# Patient Record
Sex: MALE | Race: OTHER | HISPANIC OR LATINO | Employment: OTHER | URBAN - METROPOLITAN AREA
[De-identification: names, ages, dates, MRNs, and addresses within clinical notes are randomized per-mention and may not be internally consistent; named-entity substitution may affect disease eponyms.]

---

## 2017-01-16 ENCOUNTER — TRANSCRIBE ORDERS (OUTPATIENT)
Dept: ADMINISTRATIVE | Facility: HOSPITAL | Age: 32
End: 2017-01-16

## 2017-01-16 ENCOUNTER — LAB (OUTPATIENT)
Dept: LAB | Facility: HOSPITAL | Age: 32
End: 2017-01-16
Payer: MEDICARE

## 2017-01-16 DIAGNOSIS — F25.1 SCHIZOAFFECTIVE DISORDER, DEPRESSIVE TYPE (HCC): Primary | ICD-10-CM

## 2017-01-16 DIAGNOSIS — Z79.899 ENCOUNTER FOR LONG-TERM (CURRENT) USE OF OTHER MEDICATIONS: ICD-10-CM

## 2017-01-16 DIAGNOSIS — F25.1 SCHIZOAFFECTIVE DISORDER, DEPRESSIVE TYPE (HCC): ICD-10-CM

## 2017-01-16 LAB
ALBUMIN SERPL BCP-MCNC: 3.8 G/DL (ref 3.5–5)
ALP SERPL-CCNC: 97 U/L (ref 46–116)
ALT SERPL W P-5'-P-CCNC: 26 U/L (ref 12–78)
ANION GAP SERPL CALCULATED.3IONS-SCNC: 5 MMOL/L (ref 4–13)
AST SERPL W P-5'-P-CCNC: 20 U/L (ref 5–45)
BASOPHILS # BLD AUTO: 0.02 THOUSANDS/ΜL (ref 0–0.1)
BASOPHILS NFR BLD AUTO: 0 % (ref 0–1)
BILIRUB SERPL-MCNC: 0.43 MG/DL (ref 0.2–1)
BUN SERPL-MCNC: 9 MG/DL (ref 5–25)
CALCIUM SERPL-MCNC: 9.2 MG/DL (ref 8.3–10.1)
CHLORIDE SERPL-SCNC: 104 MMOL/L (ref 100–108)
CO2 SERPL-SCNC: 34 MMOL/L (ref 21–32)
CREAT SERPL-MCNC: 0.82 MG/DL (ref 0.6–1.3)
EOSINOPHIL # BLD AUTO: 0.31 THOUSAND/ΜL (ref 0–0.61)
EOSINOPHIL NFR BLD AUTO: 7 % (ref 0–6)
ERYTHROCYTE [DISTWIDTH] IN BLOOD BY AUTOMATED COUNT: 14.3 % (ref 11.6–15.1)
GFR SERPL CREATININE-BSD FRML MDRD: >60 ML/MIN/1.73SQ M
GLUCOSE SERPL-MCNC: 68 MG/DL (ref 65–140)
HCT VFR BLD AUTO: 42.6 % (ref 36.5–49.3)
HGB BLD-MCNC: 14 G/DL (ref 12–17)
LYMPHOCYTES # BLD AUTO: 2.09 THOUSANDS/ΜL (ref 0.6–4.47)
LYMPHOCYTES NFR BLD AUTO: 43 % (ref 14–44)
MCH RBC QN AUTO: 31.2 PG (ref 26.8–34.3)
MCHC RBC AUTO-ENTMCNC: 32.9 G/DL (ref 31.4–37.4)
MCV RBC AUTO: 95 FL (ref 82–98)
MONOCYTES # BLD AUTO: 0.5 THOUSAND/ΜL (ref 0.17–1.22)
MONOCYTES NFR BLD AUTO: 11 % (ref 4–12)
NEUTROPHILS # BLD AUTO: 1.83 THOUSANDS/ΜL (ref 1.85–7.62)
NEUTS SEG NFR BLD AUTO: 39 % (ref 43–75)
NRBC BLD AUTO-RTO: 0 /100 WBCS
PLATELET # BLD AUTO: 235 THOUSANDS/UL (ref 149–390)
PMV BLD AUTO: 10.5 FL (ref 8.9–12.7)
POTASSIUM SERPL-SCNC: 4.4 MMOL/L (ref 3.5–5.3)
PROT SERPL-MCNC: 6.9 G/DL (ref 6.4–8.2)
RBC # BLD AUTO: 4.49 MILLION/UL (ref 3.88–5.62)
SODIUM SERPL-SCNC: 143 MMOL/L (ref 136–145)
VALPROATE SERPL-MCNC: 70 UG/ML (ref 50–100)
WBC # BLD AUTO: 4.75 THOUSAND/UL (ref 4.31–10.16)

## 2017-01-16 PROCEDURE — 80164 ASSAY DIPROPYLACETIC ACD TOT: CPT

## 2017-01-16 PROCEDURE — 36415 COLL VENOUS BLD VENIPUNCTURE: CPT

## 2017-01-16 PROCEDURE — 80053 COMPREHEN METABOLIC PANEL: CPT

## 2017-01-16 PROCEDURE — 85025 COMPLETE CBC W/AUTO DIFF WBC: CPT

## 2017-02-16 ENCOUNTER — TRANSCRIBE ORDERS (OUTPATIENT)
Dept: ADMINISTRATIVE | Facility: HOSPITAL | Age: 32
End: 2017-02-16

## 2017-02-16 ENCOUNTER — APPOINTMENT (OUTPATIENT)
Dept: LAB | Facility: HOSPITAL | Age: 32
End: 2017-02-16
Payer: COMMERCIAL

## 2017-02-16 DIAGNOSIS — F25.1 SCHIZOAFFECTIVE DISORDER, DEPRESSIVE TYPE (HCC): ICD-10-CM

## 2017-02-16 DIAGNOSIS — Z79.899 ENCOUNTER FOR LONG-TERM (CURRENT) USE OF OTHER MEDICATIONS: ICD-10-CM

## 2017-02-16 DIAGNOSIS — F25.1 SCHIZOAFFECTIVE DISORDER, DEPRESSIVE TYPE (HCC): Primary | ICD-10-CM

## 2017-02-16 LAB
ALBUMIN SERPL BCP-MCNC: 3.8 G/DL (ref 3.5–5)
ALP SERPL-CCNC: 105 U/L (ref 46–116)
ALT SERPL W P-5'-P-CCNC: 19 U/L (ref 12–78)
ANION GAP SERPL CALCULATED.3IONS-SCNC: 6 MMOL/L (ref 4–13)
AST SERPL W P-5'-P-CCNC: 12 U/L (ref 5–45)
BASOPHILS # BLD AUTO: 0.01 THOUSANDS/ΜL (ref 0–0.1)
BASOPHILS NFR BLD AUTO: 0 % (ref 0–1)
BILIRUB SERPL-MCNC: 0.24 MG/DL (ref 0.2–1)
BUN SERPL-MCNC: 7 MG/DL (ref 5–25)
CALCIUM SERPL-MCNC: 8.9 MG/DL (ref 8.3–10.1)
CHLORIDE SERPL-SCNC: 102 MMOL/L (ref 100–108)
CO2 SERPL-SCNC: 32 MMOL/L (ref 21–32)
CREAT SERPL-MCNC: 0.78 MG/DL (ref 0.6–1.3)
EOSINOPHIL # BLD AUTO: 0.17 THOUSAND/ΜL (ref 0–0.61)
EOSINOPHIL NFR BLD AUTO: 5 % (ref 0–6)
ERYTHROCYTE [DISTWIDTH] IN BLOOD BY AUTOMATED COUNT: 13.6 % (ref 11.6–15.1)
GFR SERPL CREATININE-BSD FRML MDRD: >60 ML/MIN/1.73SQ M
GLUCOSE SERPL-MCNC: 87 MG/DL (ref 65–140)
HCT VFR BLD AUTO: 39.4 % (ref 36.5–49.3)
HGB BLD-MCNC: 13.8 G/DL (ref 12–17)
LYMPHOCYTES # BLD AUTO: 1.76 THOUSANDS/ΜL (ref 0.6–4.47)
LYMPHOCYTES NFR BLD AUTO: 54 % (ref 14–44)
MCH RBC QN AUTO: 32.2 PG (ref 26.8–34.3)
MCHC RBC AUTO-ENTMCNC: 35 G/DL (ref 31.4–37.4)
MCV RBC AUTO: 92 FL (ref 82–98)
MONOCYTES # BLD AUTO: 0.34 THOUSAND/ΜL (ref 0.17–1.22)
MONOCYTES NFR BLD AUTO: 10 % (ref 4–12)
NEUTROPHILS # BLD AUTO: 1.04 THOUSANDS/ΜL (ref 1.85–7.62)
NEUTS SEG NFR BLD AUTO: 31 % (ref 43–75)
NRBC BLD AUTO-RTO: 0 /100 WBCS
PLATELET # BLD AUTO: 203 THOUSANDS/UL (ref 149–390)
PMV BLD AUTO: 9.9 FL (ref 8.9–12.7)
POTASSIUM SERPL-SCNC: 4.5 MMOL/L (ref 3.5–5.3)
PROT SERPL-MCNC: 7 G/DL (ref 6.4–8.2)
RBC # BLD AUTO: 4.28 MILLION/UL (ref 3.88–5.62)
SODIUM SERPL-SCNC: 140 MMOL/L (ref 136–145)
WBC # BLD AUTO: 3.32 THOUSAND/UL (ref 4.31–10.16)

## 2017-02-16 PROCEDURE — 80053 COMPREHEN METABOLIC PANEL: CPT

## 2017-02-16 PROCEDURE — 80165 DIPROPYLACETIC ACID FREE: CPT

## 2017-02-16 PROCEDURE — 85025 COMPLETE CBC W/AUTO DIFF WBC: CPT

## 2017-02-16 PROCEDURE — 36415 COLL VENOUS BLD VENIPUNCTURE: CPT

## 2017-02-23 LAB — VALPROATE FREE SERPL-MCNC: 14.1 UG/ML (ref 6–22)

## 2017-03-09 ENCOUNTER — APPOINTMENT (OUTPATIENT)
Dept: LAB | Facility: HOSPITAL | Age: 32
End: 2017-03-09
Payer: COMMERCIAL

## 2017-03-09 ENCOUNTER — TRANSCRIBE ORDERS (OUTPATIENT)
Dept: ADMINISTRATIVE | Facility: HOSPITAL | Age: 32
End: 2017-03-09

## 2017-03-09 DIAGNOSIS — Z79.899 ENCOUNTER FOR LONG-TERM (CURRENT) USE OF OTHER MEDICATIONS: ICD-10-CM

## 2017-03-09 DIAGNOSIS — F25.1 SCHIZOAFFECTIVE DISORDER, DEPRESSIVE TYPE (HCC): ICD-10-CM

## 2017-03-09 DIAGNOSIS — F25.1 SCHIZOAFFECTIVE DISORDER, DEPRESSIVE TYPE (HCC): Primary | ICD-10-CM

## 2017-03-09 LAB
BASOPHILS # BLD AUTO: 0.01 THOUSANDS/ΜL (ref 0–0.1)
BASOPHILS NFR BLD AUTO: 0 % (ref 0–1)
EOSINOPHIL # BLD AUTO: 0.12 THOUSAND/ΜL (ref 0–0.61)
EOSINOPHIL NFR BLD AUTO: 3 % (ref 0–6)
ERYTHROCYTE [DISTWIDTH] IN BLOOD BY AUTOMATED COUNT: 13.8 % (ref 11.6–15.1)
HCT VFR BLD AUTO: 41.3 % (ref 36.5–49.3)
HGB BLD-MCNC: 13.8 G/DL (ref 12–17)
LYMPHOCYTES # BLD AUTO: 1.86 THOUSANDS/ΜL (ref 0.6–4.47)
LYMPHOCYTES NFR BLD AUTO: 45 % (ref 14–44)
MCH RBC QN AUTO: 31.2 PG (ref 26.8–34.3)
MCHC RBC AUTO-ENTMCNC: 33.4 G/DL (ref 31.4–37.4)
MCV RBC AUTO: 93 FL (ref 82–98)
MONOCYTES # BLD AUTO: 0.58 THOUSAND/ΜL (ref 0.17–1.22)
MONOCYTES NFR BLD AUTO: 14 % (ref 4–12)
NEUTROPHILS # BLD AUTO: 1.6 THOUSANDS/ΜL (ref 1.85–7.62)
NEUTS SEG NFR BLD AUTO: 38 % (ref 43–75)
NRBC BLD AUTO-RTO: 0 /100 WBCS
PLATELET # BLD AUTO: 220 THOUSANDS/UL (ref 149–390)
PMV BLD AUTO: 10.3 FL (ref 8.9–12.7)
RBC # BLD AUTO: 4.43 MILLION/UL (ref 3.88–5.62)
WBC # BLD AUTO: 4.17 THOUSAND/UL (ref 4.31–10.16)

## 2017-03-09 PROCEDURE — 80159 DRUG ASSAY CLOZAPINE: CPT

## 2017-03-09 PROCEDURE — 85025 COMPLETE CBC W/AUTO DIFF WBC: CPT

## 2017-03-09 PROCEDURE — 36415 COLL VENOUS BLD VENIPUNCTURE: CPT

## 2017-03-10 LAB
CLOZAPINE SERPL-MCNC: NORMAL NG/ML (ref 350–650)
CLOZAPINE+NOR SERPL-MCNC: NORMAL NG/ML
NORCLOZAPINE SERPL-MCNC: NORMAL NG/ML

## 2017-03-17 ENCOUNTER — LAB (OUTPATIENT)
Dept: LAB | Facility: HOSPITAL | Age: 32
End: 2017-03-17
Payer: COMMERCIAL

## 2017-03-17 DIAGNOSIS — F25.1 SCHIZOAFFECTIVE DISORDER, DEPRESSIVE TYPE (HCC): ICD-10-CM

## 2017-03-17 DIAGNOSIS — Z79.899 ENCOUNTER FOR LONG-TERM (CURRENT) USE OF OTHER MEDICATIONS: ICD-10-CM

## 2017-03-17 LAB
BASOPHILS # BLD AUTO: 0.02 THOUSANDS/ΜL (ref 0–0.1)
BASOPHILS NFR BLD AUTO: 0 % (ref 0–1)
EOSINOPHIL # BLD AUTO: 0.24 THOUSAND/ΜL (ref 0–0.61)
EOSINOPHIL NFR BLD AUTO: 5 % (ref 0–6)
ERYTHROCYTE [DISTWIDTH] IN BLOOD BY AUTOMATED COUNT: 13.7 % (ref 11.6–15.1)
HCT VFR BLD AUTO: 39.2 % (ref 36.5–49.3)
HGB BLD-MCNC: 13.3 G/DL (ref 12–17)
LYMPHOCYTES # BLD AUTO: 1.88 THOUSANDS/ΜL (ref 0.6–4.47)
LYMPHOCYTES NFR BLD AUTO: 40 % (ref 14–44)
MCH RBC QN AUTO: 31.7 PG (ref 26.8–34.3)
MCHC RBC AUTO-ENTMCNC: 33.9 G/DL (ref 31.4–37.4)
MCV RBC AUTO: 94 FL (ref 82–98)
MONOCYTES # BLD AUTO: 0.59 THOUSAND/ΜL (ref 0.17–1.22)
MONOCYTES NFR BLD AUTO: 12 % (ref 4–12)
NEUTROPHILS # BLD AUTO: 2.03 THOUSANDS/ΜL (ref 1.85–7.62)
NEUTS SEG NFR BLD AUTO: 43 % (ref 43–75)
NRBC BLD AUTO-RTO: 0 /100 WBCS
PLATELET # BLD AUTO: 216 THOUSANDS/UL (ref 149–390)
PMV BLD AUTO: 10.1 FL (ref 8.9–12.7)
RBC # BLD AUTO: 4.19 MILLION/UL (ref 3.88–5.62)
WBC # BLD AUTO: 4.76 THOUSAND/UL (ref 4.31–10.16)

## 2017-03-17 PROCEDURE — 80159 DRUG ASSAY CLOZAPINE: CPT

## 2017-03-17 PROCEDURE — 36415 COLL VENOUS BLD VENIPUNCTURE: CPT

## 2017-03-17 PROCEDURE — 85025 COMPLETE CBC W/AUTO DIFF WBC: CPT

## 2017-03-24 ENCOUNTER — APPOINTMENT (OUTPATIENT)
Dept: LAB | Facility: HOSPITAL | Age: 32
End: 2017-03-24
Payer: COMMERCIAL

## 2017-03-24 DIAGNOSIS — F25.1 SCHIZOAFFECTIVE DISORDER, DEPRESSIVE TYPE (HCC): ICD-10-CM

## 2017-03-24 DIAGNOSIS — Z79.899 ENCOUNTER FOR LONG-TERM (CURRENT) USE OF OTHER MEDICATIONS: ICD-10-CM

## 2017-03-24 LAB
BASOPHILS # BLD AUTO: 0.02 THOUSANDS/ΜL (ref 0–0.1)
BASOPHILS NFR BLD AUTO: 0 % (ref 0–1)
EOSINOPHIL # BLD AUTO: 0.3 THOUSAND/ΜL (ref 0–0.61)
EOSINOPHIL NFR BLD AUTO: 6 % (ref 0–6)
ERYTHROCYTE [DISTWIDTH] IN BLOOD BY AUTOMATED COUNT: 13.5 % (ref 11.6–15.1)
HCT VFR BLD AUTO: 41 % (ref 36.5–49.3)
HGB BLD-MCNC: 13.9 G/DL (ref 12–17)
LYMPHOCYTES # BLD AUTO: 2 THOUSANDS/ΜL (ref 0.6–4.47)
LYMPHOCYTES NFR BLD AUTO: 41 % (ref 14–44)
MCH RBC QN AUTO: 31.6 PG (ref 26.8–34.3)
MCHC RBC AUTO-ENTMCNC: 33.9 G/DL (ref 31.4–37.4)
MCV RBC AUTO: 93 FL (ref 82–98)
MONOCYTES # BLD AUTO: 0.51 THOUSAND/ΜL (ref 0.17–1.22)
MONOCYTES NFR BLD AUTO: 11 % (ref 4–12)
NEUTROPHILS # BLD AUTO: 2.02 THOUSANDS/ΜL (ref 1.85–7.62)
NEUTS SEG NFR BLD AUTO: 42 % (ref 43–75)
NRBC BLD AUTO-RTO: 0 /100 WBCS
PLATELET # BLD AUTO: 222 THOUSANDS/UL (ref 149–390)
PMV BLD AUTO: 10.2 FL (ref 8.9–12.7)
RBC # BLD AUTO: 4.4 MILLION/UL (ref 3.88–5.62)
WBC # BLD AUTO: 4.85 THOUSAND/UL (ref 4.31–10.16)

## 2017-03-24 PROCEDURE — 36415 COLL VENOUS BLD VENIPUNCTURE: CPT

## 2017-03-24 PROCEDURE — 85025 COMPLETE CBC W/AUTO DIFF WBC: CPT

## 2017-04-04 ENCOUNTER — TRANSCRIBE ORDERS (OUTPATIENT)
Dept: ADMINISTRATIVE | Facility: HOSPITAL | Age: 32
End: 2017-04-04

## 2017-04-04 ENCOUNTER — APPOINTMENT (OUTPATIENT)
Dept: LAB | Facility: HOSPITAL | Age: 32
End: 2017-04-04
Payer: COMMERCIAL

## 2017-04-04 DIAGNOSIS — Z79.899 ENCOUNTER FOR LONG-TERM (CURRENT) USE OF OTHER MEDICATIONS: ICD-10-CM

## 2017-04-04 DIAGNOSIS — F25.1 SCHIZOAFFECTIVE DISORDER, DEPRESSIVE TYPE (HCC): Primary | ICD-10-CM

## 2017-04-04 DIAGNOSIS — F25.1 SCHIZOAFFECTIVE DISORDER, DEPRESSIVE TYPE (HCC): ICD-10-CM

## 2017-04-04 LAB
BASOPHILS # BLD AUTO: 0.01 THOUSANDS/ΜL (ref 0–0.1)
BASOPHILS NFR BLD AUTO: 0 % (ref 0–1)
EOSINOPHIL # BLD AUTO: 0.19 THOUSAND/ΜL (ref 0–0.61)
EOSINOPHIL NFR BLD AUTO: 5 % (ref 0–6)
ERYTHROCYTE [DISTWIDTH] IN BLOOD BY AUTOMATED COUNT: 13.4 % (ref 11.6–15.1)
HCT VFR BLD AUTO: 39.1 % (ref 36.5–49.3)
HGB BLD-MCNC: 13.2 G/DL (ref 12–17)
LYMPHOCYTES # BLD AUTO: 2.02 THOUSANDS/ΜL (ref 0.6–4.47)
LYMPHOCYTES NFR BLD AUTO: 49 % (ref 14–44)
MCH RBC QN AUTO: 31 PG (ref 26.8–34.3)
MCHC RBC AUTO-ENTMCNC: 33.8 G/DL (ref 31.4–37.4)
MCV RBC AUTO: 92 FL (ref 82–98)
MONOCYTES # BLD AUTO: 0.39 THOUSAND/ΜL (ref 0.17–1.22)
MONOCYTES NFR BLD AUTO: 10 % (ref 4–12)
NEUTROPHILS # BLD AUTO: 1.49 THOUSANDS/ΜL (ref 1.85–7.62)
NEUTS SEG NFR BLD AUTO: 36 % (ref 43–75)
PLATELET # BLD AUTO: 198 THOUSANDS/UL (ref 149–390)
PMV BLD AUTO: 10 FL (ref 8.9–12.7)
RBC # BLD AUTO: 4.26 MILLION/UL (ref 3.88–5.62)
WBC # BLD AUTO: 4.1 THOUSAND/UL (ref 4.31–10.16)

## 2017-04-04 PROCEDURE — 85025 COMPLETE CBC W/AUTO DIFF WBC: CPT

## 2017-04-04 PROCEDURE — 80159 DRUG ASSAY CLOZAPINE: CPT

## 2017-04-04 PROCEDURE — 36415 COLL VENOUS BLD VENIPUNCTURE: CPT

## 2017-04-13 ENCOUNTER — TRANSCRIBE ORDERS (OUTPATIENT)
Dept: ADMINISTRATIVE | Facility: HOSPITAL | Age: 32
End: 2017-04-13

## 2017-04-13 ENCOUNTER — APPOINTMENT (OUTPATIENT)
Dept: LAB | Facility: HOSPITAL | Age: 32
End: 2017-04-13
Payer: COMMERCIAL

## 2017-04-13 DIAGNOSIS — F25.9 SCHIZOAFFECTIVE DISORDER, UNSPECIFIED TYPE (HCC): ICD-10-CM

## 2017-04-13 DIAGNOSIS — F25.9 SCHIZOAFFECTIVE DISORDER, UNSPECIFIED TYPE (HCC): Primary | ICD-10-CM

## 2017-04-13 DIAGNOSIS — Z79.899 ENCOUNTER FOR LONG-TERM (CURRENT) USE OF OTHER MEDICATIONS: ICD-10-CM

## 2017-04-13 LAB
BASOPHILS # BLD AUTO: 0.02 THOUSANDS/ΜL (ref 0–0.1)
BASOPHILS NFR BLD AUTO: 0 % (ref 0–1)
EOSINOPHIL # BLD AUTO: 0.3 THOUSAND/ΜL (ref 0–0.61)
EOSINOPHIL NFR BLD AUTO: 6 % (ref 0–6)
ERYTHROCYTE [DISTWIDTH] IN BLOOD BY AUTOMATED COUNT: 13.7 % (ref 11.6–15.1)
HCT VFR BLD AUTO: 42.1 % (ref 36.5–49.3)
HGB BLD-MCNC: 14.6 G/DL (ref 12–17)
LYMPHOCYTES # BLD AUTO: 2.31 THOUSANDS/ΜL (ref 0.6–4.47)
LYMPHOCYTES NFR BLD AUTO: 43 % (ref 14–44)
MCH RBC QN AUTO: 32.5 PG (ref 26.8–34.3)
MCHC RBC AUTO-ENTMCNC: 34.7 G/DL (ref 31.4–37.4)
MCV RBC AUTO: 94 FL (ref 82–98)
MONOCYTES # BLD AUTO: 0.74 THOUSAND/ΜL (ref 0.17–1.22)
MONOCYTES NFR BLD AUTO: 14 % (ref 4–12)
NEUTROPHILS # BLD AUTO: 1.94 THOUSANDS/ΜL (ref 1.85–7.62)
NEUTS SEG NFR BLD AUTO: 37 % (ref 43–75)
NRBC BLD AUTO-RTO: 0 /100 WBCS
PLATELET # BLD AUTO: 210 THOUSANDS/UL (ref 149–390)
PMV BLD AUTO: 10 FL (ref 8.9–12.7)
RBC # BLD AUTO: 4.49 MILLION/UL (ref 3.88–5.62)
WBC # BLD AUTO: 5.31 THOUSAND/UL (ref 4.31–10.16)

## 2017-04-13 PROCEDURE — 85025 COMPLETE CBC W/AUTO DIFF WBC: CPT

## 2017-04-13 PROCEDURE — 36415 COLL VENOUS BLD VENIPUNCTURE: CPT

## 2017-04-20 ENCOUNTER — APPOINTMENT (OUTPATIENT)
Dept: LAB | Facility: HOSPITAL | Age: 32
End: 2017-04-20
Payer: COMMERCIAL

## 2017-04-20 ENCOUNTER — TRANSCRIBE ORDERS (OUTPATIENT)
Dept: ADMINISTRATIVE | Facility: HOSPITAL | Age: 32
End: 2017-04-20

## 2017-04-20 DIAGNOSIS — Z79.899 ENCOUNTER FOR LONG-TERM (CURRENT) USE OF OTHER MEDICATIONS: ICD-10-CM

## 2017-04-20 DIAGNOSIS — F25.1 SCHIZOAFFECTIVE DISORDER, DEPRESSIVE TYPE (HCC): ICD-10-CM

## 2017-04-20 DIAGNOSIS — F25.1 SCHIZOAFFECTIVE DISORDER, DEPRESSIVE TYPE (HCC): Primary | ICD-10-CM

## 2017-04-20 LAB
BASOPHILS # BLD AUTO: 0.03 THOUSANDS/ΜL (ref 0–0.1)
BASOPHILS NFR BLD AUTO: 1 % (ref 0–1)
EOSINOPHIL # BLD AUTO: 0.24 THOUSAND/ΜL (ref 0–0.61)
EOSINOPHIL NFR BLD AUTO: 5 % (ref 0–6)
ERYTHROCYTE [DISTWIDTH] IN BLOOD BY AUTOMATED COUNT: 13.9 % (ref 11.6–15.1)
HCT VFR BLD AUTO: 41.3 % (ref 36.5–49.3)
HGB BLD-MCNC: 13.8 G/DL (ref 12–17)
LYMPHOCYTES # BLD AUTO: 2.77 THOUSANDS/ΜL (ref 0.6–4.47)
LYMPHOCYTES NFR BLD AUTO: 58 % (ref 14–44)
MCH RBC QN AUTO: 31.1 PG (ref 26.8–34.3)
MCHC RBC AUTO-ENTMCNC: 33.4 G/DL (ref 31.4–37.4)
MCV RBC AUTO: 93 FL (ref 82–98)
MONOCYTES # BLD AUTO: 0.5 THOUSAND/ΜL (ref 0.17–1.22)
MONOCYTES NFR BLD AUTO: 11 % (ref 4–12)
NEUTROPHILS # BLD AUTO: 1.2 THOUSANDS/ΜL (ref 1.85–7.62)
NEUTS SEG NFR BLD AUTO: 25 % (ref 43–75)
NRBC BLD AUTO-RTO: 0 /100 WBCS
PLATELET # BLD AUTO: 222 THOUSANDS/UL (ref 149–390)
PMV BLD AUTO: 9.9 FL (ref 8.9–12.7)
RBC # BLD AUTO: 4.44 MILLION/UL (ref 3.88–5.62)
WBC # BLD AUTO: 4.74 THOUSAND/UL (ref 4.31–10.16)

## 2017-04-20 PROCEDURE — 36415 COLL VENOUS BLD VENIPUNCTURE: CPT

## 2017-04-20 PROCEDURE — 85025 COMPLETE CBC W/AUTO DIFF WBC: CPT

## 2017-04-25 ENCOUNTER — APPOINTMENT (OUTPATIENT)
Dept: LAB | Facility: HOSPITAL | Age: 32
End: 2017-04-25
Payer: COMMERCIAL

## 2017-04-25 ENCOUNTER — TRANSCRIBE ORDERS (OUTPATIENT)
Dept: ADMINISTRATIVE | Facility: HOSPITAL | Age: 32
End: 2017-04-25

## 2017-04-25 DIAGNOSIS — F25.1 SCHIZOAFFECTIVE DISORDER, DEPRESSIVE TYPE (HCC): Primary | ICD-10-CM

## 2017-04-25 DIAGNOSIS — F25.1 SCHIZOAFFECTIVE DISORDER, DEPRESSIVE TYPE (HCC): ICD-10-CM

## 2017-04-25 DIAGNOSIS — Z79.899 ENCOUNTER FOR LONG-TERM (CURRENT) USE OF OTHER MEDICATIONS: ICD-10-CM

## 2017-04-25 LAB
BASOPHILS # BLD AUTO: 0.01 THOUSANDS/ΜL (ref 0–0.1)
BASOPHILS NFR BLD AUTO: 0 % (ref 0–1)
EOSINOPHIL # BLD AUTO: 0.23 THOUSAND/ΜL (ref 0–0.61)
EOSINOPHIL NFR BLD AUTO: 6 % (ref 0–6)
ERYTHROCYTE [DISTWIDTH] IN BLOOD BY AUTOMATED COUNT: 13.8 % (ref 11.6–15.1)
HCT VFR BLD AUTO: 44.3 % (ref 36.5–49.3)
HGB BLD-MCNC: 14.6 G/DL (ref 12–17)
LYMPHOCYTES # BLD AUTO: 1.75 THOUSANDS/ΜL (ref 0.6–4.47)
LYMPHOCYTES NFR BLD AUTO: 45 % (ref 14–44)
MCH RBC QN AUTO: 30.3 PG (ref 26.8–34.3)
MCHC RBC AUTO-ENTMCNC: 33 G/DL (ref 31.4–37.4)
MCV RBC AUTO: 92 FL (ref 82–98)
MONOCYTES # BLD AUTO: 0.48 THOUSAND/ΜL (ref 0.17–1.22)
MONOCYTES NFR BLD AUTO: 12 % (ref 4–12)
NEUTROPHILS # BLD AUTO: 1.44 THOUSANDS/ΜL (ref 1.85–7.62)
NEUTS SEG NFR BLD AUTO: 37 % (ref 43–75)
PLATELET # BLD AUTO: 232 THOUSANDS/UL (ref 149–390)
PMV BLD AUTO: 9.9 FL (ref 8.9–12.7)
RBC # BLD AUTO: 4.82 MILLION/UL (ref 3.88–5.62)
WBC # BLD AUTO: 3.91 THOUSAND/UL (ref 4.31–10.16)

## 2017-04-25 PROCEDURE — 36415 COLL VENOUS BLD VENIPUNCTURE: CPT

## 2017-04-25 PROCEDURE — 85025 COMPLETE CBC W/AUTO DIFF WBC: CPT

## 2017-05-31 ENCOUNTER — APPOINTMENT (OUTPATIENT)
Dept: LAB | Facility: HOSPITAL | Age: 32
End: 2017-05-31
Payer: COMMERCIAL

## 2017-05-31 ENCOUNTER — TRANSCRIBE ORDERS (OUTPATIENT)
Dept: ADMINISTRATIVE | Facility: HOSPITAL | Age: 32
End: 2017-05-31

## 2017-05-31 DIAGNOSIS — F25.1 SCHIZOAFFECTIVE DISORDER, DEPRESSIVE TYPE (HCC): ICD-10-CM

## 2017-05-31 DIAGNOSIS — F25.1 SCHIZOAFFECTIVE DISORDER, DEPRESSIVE TYPE (HCC): Primary | ICD-10-CM

## 2017-05-31 DIAGNOSIS — Z79.899 ENCOUNTER FOR LONG-TERM (CURRENT) USE OF OTHER MEDICATIONS: ICD-10-CM

## 2017-05-31 LAB
BASOPHILS # BLD AUTO: 0.03 THOUSANDS/ΜL (ref 0–0.1)
BASOPHILS NFR BLD AUTO: 1 % (ref 0–1)
EOSINOPHIL # BLD AUTO: 0.37 THOUSAND/ΜL (ref 0–0.61)
EOSINOPHIL NFR BLD AUTO: 8 % (ref 0–6)
ERYTHROCYTE [DISTWIDTH] IN BLOOD BY AUTOMATED COUNT: 13.6 % (ref 11.6–15.1)
HCT VFR BLD AUTO: 39.7 % (ref 36.5–49.3)
HGB BLD-MCNC: 13.4 G/DL (ref 12–17)
LYMPHOCYTES # BLD AUTO: 2.46 THOUSANDS/ΜL (ref 0.6–4.47)
LYMPHOCYTES NFR BLD AUTO: 51 % (ref 14–44)
MCH RBC QN AUTO: 30.5 PG (ref 26.8–34.3)
MCHC RBC AUTO-ENTMCNC: 33.8 G/DL (ref 31.4–37.4)
MCV RBC AUTO: 90 FL (ref 82–98)
MONOCYTES # BLD AUTO: 0.48 THOUSAND/ΜL (ref 0.17–1.22)
MONOCYTES NFR BLD AUTO: 10 % (ref 4–12)
NEUTROPHILS # BLD AUTO: 1.39 THOUSANDS/ΜL (ref 1.85–7.62)
NEUTS SEG NFR BLD AUTO: 30 % (ref 43–75)
NRBC BLD AUTO-RTO: 0 /100 WBCS
PLATELET # BLD AUTO: 228 THOUSANDS/UL (ref 149–390)
PMV BLD AUTO: 9.7 FL (ref 8.9–12.7)
RBC # BLD AUTO: 4.4 MILLION/UL (ref 3.88–5.62)
WBC # BLD AUTO: 4.73 THOUSAND/UL (ref 4.31–10.16)

## 2017-05-31 PROCEDURE — 85025 COMPLETE CBC W/AUTO DIFF WBC: CPT

## 2017-05-31 PROCEDURE — 36415 COLL VENOUS BLD VENIPUNCTURE: CPT

## 2017-06-07 ENCOUNTER — APPOINTMENT (OUTPATIENT)
Dept: LAB | Facility: HOSPITAL | Age: 32
End: 2017-06-07
Payer: COMMERCIAL

## 2017-06-07 ENCOUNTER — TRANSCRIBE ORDERS (OUTPATIENT)
Dept: ADMINISTRATIVE | Facility: HOSPITAL | Age: 32
End: 2017-06-07

## 2017-06-07 DIAGNOSIS — Z79.899 ENCOUNTER FOR LONG-TERM (CURRENT) USE OF OTHER MEDICATIONS: ICD-10-CM

## 2017-06-07 DIAGNOSIS — Z79.899 ENCOUNTER FOR LONG-TERM (CURRENT) USE OF OTHER MEDICATIONS: Primary | ICD-10-CM

## 2017-06-07 DIAGNOSIS — F25.1 SCHIZOAFFECTIVE DISORDER, DEPRESSIVE TYPE (HCC): ICD-10-CM

## 2017-06-07 LAB
BASOPHILS # BLD AUTO: 0.02 THOUSANDS/ΜL (ref 0–0.1)
BASOPHILS NFR BLD AUTO: 1 % (ref 0–1)
EOSINOPHIL # BLD AUTO: 0.31 THOUSAND/ΜL (ref 0–0.61)
EOSINOPHIL NFR BLD AUTO: 7 % (ref 0–6)
ERYTHROCYTE [DISTWIDTH] IN BLOOD BY AUTOMATED COUNT: 13.8 % (ref 11.6–15.1)
HCT VFR BLD AUTO: 39.5 % (ref 36.5–49.3)
HGB BLD-MCNC: 13.6 G/DL (ref 12–17)
LYMPHOCYTES # BLD AUTO: 2.51 THOUSANDS/ΜL (ref 0.6–4.47)
LYMPHOCYTES NFR BLD AUTO: 57 % (ref 14–44)
MCH RBC QN AUTO: 30.6 PG (ref 26.8–34.3)
MCHC RBC AUTO-ENTMCNC: 34.4 G/DL (ref 31.4–37.4)
MCV RBC AUTO: 89 FL (ref 82–98)
MONOCYTES # BLD AUTO: 0.47 THOUSAND/ΜL (ref 0.17–1.22)
MONOCYTES NFR BLD AUTO: 11 % (ref 4–12)
NEUTROPHILS # BLD AUTO: 1.07 THOUSANDS/ΜL (ref 1.85–7.62)
NEUTS SEG NFR BLD AUTO: 24 % (ref 43–75)
NRBC BLD AUTO-RTO: 0 /100 WBCS
PLATELET # BLD AUTO: 192 THOUSANDS/UL (ref 149–390)
PMV BLD AUTO: 9.9 FL (ref 8.9–12.7)
RBC # BLD AUTO: 4.44 MILLION/UL (ref 3.88–5.62)
WBC # BLD AUTO: 4.38 THOUSAND/UL (ref 4.31–10.16)

## 2017-06-07 PROCEDURE — 85025 COMPLETE CBC W/AUTO DIFF WBC: CPT

## 2017-06-07 PROCEDURE — 36415 COLL VENOUS BLD VENIPUNCTURE: CPT

## 2017-06-19 ENCOUNTER — APPOINTMENT (OUTPATIENT)
Dept: LAB | Facility: HOSPITAL | Age: 32
End: 2017-06-19
Payer: COMMERCIAL

## 2017-06-19 DIAGNOSIS — Z79.899 ENCOUNTER FOR LONG-TERM (CURRENT) USE OF OTHER MEDICATIONS: ICD-10-CM

## 2017-06-19 DIAGNOSIS — F25.1 SCHIZOAFFECTIVE DISORDER, DEPRESSIVE TYPE (HCC): ICD-10-CM

## 2017-06-19 LAB
BASOPHILS # BLD AUTO: 0.03 THOUSANDS/ΜL (ref 0–0.1)
BASOPHILS NFR BLD AUTO: 0 % (ref 0–1)
EOSINOPHIL # BLD AUTO: 0.29 THOUSAND/ΜL (ref 0–0.61)
EOSINOPHIL NFR BLD AUTO: 4 % (ref 0–6)
ERYTHROCYTE [DISTWIDTH] IN BLOOD BY AUTOMATED COUNT: 14.4 % (ref 11.6–15.1)
HCT VFR BLD AUTO: 40.2 % (ref 36.5–49.3)
HGB BLD-MCNC: 13.7 G/DL (ref 12–17)
LYMPHOCYTES # BLD AUTO: 2.88 THOUSANDS/ΜL (ref 0.6–4.47)
LYMPHOCYTES NFR BLD AUTO: 40 % (ref 14–44)
MCH RBC QN AUTO: 30.6 PG (ref 26.8–34.3)
MCHC RBC AUTO-ENTMCNC: 34.1 G/DL (ref 31.4–37.4)
MCV RBC AUTO: 90 FL (ref 82–98)
MONOCYTES # BLD AUTO: 1.05 THOUSAND/ΜL (ref 0.17–1.22)
MONOCYTES NFR BLD AUTO: 15 % (ref 4–12)
NEUTROPHILS # BLD AUTO: 2.94 THOUSANDS/ΜL (ref 1.85–7.62)
NEUTS SEG NFR BLD AUTO: 41 % (ref 43–75)
NRBC BLD AUTO-RTO: 0 /100 WBCS
PLATELET # BLD AUTO: 232 THOUSANDS/UL (ref 149–390)
PMV BLD AUTO: 10.1 FL (ref 8.9–12.7)
RBC # BLD AUTO: 4.48 MILLION/UL (ref 3.88–5.62)
WBC # BLD AUTO: 7.19 THOUSAND/UL (ref 4.31–10.16)

## 2017-06-19 PROCEDURE — 36415 COLL VENOUS BLD VENIPUNCTURE: CPT

## 2017-06-19 PROCEDURE — 85025 COMPLETE CBC W/AUTO DIFF WBC: CPT

## 2017-07-07 ENCOUNTER — TRANSCRIBE ORDERS (OUTPATIENT)
Dept: ADMINISTRATIVE | Facility: HOSPITAL | Age: 32
End: 2017-07-07

## 2017-07-07 ENCOUNTER — APPOINTMENT (OUTPATIENT)
Dept: LAB | Facility: HOSPITAL | Age: 32
End: 2017-07-07
Payer: COMMERCIAL

## 2017-07-07 DIAGNOSIS — Z79.899 ENCOUNTER FOR LONG-TERM (CURRENT) USE OF OTHER MEDICATIONS: Primary | ICD-10-CM

## 2017-07-07 DIAGNOSIS — Z79.899 ENCOUNTER FOR LONG-TERM (CURRENT) USE OF OTHER MEDICATIONS: ICD-10-CM

## 2017-07-07 LAB
BASOPHILS # BLD AUTO: 0.03 THOUSANDS/ΜL (ref 0–0.1)
BASOPHILS NFR BLD AUTO: 1 % (ref 0–1)
EOSINOPHIL # BLD AUTO: 0.26 THOUSAND/ΜL (ref 0–0.61)
EOSINOPHIL NFR BLD AUTO: 6 % (ref 0–6)
ERYTHROCYTE [DISTWIDTH] IN BLOOD BY AUTOMATED COUNT: 14.2 % (ref 11.6–15.1)
HCT VFR BLD AUTO: 37.9 % (ref 36.5–49.3)
HGB BLD-MCNC: 13.1 G/DL (ref 12–17)
LYMPHOCYTES # BLD AUTO: 1.83 THOUSANDS/ΜL (ref 0.6–4.47)
LYMPHOCYTES NFR BLD AUTO: 40 % (ref 14–44)
MCH RBC QN AUTO: 30.8 PG (ref 26.8–34.3)
MCHC RBC AUTO-ENTMCNC: 34.6 G/DL (ref 31.4–37.4)
MCV RBC AUTO: 89 FL (ref 82–98)
MONOCYTES # BLD AUTO: 0.56 THOUSAND/ΜL (ref 0.17–1.22)
MONOCYTES NFR BLD AUTO: 13 % (ref 4–12)
NEUTROPHILS # BLD AUTO: 1.77 THOUSANDS/ΜL (ref 1.85–7.62)
NEUTS SEG NFR BLD AUTO: 40 % (ref 43–75)
NRBC BLD AUTO-RTO: 0 /100 WBCS
PLATELET # BLD AUTO: 223 THOUSANDS/UL (ref 149–390)
PMV BLD AUTO: 10.2 FL (ref 8.9–12.7)
RBC # BLD AUTO: 4.25 MILLION/UL (ref 3.88–5.62)
WBC # BLD AUTO: 4.45 THOUSAND/UL (ref 4.31–10.16)

## 2017-07-07 PROCEDURE — 36415 COLL VENOUS BLD VENIPUNCTURE: CPT

## 2017-07-07 PROCEDURE — 85025 COMPLETE CBC W/AUTO DIFF WBC: CPT

## 2017-07-18 ENCOUNTER — TRANSCRIBE ORDERS (OUTPATIENT)
Dept: ADMINISTRATIVE | Facility: HOSPITAL | Age: 32
End: 2017-07-18

## 2017-07-18 ENCOUNTER — APPOINTMENT (OUTPATIENT)
Dept: LAB | Facility: HOSPITAL | Age: 32
End: 2017-07-18
Payer: COMMERCIAL

## 2017-07-18 DIAGNOSIS — F25.1 SCHIZOAFFECTIVE DISORDER, DEPRESSIVE TYPE (HCC): Primary | ICD-10-CM

## 2017-07-18 DIAGNOSIS — F25.1 SCHIZOAFFECTIVE DISORDER, DEPRESSIVE TYPE (HCC): ICD-10-CM

## 2017-07-18 DIAGNOSIS — Z79.899 ENCOUNTER FOR LONG-TERM (CURRENT) USE OF OTHER MEDICATIONS: ICD-10-CM

## 2017-07-18 LAB
BASOPHILS # BLD AUTO: 0.03 THOUSANDS/ΜL (ref 0–0.1)
BASOPHILS NFR BLD AUTO: 0 % (ref 0–1)
EOSINOPHIL # BLD AUTO: 0.19 THOUSAND/ΜL (ref 0–0.61)
EOSINOPHIL NFR BLD AUTO: 3 % (ref 0–6)
ERYTHROCYTE [DISTWIDTH] IN BLOOD BY AUTOMATED COUNT: 14.7 % (ref 11.6–15.1)
HCT VFR BLD AUTO: 36.7 % (ref 36.5–49.3)
HGB BLD-MCNC: 12.8 G/DL (ref 12–17)
LYMPHOCYTES # BLD AUTO: 2.52 THOUSANDS/ΜL (ref 0.6–4.47)
LYMPHOCYTES NFR BLD AUTO: 36 % (ref 14–44)
MCH RBC QN AUTO: 30.9 PG (ref 26.8–34.3)
MCHC RBC AUTO-ENTMCNC: 34.9 G/DL (ref 31.4–37.4)
MCV RBC AUTO: 89 FL (ref 82–98)
MONOCYTES # BLD AUTO: 0.8 THOUSAND/ΜL (ref 0.17–1.22)
MONOCYTES NFR BLD AUTO: 11 % (ref 4–12)
NEUTROPHILS # BLD AUTO: 3.5 THOUSANDS/ΜL (ref 1.85–7.62)
NEUTS SEG NFR BLD AUTO: 50 % (ref 43–75)
NRBC BLD AUTO-RTO: 0 /100 WBCS
PLATELET # BLD AUTO: 207 THOUSANDS/UL (ref 149–390)
PMV BLD AUTO: 9.9 FL (ref 8.9–12.7)
RBC # BLD AUTO: 4.14 MILLION/UL (ref 3.88–5.62)
WBC # BLD AUTO: 7.04 THOUSAND/UL (ref 4.31–10.16)

## 2017-07-18 PROCEDURE — 36415 COLL VENOUS BLD VENIPUNCTURE: CPT

## 2017-07-18 PROCEDURE — 85025 COMPLETE CBC W/AUTO DIFF WBC: CPT

## 2017-07-31 ENCOUNTER — APPOINTMENT (OUTPATIENT)
Dept: LAB | Facility: HOSPITAL | Age: 32
End: 2017-07-31
Payer: COMMERCIAL

## 2017-07-31 ENCOUNTER — TRANSCRIBE ORDERS (OUTPATIENT)
Dept: ADMINISTRATIVE | Facility: HOSPITAL | Age: 32
End: 2017-07-31

## 2017-07-31 DIAGNOSIS — Z79.899 ENCOUNTER FOR LONG-TERM (CURRENT) USE OF OTHER MEDICATIONS: ICD-10-CM

## 2017-07-31 DIAGNOSIS — F25.1 SCHIZOAFFECTIVE DISORDER, DEPRESSIVE TYPE (HCC): Primary | ICD-10-CM

## 2017-07-31 DIAGNOSIS — F25.1 SCHIZOAFFECTIVE DISORDER, DEPRESSIVE TYPE (HCC): ICD-10-CM

## 2017-07-31 LAB
BASOPHILS # BLD AUTO: 0.01 THOUSANDS/ΜL (ref 0–0.1)
BASOPHILS NFR BLD AUTO: 0 % (ref 0–1)
EOSINOPHIL # BLD AUTO: 0.21 THOUSAND/ΜL (ref 0–0.61)
EOSINOPHIL NFR BLD AUTO: 6 % (ref 0–6)
ERYTHROCYTE [DISTWIDTH] IN BLOOD BY AUTOMATED COUNT: 14.8 % (ref 11.6–15.1)
HCT VFR BLD AUTO: 39.1 % (ref 36.5–49.3)
HGB BLD-MCNC: 13.2 G/DL (ref 12–17)
LYMPHOCYTES # BLD AUTO: 1.48 THOUSANDS/ΜL (ref 0.6–4.47)
LYMPHOCYTES NFR BLD AUTO: 41 % (ref 14–44)
MCH RBC QN AUTO: 30.4 PG (ref 26.8–34.3)
MCHC RBC AUTO-ENTMCNC: 33.8 G/DL (ref 31.4–37.4)
MCV RBC AUTO: 90 FL (ref 82–98)
MONOCYTES # BLD AUTO: 0.55 THOUSAND/ΜL (ref 0.17–1.22)
MONOCYTES NFR BLD AUTO: 15 % (ref 4–12)
NEUTROPHILS # BLD AUTO: 1.4 THOUSANDS/ΜL (ref 1.85–7.62)
NEUTS SEG NFR BLD AUTO: 38 % (ref 43–75)
PLATELET # BLD AUTO: 213 THOUSANDS/UL (ref 149–390)
PMV BLD AUTO: 10.1 FL (ref 8.9–12.7)
RBC # BLD AUTO: 4.34 MILLION/UL (ref 3.88–5.62)
WBC # BLD AUTO: 3.65 THOUSAND/UL (ref 4.31–10.16)

## 2017-07-31 PROCEDURE — 36415 COLL VENOUS BLD VENIPUNCTURE: CPT

## 2017-07-31 PROCEDURE — 85025 COMPLETE CBC W/AUTO DIFF WBC: CPT

## 2017-08-11 ENCOUNTER — APPOINTMENT (OUTPATIENT)
Dept: LAB | Facility: HOSPITAL | Age: 32
End: 2017-08-11
Payer: COMMERCIAL

## 2017-08-11 DIAGNOSIS — Z79.899 ENCOUNTER FOR LONG-TERM (CURRENT) USE OF OTHER MEDICATIONS: ICD-10-CM

## 2017-08-11 DIAGNOSIS — F25.1 SCHIZOAFFECTIVE DISORDER, DEPRESSIVE TYPE (HCC): ICD-10-CM

## 2017-08-11 LAB
BASOPHILS # BLD AUTO: 0.01 THOUSANDS/ΜL (ref 0–0.1)
BASOPHILS NFR BLD AUTO: 0 % (ref 0–1)
EOSINOPHIL # BLD AUTO: 0.18 THOUSAND/ΜL (ref 0–0.61)
EOSINOPHIL NFR BLD AUTO: 5 % (ref 0–6)
ERYTHROCYTE [DISTWIDTH] IN BLOOD BY AUTOMATED COUNT: 14.7 % (ref 11.6–15.1)
HCT VFR BLD AUTO: 37.6 % (ref 36.5–49.3)
HGB BLD-MCNC: 12.6 G/DL (ref 12–17)
LYMPHOCYTES # BLD AUTO: 1.44 THOUSANDS/ΜL (ref 0.6–4.47)
LYMPHOCYTES NFR BLD AUTO: 38 % (ref 14–44)
MCH RBC QN AUTO: 30.5 PG (ref 26.8–34.3)
MCHC RBC AUTO-ENTMCNC: 33.5 G/DL (ref 31.4–37.4)
MCV RBC AUTO: 91 FL (ref 82–98)
MONOCYTES # BLD AUTO: 0.48 THOUSAND/ΜL (ref 0.17–1.22)
MONOCYTES NFR BLD AUTO: 13 % (ref 4–12)
NEUTROPHILS # BLD AUTO: 1.64 THOUSANDS/ΜL (ref 1.85–7.62)
NEUTS SEG NFR BLD AUTO: 44 % (ref 43–75)
PLATELET # BLD AUTO: 212 THOUSANDS/UL (ref 149–390)
PMV BLD AUTO: 9.9 FL (ref 8.9–12.7)
RBC # BLD AUTO: 4.13 MILLION/UL (ref 3.88–5.62)
WBC # BLD AUTO: 3.75 THOUSAND/UL (ref 4.31–10.16)

## 2017-08-11 PROCEDURE — 36415 COLL VENOUS BLD VENIPUNCTURE: CPT

## 2017-08-11 PROCEDURE — 85025 COMPLETE CBC W/AUTO DIFF WBC: CPT

## 2017-08-18 ENCOUNTER — TRANSCRIBE ORDERS (OUTPATIENT)
Dept: ADMINISTRATIVE | Facility: HOSPITAL | Age: 32
End: 2017-08-18

## 2017-08-18 ENCOUNTER — APPOINTMENT (OUTPATIENT)
Dept: LAB | Facility: HOSPITAL | Age: 32
End: 2017-08-18
Payer: COMMERCIAL

## 2017-08-18 DIAGNOSIS — Z79.899 ENCOUNTER FOR LONG-TERM (CURRENT) USE OF OTHER MEDICATIONS: Primary | ICD-10-CM

## 2017-08-18 DIAGNOSIS — Z79.899 ENCOUNTER FOR LONG-TERM (CURRENT) USE OF OTHER MEDICATIONS: ICD-10-CM

## 2017-08-18 LAB
BASOPHILS # BLD AUTO: 0.03 THOUSANDS/ΜL (ref 0–0.1)
BASOPHILS NFR BLD AUTO: 1 % (ref 0–1)
EOSINOPHIL # BLD AUTO: 0.16 THOUSAND/ΜL (ref 0–0.61)
EOSINOPHIL NFR BLD AUTO: 4 % (ref 0–6)
ERYTHROCYTE [DISTWIDTH] IN BLOOD BY AUTOMATED COUNT: 14.5 % (ref 11.6–15.1)
HCT VFR BLD AUTO: 36.6 % (ref 36.5–49.3)
HGB BLD-MCNC: 13 G/DL (ref 12–17)
LYMPHOCYTES # BLD AUTO: 2.32 THOUSANDS/ΜL (ref 0.6–4.47)
LYMPHOCYTES NFR BLD AUTO: 51 % (ref 14–44)
MCH RBC QN AUTO: 31.7 PG (ref 26.8–34.3)
MCHC RBC AUTO-ENTMCNC: 35.5 G/DL (ref 31.4–37.4)
MCV RBC AUTO: 89 FL (ref 82–98)
MONOCYTES # BLD AUTO: 0.47 THOUSAND/ΜL (ref 0.17–1.22)
MONOCYTES NFR BLD AUTO: 11 % (ref 4–12)
NEUTROPHILS # BLD AUTO: 1.5 THOUSANDS/ΜL (ref 1.85–7.62)
NEUTS SEG NFR BLD AUTO: 33 % (ref 43–75)
NRBC BLD AUTO-RTO: 0 /100 WBCS
PLATELET # BLD AUTO: 219 THOUSANDS/UL (ref 149–390)
PMV BLD AUTO: 9.6 FL (ref 8.9–12.7)
RBC # BLD AUTO: 4.1 MILLION/UL (ref 3.88–5.62)
WBC # BLD AUTO: 4.48 THOUSAND/UL (ref 4.31–10.16)

## 2017-08-18 PROCEDURE — 36415 COLL VENOUS BLD VENIPUNCTURE: CPT

## 2017-08-18 PROCEDURE — 85025 COMPLETE CBC W/AUTO DIFF WBC: CPT

## 2017-10-03 ENCOUNTER — APPOINTMENT (OUTPATIENT)
Dept: LAB | Facility: HOSPITAL | Age: 32
End: 2017-10-03
Payer: COMMERCIAL

## 2017-10-03 ENCOUNTER — TRANSCRIBE ORDERS (OUTPATIENT)
Dept: ADMINISTRATIVE | Facility: HOSPITAL | Age: 32
End: 2017-10-03

## 2017-10-03 DIAGNOSIS — F25.1 SCHIZOAFFECTIVE DISORDER, DEPRESSIVE TYPE (HCC): ICD-10-CM

## 2017-10-03 DIAGNOSIS — Z79.899 ENCOUNTER FOR LONG-TERM (CURRENT) USE OF OTHER MEDICATIONS: Primary | ICD-10-CM

## 2017-10-03 DIAGNOSIS — Z79.899 ENCOUNTER FOR LONG-TERM (CURRENT) USE OF OTHER MEDICATIONS: ICD-10-CM

## 2017-10-03 LAB
BASOPHILS # BLD AUTO: 0.02 THOUSANDS/ΜL (ref 0–0.1)
BASOPHILS NFR BLD AUTO: 1 % (ref 0–1)
EOSINOPHIL # BLD AUTO: 0.32 THOUSAND/ΜL (ref 0–0.61)
EOSINOPHIL NFR BLD AUTO: 7 % (ref 0–6)
ERYTHROCYTE [DISTWIDTH] IN BLOOD BY AUTOMATED COUNT: 13.7 % (ref 11.6–15.1)
HCT VFR BLD AUTO: 42.5 % (ref 36.5–49.3)
HGB BLD-MCNC: 14.3 G/DL (ref 12–17)
LYMPHOCYTES # BLD AUTO: 2.26 THOUSANDS/ΜL (ref 0.6–4.47)
LYMPHOCYTES NFR BLD AUTO: 52 % (ref 14–44)
MCH RBC QN AUTO: 30.6 PG (ref 26.8–34.3)
MCHC RBC AUTO-ENTMCNC: 33.6 G/DL (ref 31.4–37.4)
MCV RBC AUTO: 91 FL (ref 82–98)
MONOCYTES # BLD AUTO: 0.45 THOUSAND/ΜL (ref 0.17–1.22)
MONOCYTES NFR BLD AUTO: 10 % (ref 4–12)
NEUTROPHILS # BLD AUTO: 1.33 THOUSANDS/ΜL (ref 1.85–7.62)
NEUTS SEG NFR BLD AUTO: 30 % (ref 43–75)
NRBC BLD AUTO-RTO: 0 /100 WBCS
PLATELET # BLD AUTO: 197 THOUSANDS/UL (ref 149–390)
PMV BLD AUTO: 10.6 FL (ref 8.9–12.7)
RBC # BLD AUTO: 4.68 MILLION/UL (ref 3.88–5.62)
WBC # BLD AUTO: 4.38 THOUSAND/UL (ref 4.31–10.16)

## 2017-10-03 PROCEDURE — 36415 COLL VENOUS BLD VENIPUNCTURE: CPT

## 2017-10-03 PROCEDURE — 85025 COMPLETE CBC W/AUTO DIFF WBC: CPT

## 2017-10-12 ENCOUNTER — ALLSCRIPTS OFFICE VISIT (OUTPATIENT)
Dept: OTHER | Facility: OTHER | Age: 32
End: 2017-10-12

## 2017-10-12 DIAGNOSIS — F25.9 SCHIZOAFFECTIVE DISORDER (HCC): ICD-10-CM

## 2017-10-12 DIAGNOSIS — Z00.00 ENCOUNTER FOR GENERAL ADULT MEDICAL EXAMINATION WITHOUT ABNORMAL FINDINGS: ICD-10-CM

## 2017-10-12 DIAGNOSIS — R25.9 ABNORMAL INVOLUNTARY MOVEMENT: ICD-10-CM

## 2017-10-18 ENCOUNTER — GENERIC CONVERSION - ENCOUNTER (OUTPATIENT)
Dept: OTHER | Facility: OTHER | Age: 32
End: 2017-10-18

## 2017-10-18 ENCOUNTER — APPOINTMENT (OUTPATIENT)
Dept: LAB | Facility: HOSPITAL | Age: 32
End: 2017-10-18
Payer: COMMERCIAL

## 2017-10-18 ENCOUNTER — TRANSCRIBE ORDERS (OUTPATIENT)
Dept: ADMINISTRATIVE | Facility: HOSPITAL | Age: 32
End: 2017-10-18

## 2017-10-18 DIAGNOSIS — F25.1 SCHIZOAFFECTIVE DISORDER, DEPRESSIVE TYPE (HCC): Primary | ICD-10-CM

## 2017-10-18 DIAGNOSIS — Z00.00 ENCOUNTER FOR GENERAL ADULT MEDICAL EXAMINATION WITHOUT ABNORMAL FINDINGS: ICD-10-CM

## 2017-10-18 DIAGNOSIS — F25.1 SCHIZOAFFECTIVE DISORDER, DEPRESSIVE TYPE (HCC): ICD-10-CM

## 2017-10-18 DIAGNOSIS — R25.9 ABNORMAL INVOLUNTARY MOVEMENT: ICD-10-CM

## 2017-10-18 DIAGNOSIS — Z79.899 NEED FOR PROPHYLACTIC CHEMOTHERAPY: ICD-10-CM

## 2017-10-18 DIAGNOSIS — F25.9 SCHIZOAFFECTIVE DISORDER (HCC): ICD-10-CM

## 2017-10-18 LAB
ALBUMIN SERPL BCP-MCNC: 3.8 G/DL (ref 3.5–5)
ALP SERPL-CCNC: 81 U/L (ref 46–116)
ALT SERPL W P-5'-P-CCNC: 22 U/L (ref 12–78)
ANION GAP SERPL CALCULATED.3IONS-SCNC: 7 MMOL/L (ref 4–13)
AST SERPL W P-5'-P-CCNC: 18 U/L (ref 5–45)
BASOPHILS # BLD AUTO: 0.01 THOUSANDS/ΜL (ref 0–0.1)
BASOPHILS NFR BLD AUTO: 0 % (ref 0–1)
BILIRUB SERPL-MCNC: 0.37 MG/DL (ref 0.2–1)
BILIRUB UR QL STRIP: NEGATIVE
BUN SERPL-MCNC: 11 MG/DL (ref 5–25)
CALCIUM SERPL-MCNC: 9.3 MG/DL (ref 8.3–10.1)
CHLORIDE SERPL-SCNC: 101 MMOL/L (ref 100–108)
CHOLEST SERPL-MCNC: 187 MG/DL (ref 50–200)
CLARITY UR: CLEAR
CO2 SERPL-SCNC: 32 MMOL/L (ref 21–32)
COLOR UR: YELLOW
CREAT SERPL-MCNC: 0.89 MG/DL (ref 0.6–1.3)
EOSINOPHIL # BLD AUTO: 0.33 THOUSAND/ΜL (ref 0–0.61)
EOSINOPHIL NFR BLD AUTO: 7 % (ref 0–6)
ERYTHROCYTE [DISTWIDTH] IN BLOOD BY AUTOMATED COUNT: 14.1 % (ref 11.6–15.1)
FOLATE SERPL-MCNC: 7.7 NG/ML (ref 3.1–17.5)
GFR SERPL CREATININE-BSD FRML MDRD: 115 ML/MIN/1.73SQ M
GLUCOSE P FAST SERPL-MCNC: 72 MG/DL (ref 65–99)
GLUCOSE UR STRIP-MCNC: NEGATIVE MG/DL
HCT VFR BLD AUTO: 42.5 % (ref 36.5–49.3)
HDLC SERPL-MCNC: 60 MG/DL (ref 40–60)
HGB BLD-MCNC: 14.6 G/DL (ref 12–17)
HGB UR QL STRIP.AUTO: NEGATIVE
KETONES UR STRIP-MCNC: NEGATIVE MG/DL
LDLC SERPL CALC-MCNC: 105 MG/DL (ref 0–100)
LEUKOCYTE ESTERASE UR QL STRIP: NEGATIVE
LYMPHOCYTES # BLD AUTO: 2.26 THOUSANDS/ΜL (ref 0.6–4.47)
LYMPHOCYTES NFR BLD AUTO: 50 % (ref 14–44)
MAGNESIUM SERPL-MCNC: 1.9 MG/DL (ref 1.6–2.6)
MCH RBC QN AUTO: 31.4 PG (ref 26.8–34.3)
MCHC RBC AUTO-ENTMCNC: 34.4 G/DL (ref 31.4–37.4)
MCV RBC AUTO: 91 FL (ref 82–98)
MONOCYTES # BLD AUTO: 0.68 THOUSAND/ΜL (ref 0.17–1.22)
MONOCYTES NFR BLD AUTO: 15 % (ref 4–12)
NEUTROPHILS # BLD AUTO: 1.27 THOUSANDS/ΜL (ref 1.85–7.62)
NEUTS SEG NFR BLD AUTO: 28 % (ref 43–75)
NITRITE UR QL STRIP: NEGATIVE
NRBC BLD AUTO-RTO: 0 /100 WBCS
PH UR STRIP.AUTO: 6 [PH] (ref 4.5–8)
PLATELET # BLD AUTO: 212 THOUSANDS/UL (ref 149–390)
PMV BLD AUTO: 10.2 FL (ref 8.9–12.7)
POTASSIUM SERPL-SCNC: 4.3 MMOL/L (ref 3.5–5.3)
PROT SERPL-MCNC: 7 G/DL (ref 6.4–8.2)
PROT UR STRIP-MCNC: NEGATIVE MG/DL
RBC # BLD AUTO: 4.65 MILLION/UL (ref 3.88–5.62)
SODIUM SERPL-SCNC: 140 MMOL/L (ref 136–145)
SP GR UR STRIP.AUTO: 1.01 (ref 1–1.03)
T4 FREE SERPL-MCNC: 1.08 NG/DL (ref 0.76–1.46)
TRIGL SERPL-MCNC: 112 MG/DL
TSH SERPL DL<=0.05 MIU/L-ACNC: 3.83 UIU/ML (ref 0.36–3.74)
UROBILINOGEN UR QL STRIP.AUTO: 0.2 E.U./DL
VIT B12 SERPL-MCNC: 451 PG/ML (ref 100–900)
WBC # BLD AUTO: 4.55 THOUSAND/UL (ref 4.31–10.16)

## 2017-10-18 PROCEDURE — 80061 LIPID PANEL: CPT

## 2017-10-18 PROCEDURE — 36415 COLL VENOUS BLD VENIPUNCTURE: CPT

## 2017-10-18 PROCEDURE — 81003 URINALYSIS AUTO W/O SCOPE: CPT

## 2017-10-18 PROCEDURE — 85025 COMPLETE CBC W/AUTO DIFF WBC: CPT

## 2017-10-18 PROCEDURE — 82746 ASSAY OF FOLIC ACID SERUM: CPT

## 2017-10-18 PROCEDURE — 84443 ASSAY THYROID STIM HORMONE: CPT

## 2017-10-18 PROCEDURE — 82607 VITAMIN B-12: CPT

## 2017-10-18 PROCEDURE — 80053 COMPREHEN METABOLIC PANEL: CPT

## 2017-10-18 PROCEDURE — 84439 ASSAY OF FREE THYROXINE: CPT

## 2017-10-18 PROCEDURE — 83735 ASSAY OF MAGNESIUM: CPT

## 2017-11-09 ENCOUNTER — APPOINTMENT (OUTPATIENT)
Dept: LAB | Facility: HOSPITAL | Age: 32
End: 2017-11-09
Payer: COMMERCIAL

## 2017-11-09 ENCOUNTER — TRANSCRIBE ORDERS (OUTPATIENT)
Dept: ADMINISTRATIVE | Facility: HOSPITAL | Age: 32
End: 2017-11-09

## 2017-11-09 DIAGNOSIS — Z79.899 DRUG THERAPY: ICD-10-CM

## 2017-11-09 DIAGNOSIS — F25.1 SCHIZOAFFECTIVE DISORDER, DEPRESSIVE TYPE (HCC): Primary | ICD-10-CM

## 2017-11-09 DIAGNOSIS — F25.1 SCHIZOAFFECTIVE DISORDER, DEPRESSIVE TYPE (HCC): ICD-10-CM

## 2017-11-09 LAB
BASOPHILS # BLD AUTO: 0.01 THOUSANDS/ΜL (ref 0–0.1)
BASOPHILS NFR BLD AUTO: 0 % (ref 0–1)
EOSINOPHIL # BLD AUTO: 0.22 THOUSAND/ΜL (ref 0–0.61)
EOSINOPHIL NFR BLD AUTO: 5 % (ref 0–6)
ERYTHROCYTE [DISTWIDTH] IN BLOOD BY AUTOMATED COUNT: 14.5 % (ref 11.6–15.1)
HCT VFR BLD AUTO: 41.7 % (ref 36.5–49.3)
HGB BLD-MCNC: 14 G/DL (ref 12–17)
LYMPHOCYTES # BLD AUTO: 1.8 THOUSANDS/ΜL (ref 0.6–4.47)
LYMPHOCYTES NFR BLD AUTO: 39 % (ref 14–44)
MCH RBC QN AUTO: 30.2 PG (ref 26.8–34.3)
MCHC RBC AUTO-ENTMCNC: 33.6 G/DL (ref 31.4–37.4)
MCV RBC AUTO: 90 FL (ref 82–98)
MONOCYTES # BLD AUTO: 0.83 THOUSAND/ΜL (ref 0.17–1.22)
MONOCYTES NFR BLD AUTO: 18 % (ref 4–12)
NEUTROPHILS # BLD AUTO: 1.73 THOUSANDS/ΜL (ref 1.85–7.62)
NEUTS SEG NFR BLD AUTO: 38 % (ref 43–75)
PLATELET # BLD AUTO: 221 THOUSANDS/UL (ref 149–390)
PMV BLD AUTO: 9.8 FL (ref 8.9–12.7)
RBC # BLD AUTO: 4.64 MILLION/UL (ref 3.88–5.62)
WBC # BLD AUTO: 4.59 THOUSAND/UL (ref 4.31–10.16)

## 2017-11-09 PROCEDURE — 36415 COLL VENOUS BLD VENIPUNCTURE: CPT

## 2017-11-09 PROCEDURE — 85025 COMPLETE CBC W/AUTO DIFF WBC: CPT

## 2017-12-01 ENCOUNTER — APPOINTMENT (OUTPATIENT)
Dept: LAB | Facility: HOSPITAL | Age: 32
End: 2017-12-01
Payer: COMMERCIAL

## 2017-12-01 DIAGNOSIS — Z79.899 DRUG THERAPY: ICD-10-CM

## 2017-12-01 DIAGNOSIS — F25.1 SCHIZOAFFECTIVE DISORDER, DEPRESSIVE TYPE (HCC): ICD-10-CM

## 2017-12-01 LAB
BASOPHILS # BLD AUTO: 0.01 THOUSANDS/ΜL (ref 0–0.1)
BASOPHILS NFR BLD AUTO: 0 % (ref 0–1)
EOSINOPHIL # BLD AUTO: 0.22 THOUSAND/ΜL (ref 0–0.61)
EOSINOPHIL NFR BLD AUTO: 5 % (ref 0–6)
ERYTHROCYTE [DISTWIDTH] IN BLOOD BY AUTOMATED COUNT: 14.6 % (ref 11.6–15.1)
HCT VFR BLD AUTO: 39.6 % (ref 36.5–49.3)
HGB BLD-MCNC: 13.6 G/DL (ref 12–17)
LYMPHOCYTES # BLD AUTO: 2.04 THOUSANDS/ΜL (ref 0.6–4.47)
LYMPHOCYTES NFR BLD AUTO: 44 % (ref 14–44)
MCH RBC QN AUTO: 31.3 PG (ref 26.8–34.3)
MCHC RBC AUTO-ENTMCNC: 34.3 G/DL (ref 31.4–37.4)
MCV RBC AUTO: 91 FL (ref 82–98)
MONOCYTES # BLD AUTO: 0.73 THOUSAND/ΜL (ref 0.17–1.22)
MONOCYTES NFR BLD AUTO: 16 % (ref 4–12)
NEUTROPHILS # BLD AUTO: 1.62 THOUSANDS/ΜL (ref 1.85–7.62)
NEUTS SEG NFR BLD AUTO: 35 % (ref 43–75)
NRBC BLD AUTO-RTO: 0 /100 WBCS
PLATELET # BLD AUTO: 195 THOUSANDS/UL (ref 149–390)
PMV BLD AUTO: 10.2 FL (ref 8.9–12.7)
RBC # BLD AUTO: 4.35 MILLION/UL (ref 3.88–5.62)
WBC # BLD AUTO: 4.62 THOUSAND/UL (ref 4.31–10.16)

## 2017-12-01 PROCEDURE — 36415 COLL VENOUS BLD VENIPUNCTURE: CPT

## 2017-12-01 PROCEDURE — 85025 COMPLETE CBC W/AUTO DIFF WBC: CPT

## 2017-12-27 ENCOUNTER — TRANSCRIBE ORDERS (OUTPATIENT)
Dept: ADMINISTRATIVE | Facility: HOSPITAL | Age: 32
End: 2017-12-27

## 2017-12-27 ENCOUNTER — APPOINTMENT (OUTPATIENT)
Dept: LAB | Facility: HOSPITAL | Age: 32
End: 2017-12-27
Payer: COMMERCIAL

## 2017-12-27 DIAGNOSIS — F25.1 SCHIZOAFFECTIVE DISORDER, DEPRESSIVE TYPE (HCC): ICD-10-CM

## 2017-12-27 DIAGNOSIS — F25.1 SCHIZOAFFECTIVE DISORDER, DEPRESSIVE TYPE (HCC): Primary | ICD-10-CM

## 2017-12-27 DIAGNOSIS — Z79.899 NEED FOR PROPHYLACTIC CHEMOTHERAPY: ICD-10-CM

## 2017-12-27 LAB
BASOPHILS # BLD AUTO: 0.04 THOUSANDS/ΜL (ref 0–0.1)
BASOPHILS NFR BLD AUTO: 1 % (ref 0–1)
EOSINOPHIL # BLD AUTO: 0.27 THOUSAND/ΜL (ref 0–0.61)
EOSINOPHIL NFR BLD AUTO: 6 % (ref 0–6)
ERYTHROCYTE [DISTWIDTH] IN BLOOD BY AUTOMATED COUNT: 14.7 % (ref 11.6–15.1)
HCT VFR BLD AUTO: 42.3 % (ref 36.5–49.3)
HGB BLD-MCNC: 14.3 G/DL (ref 12–17)
LYMPHOCYTES # BLD AUTO: 2.1 THOUSANDS/ΜL (ref 0.6–4.47)
LYMPHOCYTES NFR BLD AUTO: 44 % (ref 14–44)
MCH RBC QN AUTO: 31.3 PG (ref 26.8–34.3)
MCHC RBC AUTO-ENTMCNC: 33.8 G/DL (ref 31.4–37.4)
MCV RBC AUTO: 93 FL (ref 82–98)
MONOCYTES # BLD AUTO: 0.49 THOUSAND/ΜL (ref 0.17–1.22)
MONOCYTES NFR BLD AUTO: 10 % (ref 4–12)
NEUTROPHILS # BLD AUTO: 1.83 THOUSANDS/ΜL (ref 1.85–7.62)
NEUTS SEG NFR BLD AUTO: 39 % (ref 43–75)
NRBC BLD AUTO-RTO: 0 /100 WBCS
PLATELET # BLD AUTO: 213 THOUSANDS/UL (ref 149–390)
PMV BLD AUTO: 10 FL (ref 8.9–12.7)
RBC # BLD AUTO: 4.57 MILLION/UL (ref 3.88–5.62)
WBC # BLD AUTO: 4.73 THOUSAND/UL (ref 4.31–10.16)

## 2017-12-27 PROCEDURE — 85025 COMPLETE CBC W/AUTO DIFF WBC: CPT

## 2017-12-27 PROCEDURE — 36415 COLL VENOUS BLD VENIPUNCTURE: CPT

## 2018-01-12 NOTE — PROGRESS NOTES
Assessment    1  Encounter for preventive health examination (V70 0) (Z00 00)   2  Schizoaffective disorder (295 70) (F25 9)   3  Resting tremor (781 0) (R25 9)    Plan  Health Maintenance    · Always use a seat belt and shoulder strap when riding or driving a motor vehicle ;  Status:Complete;   Done: 59LLG7684   · Begin a limited exercise program ; Status:Complete;   Done: 93MXX0647   · Begin or continue regular aerobic exercise  Gradually work up to at least 3 sessions of 30  minutes of exercise a week ; Status:Complete;   Done: 68VRG3673   · Brush your teeth 3 times a day and floss at least once a day ; Status:Complete;   Done:  12Oct2017   · Decreasing the stress in your life may help your condition improve ; Status:Complete;    Done: 12Oct2017   · Drink plenty of fluids ; Status:Complete;   Done: 63BQI6491   · Stretch and warm up your muscles during the first 10 minutes , then cool down your  muscles for the last 10 minutes of exercise ; Status:Complete;   Done: 93WFS4968   · Use a sun block product with an SPF of 15 or more ; Status:Complete;   Done:  77DIE2764   · We encourage all of our patients to exercise regularly  30 minutes of exercise or physical  activity five or more days a week is recommended for children and adults ;  Status:Complete;   Done: 12Oct2017   · We recommend routine visits to a dentist ; Status:Complete;   Done: 47FRX5457   · You need to quit smoking ; Status:Complete;   Done: 34FQF9990  Health Maintenance, Schizoaffective disorder    · (1) CBC/PLT/DIFF; Status:Active; Requested for:12Oct2017;    · (1) COMPREHENSIVE METABOLIC PANEL; Status:Active; Requested for:12Oct2017;    · (1) LIPID PANEL, FASTING; Status:Active; Requested for:12Oct2017;   Resting tremor, Schizoaffective disorder    · (1) FOLATE; Status:Active; Requested for:12Oct2017;    · (1) MAGNESIUM; Status:Active; Requested for:12Oct2017;    · (1) TSH WITH FT4 REFLEX; Status:Active;  Requested for:12Oct2017;    · (1) URINALYSIS w URINE C/S REFLEX (will reflex a microscopy if leukocytes, occult  blood, or nitrites are not within normal limits); Status:Active; Requested for:12Oct2017;    · (1) VITAMIN B12; Status:Active; Requested for:12Oct2017;     Discussion/Summary    Discussed diet and exercise with patient  Recommend to decrease and completely stop soda drinking  Recommend to walk daily  Get lab work completed, will call with results  Discussed with patient the importance of smoking cessation  Recommend to try and smoke every 30-60 minutes, and do this for several day, then slowly increase  Can provide medication or patches to help, but patient declines at this time  Follow up in 1 year for physical    Impression: Welcome to Medicare Visit, with preventive exam as well as age and risk appropriate counseling completed  Cardiovascular screening and counseling: the risks and benefits of screening were discussed, screening is current, counseling was given on maintaining a healthy diet, counseling was given on maintaining a healthy weight, counseling was given on ways to improve exercise tolerance, counseling was given on tobacco cessation, due for a lipid panel and Dx - V81 2 Screen for CV Disorder  Diabetes screening and counseling: the risks and benefits of screening were discussed, screening is current, counseling was given on maintaining a healthy diet, counseling was given on maintaining a healthy weight, counseling was given on ways to improve physical activity, due for blood glucose and Dx - V77 1 Screen for DM  Colorectal cancer screening and counseling: screening not indicated  Prostate cancer screening and counseling: screening not indicated  Osteoporosis screening and counseling: screening not indicated  Abdominal aortic aneurysm screening and counseling: screening not indicated  Glaucoma screening and counseling: screening not indicated  Tobacco Cessation: an intermediate session is done today  Advance Directive Planning: not complete  Advice and education were given regarding increasing physical activity, nutrition (non-diabetic) and sunscreen use  Patient Discussion: plan discussed with the patient, follow-up visit needed in one year  Self Referrals: No   Possible side effects of new medications were reviewed with the patient/guardian today  The treatment plan was reviewed with the patient/guardian  The patient/guardian understands and agrees with the treatment plan      Chief Complaint  29 y/o male here for initial AWV      History of Present Illness  HPI: 29 y/o male presenting for Medicare wellness visit  Pt is doing well and is following with psychiatry for Bipolar d/o Continues to smoke a cigarette every 15 min  Not interested in quitting  Exercising and eating healthy diet  No complaints  Welcome to [facility] Visits: The patient is being seen for the welcome to medicare visit  Medicare Screening and Risk Factors   Hospitalizations: no previous hospitalizations  Once per lifetime medicare screening tests: ECG has not been done and AAA screening US has not yet been done  Medicare Screening Tests Risk Questions   Abdominal aortic aneurysm risk assessment: tobacco use and cigarrettes  Osteoporosis risk assessment: the patient's weight is too low (<127 lbs) and past medical history of fracture(s)  HIV risk assessment: none indicated  Drug and Alcohol Use: The patient is a current cigarette smoker and currently smokes 15-20 cigarettes per day  He has smoked for 13 year(s)  He is ready to quit using tobacco, is quitting tobacco use and is cutting back on tobacco use  The patient reports occasional alcohol use  Alcohol concern:   The patient has no concerns about alcohol abuse  He has previously used illicit drugs and pt states he used to smoke marijuana  He reports using marijuana     Diet and Physical Activity: Current diet includes frequent junk food, 1 servings of fruit per day, 1-2 servings of vegetables per day, 2 servings of meat per day, 0 servings of whole grains per day, 1 servings of simple carbohydrates per day, not everyday servings of dairy products per day, 2-4 cups of coffee per day, 0 cups of tea per day, 2 cans of regular soda per day and 0 cans of diet soda per day  He exercises infrequently  Exercise: walking 30 minutes per day  Mood Disorder and Cognitive Impairment Screening: PHQ-9 Depression Scale   Over the past 2 weeks, how often have you been bothered by the following problems? 1 ) Little interest or pleasure in doing things? Not at all    2 ) Feeling down, depressed or hopeless? Not at all    3 ) Trouble falling asleep or sleeping too much? Not at all    4 ) Feeling tired or having little energy? Not at all    5 ) Poor appetite or overeating? Not at all    6 ) Feeling bad about yourself, or that you are a failure, or have let yourself or your family down? Not at all    7 ) Trouble concentrating on things, such as reading a newspaper or watching television? Not at all    8 ) Moving or speaking so slowly that other people could have noticed, or the opposite, moving or speaking faster than usual? Not at all  TOTAL SCORE: 0    How difficult have these problems made it for you to do your work, take care of things at home, or get along with people? Not at all  He denies feeling down, depressed, or hopeless over the past two weeks  He denies feeling little interest or pleasure in doing things over the past two weeks  Cognitive impairment screening: denies difficulty learning/retaining new information, denies difficulty handling complex tasks, denies difficulty with reasoning, denies difficulty with spatial ability and orientation, denies difficulty with language and denies difficulty with behavior  Functional Ability/Level of Safety: Hearing is normal bilaterally  He denies hearing difficulties  He does not use a hearing aid   The patient is currently able to participate in social activities with limitations and unable to drive, but able to do activities of daily living without limitations and able to do instrumental activities of daily living without limitations  Activities of daily living details: does not need help using the phone, no transportation help needed, does not need help shopping, no meal preparation help needed, does not need help doing housework, does not need help doing laundry, does not need help managing medications and does not need help managing money  Fall risk factors: The patient fell 0 times in the past 12 months  Injury History: polypharmacy, alcohol use, no mobility impairment, no antidepressant use, no deconditioning, sedative use, no visual impairment, no urinary incontinence, no antihypertensive use, no cognitive impairment and no previous fall   pt states he drinks socially  Home safety risk factors:  no unfamiliar surroundings, no loose rugs, no poor household lighting, no uneven floors, no household clutter, grab bars in the bathroom and handrails on the stairs  Co-Managers and Medical Equipment/Suppliers: See Patient Care Team      Patient Care Team    Care Team Member Role Specialty Office Number   Ely MASON  Specialist Orthopedic Surgery [phone], Pau Schumacher [facility] [phone]     Review of Systems    Constitutional: negative  Head and Face: negative  Eyes: negative  ENT: negative  Cardiovascular: negative  Respiratory: negative  Gastrointestinal: negative  Genitourinary: negative  Musculoskeletal: negative  Integumentary and Breasts: negative  Neurological: negative  Psychiatric: negative  Endocrine: negative  Hematologic and Lymphatic: negative  Active Problems    1  Fracture, metacarpal (815 00) (P25 088A)   2   History of bipolar disorder (V11 1) (Z86 59)    Past Medical History    · History of bipolar disorder (V11 1) (Z86 59)   · History of Schizo affective schizophrenia (295 70) (F25 0)    The active problems and past medical history were reviewed and updated today  Surgical History    · Denied: History Of Prior Surgery    The surgical history was reviewed and updated today  Family History  Mother    · Family history of Anxiety and depression   · Denied: Family history of Drug abuse   · Family history of Dysphagia   · Denied: Family history of arthritis   · Family history of cerebrovascular accident (CVA) (V17 1) (Z82 3)   · Denied: Family history of malignant neoplasm   · Family history of type 2 diabetes mellitus (V18 0) (Z83 3)   · Family history of Hyponatremia   · Denied: Family history of Osteopetrosis   · Family history of Psychosis, affective  Father    · Denied: Family history of arthritis   · Denied: Family history of malignant neoplasm   · Denied: Family history of Osteopetrosis  Family History    · Denied: Family history of arthritis   · Denied: Family history of malignant neoplasm   · Denied: Family history of Osteopetrosis    The family history was reviewed and updated today  Social History    · Cigarette smoker (305 1) (F17 210)   · Current every day smoker (305 1) (F17 200)   · History of drug use (305 93) (Z87 898)   · No preference on Mu-ism beliefs   · Social alcohol use (Z78 9)  The social history was reviewed and updated today  The social history was reviewed and is unchanged  Current Meds   1  Benztropine Mesylate 0 5 MG Oral Tablet; Therapy: 18JIR4541 to Recorded   2  Benztropine Mesylate 1 MG Oral Tablet; Therapy: 46UOG7775 to Recorded   3  Divalproex Sodium  MG Oral Tablet Extended Release 24 Hour; Therapy: 00GLN0624 to Recorded   4  HydrOXYzine HCl - 25 MG Oral Tablet; Therapy: 26UEE7223 to Recorded   5  Invega Sustenna 156 MG/ML Intramuscular Suspension; Therapy: 91Hun1568 to Recorded   6  RisperiDONE 1 MG Oral Tablet; Therapy: 78BGL5449 to Recorded   7   RisperiDONE 2 MG Oral Tablet; Therapy: 62IDP8116 to Recorded   8  TraZODone HCl - 100 MG Oral Tablet; Therapy: 08GKD8811 to Recorded   9  Zolpidem Tartrate 10 MG Oral Tablet; Therapy: 80OME5160 to Recorded    The medication list was reviewed and updated today  Allergies    1  No Known Drug Allergies    Vitals  Signs    Temperature: 96 9 F  Heart Rate: 72  Respiration: 18  Systolic: 407  Diastolic: 62  Height: 5 ft 4 in  Weight: 122 lb 2 oz  BMI Calculated: 20 96  BSA Calculated: 1 59  O2 Saturation: 100    Physical Exam    Constitutional   General appearance: No acute distress, well appearing and well nourished  Head and Face   Head and face: Normal     Palpation of the face and sinuses: No sinus tenderness  Eyes   Conjunctiva and lids: No erythema, swelling or discharge  Pupils and irises: Equal, round, reactive to light  Ears, Nose, Mouth, and Throat   External inspection of ears and nose: Normal     Otoscopic examination: Tympanic membranes translucent with normal light reflex  Canals patent without erythema  Hearing: Normal     Nasal mucosa, septum, and turbinates: Normal without edema or erythema  Lips, teeth, and gums: Normal, good dentition  Oropharynx: Normal with no erythema, edema, exudate or lesions  Neck   Neck: Supple, symmetric, trachea midline, no masses  Pulmonary   Respiratory effort: No increased work of breathing or signs of respiratory distress  Auscultation of lungs: Clear to auscultation  Cardiovascular   Palpation of heart: Normal PMI, no thrills  Auscultation of heart: Normal rate and rhythm, normal S1 and S2, no murmurs  Peripheral vascular exam: Normal     Examination of extremities for edema and/or varicosities: Normal     Abdomen   Abdomen: Non-tender, no masses  Liver and spleen: No hepatomegaly or splenomegaly  Lymphatic   Palpation of lymph nodes in neck: No lymphadenopathy      Musculoskeletal   Gait and station: Normal     Inspection/palpation of digits and nails: Normal without clubbing or cyanosis  Inspection/palpation of joints, bones, and muscles: Normal     Range of motion: Normal     Stability: Normal     Muscle strength/tone: Abnormal   Tremor(s): right upper extremity resting tremor and left upper extremity resting tremor  Neurologic   Cranial nerves: Cranial nerves 2-12 intact  Reflexes: 2+ and symmetric  Coordination: Normal finger to nose and heel to shin  Psychiatric   Mood and affect: Abnormal   Mood and Affect: flat        Signatures   Electronically signed by : GUERDA Sanchez; Oct 12 2017  2:48PM EST                       (Author)    Electronically signed by : FELIBERTO Arais ; Oct 12 2017  3:40PM EST

## 2018-01-13 VITALS
TEMPERATURE: 96.9 F | SYSTOLIC BLOOD PRESSURE: 110 MMHG | DIASTOLIC BLOOD PRESSURE: 62 MMHG | WEIGHT: 122.13 LBS | RESPIRATION RATE: 18 BRPM | BODY MASS INDEX: 20.85 KG/M2 | OXYGEN SATURATION: 100 % | HEIGHT: 64 IN | HEART RATE: 72 BPM

## 2018-01-17 NOTE — RESULT NOTES
Verified Results  (1) CBC/PLT/DIFF 18Oct2017 08:02AM Grace Tomlin Order Number: [id number]     Test Name Result Flag Reference   WBC COUNT 4 55 Thousand/uL  4 31-10 16   RBC COUNT 4 65 Million/uL  3 88-5 62   HEMOGLOBIN 14 6 g/dL  12 0-17 0   HEMATOCRIT 42 5 %  36 5-49 3   MCV 91 fL  82-98   MCH 31 4 pg  26 8-34 3   MCHC 34 4 g/dL  31 4-37 4   RDW 14 1 %  11 6-15 1   MPV 10 2 fL  8 9-12 7   PLATELET COUNT 878 Thousands/uL  149-390   nRBC AUTOMATED 0 /100 WBCs     NEUTROPHILS RELATIVE PERCENT 28 % L 43-75   LYMPHOCYTES RELATIVE PERCENT 50 % H 14-44   MONOCYTES RELATIVE PERCENT 15 % H 4-12   EOSINOPHILS RELATIVE PERCENT 7 % H 0-6   BASOPHILS RELATIVE PERCENT 0 %  0-1   NEUTROPHILS ABSOLUTE COUNT 1 27 Thousands/? ??L L 1 85-7 62   LYMPHOCYTES ABSOLUTE COUNT 2 26 Thousands/? ??L  0 60-4 47   MONOCYTES ABSOLUTE COUNT 0 68 Thousand/? ??L  0 17-1 22   EOSINOPHILS ABSOLUTE COUNT 0 33 Thousand/? ??L  0 00-0 61   BASOPHILS ABSOLUTE COUNT 0 01 Thousands/? ??L  0 00-0 10     (1) COMPREHENSIVE METABOLIC PANEL [id number] 42:38UE Grace Tomlin Order Number: [id number]     Test Name Result Flag Reference   SODIUM 140 mmol/L  136-145   POTASSIUM 4 3 mmol/L  3 5-5 3   CHLORIDE 101 mmol/L  100-108   CARBON DIOXIDE 32 mmol/L  21-32   ANION GAP (CALC) 7 mmol/L  4-13   BLOOD UREA NITROGEN 11 mg/dL  5-25   CREATININE 0 89 mg/dL  0 60-1 30   Standardized to IDMS reference method   CALCIUM 9 3 mg/dL  8 3-10 1   BILI, TOTAL 0 37 mg/dL  0 20-1 00   ALK PHOSPHATAS 81 U/L     ALT (SGPT) 22 U/L  12-78   Specimen collection should occur prior to Sulfasalazine administration due to the potential for falsely depressed results  AST(SGOT) 18 U/L  5-45   Specimen collection should occur prior to Sulfasalazine administration due to the potential for falsely depressed results     ALBUMIN 3 8 g/dL  3 5-5 0   TOTAL PROTEIN 7 0 g/dL  6 4-8 2   eGFR 115 ml/min/1 73sq m     [employer] [facility] depressed TSH values  If the patient had this procedure,a specimen should be resubmitted post fluorescein clearance  T4,FREE 1 08 ng/dL  0 76-1 46   Specimen collection should occur prior to Sulfasalazine administration due to the potential for falsely elevated results       (1) URINALYSIS w URINE C/S REFLEX (will reflex a microscopy if leukocytes, occult blood, or nitrites are not within normal limits) 18Oct2017 08:02AM [facility] [place] Order Number: [id number]     Test Name Result Flag Reference   COLOR Yellow     CLARITY Clear     PH UA 6 0  4 5-8 0   LEUKOCYTE ESTERASE UA Negative  Negative   NITRITE UA Negative  Negative   PROTEIN UA Negative mg/dl  Negative   GLUCOSE UA Negative mg/dl  Negative   KETONES UA Negative mg/dl  Negative   UROBILINOGEN UA 0 2 E U /dl  0 2, 1 0 E U /dl   BILIRUBIN UA Negative  Negative   BLOOD UA Negative  Negative, Trace-Intact   SPECIFIC GRAVITY UA 1 010  1 003-1 030     (1) MAGNESIUM 18Oct2017 08:02AM [facility] [place] Order Number: [id number]     Test Name Result Flag Reference   MAGNESIUM 1 9 mg/dL  1 6-2 6     (1) VITAMIN B12 18Oct2017 08:02AM [facility]: [id number]     Test Name Result Flag Reference   VITAMIN B12 451 pg/mL  100-900     (1) FOLATE 44KOB4012 08:02AM [facility] Order Number: [id number]     Test Name Result Flag Reference   FOLATE 7 7 ng/mL  3 1-17 5

## 2018-01-19 ENCOUNTER — APPOINTMENT (OUTPATIENT)
Dept: LAB | Facility: HOSPITAL | Age: 33
End: 2018-01-19
Payer: COMMERCIAL

## 2018-01-19 ENCOUNTER — TRANSCRIBE ORDERS (OUTPATIENT)
Dept: ADMINISTRATIVE | Facility: HOSPITAL | Age: 33
End: 2018-01-19

## 2018-01-19 DIAGNOSIS — Z79.899 DRUG THERAPY: ICD-10-CM

## 2018-01-19 DIAGNOSIS — F25.0 SCHIZOAFFECTIVE DISORDER, BIPOLAR TYPE (HCC): Primary | ICD-10-CM

## 2018-01-19 DIAGNOSIS — F25.0 SCHIZOAFFECTIVE DISORDER, BIPOLAR TYPE (HCC): ICD-10-CM

## 2018-01-19 LAB
ALBUMIN SERPL BCP-MCNC: 3.9 G/DL (ref 3.5–5)
ALP SERPL-CCNC: 89 U/L (ref 46–116)
ALT SERPL W P-5'-P-CCNC: 29 U/L (ref 12–78)
AST SERPL W P-5'-P-CCNC: 25 U/L (ref 5–45)
BASOPHILS # BLD AUTO: 0.02 THOUSANDS/ΜL (ref 0–0.1)
BASOPHILS NFR BLD AUTO: 0 % (ref 0–1)
BILIRUB DIRECT SERPL-MCNC: 0.13 MG/DL (ref 0–0.2)
BILIRUB SERPL-MCNC: 0.44 MG/DL (ref 0.2–1)
BUN SERPL-MCNC: 6 MG/DL (ref 5–25)
CREAT SERPL-MCNC: 0.87 MG/DL (ref 0.6–1.3)
EOSINOPHIL # BLD AUTO: 0.19 THOUSAND/ΜL (ref 0–0.61)
EOSINOPHIL NFR BLD AUTO: 4 % (ref 0–6)
ERYTHROCYTE [DISTWIDTH] IN BLOOD BY AUTOMATED COUNT: 14 % (ref 11.6–15.1)
GFR SERPL CREATININE-BSD FRML MDRD: 115 ML/MIN/1.73SQ M
HCT VFR BLD AUTO: 41.3 % (ref 36.5–49.3)
HGB BLD-MCNC: 13.8 G/DL (ref 12–17)
LYMPHOCYTES # BLD AUTO: 1.69 THOUSANDS/ΜL (ref 0.6–4.47)
LYMPHOCYTES NFR BLD AUTO: 36 % (ref 14–44)
MCH RBC QN AUTO: 31.5 PG (ref 26.8–34.3)
MCHC RBC AUTO-ENTMCNC: 33.4 G/DL (ref 31.4–37.4)
MCV RBC AUTO: 94 FL (ref 82–98)
MONOCYTES # BLD AUTO: 0.71 THOUSAND/ΜL (ref 0.17–1.22)
MONOCYTES NFR BLD AUTO: 15 % (ref 4–12)
NEUTROPHILS # BLD AUTO: 2.1 THOUSANDS/ΜL (ref 1.85–7.62)
NEUTS SEG NFR BLD AUTO: 45 % (ref 43–75)
NRBC BLD AUTO-RTO: 0 /100 WBCS
PLATELET # BLD AUTO: 210 THOUSANDS/UL (ref 149–390)
PMV BLD AUTO: 11 FL (ref 8.9–12.7)
PROT SERPL-MCNC: 7 G/DL (ref 6.4–8.2)
RBC # BLD AUTO: 4.38 MILLION/UL (ref 3.88–5.62)
VALPROATE SERPL-MCNC: 64 UG/ML (ref 50–100)
WBC # BLD AUTO: 4.71 THOUSAND/UL (ref 4.31–10.16)

## 2018-01-19 PROCEDURE — 85025 COMPLETE CBC W/AUTO DIFF WBC: CPT

## 2018-01-19 PROCEDURE — 80164 ASSAY DIPROPYLACETIC ACD TOT: CPT

## 2018-01-19 PROCEDURE — 80076 HEPATIC FUNCTION PANEL: CPT

## 2018-01-19 PROCEDURE — 36415 COLL VENOUS BLD VENIPUNCTURE: CPT

## 2018-01-19 PROCEDURE — 84520 ASSAY OF UREA NITROGEN: CPT

## 2018-01-19 PROCEDURE — 82565 ASSAY OF CREATININE: CPT

## 2018-02-09 ENCOUNTER — TRANSCRIBE ORDERS (OUTPATIENT)
Dept: ADMINISTRATIVE | Facility: HOSPITAL | Age: 33
End: 2018-02-09

## 2018-02-09 ENCOUNTER — APPOINTMENT (OUTPATIENT)
Dept: LAB | Facility: HOSPITAL | Age: 33
End: 2018-02-09
Attending: PSYCHIATRY & NEUROLOGY
Payer: COMMERCIAL

## 2018-02-09 DIAGNOSIS — F25.0 SCHIZOAFFECTIVE DISORDER, BIPOLAR TYPE (HCC): Primary | ICD-10-CM

## 2018-02-09 DIAGNOSIS — F25.0 SCHIZOAFFECTIVE DISORDER, BIPOLAR TYPE (HCC): ICD-10-CM

## 2018-02-09 LAB
ALBUMIN SERPL BCP-MCNC: 4.3 G/DL (ref 3.5–5)
ALP SERPL-CCNC: 102 U/L (ref 46–116)
ALT SERPL W P-5'-P-CCNC: 28 U/L (ref 12–78)
AST SERPL W P-5'-P-CCNC: 26 U/L (ref 5–45)
BASOPHILS # BLD AUTO: 0.01 THOUSANDS/ΜL (ref 0–0.1)
BASOPHILS NFR BLD AUTO: 0 % (ref 0–1)
BILIRUB DIRECT SERPL-MCNC: 0.13 MG/DL (ref 0–0.2)
BILIRUB SERPL-MCNC: 0.38 MG/DL (ref 0.2–1)
BUN SERPL-MCNC: 10 MG/DL (ref 5–25)
CREAT SERPL-MCNC: 0.71 MG/DL (ref 0.6–1.3)
EOSINOPHIL # BLD AUTO: 0.12 THOUSAND/ΜL (ref 0–0.61)
EOSINOPHIL NFR BLD AUTO: 3 % (ref 0–6)
ERYTHROCYTE [DISTWIDTH] IN BLOOD BY AUTOMATED COUNT: 13.7 % (ref 11.6–15.1)
GFR SERPL CREATININE-BSD FRML MDRD: 125 ML/MIN/1.73SQ M
HCT VFR BLD AUTO: 43.4 % (ref 36.5–49.3)
HGB BLD-MCNC: 14.8 G/DL (ref 12–17)
LYMPHOCYTES # BLD AUTO: 1.43 THOUSANDS/ΜL (ref 0.6–4.47)
LYMPHOCYTES NFR BLD AUTO: 30 % (ref 14–44)
MCH RBC QN AUTO: 31.6 PG (ref 26.8–34.3)
MCHC RBC AUTO-ENTMCNC: 34.1 G/DL (ref 31.4–37.4)
MCV RBC AUTO: 93 FL (ref 82–98)
MONOCYTES # BLD AUTO: 0.61 THOUSAND/ΜL (ref 0.17–1.22)
MONOCYTES NFR BLD AUTO: 13 % (ref 4–12)
NEUTROPHILS # BLD AUTO: 2.63 THOUSANDS/ΜL (ref 1.85–7.62)
NEUTS SEG NFR BLD AUTO: 54 % (ref 43–75)
NRBC BLD AUTO-RTO: 0 /100 WBCS
PLATELET # BLD AUTO: 216 THOUSANDS/UL (ref 149–390)
PMV BLD AUTO: 10.4 FL (ref 8.9–12.7)
PROT SERPL-MCNC: 7.7 G/DL (ref 6.4–8.2)
RBC # BLD AUTO: 4.68 MILLION/UL (ref 3.88–5.62)
VALPROATE SERPL-MCNC: 49 UG/ML (ref 50–100)
WBC # BLD AUTO: 4.8 THOUSAND/UL (ref 4.31–10.16)

## 2018-02-09 PROCEDURE — 80164 ASSAY DIPROPYLACETIC ACD TOT: CPT

## 2018-02-09 PROCEDURE — 82565 ASSAY OF CREATININE: CPT

## 2018-02-09 PROCEDURE — 84520 ASSAY OF UREA NITROGEN: CPT

## 2018-02-09 PROCEDURE — 80076 HEPATIC FUNCTION PANEL: CPT

## 2018-02-09 PROCEDURE — 85025 COMPLETE CBC W/AUTO DIFF WBC: CPT

## 2018-02-09 PROCEDURE — 36415 COLL VENOUS BLD VENIPUNCTURE: CPT

## 2018-03-25 LAB
ABSOL LYMPHOCYTES (HISTORICAL): 3.1 K/UL (ref 0.5–4)
AMPHETAMINE URINE (HISTORICAL): NEGATIVE
ANION GAP SERPL CALCULATED.3IONS-SCNC: 10 MMOL/L (ref 5–14)
BANDS (HISTORICAL): 1 % (ref 3–11)
BARBITURATE URINE (HISTORICAL): NEGATIVE
BENZODIAZEPINE URINE (HISTORICAL): NEGATIVE
BUN SERPL-MCNC: 10 MG/DL (ref 5–25)
CALCIUM SERPL-MCNC: 9.4 MG/DL (ref 8.4–10.2)
CHLORIDE SERPL-SCNC: 103 MEQ/L (ref 97–108)
CO2 SERPL-SCNC: 26 MMOL/L (ref 22–30)
COCAINE (METAB.), URINE (HISTORICAL): NEGATIVE
COMMENT (HISTORICAL): ABNORMAL
CREATINE, SERUM (HISTORICAL): 0.8 MG/DL (ref 0.7–1.5)
DEPRECATED RDW RBC AUTO: 14.5 %
DRUG COMMENT (HISTORICAL): ABNORMAL
EGFR (HISTORICAL): >60 ML/MIN/1.73 M2
EOSINOPHIL # BLD AUTO: 0.4 K/UL (ref 0–0.4)
EOSINOPHIL NFR BLD AUTO: 7 % (ref 0–6)
GLUCOSE SERPL-MCNC: 118 MG/DL (ref 70–99)
HCT VFR BLD AUTO: 39.1 % (ref 41–53)
HGB BLD-MCNC: 13.3 G/DL (ref 13.5–17.5)
LYMPHOCYTES NFR BLD AUTO: 47 % (ref 25–45)
MCH RBC QN AUTO: 31 PG (ref 26–34)
MCHC RBC AUTO-ENTMCNC: 33.9 % (ref 31–36)
MCV RBC AUTO: 91 FL (ref 80–100)
MDMA (GC/MS) (HISTORICAL): NEGATIVE
METHADONE URINE (HISTORICAL): NEGATIVE
METHAMPHETAMINE URINE (HISTORICAL): NEGATIVE
METHYL ALCOHOL (HISTORICAL): NEGATIVE MG/DL
MONOCYTES # BLD AUTO: 0.9 K/UL (ref 0.2–0.9)
MONOCYTES NFR BLD AUTO: 14 % (ref 1–10)
NEUTROPHILS ABS COUNT (HISTORICAL): 2 K/UL (ref 1.8–7.8)
NEUTS SEG NFR BLD AUTO: 31 % (ref 45–65)
OPIATES (HISTORICAL): NEGATIVE
OXYCODONE (HISTORICAL): NEGATIVE
PHENCYCLIDINE URINE (HISTORICAL): NEGATIVE
PLATELET # BLD AUTO: 210 K/MCL (ref 150–450)
POTASSIUM SERPL-SCNC: 3.8 MEQ/L (ref 3.6–5)
RBC # BLD AUTO: 4.28 M/MCL (ref 4.5–5.9)
RBC MORPHOLOGY (HISTORICAL): ABNORMAL
SODIUM SERPL-SCNC: 138 MEQ/L (ref 137–147)
THC URINE (HISTORICAL): POSITIVE
TRICYCLICS URINE (HISTORICAL): NEGATIVE
VALPROIC ACID TOTAL (HISTORICAL): 63.5 UG/ML (ref 50–120)
WBC # BLD AUTO: 6.4 K/MCL (ref 4.5–11)

## 2018-04-20 ENCOUNTER — OFFICE VISIT (OUTPATIENT)
Dept: FAMILY MEDICINE CLINIC | Facility: CLINIC | Age: 33
End: 2018-04-20
Payer: COMMERCIAL

## 2018-04-20 ENCOUNTER — TELEPHONE (OUTPATIENT)
Dept: NEUROLOGY | Facility: CLINIC | Age: 33
End: 2018-04-20

## 2018-04-20 VITALS — SYSTOLIC BLOOD PRESSURE: 116 MMHG | DIASTOLIC BLOOD PRESSURE: 70 MMHG | TEMPERATURE: 97.6 F | HEART RATE: 101 BPM

## 2018-04-20 DIAGNOSIS — F25.9 SCHIZOAFFECTIVE DISORDER, UNSPECIFIED TYPE (HCC): ICD-10-CM

## 2018-04-20 DIAGNOSIS — R41.3 IMPAIRED MEMORY: ICD-10-CM

## 2018-04-20 DIAGNOSIS — R41.89 IMPAIRED COGNITION: Primary | ICD-10-CM

## 2018-04-20 PROCEDURE — 99213 OFFICE O/P EST LOW 20 MIN: CPT | Performed by: PHYSICIAN ASSISTANT

## 2018-04-20 NOTE — ASSESSMENT & PLAN NOTE
Will refer to neurology for further evaluation  Will order additional lab work  Because of current symptoms of impaired memory and delayed response, will order MRI for further evaluation

## 2018-04-20 NOTE — PROGRESS NOTES
Assessment/Plan:    [facility] )  Continue to follow up with psychiatry  Impaired cognition  Will refer to neurology for further evaluation  Will order additional lab work  Because of current symptoms of impaired memory and delayed response, will order MRI for further evaluation  Diagnoses and all orders for this visit:    Impaired cognition  -     Ambulatory referral to Neurology; Future  -     MRI brain w wo contrast; Future    Impaired memory  -     Ambulatory referral to Neurology; Future  -     MRI brain w wo contrast; Future  -     CBC and differential; Future  -     Comprehensive metabolic panel; Future  -     Folate; Future  -     Vitamin B12; Future  -     Magnesium; Future  -     Lipid panel; Future  -     TSH, 3rd generation with T4 reflex; Future  -     RPR; Future    Schizoaffective disorder, unspecified type [facility])  -     Ambulatory referral to Neurology; Future  -     MRI brain w wo contrast; Future          Subjective:      Patient ID: Margarita Talley is a 33 y o  male  50-year-old male diagnosed with schizoaffective disorder presenting with Mother for referral to Neurology  Patient has been following up with psychiatrist for his schizoaffective disorder  His psychiatrist has a concern for cognitive delay  Whenever he is asked a question it will typically take him 5 or more seconds to actually respond to the question  Mother also notices that the patient is very forgetful  All the symptoms started occur around the age of 24  Denies any headache drink, headaches, instability, syncope, seizures, chest pain, palpitations  Patient is unsure of what medications he is currently taking  The following portions of the patient's history were reviewed and updated as appropriate:   He  has a past medical history of Bipolar disorder ([facility] ) and Schizo affective schizophrenia ([facility] )    He   Patient Active Problem List    Diagnosis Date Noted    Impaired memory 04/20/2018    Impaired cognition 04/20/2018    Schizoaffective disorder ([facility] ) 10/12/2017     He  has no past surgical history on file  His family history includes Anxiety disorder in his mother; Depression in his mother; Diabetes type II in his mother; Dysphagia in his mother; Other in his mother; Psychosis in his mother; Stroke in his mother  He  reports that he has been smoking Cigarettes  He has never used smokeless tobacco  He reports that he drinks alcohol  He reports that he does not use drugs  No current outpatient prescriptions on file  No current facility-administered medications for this visit  He has No Known Allergies       Review of Systems   Constitutional: Negative for chills, fatigue and fever  HENT: Negative for congestion, ear pain, hearing loss, rhinorrhea and sore throat  Eyes: Negative for pain and visual disturbance  Respiratory: Negative for cough, shortness of breath and wheezing  Cardiovascular: Negative for chest pain, palpitations and leg swelling  Gastrointestinal: Negative for abdominal pain, blood in stool, constipation, diarrhea, nausea and vomiting  Endocrine: Negative for cold intolerance, heat intolerance and polyuria  Genitourinary: Negative for difficulty urinating, dysuria, frequency, hematuria and urgency  Musculoskeletal: Negative for arthralgias, joint swelling and myalgias  Skin: Negative for rash and wound  Neurological: Negative for dizziness, syncope, weakness, numbness and headaches  See HPI   Psychiatric/Behavioral: Negative for agitation, behavioral problems, dysphoric mood, sleep disturbance and suicidal ideas  The patient is not nervous/anxious  Objective:      /70 (BP Location: Right arm, Patient Position: Sitting, Cuff Size: Standard)   Pulse 101   Temp 97 6 °F (36 4 °C) (Tympanic)          Physical Exam   Constitutional: He appears well-developed and well-nourished     HENT:   Right Ear: Tympanic membrane, external ear and ear canal normal    Left Ear: Tympanic membrane, external ear and ear canal normal    Nose: Nose normal    Mouth/Throat: Oropharynx is clear and moist and mucous membranes are normal  No oropharyngeal exudate  Eyes: Conjunctivae and EOM are normal  Pupils are equal, round, and reactive to light  Neck: Normal range of motion  Neck supple  Cardiovascular: Normal rate, regular rhythm, normal heart sounds and normal pulses  Exam reveals no gallop and no friction rub  No murmur heard  Pulmonary/Chest: Effort normal and breath sounds normal  No respiratory distress  He has no wheezes  He has no rales  Abdominal: Soft  Bowel sounds are normal  He exhibits no distension and no mass  There is no tenderness  There is no rebound and no guarding  Musculoskeletal: Normal range of motion  He exhibits no edema  Lymphadenopathy:     He has no cervical adenopathy  Neurological: He is alert  He has normal strength and normal reflexes  No cranial nerve deficit  Skin: Skin is warm and dry  No rash noted  Psychiatric: He has a normal mood and affect  His speech is delayed  He is slowed  He exhibits abnormal recent memory  It would take patient 5 or more seconds to answer questions when asked  During that time patient would be staring off  Answers were appropriate  Nursing note and vitals reviewed

## 2018-04-26 ENCOUNTER — APPOINTMENT (OUTPATIENT)
Dept: LAB | Facility: HOSPITAL | Age: 33
End: 2018-04-26
Payer: COMMERCIAL

## 2018-04-26 DIAGNOSIS — R41.3 IMPAIRED MEMORY: ICD-10-CM

## 2018-04-26 LAB
ALBUMIN SERPL BCP-MCNC: 4.3 G/DL (ref 3.5–5)
ALP SERPL-CCNC: 94 U/L (ref 46–116)
ALT SERPL W P-5'-P-CCNC: 20 U/L (ref 12–78)
ANION GAP SERPL CALCULATED.3IONS-SCNC: 10 MMOL/L (ref 4–13)
AST SERPL W P-5'-P-CCNC: 18 U/L (ref 5–45)
BASOPHILS # BLD AUTO: 0.03 THOUSANDS/ΜL (ref 0–0.1)
BASOPHILS NFR BLD AUTO: 0 % (ref 0–1)
BILIRUB SERPL-MCNC: 0.66 MG/DL (ref 0.2–1)
BUN SERPL-MCNC: 14 MG/DL (ref 5–25)
CALCIUM SERPL-MCNC: 9.7 MG/DL (ref 8.3–10.1)
CHLORIDE SERPL-SCNC: 101 MMOL/L (ref 100–108)
CHOLEST SERPL-MCNC: 193 MG/DL (ref 50–200)
CO2 SERPL-SCNC: 28 MMOL/L (ref 21–32)
CREAT SERPL-MCNC: 0.8 MG/DL (ref 0.6–1.3)
EOSINOPHIL # BLD AUTO: 0.15 THOUSAND/ΜL (ref 0–0.61)
EOSINOPHIL NFR BLD AUTO: 2 % (ref 0–6)
ERYTHROCYTE [DISTWIDTH] IN BLOOD BY AUTOMATED COUNT: 13.8 % (ref 11.6–15.1)
FOLATE SERPL-MCNC: 7.5 NG/ML (ref 3.1–17.5)
GFR SERPL CREATININE-BSD FRML MDRD: 119 ML/MIN/1.73SQ M
GLUCOSE P FAST SERPL-MCNC: 122 MG/DL (ref 65–99)
HCT VFR BLD AUTO: 45.5 % (ref 36.5–49.3)
HDLC SERPL-MCNC: 57 MG/DL (ref 40–60)
HGB BLD-MCNC: 15.4 G/DL (ref 12–17)
LDLC SERPL CALC-MCNC: 113 MG/DL (ref 0–100)
LYMPHOCYTES # BLD AUTO: 2.98 THOUSANDS/ΜL (ref 0.6–4.47)
LYMPHOCYTES NFR BLD AUTO: 41 % (ref 14–44)
MAGNESIUM SERPL-MCNC: 2.1 MG/DL (ref 1.6–2.6)
MCH RBC QN AUTO: 31.2 PG (ref 26.8–34.3)
MCHC RBC AUTO-ENTMCNC: 33.8 G/DL (ref 31.4–37.4)
MCV RBC AUTO: 92 FL (ref 82–98)
MONOCYTES # BLD AUTO: 0.91 THOUSAND/ΜL (ref 0.17–1.22)
MONOCYTES NFR BLD AUTO: 13 % (ref 4–12)
NEUTROPHILS # BLD AUTO: 3.18 THOUSANDS/ΜL (ref 1.85–7.62)
NEUTS SEG NFR BLD AUTO: 44 % (ref 43–75)
NONHDLC SERPL-MCNC: 136 MG/DL
NRBC BLD AUTO-RTO: 0 /100 WBCS
PLATELET # BLD AUTO: 250 THOUSANDS/UL (ref 149–390)
PMV BLD AUTO: 10.4 FL (ref 8.9–12.7)
POTASSIUM SERPL-SCNC: 4.1 MMOL/L (ref 3.5–5.3)
PROT SERPL-MCNC: 7.9 G/DL (ref 6.4–8.2)
RBC # BLD AUTO: 4.93 MILLION/UL (ref 3.88–5.62)
RPR SER QL: NORMAL
SODIUM SERPL-SCNC: 139 MMOL/L (ref 136–145)
T4 FREE SERPL-MCNC: 1.19 NG/DL (ref 0.76–1.46)
TRIGL SERPL-MCNC: 116 MG/DL
TSH SERPL DL<=0.05 MIU/L-ACNC: 3.76 UIU/ML (ref 0.36–3.74)
VIT B12 SERPL-MCNC: 541 PG/ML (ref 100–900)
WBC # BLD AUTO: 7.25 THOUSAND/UL (ref 4.31–10.16)

## 2018-04-26 PROCEDURE — 83735 ASSAY OF MAGNESIUM: CPT

## 2018-04-26 PROCEDURE — 86592 SYPHILIS TEST NON-TREP QUAL: CPT

## 2018-04-26 PROCEDURE — 82746 ASSAY OF FOLIC ACID SERUM: CPT

## 2018-04-26 PROCEDURE — 85025 COMPLETE CBC W/AUTO DIFF WBC: CPT

## 2018-04-26 PROCEDURE — 80053 COMPREHEN METABOLIC PANEL: CPT

## 2018-04-26 PROCEDURE — 36415 COLL VENOUS BLD VENIPUNCTURE: CPT

## 2018-04-26 PROCEDURE — 82607 VITAMIN B-12: CPT

## 2018-04-26 PROCEDURE — 80061 LIPID PANEL: CPT

## 2018-04-26 PROCEDURE — 84439 ASSAY OF FREE THYROXINE: CPT

## 2018-04-26 PROCEDURE — 84443 ASSAY THYROID STIM HORMONE: CPT

## 2018-05-02 ENCOUNTER — HOSPITAL ENCOUNTER (OUTPATIENT)
Dept: MRI IMAGING | Facility: HOSPITAL | Age: 33
Discharge: HOME | End: 2018-05-02
Payer: COMMERCIAL

## 2018-05-02 DIAGNOSIS — F25.9 SCHIZOAFFECTIVE DISORDER, UNSPECIFIED TYPE (HCC): ICD-10-CM

## 2018-05-02 DIAGNOSIS — R41.89 IMPAIRED COGNITION: ICD-10-CM

## 2018-05-02 DIAGNOSIS — R41.3 IMPAIRED MEMORY: ICD-10-CM

## 2018-05-02 PROCEDURE — 70553 MRI BRAIN STEM W/O & W/DYE: CPT

## 2018-05-02 PROCEDURE — A9585 GADOBUTROL INJECTION: HCPCS | Performed by: PHYSICIAN ASSISTANT

## 2018-05-02 RX ADMIN — GADOBUTROL 5 ML: 604.72 INJECTION INTRAVENOUS at 07:56

## 2018-09-13 ENCOUNTER — APPOINTMENT (OUTPATIENT)
Dept: LAB | Facility: HOSPITAL | Age: 33
End: 2018-09-13
Payer: COMMERCIAL

## 2018-09-13 ENCOUNTER — TRANSCRIBE ORDERS (OUTPATIENT)
Dept: ADMINISTRATIVE | Facility: HOSPITAL | Age: 33
End: 2018-09-13

## 2018-09-13 DIAGNOSIS — F31.9 DEPRESSED BIPOLAR I DISORDER (HCC): Primary | ICD-10-CM

## 2018-09-13 DIAGNOSIS — F31.9 DEPRESSED BIPOLAR I DISORDER (HCC): ICD-10-CM

## 2018-09-13 LAB
ALBUMIN SERPL BCP-MCNC: 3.9 G/DL (ref 3.5–5)
ALP SERPL-CCNC: 73 U/L (ref 46–116)
ALT SERPL W P-5'-P-CCNC: 14 U/L (ref 12–78)
AMMONIA PLAS-SCNC: 16 UMOL/L (ref 11–35)
AST SERPL W P-5'-P-CCNC: 12 U/L (ref 5–45)
BASOPHILS # BLD AUTO: 0.03 THOUSANDS/ΜL (ref 0–0.1)
BASOPHILS NFR BLD AUTO: 1 % (ref 0–1)
BILIRUB DIRECT SERPL-MCNC: 0.14 MG/DL (ref 0–0.2)
BILIRUB SERPL-MCNC: 0.43 MG/DL (ref 0.2–1)
EOSINOPHIL # BLD AUTO: 0.28 THOUSAND/ΜL (ref 0–0.61)
EOSINOPHIL NFR BLD AUTO: 6 % (ref 0–6)
ERYTHROCYTE [DISTWIDTH] IN BLOOD BY AUTOMATED COUNT: 14.2 % (ref 11.6–15.1)
HCT VFR BLD AUTO: 41.9 % (ref 36.5–49.3)
HGB BLD-MCNC: 13.8 G/DL (ref 12–17)
IMM GRANULOCYTES # BLD AUTO: 0 THOUSAND/UL (ref 0–0.2)
IMM GRANULOCYTES NFR BLD AUTO: 0 % (ref 0–2)
LYMPHOCYTES # BLD AUTO: 2.32 THOUSANDS/ΜL (ref 0.6–4.47)
LYMPHOCYTES NFR BLD AUTO: 51 % (ref 14–44)
MCH RBC QN AUTO: 30.3 PG (ref 26.8–34.3)
MCHC RBC AUTO-ENTMCNC: 32.9 G/DL (ref 31.4–37.4)
MCV RBC AUTO: 92 FL (ref 82–98)
MONOCYTES # BLD AUTO: 0.47 THOUSAND/ΜL (ref 0.17–1.22)
MONOCYTES NFR BLD AUTO: 11 % (ref 4–12)
NEUTROPHILS # BLD AUTO: 1.36 THOUSANDS/ΜL (ref 1.85–7.62)
NEUTS SEG NFR BLD AUTO: 31 % (ref 43–75)
NRBC BLD AUTO-RTO: 0 /100 WBCS
PLATELET # BLD AUTO: 211 THOUSANDS/UL (ref 149–390)
PMV BLD AUTO: 9.3 FL (ref 8.9–12.7)
PROT SERPL-MCNC: 6.9 G/DL (ref 6.4–8.2)
RBC # BLD AUTO: 4.55 MILLION/UL (ref 3.88–5.62)
T3 SERPL-MCNC: 1 NG/ML (ref 0.6–1.8)
T4 SERPL-MCNC: 9.6 UG/DL (ref 4.7–13.3)
TSH SERPL DL<=0.05 MIU/L-ACNC: 2.26 UIU/ML (ref 0.36–3.74)
VALPROATE SERPL-MCNC: 104 UG/ML (ref 50–100)
WBC # BLD AUTO: 4.46 THOUSAND/UL (ref 4.31–10.16)

## 2018-09-13 PROCEDURE — 85025 COMPLETE CBC W/AUTO DIFF WBC: CPT

## 2018-09-13 PROCEDURE — 84436 ASSAY OF TOTAL THYROXINE: CPT

## 2018-09-13 PROCEDURE — 80076 HEPATIC FUNCTION PANEL: CPT

## 2018-09-13 PROCEDURE — 84480 ASSAY TRIIODOTHYRONINE (T3): CPT

## 2018-09-13 PROCEDURE — 84443 ASSAY THYROID STIM HORMONE: CPT

## 2018-09-13 PROCEDURE — 80164 ASSAY DIPROPYLACETIC ACD TOT: CPT

## 2018-09-13 PROCEDURE — 36415 COLL VENOUS BLD VENIPUNCTURE: CPT

## 2018-09-13 PROCEDURE — 82140 ASSAY OF AMMONIA: CPT

## 2019-04-14 ENCOUNTER — HOSPITAL ENCOUNTER (EMERGENCY)
Facility: HOSPITAL | Age: 34
Discharge: HOME | End: 2019-04-14
Attending: EMERGENCY MEDICINE | Admitting: EMERGENCY MEDICINE
Payer: COMMERCIAL

## 2019-04-14 ENCOUNTER — APPOINTMENT (EMERGENCY)
Dept: CT IMAGING | Facility: HOSPITAL | Age: 34
End: 2019-04-14
Payer: COMMERCIAL

## 2019-04-14 VITALS
BODY MASS INDEX: 22.59 KG/M2 | DIASTOLIC BLOOD PRESSURE: 61 MMHG | HEART RATE: 90 BPM | WEIGHT: 131.61 LBS | TEMPERATURE: 98.6 F | OXYGEN SATURATION: 98 % | SYSTOLIC BLOOD PRESSURE: 110 MMHG | RESPIRATION RATE: 18 BRPM

## 2019-04-14 DIAGNOSIS — F19.10 DRUG ABUSE (HCC): Primary | ICD-10-CM

## 2019-04-14 LAB
ALBUMIN SERPL BCP-MCNC: 3.6 G/DL (ref 3.5–5)
ALP SERPL-CCNC: 93 U/L (ref 46–116)
ALT SERPL W P-5'-P-CCNC: 18 U/L (ref 12–78)
ANION GAP SERPL CALCULATED.3IONS-SCNC: 7 MMOL/L (ref 4–13)
APAP SERPL-MCNC: <2 UG/ML (ref 10–20)
AST SERPL W P-5'-P-CCNC: 16 U/L (ref 5–45)
ATRIAL RATE: 103 BPM
BASOPHILS # BLD AUTO: 0.03 THOUSANDS/ΜL (ref 0–0.1)
BASOPHILS NFR BLD AUTO: 1 % (ref 0–1)
BILIRUB SERPL-MCNC: 0.13 MG/DL (ref 0.2–1)
BUN SERPL-MCNC: 13 MG/DL (ref 5–25)
CALCIUM SERPL-MCNC: 9.3 MG/DL (ref 8.3–10.1)
CHLORIDE SERPL-SCNC: 105 MMOL/L (ref 100–108)
CO2 SERPL-SCNC: 31 MMOL/L (ref 21–32)
CREAT SERPL-MCNC: 1 MG/DL (ref 0.6–1.3)
EOSINOPHIL # BLD AUTO: 0.19 THOUSAND/ΜL (ref 0–0.61)
EOSINOPHIL NFR BLD AUTO: 4 % (ref 0–6)
ERYTHROCYTE [DISTWIDTH] IN BLOOD BY AUTOMATED COUNT: 14.3 % (ref 11.6–15.1)
ETHANOL SERPL-MCNC: <3 MG/DL (ref 0–3)
GFR SERPL CREATININE-BSD FRML MDRD: 115 ML/MIN/1.73SQ M
GLUCOSE SERPL-MCNC: 94 MG/DL (ref 65–140)
HCT VFR BLD AUTO: 39 % (ref 36.5–49.3)
HGB BLD-MCNC: 12.7 G/DL (ref 12–17)
IMM GRANULOCYTES # BLD AUTO: 0.03 THOUSAND/UL (ref 0–0.2)
IMM GRANULOCYTES NFR BLD AUTO: 1 % (ref 0–2)
LYMPHOCYTES # BLD AUTO: 2.04 THOUSANDS/ΜL (ref 0.6–4.47)
LYMPHOCYTES NFR BLD AUTO: 39 % (ref 14–44)
MCH RBC QN AUTO: 30.8 PG (ref 26.8–34.3)
MCHC RBC AUTO-ENTMCNC: 32.6 G/DL (ref 31.4–37.4)
MCV RBC AUTO: 94 FL (ref 82–98)
MONOCYTES # BLD AUTO: 0.69 THOUSAND/ΜL (ref 0.17–1.22)
MONOCYTES NFR BLD AUTO: 13 % (ref 4–12)
NEUTROPHILS # BLD AUTO: 2.28 THOUSANDS/ΜL (ref 1.85–7.62)
NEUTS SEG NFR BLD AUTO: 42 % (ref 43–75)
NRBC BLD AUTO-RTO: 0 /100 WBCS
P AXIS: 68 DEGREES
PLATELET # BLD AUTO: 225 THOUSANDS/UL (ref 149–390)
PMV BLD AUTO: 9.9 FL (ref 8.9–12.7)
POTASSIUM SERPL-SCNC: 4.5 MMOL/L (ref 3.5–5.3)
PR INTERVAL: 138 MS
PROT SERPL-MCNC: 6.4 G/DL (ref 6.4–8.2)
QRS AXIS: 83 DEGREES
QRSD INTERVAL: 86 MS
QT INTERVAL: 324 MS
QTC INTERVAL: 424 MS
RBC # BLD AUTO: 4.13 MILLION/UL (ref 3.88–5.62)
SALICYLATES SERPL-MCNC: <3 MG/DL (ref 3–20)
SODIUM SERPL-SCNC: 143 MMOL/L (ref 136–145)
T WAVE AXIS: 48 DEGREES
VALPROATE SERPL-MCNC: 86 UG/ML (ref 50–100)
VENTRICULAR RATE: 103 BPM
WBC # BLD AUTO: 5.26 THOUSAND/UL (ref 4.31–10.16)

## 2019-04-14 PROCEDURE — 80164 ASSAY DIPROPYLACETIC ACD TOT: CPT | Performed by: EMERGENCY MEDICINE

## 2019-04-14 PROCEDURE — 96361 HYDRATE IV INFUSION ADD-ON: CPT

## 2019-04-14 PROCEDURE — 85025 COMPLETE CBC W/AUTO DIFF WBC: CPT | Performed by: EMERGENCY MEDICINE

## 2019-04-14 PROCEDURE — 70450 CT HEAD/BRAIN W/O DYE: CPT

## 2019-04-14 PROCEDURE — 93010 ELECTROCARDIOGRAM REPORT: CPT | Performed by: INTERNAL MEDICINE

## 2019-04-14 PROCEDURE — 99284 EMERGENCY DEPT VISIT MOD MDM: CPT

## 2019-04-14 PROCEDURE — 36415 COLL VENOUS BLD VENIPUNCTURE: CPT | Performed by: EMERGENCY MEDICINE

## 2019-04-14 PROCEDURE — 93005 ELECTROCARDIOGRAM TRACING: CPT

## 2019-04-14 PROCEDURE — 80053 COMPREHEN METABOLIC PANEL: CPT | Performed by: EMERGENCY MEDICINE

## 2019-04-14 PROCEDURE — 96360 HYDRATION IV INFUSION INIT: CPT

## 2019-04-14 PROCEDURE — 99284 EMERGENCY DEPT VISIT MOD MDM: CPT | Performed by: EMERGENCY MEDICINE

## 2019-04-14 PROCEDURE — 80329 ANALGESICS NON-OPIOID 1 OR 2: CPT | Performed by: EMERGENCY MEDICINE

## 2019-04-14 PROCEDURE — 80320 DRUG SCREEN QUANTALCOHOLS: CPT | Performed by: EMERGENCY MEDICINE

## 2019-04-14 RX ADMIN — SODIUM CHLORIDE 1000 ML: 0.9 INJECTION, SOLUTION INTRAVENOUS at 11:00

## 2019-04-14 RX ADMIN — SODIUM CHLORIDE 1000 ML: 0.9 INJECTION, SOLUTION INTRAVENOUS at 12:20

## 2019-05-09 ENCOUNTER — APPOINTMENT (OUTPATIENT)
Dept: LAB | Facility: HOSPITAL | Age: 34
End: 2019-05-09
Payer: COMMERCIAL

## 2019-05-09 ENCOUNTER — TRANSCRIBE ORDERS (OUTPATIENT)
Dept: ADMINISTRATIVE | Facility: HOSPITAL | Age: 34
End: 2019-05-09

## 2019-05-09 DIAGNOSIS — F31.9 BIPOLAR AFFECTIVE DISORDER, REMISSION STATUS UNSPECIFIED (HCC): ICD-10-CM

## 2019-05-09 DIAGNOSIS — F31.9 BIPOLAR AFFECTIVE DISORDER, REMISSION STATUS UNSPECIFIED (HCC): Primary | ICD-10-CM

## 2019-05-09 LAB
ALBUMIN SERPL BCP-MCNC: 4 G/DL (ref 3.5–5)
ALP SERPL-CCNC: 90 U/L (ref 46–116)
ALT SERPL W P-5'-P-CCNC: 22 U/L (ref 12–78)
AMMONIA PLAS-SCNC: 12 UMOL/L (ref 11–35)
AST SERPL W P-5'-P-CCNC: 11 U/L (ref 5–45)
BASOPHILS # BLD AUTO: 0.02 THOUSANDS/ΜL (ref 0–0.1)
BASOPHILS NFR BLD AUTO: 0 % (ref 0–1)
BILIRUB DIRECT SERPL-MCNC: 0.14 MG/DL (ref 0–0.2)
BILIRUB SERPL-MCNC: 0.52 MG/DL (ref 0.2–1)
CHOLEST SERPL-MCNC: 188 MG/DL (ref 50–200)
EOSINOPHIL # BLD AUTO: 0.19 THOUSAND/ΜL (ref 0–0.61)
EOSINOPHIL NFR BLD AUTO: 4 % (ref 0–6)
ERYTHROCYTE [DISTWIDTH] IN BLOOD BY AUTOMATED COUNT: 14.2 % (ref 11.6–15.1)
EST. AVERAGE GLUCOSE BLD GHB EST-MCNC: 117 MG/DL
GLUCOSE P FAST SERPL-MCNC: 95 MG/DL (ref 65–99)
HBA1C MFR BLD: 5.7 % (ref 4.2–6.3)
HCT VFR BLD AUTO: 45.2 % (ref 36.5–49.3)
HDLC SERPL-MCNC: 72 MG/DL (ref 40–60)
HGB BLD-MCNC: 14.6 G/DL (ref 12–17)
IMM GRANULOCYTES # BLD AUTO: 0 THOUSAND/UL (ref 0–0.2)
IMM GRANULOCYTES NFR BLD AUTO: 0 % (ref 0–2)
LDLC SERPL CALC-MCNC: 98 MG/DL (ref 0–100)
LYMPHOCYTES # BLD AUTO: 2.05 THOUSANDS/ΜL (ref 0.6–4.47)
LYMPHOCYTES NFR BLD AUTO: 41 % (ref 14–44)
MCH RBC QN AUTO: 30.8 PG (ref 26.8–34.3)
MCHC RBC AUTO-ENTMCNC: 32.3 G/DL (ref 31.4–37.4)
MCV RBC AUTO: 95 FL (ref 82–98)
MONOCYTES # BLD AUTO: 0.55 THOUSAND/ΜL (ref 0.17–1.22)
MONOCYTES NFR BLD AUTO: 11 % (ref 4–12)
NEUTROPHILS # BLD AUTO: 2.16 THOUSANDS/ΜL (ref 1.85–7.62)
NEUTS SEG NFR BLD AUTO: 44 % (ref 43–75)
NONHDLC SERPL-MCNC: 116 MG/DL
NRBC BLD AUTO-RTO: 0 /100 WBCS
PLATELET # BLD AUTO: 252 THOUSANDS/UL (ref 149–390)
PMV BLD AUTO: 9.4 FL (ref 8.9–12.7)
PROLACTIN SERPL-MCNC: 6.5 NG/ML (ref 2.5–17.4)
PROT SERPL-MCNC: 7.4 G/DL (ref 6.4–8.2)
RBC # BLD AUTO: 4.74 MILLION/UL (ref 3.88–5.62)
T3 SERPL-MCNC: 0.8 NG/ML (ref 0.6–1.8)
T4 SERPL-MCNC: 9.9 UG/DL (ref 4.7–13.3)
TRIGL SERPL-MCNC: 90 MG/DL
TSH SERPL DL<=0.05 MIU/L-ACNC: 2.57 UIU/ML (ref 0.36–3.74)
VALPROATE SERPL-MCNC: 63 UG/ML (ref 50–100)
WBC # BLD AUTO: 4.97 THOUSAND/UL (ref 4.31–10.16)

## 2019-05-09 PROCEDURE — 84480 ASSAY TRIIODOTHYRONINE (T3): CPT

## 2019-05-09 PROCEDURE — 80164 ASSAY DIPROPYLACETIC ACD TOT: CPT

## 2019-05-09 PROCEDURE — 83036 HEMOGLOBIN GLYCOSYLATED A1C: CPT

## 2019-05-09 PROCEDURE — 82947 ASSAY GLUCOSE BLOOD QUANT: CPT

## 2019-05-09 PROCEDURE — 84443 ASSAY THYROID STIM HORMONE: CPT

## 2019-05-09 PROCEDURE — 84146 ASSAY OF PROLACTIN: CPT

## 2019-05-09 PROCEDURE — 80061 LIPID PANEL: CPT

## 2019-05-09 PROCEDURE — 82140 ASSAY OF AMMONIA: CPT

## 2019-05-09 PROCEDURE — 84436 ASSAY OF TOTAL THYROXINE: CPT

## 2019-05-09 PROCEDURE — 80076 HEPATIC FUNCTION PANEL: CPT

## 2019-05-09 PROCEDURE — 85025 COMPLETE CBC W/AUTO DIFF WBC: CPT

## 2019-05-09 PROCEDURE — 36415 COLL VENOUS BLD VENIPUNCTURE: CPT

## 2019-12-26 ENCOUNTER — APPOINTMENT (OUTPATIENT)
Dept: LAB | Facility: HOSPITAL | Age: 34
End: 2019-12-26
Payer: COMMERCIAL

## 2019-12-26 ENCOUNTER — TRANSCRIBE ORDERS (OUTPATIENT)
Dept: ADMINISTRATIVE | Facility: HOSPITAL | Age: 34
End: 2019-12-26

## 2019-12-26 DIAGNOSIS — F31.9 BIPOLAR AFFECTIVE DISORDER, REMISSION STATUS UNSPECIFIED (HCC): Primary | ICD-10-CM

## 2019-12-26 DIAGNOSIS — E88.89 MADELUNG'S NECK (HCC): ICD-10-CM

## 2019-12-26 DIAGNOSIS — Z79.899 ENCOUNTER FOR LONG-TERM (CURRENT) USE OF OTHER MEDICATIONS: ICD-10-CM

## 2019-12-26 DIAGNOSIS — F31.9 BIPOLAR AFFECTIVE DISORDER, REMISSION STATUS UNSPECIFIED (HCC): ICD-10-CM

## 2019-12-26 LAB
ALBUMIN SERPL BCP-MCNC: 4 G/DL (ref 3.5–5)
ALP SERPL-CCNC: 107 U/L (ref 46–116)
ALT SERPL W P-5'-P-CCNC: 14 U/L (ref 12–78)
AMMONIA PLAS-SCNC: <10 UMOL/L (ref 11–35)
ANION GAP SERPL CALCULATED.3IONS-SCNC: 6 MMOL/L (ref 4–13)
AST SERPL W P-5'-P-CCNC: 10 U/L (ref 5–45)
BASOPHILS # BLD AUTO: 0.03 THOUSANDS/ΜL (ref 0–0.1)
BASOPHILS NFR BLD AUTO: 1 % (ref 0–1)
BILIRUB SERPL-MCNC: 0.14 MG/DL (ref 0.2–1)
BUN SERPL-MCNC: 7 MG/DL (ref 5–25)
CALCIUM SERPL-MCNC: 9.3 MG/DL (ref 8.3–10.1)
CHLORIDE SERPL-SCNC: 99 MMOL/L (ref 100–108)
CO2 SERPL-SCNC: 32 MMOL/L (ref 21–32)
CREAT SERPL-MCNC: 0.71 MG/DL (ref 0.6–1.3)
EOSINOPHIL # BLD AUTO: 0.14 THOUSAND/ΜL (ref 0–0.61)
EOSINOPHIL NFR BLD AUTO: 2 % (ref 0–6)
ERYTHROCYTE [DISTWIDTH] IN BLOOD BY AUTOMATED COUNT: 13.6 % (ref 11.6–15.1)
GFR SERPL CREATININE-BSD FRML MDRD: 143 ML/MIN/1.73SQ M
GLUCOSE SERPL-MCNC: 98 MG/DL (ref 65–140)
HCT VFR BLD AUTO: 42.8 % (ref 36.5–49.3)
HGB BLD-MCNC: 13.8 G/DL (ref 12–17)
IMM GRANULOCYTES # BLD AUTO: 0.01 THOUSAND/UL (ref 0–0.2)
IMM GRANULOCYTES NFR BLD AUTO: 0 % (ref 0–2)
LYMPHOCYTES # BLD AUTO: 2.12 THOUSANDS/ΜL (ref 0.6–4.47)
LYMPHOCYTES NFR BLD AUTO: 35 % (ref 14–44)
MCH RBC QN AUTO: 30.2 PG (ref 26.8–34.3)
MCHC RBC AUTO-ENTMCNC: 32.2 G/DL (ref 31.4–37.4)
MCV RBC AUTO: 94 FL (ref 82–98)
MONOCYTES # BLD AUTO: 0.53 THOUSAND/ΜL (ref 0.17–1.22)
MONOCYTES NFR BLD AUTO: 9 % (ref 4–12)
NEUTROPHILS # BLD AUTO: 3.32 THOUSANDS/ΜL (ref 1.85–7.62)
NEUTS SEG NFR BLD AUTO: 53 % (ref 43–75)
NRBC BLD AUTO-RTO: 0 /100 WBCS
PLATELET # BLD AUTO: 302 THOUSANDS/UL (ref 149–390)
PMV BLD AUTO: 9 FL (ref 8.9–12.7)
POTASSIUM SERPL-SCNC: 4.3 MMOL/L (ref 3.5–5.3)
PROT SERPL-MCNC: 7.5 G/DL (ref 6.4–8.2)
RBC # BLD AUTO: 4.57 MILLION/UL (ref 3.88–5.62)
SODIUM SERPL-SCNC: 137 MMOL/L (ref 136–145)
TSH SERPL DL<=0.05 MIU/L-ACNC: 1.43 UIU/ML (ref 0.36–3.74)
VALPROATE SERPL-MCNC: 66 UG/ML (ref 50–100)
WBC # BLD AUTO: 6.15 THOUSAND/UL (ref 4.31–10.16)

## 2019-12-26 PROCEDURE — 80307 DRUG TEST PRSMV CHEM ANLYZR: CPT | Performed by: PSYCHIATRY & NEUROLOGY

## 2019-12-26 PROCEDURE — 36415 COLL VENOUS BLD VENIPUNCTURE: CPT

## 2019-12-26 PROCEDURE — 80164 ASSAY DIPROPYLACETIC ACD TOT: CPT

## 2019-12-26 PROCEDURE — 80053 COMPREHEN METABOLIC PANEL: CPT

## 2019-12-26 PROCEDURE — 82140 ASSAY OF AMMONIA: CPT

## 2019-12-26 PROCEDURE — 84443 ASSAY THYROID STIM HORMONE: CPT

## 2019-12-26 PROCEDURE — 85025 COMPLETE CBC W/AUTO DIFF WBC: CPT

## 2020-01-03 LAB
AMPHETAMINES UR QL SCN: NEGATIVE NG/ML
BARBITURATES UR QL SCN: NEGATIVE NG/ML
BENZODIAZ UR QL: NEGATIVE NG/ML
BZE UR QL: NEGATIVE NG/ML
CANNABINOIDS UR QL SCN: POSITIVE
METHADONE UR QL SCN: NEGATIVE NG/ML
OPIATES UR QL: NEGATIVE NG/ML
PCP UR QL: NEGATIVE NG/ML
PROPOXYPH UR QL SCN: NEGATIVE NG/ML

## 2020-07-03 ENCOUNTER — HOSPITAL ENCOUNTER (EMERGENCY)
Facility: HOSPITAL | Age: 35
Discharge: HOME | End: 2020-07-03
Attending: EMERGENCY MEDICINE | Admitting: EMERGENCY MEDICINE
Payer: COMMERCIAL

## 2020-07-03 VITALS
RESPIRATION RATE: 16 BRPM | BODY MASS INDEX: 21.8 KG/M2 | DIASTOLIC BLOOD PRESSURE: 63 MMHG | WEIGHT: 126.98 LBS | HEART RATE: 88 BPM | SYSTOLIC BLOOD PRESSURE: 97 MMHG | OXYGEN SATURATION: 100 % | TEMPERATURE: 98.8 F

## 2020-07-03 DIAGNOSIS — F19.10 SUBSTANCE ABUSE (HCC): Primary | ICD-10-CM

## 2020-07-03 DIAGNOSIS — F19.90 RECREATIONAL DRUG USE: ICD-10-CM

## 2020-07-03 LAB
ALBUMIN SERPL BCP-MCNC: 3.7 G/DL (ref 3.5–5)
ALP SERPL-CCNC: 73 U/L (ref 46–116)
ALT SERPL W P-5'-P-CCNC: 15 U/L (ref 12–78)
ANION GAP SERPL CALCULATED.3IONS-SCNC: 11 MMOL/L (ref 4–13)
APAP SERPL-MCNC: <2 UG/ML (ref 10–20)
AST SERPL W P-5'-P-CCNC: 19 U/L (ref 5–45)
BASOPHILS # BLD AUTO: 0.03 THOUSANDS/ΜL (ref 0–0.1)
BASOPHILS NFR BLD AUTO: 1 % (ref 0–1)
BILIRUB SERPL-MCNC: 0.32 MG/DL (ref 0.2–1)
BUN SERPL-MCNC: 9 MG/DL (ref 5–25)
CALCIUM SERPL-MCNC: 8.9 MG/DL (ref 8.3–10.1)
CHLORIDE SERPL-SCNC: 103 MMOL/L (ref 100–108)
CO2 SERPL-SCNC: 26 MMOL/L (ref 21–32)
CREAT SERPL-MCNC: 0.9 MG/DL (ref 0.6–1.3)
EOSINOPHIL # BLD AUTO: 0.14 THOUSAND/ΜL (ref 0–0.61)
EOSINOPHIL NFR BLD AUTO: 2 % (ref 0–6)
ERYTHROCYTE [DISTWIDTH] IN BLOOD BY AUTOMATED COUNT: 14.6 % (ref 11.6–15.1)
ETHANOL SERPL-MCNC: 4 MG/DL (ref 0–3)
GFR SERPL CREATININE-BSD FRML MDRD: 129 ML/MIN/1.73SQ M
GLUCOSE SERPL-MCNC: 84 MG/DL (ref 65–140)
HCT VFR BLD AUTO: 38.7 % (ref 36.5–49.3)
HGB BLD-MCNC: 12.8 G/DL (ref 12–17)
IMM GRANULOCYTES # BLD AUTO: 0.02 THOUSAND/UL (ref 0–0.2)
IMM GRANULOCYTES NFR BLD AUTO: 0 % (ref 0–2)
LYMPHOCYTES # BLD AUTO: 1.56 THOUSANDS/ΜL (ref 0.6–4.47)
LYMPHOCYTES NFR BLD AUTO: 25 % (ref 14–44)
MAGNESIUM SERPL-MCNC: 2.3 MG/DL (ref 1.6–2.6)
MCH RBC QN AUTO: 31.1 PG (ref 26.8–34.3)
MCHC RBC AUTO-ENTMCNC: 33.1 G/DL (ref 31.4–37.4)
MCV RBC AUTO: 94 FL (ref 82–98)
MONOCYTES # BLD AUTO: 0.64 THOUSAND/ΜL (ref 0.17–1.22)
MONOCYTES NFR BLD AUTO: 10 % (ref 4–12)
NEUTROPHILS # BLD AUTO: 3.75 THOUSANDS/ΜL (ref 1.85–7.62)
NEUTS SEG NFR BLD AUTO: 62 % (ref 43–75)
NRBC BLD AUTO-RTO: 0 /100 WBCS
PLATELET # BLD AUTO: 218 THOUSANDS/UL (ref 149–390)
PMV BLD AUTO: 9.9 FL (ref 8.9–12.7)
POTASSIUM SERPL-SCNC: 4.6 MMOL/L (ref 3.5–5.3)
PROT SERPL-MCNC: 6.4 G/DL (ref 6.4–8.2)
RBC # BLD AUTO: 4.12 MILLION/UL (ref 3.88–5.62)
SALICYLATES SERPL-MCNC: 4.6 MG/DL (ref 3–20)
SODIUM SERPL-SCNC: 140 MMOL/L (ref 136–145)
VALPROATE SERPL-MCNC: 69 UG/ML (ref 50–100)
WBC # BLD AUTO: 6.14 THOUSAND/UL (ref 4.31–10.16)

## 2020-07-03 PROCEDURE — 80053 COMPREHEN METABOLIC PANEL: CPT | Performed by: EMERGENCY MEDICINE

## 2020-07-03 PROCEDURE — 99283 EMERGENCY DEPT VISIT LOW MDM: CPT

## 2020-07-03 PROCEDURE — 80320 DRUG SCREEN QUANTALCOHOLS: CPT | Performed by: EMERGENCY MEDICINE

## 2020-07-03 PROCEDURE — 85025 COMPLETE CBC W/AUTO DIFF WBC: CPT | Performed by: EMERGENCY MEDICINE

## 2020-07-03 PROCEDURE — 83735 ASSAY OF MAGNESIUM: CPT | Performed by: EMERGENCY MEDICINE

## 2020-07-03 PROCEDURE — 80164 ASSAY DIPROPYLACETIC ACD TOT: CPT | Performed by: EMERGENCY MEDICINE

## 2020-07-03 PROCEDURE — 93005 ELECTROCARDIOGRAM TRACING: CPT

## 2020-07-03 PROCEDURE — 96360 HYDRATION IV INFUSION INIT: CPT

## 2020-07-03 PROCEDURE — 96361 HYDRATE IV INFUSION ADD-ON: CPT

## 2020-07-03 PROCEDURE — 36415 COLL VENOUS BLD VENIPUNCTURE: CPT | Performed by: EMERGENCY MEDICINE

## 2020-07-03 PROCEDURE — 80329 ANALGESICS NON-OPIOID 1 OR 2: CPT | Performed by: EMERGENCY MEDICINE

## 2020-07-03 PROCEDURE — 99284 EMERGENCY DEPT VISIT MOD MDM: CPT | Performed by: EMERGENCY MEDICINE

## 2020-07-03 RX ADMIN — SODIUM CHLORIDE 1000 ML: 0.9 INJECTION, SOLUTION INTRAVENOUS at 11:55

## 2020-07-03 NOTE — ED NOTES
Per Dr Carlos Garrett ok for pt to eat provided with sandwich, snacks and juice as per his request       Trell Orantes, PETERSON  07/03/20 2365

## 2020-07-03 NOTE — ED PROVIDER NOTES
History  Chief Complaint   Patient presents with    Recreational Drug Use     pt found by family laying against wall  possible k2 use  History provided by:  Patient  History limited by:  Mental status change and psychiatric disorder (Drug use)   used: No    Medical Problem - Major   Location:  Admits to drinking beer and smoking pot  Apparently had passed out at home  He denies other drug use and says he feels fine right now  Severity:  Unable to specify  Onset quality:  Unable to specify Christian Tia he did this morning prior to arrival )  Timing:  Constant  Progression:  Unable to specify  Relieved by:  Nothing  Worsened by:  He says he had 2 beers and smoke pot  Ineffective treatments:  None tried  Associated symptoms: no abdominal pain, no chest pain, no congestion, no cough, no fever, no headaches, no nausea, no shortness of breath and no vomiting        Prior to Admission Medications   Prescriptions Last Dose Informant Patient Reported? Taking?   benztropine (COGENTIN) 0 5 mg tablet   Yes No   Sig: Take 0 5 mg by mouth 3 (three) times a day   divalproex sodium (DEPAKOTE) 500 mg EC tablet   Yes No   Sig: Take 500 mg by mouth daily at bedtime   loxapine (LOXITANE) 25 mg capsule   Yes No   Sig: Take 25 mg by mouth 3 (three) times a day      Facility-Administered Medications: None       Past Medical History:   Diagnosis Date    [facility]     Resolved 05/16/14    Schizo affective schizophrenia ([facility] )     resolved 05/16/14       History reviewed  No pertinent surgical history  Family History   Problem Relation Age of Onset    Anxiety disorder Mother     Depression Mother     Dysphagia Mother     Stroke Mother         cva    Diabetes type II Mother     Other Mother         Hyponatremia    Psychosis Mother      I have reviewed and agree with the history as documented      E-Cigarette/Vaping    E-Cigarette Use Never User      E-Cigarette/Vaping Substances     Social History     Tobacco Use    Smoking status: Current Every Day Smoker     Types: Cigarettes    Smokeless tobacco: Never Used   Substance Use Topics    Alcohol use: Yes     Comment: social    Drug use: No       Review of Systems   Unable to perform ROS: Mental status change   Constitutional: Negative for fever  HENT: Negative for congestion  Respiratory: Negative for cough and shortness of breath  Cardiovascular: Negative for chest pain  Gastrointestinal: Negative for abdominal pain, nausea and vomiting  Neurological: Negative for weakness, numbness and headaches  Psychiatric/Behavioral: Positive for confusion  All other systems reviewed and are negative  Physical Exam  Physical Exam   Constitutional: He appears well-developed and well-nourished  He appears listless  He is cooperative  Non-toxic appearance  He does not have a sickly appearance  He does not appear ill  No distress  Easily arousable and interactive but globally slowed  Looks to be in no acute distress  HENT:   Head: Normocephalic and atraumatic  Right Ear: Hearing normal    Left Ear: Hearing normal    Mouth/Throat: Mucous membranes are normal    Eyes: Pupils are equal, round, and reactive to light  Conjunctivae, EOM and lids are normal  Right eye exhibits no discharge  Left eye exhibits no discharge  No scleral icterus  Pupils are about 4-5 mm   Neck: Trachea normal and normal range of motion  Cardiovascular: Regular rhythm, normal heart sounds, intact distal pulses and normal pulses  Tachycardia present  Exam reveals no gallop and no friction rub  No murmur heard  Pulmonary/Chest: Effort normal and breath sounds normal  No stridor  No respiratory distress  He has no wheezes  He has no rales  Abdominal: Soft  Normal appearance and bowel sounds are normal  There is no tenderness  There is no rebound, no guarding and no CVA tenderness  Musculoskeletal: Normal range of motion  He exhibits no edema     Neurological: He has normal strength and normal reflexes  He appears listless  He displays no tremor  No cranial nerve deficit or sensory deficit  He exhibits normal muscle tone  He displays no seizure activity  Coordination normal  GCS eye subscore is 4  GCS verbal subscore is 5  GCS motor subscore is 6  [place] to person place and year  Skin: Skin is warm, dry and intact  No rash noted  He is not diaphoretic  No pallor  Psychiatric: He has a normal mood and affect  His speech is normal  Cognition and memory are normal    Nursing note and vitals reviewed  Vital Signs  ED Triage Vitals [07/03/20 1158]   Temperature Pulse Respirations Blood Pressure SpO2   98 8 °F (37 1 °C) 90 14 95/51 96 %      Temp Source Heart Rate Source Patient Position - Orthostatic VS BP Location FiO2 (%)   Temporal Monitor Lying Right arm --      Pain Score       No Pain           Vitals:    07/03/20 1158   BP: 95/51   Pulse: 90   Patient Position - Orthostatic VS: Lying         Visual Acuity      ED Medications  Medications   sodium chloride 0 9 % bolus 1,000 mL (1,000 mL Intravenous New Bag 7/3/20 1155)       Diagnostic Studies  Results Reviewed     Procedure Component Value Units Date/Time    Acetaminophen level-If concentration is detectable, please discuss with medical  on call   [[id number]]  (Abnormal) Collected:  07/03/20 1156    Lab Status:  Final result Specimen:  Blood from Arm, Left Updated:  07/03/20 1223     Acetaminophen Level <2 0 ug/mL     Comprehensive metabolic panel [[id number]] Collected:  07/03/20 1156    Lab Status:  Final result Specimen:  Blood from Arm, Left Updated:  07/03/20 1222     Sodium 140 mmol/L      Potassium 4 6 mmol/L      Chloride 103 mmol/L      CO2 26 mmol/L      ANION GAP 11 mmol/L      BUN 9 mg/dL      Creatinine 0 90 mg/dL      Glucose 84 mg/dL      Calcium 8 9 mg/dL      AST 19 U/L      ALT 15 U/L      Alkaline Phosphatase 73 U/L      Total Protein 6 4 g/dL      Albumin 3 7 g/dL      Total Bilirubin 0 32 mg/dL      eGFR 129 ml/min/1 73sq m     Narrative:       [place] guidelines for Chronic Kidney Disease (CKD):     Stage 1 with normal or high GFR (GFR > 90 mL/min/1 73 square meters)    Stage 2 Mild CKD (GFR = 60-89 mL/min/1 73 square meters)    Stage 3A Moderate CKD (GFR = 45-59 mL/min/1 73 square meters)    Stage 3B Moderate CKD (GFR = 30-44 mL/min/1 73 square meters)    Stage 4 Severe CKD (GFR = 15-29 mL/min/1 73 square meters)    Stage 5 End Stage CKD (GFR <15 mL/min/1 73 square meters)  Note: GFR calculation is accurate only with a steady state creatinine    Magnesium [[id number]]  (Normal) Collected:  07/03/20 1156    Lab Status:  Final result Specimen:  Blood from Arm, Left Updated:  07/03/20 1222     Magnesium 2 3 mg/dL     Salicylate level [[id number]]  (Normal) Collected:  07/03/20 1156    Lab Status:  Final result Specimen:  Blood from Arm, Left Updated:  28/28/79 9144     Salicylate Lvl 4 6 mg/dL     Valproic acid level, total [[id number]]  (Normal) Collected:  07/03/20 1156    Lab Status:  Final result Specimen:  Blood from Arm, Left Updated:  07/03/20 1221     Valproic Acid, Total 69 ug/mL     Ethanol [[id number]]  (Abnormal) Collected:  07/03/20 1156    Lab Status:  Final result Specimen:  Blood from Arm, Left Updated:  07/03/20 1219     Ethanol Lvl 4 mg/dL     CBC and differential [[id number]] Collected:  07/03/20 1156    Lab Status:  Final result Specimen:  Blood from Arm, Left Updated:  07/03/20 1204     WBC 6 14 Thousand/uL      RBC 4 12 Million/uL      Hemoglobin 12 8 g/dL      Hematocrit 38 7 %      MCV 94 fL      MCH 31 1 pg      MCHC 33 1 g/dL      RDW 14 6 %      MPV 9 9 fL      Platelets 070 Thousands/uL      nRBC 0 /100 WBCs      Neutrophils Relative 62 %      Immat GRANS % 0 %      Lymphocytes Relative 25 %      Monocytes Relative 10 %      Eosinophils Relative 2 %      Basophils Relative 1 %      Neutrophils Absolute 3 75 Thousands/µL Immature Grans Absolute 0 02 Thousand/uL      Lymphocytes Absolute 1 56 Thousands/µL      Monocytes Absolute 0 64 Thousand/µL      Eosinophils Absolute 0 14 Thousand/µL      Basophils Absolute 0 03 Thousands/µL     POCT urinalysis dipstick [[id number]]     Lab Status:  No result Specimen:  Urine     Rapid drug screen, urine [[id number]]     Lab Status:  No result Specimen:  Urine                  No orders to display              Procedures  ECG 12 Lead Documentation Only  Date/Time: 7/3/2020 11:30 AM  Performed by: Domo Taveras MD  Authorized by: Domo Taveras MD     Indications / Diagnosis:  Drug ingestion  ECG reviewed by me, the ED Provider: yes    Patient location:  ED  Interpretation:     Interpretation: non-specific    Rate:     ECG rate assessment: tachycardic    Rhythm:     Rhythm: sinus tachycardia    Ectopy:     Ectopy: none    QRS:     QRS axis:  Right    QRS intervals:  Normal  Conduction:     Conduction: abnormal      Abnormal conduction: incomplete RBBB    ST segments:     ST segments:  Normal  T waves:     T waves: normal               ED Course                                             MDM  Number of Diagnoses or Management Options  Recreational drug use:   Substance abuse [facility]):   Diagnosis management comments: Patient remains awake and alert and ate a full meal   Alcohol level was rather low  Suspect more due to marijuana use  No signs of trauma  Other labs are unremarkable  Did not provide a urine sample          Disposition  Final diagnoses:   Substance abuse ([facility] )   Recreational drug use     Time reflects when diagnosis was documented in both MDM as applicable and the Disposition within this note     Time User Action Codes Description Comment    7/3/2020  1:20 PM Lacy Prow Add [F19 10] Substance abuse ([address] )     7/3/2020  1:20 PM Lacy Prow Add [F19 90] Recreational drug use       ED Disposition     ED Disposition Condition Date/Time Comment Discharge Stable Fri Jul 3, 2020  1:20 PM Michellekimberlee Arrington discharge to home/self care  Follow-up Information     Follow up With Specialties Details Why Contact Info    Tarik Jovel PA-C Family Medicine, Physician Assistant Schedule an appointment as soon as possible for a visit in 3 days If symptoms worsen [address]  [address] [facility]  [address]  [facility] [address]             Current Discharge Medication List      CONTINUE these medications which have NOT CHANGED    Details   benztropine (COGENTIN) 0 5 mg tablet Take 0 5 mg by mouth 3 (three) times a day  Refills: 0      divalproex sodium (DEPAKOTE) 500 mg EC tablet Take 500 mg by mouth daily at bedtime  Refills: 0      loxapine (LOXITANE) 25 mg capsule Take 25 mg by mouth 3 (three) times a day  Refills: 0           No discharge procedures on file      PDMP Review     None          ED Provider  Electronically Signed by           Daina Jones MD  07/03/20 1075

## 2020-07-04 LAB
ATRIAL RATE: 108 BPM
P AXIS: 63 DEGREES
PR INTERVAL: 118 MS
QRS AXIS: 92 DEGREES
QRSD INTERVAL: 94 MS
QT INTERVAL: 320 MS
QTC INTERVAL: 428 MS
T WAVE AXIS: 63 DEGREES
VENTRICULAR RATE: 108 BPM

## 2020-07-04 PROCEDURE — 93010 ELECTROCARDIOGRAM REPORT: CPT | Performed by: INTERNAL MEDICINE

## 2020-09-07 ENCOUNTER — HOSPITAL ENCOUNTER (EMERGENCY)
Facility: HOSPITAL | Age: 35
Discharge: HOME | End: 2020-09-07
Attending: EMERGENCY MEDICINE | Admitting: EMERGENCY MEDICINE
Payer: COMMERCIAL

## 2020-09-07 VITALS
HEART RATE: 88 BPM | TEMPERATURE: 97.9 F | DIASTOLIC BLOOD PRESSURE: 55 MMHG | RESPIRATION RATE: 16 BRPM | SYSTOLIC BLOOD PRESSURE: 106 MMHG | OXYGEN SATURATION: 97 %

## 2020-09-07 DIAGNOSIS — S50.312A ABRASION OF LEFT ELBOW, INITIAL ENCOUNTER: Primary | ICD-10-CM
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
## 2020-09-07 PROCEDURE — 99281 EMR DPT VST MAYX REQ PHY/QHP: CPT

## 2020-09-07 PROCEDURE — 99282 EMERGENCY DEPT VISIT SF MDM: CPT | Performed by: EMERGENCY MEDICINE

## 2020-09-07 RX ORDER — BACITRACIN, NEOMYCIN, POLYMYXIN B 400; 3.5; 5 [USP'U]/G; MG/G; [USP'U]/G
1 OINTMENT TOPICAL ONCE
Status: COMPLETED | OUTPATIENT
Start: 2020-09-07 | End: 2020-09-07

## 2020-09-07 RX ADMIN — BACITRACIN ZINC, NEOMYCIN, POLYMYXIN B 1 SMALL APPLICATION: 400; 3.5; 5 OINTMENT TOPICAL at 20:40

## 2020-09-08 NOTE — ED PROVIDER NOTES
History  Chief Complaint   Patient presents with    Insect Bite     Pt reports "I was attacked by some kind of bug " Pt reports bug bite to right lower leg  Patient is a 78-year-old male past medical history of schizophrenia and bipolar disorder presents for evaluation of bug bite and abrasion  He states that about half an hour prior to arrival a bug attacked him   He states that he was trying to swat it away which caused him to fall and scraped his left elbow  He denies any head injury or loss of consciousness  He did not clean the area but came here for evaluation  He has a large abrasion/skin avulsion to the left elbow area  He has a small abrasion to the left wrist and the right shin and ankle area  He has full range of motion and no joint pain or swelling  No active bleeding or drainage  He is up-to-date on tetanus immunization  He denies other complaints at this time  Prior to Admission Medications   Prescriptions Last Dose Informant Patient Reported? Taking?   benztropine (COGENTIN) 0 5 mg tablet   Yes No   Sig: Take 0 5 mg by mouth 3 (three) times a day   divalproex sodium (DEPAKOTE) 500 mg EC tablet   Yes No   Sig: Take 500 mg by mouth daily at bedtime   loxapine (LOXITANE) 25 mg capsule   Yes No   Sig: Take 25 mg by mouth 3 (three) times a day      Facility-Administered Medications: None       Past Medical History:   Diagnosis Date    Bipolar disorder ([facility] )     Resolved 05/16/14    Schizo affective schizophrenia ([facility] )     resolved 05/16/14       History reviewed  No pertinent surgical history  Family History   Problem Relation Age of Onset    Anxiety disorder Mother     Depression Mother     Dysphagia Mother     Stroke Mother         cva    Diabetes type II Mother     Other Mother         Hyponatremia    Psychosis Mother      I have reviewed and agree with the history as documented      E-Cigarette/Vaping    E-Cigarette Use Never User      E-Cigarette/Vaping Substances     Social History     Tobacco Use    Smoking status: Current Every Day Smoker     Types: Cigarettes    Smokeless tobacco: Never Used   Substance Use Topics    Alcohol use: Yes     Comment: social    Drug use: No       Review of Systems   Constitutional: Negative for chills and fever  Respiratory: Negative for shortness of breath  Cardiovascular: Negative for chest pain  Gastrointestinal: Negative for abdominal pain, nausea and vomiting  Musculoskeletal: Negative for arthralgias  Skin: Positive for wound  Neurological: Negative for weakness and numbness  All other systems reviewed and are negative  Physical Exam  Physical Exam  Vitals signs and nursing note reviewed  Constitutional:       General: He is not in acute distress  Appearance: Normal appearance  He is normal weight  He is not ill-appearing or toxic-appearing  HENT:      Head: Normocephalic and atraumatic  Neck:      Musculoskeletal: Normal range of motion  Cardiovascular:      Rate and Rhythm: Normal rate and regular rhythm  Heart sounds: Normal heart sounds  Pulmonary:      Effort: Pulmonary effort is normal  No respiratory distress  Breath sounds: Normal breath sounds  No stridor  No wheezing or rales  Musculoskeletal: Normal range of motion  Skin:     General: Skin is warm and dry  Capillary Refill: Capillary refill takes less than 2 seconds  Comments: Large superficial abrasion/skin avulsion noted to the left elbow area  No foreign bodies, contamination, active bleeding  No lacerations  Full range of motion of the elbow and no bony tenderness palpation  Neurovascular intact distally  Radial pulse intact  Capillary refill intact  Small abrasion noted to the right shin and right ankle  Do not appear new as there is some scabbing to the shin area  Neurological:      Mental Status: He is alert           Vital Signs  ED Triage Vitals [09/07/20 2012]   Temperature Pulse Respirations Blood Pressure SpO2   97 9 °F (36 6 °C) 88 16 106/55 97 %      Temp src Heart Rate Source Patient Position - Orthostatic VS BP Location FiO2 (%)   -- Monitor -- Right arm --      Pain Score       --           Vitals:    09/07/20 2012   BP: 106/55   Pulse: 88         Visual Acuity      ED Medications  Medications   neomycin-bacitracin-polymyxin b (NEOSPORIN) ointment 1 small application (1 small application Topical Given 9/7/20 2040)       Diagnostic Studies  Results Reviewed     None                 No orders to display              Procedures  Procedures         ED Course       US AUDIT      Most Recent Value   Initial Alcohol Screen: US AUDIT-C    1  How often do you have a drink containing alcohol?  0 Filed at: 09/07/2020 2041   2  How many drinks containing alcohol do you have on a typical day you are drinking? 0 Filed at: 09/07/2020 2041   3a  Male UNDER 65: How often do you have five or more drinks on one occasion? 0 Filed at: 09/07/2020 2041   3b  FEMALE Any Age, or MALE 65+: How often do you have 4 or more drinks on one occassion? 0 Filed at: 09/07/2020 2041   Audit-C Score  0 Filed at: 09/07/2020 2041                  JOURDAN/DAST-10      Most Recent Value   How many times in the past year have you    Used an illegal drug or used a prescription medication for non-medical reasons? Never Filed at: 09/07/2020 2041                                MDM  Number of Diagnoses or Management Options  Abrasion of left elbow, initial encounter:   Insect bite:   Diagnosis management comments: Her abrasions were thoroughly irrigated and cleaned with Shur-Clens  No foreign body, contamination, active bleeding, laceration, cellulitis  No joint pain or decreased range of motion  Neurovascular intact distally  Pulses intact  Bacitracin and nonstick dressing applied to the left elbow  Discussed wound care  Follow up with family doctor  Return to the ED if symptoms worsen or new symptoms arise    Patient states understanding and agrees with plan  Patient Progress  Patient progress: stable        Disposition  Final diagnoses:   Abrasion of left elbow, initial encounter   Insect bite     Time reflects when diagnosis was documented in both MDM as applicable and the Disposition within this note     Time User Action Codes Description Comment    9/7/2020  8:33 PM [place] Show Add [S50 312A] Abrasion of left elbow, initial encounter     9/7/2020  8:33 PM Noble Force  XXXA] Insect bite       ED Disposition     ED Disposition Condition Date/Time Comment    Discharge Stable Mon Sep 7, 2020  8:33 PM Richi Vogel discharge to home/self care  Follow-up Information     Follow up With Specialties Details Why Contact Info Additional Information    Allie Blankenship PA-C Family Medicine, Physician Assistant Schedule an appointment as soon as possible for a visit in 1 day  [address] [facility] [address] Emergency Department Emergency Medicine  If symptoms worsen [facility] [address]  [phone] [address] [facility], [address] [place] [address], [facility]          Discharge Medication List as of 9/7/2020  8:33 PM      CONTINUE these medications which have NOT CHANGED    Details   benztropine (COGENTIN) 0 5 mg tablet Take 0 5 mg by mouth 3 (three) times a day, Starting Mon 6/4/2018, Historical Med      divalproex sodium (DEPAKOTE) 500 mg EC tablet Take 500 mg by mouth daily at bedtime, Starting Thu 6/7/2018, Historical Med      loxapine (LOXITANE) 25 mg capsule Take 25 mg by mouth 3 (three) times a day, Starting Mon 6/4/2018, Historical Med           No discharge procedures on file      PDMP Review     None          ED Provider  Electronically Signed by           Rebeka Gupta PA-C  09/07/20 3581

## 2020-09-10 ENCOUNTER — HOSPITAL ENCOUNTER (EMERGENCY)
Facility: HOSPITAL | Age: 35
Discharge: HOME | End: 2020-09-10
Attending: EMERGENCY MEDICINE | Admitting: EMERGENCY MEDICINE
Payer: COMMERCIAL

## 2020-09-10 ENCOUNTER — APPOINTMENT (EMERGENCY)
Dept: CT IMAGING | Facility: HOSPITAL | Age: 35
End: 2020-09-10
Payer: COMMERCIAL

## 2020-09-10 VITALS
WEIGHT: 130 LBS | SYSTOLIC BLOOD PRESSURE: 112 MMHG | DIASTOLIC BLOOD PRESSURE: 70 MMHG | RESPIRATION RATE: 16 BRPM | HEART RATE: 75 BPM | TEMPERATURE: 98.5 F | OXYGEN SATURATION: 100 % | BODY MASS INDEX: 22.31 KG/M2

## 2020-09-10 DIAGNOSIS — Z86.59 MENTAL STATUS CHANGE RESOLVED: Primary | ICD-10-CM

## 2020-09-10 LAB
ALBUMIN SERPL BCP-MCNC: 4.1 G/DL (ref 3–5.2)
ALP SERPL-CCNC: 63 U/L (ref 43–122)
ALT SERPL W P-5'-P-CCNC: 23 U/L (ref 9–52)
AMPHETAMINES SERPL QL SCN: NEGATIVE
ANION GAP SERPL CALCULATED.3IONS-SCNC: 7 MMOL/L (ref 5–14)
AST SERPL W P-5'-P-CCNC: 27 U/L (ref 17–59)
BARBITURATES UR QL: NEGATIVE
BASOPHILS # BLD AUTO: 0.1 THOUSANDS/ΜL (ref 0–0.1)
BASOPHILS NFR BLD AUTO: 1 % (ref 0–1)
BENZODIAZ UR QL: NEGATIVE
BILIRUB SERPL-MCNC: 0.4 MG/DL
BUN SERPL-MCNC: 15 MG/DL (ref 5–25)
CALCIUM SERPL-MCNC: 9.3 MG/DL (ref 8.4–10.2)
CHLORIDE SERPL-SCNC: 101 MMOL/L (ref 97–108)
CO2 SERPL-SCNC: 27 MMOL/L (ref 22–30)
COCAINE UR QL: NEGATIVE
CREAT SERPL-MCNC: 0.77 MG/DL (ref 0.7–1.5)
EOSINOPHIL # BLD AUTO: 0.2 THOUSAND/ΜL (ref 0–0.4)
EOSINOPHIL NFR BLD AUTO: 3 % (ref 0–6)
ERYTHROCYTE [DISTWIDTH] IN BLOOD BY AUTOMATED COUNT: 14.4 %
ETHANOL SERPL-MCNC: <10 MG/DL (ref 0–10)
GFR SERPL CREATININE-BSD FRML MDRD: 138 ML/MIN/1.73SQ M
GLUCOSE SERPL-MCNC: 90 MG/DL (ref 70–99)
HCT VFR BLD AUTO: 38.4 % (ref 41–53)
HGB BLD-MCNC: 13.2 G/DL (ref 13.5–17.5)
LYMPHOCYTES # BLD AUTO: 1.9 THOUSANDS/ΜL (ref 0.5–4)
LYMPHOCYTES NFR BLD AUTO: 28 % (ref 25–45)
MCH RBC QN AUTO: 31.8 PG (ref 26–34)
MCHC RBC AUTO-ENTMCNC: 34.4 G/DL (ref 31–36)
MCV RBC AUTO: 93 FL (ref 80–100)
METHADONE UR QL: NEGATIVE
MONOCYTES # BLD AUTO: 0.8 THOUSAND/ΜL (ref 0.2–0.9)
MONOCYTES NFR BLD AUTO: 11 % (ref 1–10)
NEUTROPHILS # BLD AUTO: 3.8 THOUSANDS/ΜL (ref 1.8–7.8)
NEUTS SEG NFR BLD AUTO: 57 % (ref 45–65)
OPIATES UR QL SCN: NEGATIVE
OXYCODONE+OXYMORPHONE UR QL SCN: NEGATIVE
PCP UR QL: NEGATIVE
PLATELET # BLD AUTO: 227 THOUSANDS/UL (ref 150–450)
PMV BLD AUTO: 8 FL (ref 8.9–12.7)
POTASSIUM SERPL-SCNC: 4.2 MMOL/L (ref 3.6–5)
PROT SERPL-MCNC: 6.6 G/DL (ref 5.9–8.4)
RBC # BLD AUTO: 4.15 MILLION/UL (ref 4.5–5.9)
SODIUM SERPL-SCNC: 135 MMOL/L (ref 137–147)
THC UR QL: POSITIVE
VALPROATE SERPL-MCNC: 43.8 UG/ML (ref 50–120)
WBC # BLD AUTO: 6.8 THOUSAND/UL (ref 4.5–11)

## 2020-09-10 PROCEDURE — G1004 CDSM NDSC: HCPCS

## 2020-09-10 PROCEDURE — 96361 HYDRATE IV INFUSION ADD-ON: CPT

## 2020-09-10 PROCEDURE — 80053 COMPREHEN METABOLIC PANEL: CPT | Performed by: EMERGENCY MEDICINE

## 2020-09-10 PROCEDURE — 70450 CT HEAD/BRAIN W/O DYE: CPT

## 2020-09-10 PROCEDURE — 93005 ELECTROCARDIOGRAM TRACING: CPT

## 2020-09-10 PROCEDURE — 80320 DRUG SCREEN QUANTALCOHOLS: CPT | Performed by: EMERGENCY MEDICINE

## 2020-09-10 PROCEDURE — 80307 DRUG TEST PRSMV CHEM ANLYZR: CPT | Performed by: EMERGENCY MEDICINE

## 2020-09-10 PROCEDURE — 99284 EMERGENCY DEPT VISIT MOD MDM: CPT

## 2020-09-10 PROCEDURE — 99284 EMERGENCY DEPT VISIT MOD MDM: CPT | Performed by: EMERGENCY MEDICINE

## 2020-09-10 PROCEDURE — 85025 COMPLETE CBC W/AUTO DIFF WBC: CPT | Performed by: EMERGENCY MEDICINE

## 2020-09-10 PROCEDURE — 96360 HYDRATION IV INFUSION INIT: CPT

## 2020-09-10 PROCEDURE — 80164 ASSAY DIPROPYLACETIC ACD TOT: CPT | Performed by: EMERGENCY MEDICINE

## 2020-09-10 PROCEDURE — 36415 COLL VENOUS BLD VENIPUNCTURE: CPT | Performed by: EMERGENCY MEDICINE

## 2020-09-10 RX ADMIN — SODIUM CHLORIDE 1000 ML: 0.9 INJECTION, SOLUTION INTRAVENOUS at 16:15

## 2020-09-10 NOTE — ED PROVIDER NOTES
History  Chief Complaint   Patient presents with    Medical Problem     Patient arrived via EMS  Patient fell to his knees at the bus stop unable to answer EMS questions  Patient very slow to respond does not know why he is in the hospital      37 yo male with a history of bipolar disorder and schizoaffective disorder brought to the ED for evaluation of a mental status change  Per report the patient suddenly "dropped to his knees" at the bus stop  No head strike or LOC  The patient has no appreciable complaints but is very slow to respond to questioning and does not know why he was brought to the ED  He denies chest pain and shortness of breath  No headache  No N/V/D  (+) Occasional marijuana and alcohol use but the patient "doesn't think" that he used any substances today  Prior to Admission Medications   Prescriptions Last Dose Informant Patient Reported? Taking?   benztropine (COGENTIN) 0 5 mg tablet Not Taking at Unknown time  Yes No   Sig: Take 0 5 mg by mouth 3 (three) times a day   divalproex sodium (DEPAKOTE) 500 mg EC tablet   Yes No   Sig: Take 500 mg by mouth daily at bedtime   loxapine (LOXITANE) 25 mg capsule Not Taking at Unknown time  Yes No   Sig: Take 25 mg by mouth 3 (three) times a day      Facility-Administered Medications: None       Past Medical History:   Diagnosis Date    Bipolar disorder ([facility] )     Resolved 05/16/14    Schizo affective schizophrenia ([facility] )     resolved 05/16/14       History reviewed  No pertinent surgical history  Family History   Problem Relation Age of Onset    Anxiety disorder Mother     Depression Mother     Dysphagia Mother     Stroke Mother         cva    Diabetes type II Mother     Other Mother         Hyponatremia    Psychosis Mother      I have reviewed and agree with the history as documented      E-Cigarette/Vaping    E-Cigarette Use Never User      E-Cigarette/Vaping Substances     Social History     Tobacco Use    Smoking status: Current Every Day Smoker     Types: Cigarettes    Smokeless tobacco: Never Used   Substance Use Topics    Alcohol use: Yes     Comment: social    Drug use: Yes     Types: Marijuana       Review of Systems   Constitutional: Negative for chills and fever  HENT: Negative for sore throat  Respiratory: Negative for cough and shortness of breath  Cardiovascular: Negative for chest pain and palpitations  Gastrointestinal: Negative for abdominal pain, diarrhea, nausea and vomiting  Endocrine: Negative for cold intolerance and heat intolerance  Genitourinary: Negative for dysuria and flank pain  Musculoskeletal: Negative for back pain  Skin: Negative for rash  Allergic/Immunologic: Negative for immunocompromised state  Neurological: Negative for headaches  Hematological: Negative for adenopathy  Psychiatric/Behavioral: The patient is not nervous/anxious  Physical Exam  Physical Exam  Constitutional:       General: He is not in acute distress  Appearance: He is well-developed  HENT:      Head: Normocephalic and atraumatic  Eyes:      Pupils: Pupils are equal, round, and reactive to light  Neck:      Musculoskeletal: Normal range of motion and neck supple  Cardiovascular:      Rate and Rhythm: Normal rate and regular rhythm  Pulmonary:      Effort: Pulmonary effort is normal  No respiratory distress  Breath sounds: Normal breath sounds  Abdominal:      General: There is no distension  Palpations: Abdomen is soft  Tenderness: There is no abdominal tenderness  Musculoskeletal: Normal range of motion  Skin:     General: Skin is warm and dry  Neurological:      Mental Status: He is oriented to person, place, and time  He is lethargic  Cranial Nerves: Cranial nerves are intact  Sensory: Sensation is intact  Motor: Motor function is intact  Coordination: Coordination is intact  Gait: Gait is intact           Vital Signs  ED Triage Vitals [09/10/20 1603]   Temperature Pulse Respirations Blood Pressure SpO2   98 5 °F (36 9 °C) (!) 106 20 119/77 95 %      Temp Source Heart Rate Source Patient Position - Orthostatic VS BP Location FiO2 (%)   Tympanic Monitor Sitting Left arm --      Pain Score       --           Vitals:    09/10/20 1603 09/10/20 1801   BP: 119/77 112/70   Pulse: (!) 106 75   Patient Position - Orthostatic VS: Sitting Sitting         Visual Acuity      ED Medications  Medications   sodium chloride 0 9 % bolus 1,000 mL (0 mL Intravenous Stopped 9/10/20 1801)       Diagnostic Studies  Results Reviewed     Procedure Component Value Units Date/Time    Rapid drug screen, urine [[id number]]  (Abnormal) Collected:  09/10/20 1623    Lab Status:  Final result Specimen:  Urine, Other Updated:  09/10/20 1654     Amph/Meth UR Negative     Barbiturate Ur Negative     Benzodiazepine Urine Negative     Cocaine Urine Negative     Methadone Urine Negative     Opiate Urine Negative     PCP Ur Negative     THC Urine Positive     Oxycodone Urine Negative    Narrative:       Presumptive report  If requested, specimen will be sent to reference lab for confirmation  FOR MEDICAL PURPOSES ONLY  IF CONFIRMATION NEEDED PLEASE CONTACT THE LAB WITHIN 5 DAYS      Drug Screen Cutoff Levels:  AMPHETAMINE/METHAMPHETAMINES  1000 ng/mL  BARBITURATES     200 ng/mL  BENZODIAZEPINES     200 ng/mL  COCAINE      300 ng/mL  METHADONE      300 ng/mL  OPIATES      300 ng/mL  PHENCYCLIDINE     25 ng/mL  THC       50 ng/mL  OXYCODONE      100 ng/mL    Valproic acid level, total [[id number]]  (Abnormal) Collected:  09/10/20 1615    Lab Status:  Final result Specimen:  Blood from Arm, Right Updated:  09/10/20 1639     Valproic Acid, Total 43 8 ug/mL     Ethanol [[id number]]  (Normal) Collected:  09/10/20 1615    Lab Status:  Final result Specimen:  Blood from Arm, Right Updated:  09/10/20 1634     Ethanol Lvl <10 mg/dL     Comprehensive metabolic panel [[id number]]  (Abnormal) Collected:  09/10/20 1615    Lab Status:  Final result Specimen:  Blood from Arm, Right Updated:  09/10/20 1634     Sodium 135 mmol/L      Potassium 4 2 mmol/L      Chloride 101 mmol/L      CO2 27 mmol/L      ANION GAP 7 mmol/L      BUN 15 mg/dL      Creatinine 0 77 mg/dL      Glucose 90 mg/dL      Calcium 9 3 mg/dL      AST 27 U/L      ALT 23 U/L      Alkaline Phosphatase 63 U/L      Total Protein 6 6 g/dL      Albumin 4 1 g/dL      Total Bilirubin 0 40 mg/dL      eGFR 138 ml/min/1 73sq m     Narrative:       National Kid[employer] Disease Foundation guidelines for Chronic Kidney Disease (CKD):     Stage 1 with normal or high GFR (GFR > 90 mL/min/1 73 square meters)    Stage 2 Mild CKD (GFR = 60-89 mL/min/1 73 square meters)    Stage 3A Moderate CKD (GFR = 45-59 mL/min/1 73 square meters)    Stage 3B Moderate CKD (GFR = 30-44 mL/min/1 73 square meters)    Stage 4 Severe CKD (GFR = 15-29 mL/min/1 73 square meters)    Stage 5 End Stage CKD (GFR <15 mL/min/1 73 square meters)  Note: GFR calculation is accurate only with a steady state creatinine    CBC and differential [[id number]]  (Abnormal) Collected:  09/10/20 1615    Lab Status:  Final result Specimen:  Blood from Arm, Right Updated:  09/10/20 1626     WBC 6 80 Thousand/uL      RBC 4 15 Million/uL      Hemoglobin 13 2 g/dL      Hematocrit 38 4 %      MCV 93 fL      MCH 31 8 pg      MCHC 34 4 g/dL      RDW 14 4 %      MPV 8 0 fL      Platelets 440 Thousands/uL      Neutrophils Relative 57 %      Lymphocytes Relative 28 %      Monocytes Relative 11 %      Eosinophils Relative 3 %      Basophils Relative 1 %      Neutrophils Absolute 3 80 Thousands/µL      Lymphocytes Absolute 1 90 Thousands/µL      Monocytes Absolute 0 80 Thousand/µL      Eosinophils Absolute 0 20 Thousand/µL      Basophils Absolute 0 10 Thousands/µL                  CT head without contrast   Final Result by Nahid Roca MD (09/10 1712)      No acute intracranial abnormality  Workstation performed: [id number]                    Procedures  ECG 12 Lead Documentation Only    Date/Time: 9/10/2020 4:15 PM  Performed by: Onofre German MD  Authorized by: Onofre German MD     Indications / Diagnosis:  Syncope  ECG reviewed by me, the ED Provider: yes    Patient location:  ED  Interpretation:     Interpretation: normal    Rate:     ECG rate:  98 bpm    ECG rate assessment: normal    Rhythm:     Rhythm: sinus rhythm    Ectopy:     Ectopy: none    QRS:     QRS axis:  Normal  Conduction:     Conduction: normal    ST segments:     ST segments:  Normal  T waves:     T waves: normal               ED Course                           SBIRT 22yo+      Most Recent Value   SBIRT (24 yo +)   In order to provide better care to our patients, we are screening all of our patients for alcohol and drug use  Would it be okay to ask you these screening questions? Yes Filed at: 09/10/2020 1616   Initial Alcohol Screen: US AUDIT-C    1  How often do you have a drink containing alcohol?  0 Filed at: 09/10/2020 1616   2  How many drinks containing alcohol do you have on a typical day you are drinking? 0 Filed at: 09/10/2020 1616   3a  Male UNDER 65: How often do you have five or more drinks on one occasion? 0 Filed at: 09/10/2020 1616   3b  FEMALE Any Age, or MALE 65+: How often do you have 4 or more drinks on one occassion? 0 Filed at: 09/10/2020 1616   Audit-C Score  0 Filed at: 09/10/2020 1616   JOURDAN: How many times in the past year have you    Used an illegal drug or used a prescription medication for non-medical reasons? Never Filed at: 09/10/2020 1616                  MDM  Number of Diagnoses or Management Options  Mental status change resolved:   Diagnosis management comments: The patient is oriented but very slow to respond to questioning  He does not know why he was brought to the ED  No external signs of trauma  Substance abuse vs post-ictal state vs psychiatric disorder?  Will check EKG, head CT, basic labs, ethanol level, UDS, and valproic acid level  IVFs initiated, will continue to monitor in the ED  18:02 Workup unremarkable  The patient is now alert, coordinated, and ambulating with a steady gait  He says he feels "great" and is repeatedly requesting discharge  Unclear etiology of earlier behavior --> he denies any substance abuse today  He does not want to stay for further workup/observation  Plan for close follow up with his PCP for reassessment and further workup  The patient is agreeable to this plan  Strict return precautions provided  Amount and/or Complexity of Data Reviewed  Clinical lab tests: ordered and reviewed  Tests in the radiology section of CPT®: ordered and reviewed  Tests in the medicine section of CPT®: ordered and reviewed    Patient Progress  Patient progress: improved      Disposition  Final diagnoses:   Mental status change resolved     Time reflects when diagnosis was documented in both MDM as applicable and the Disposition within this note     Time User Action Codes Description Comment    9/10/2020  6:02 PM Rohit Corry Add [Z86 [id number]] Mental status change resolved       ED Disposition     ED Disposition Condition Date/Time Comment    Discharge Stable Thu Sep 10, 2020  6:02 PM [facility] discharge to home/self care              Follow-up Information     Follow up With Specialties Details Why Contact Info    Heidi Delgadillo PA-C Family Medicine, Physician Assistant Schedule an appointment as soon as possible for a visit   [place] [address]  [facility] [address]  [phone]            Discharge Medication List as of 9/10/2020  6:03 PM      CONTINUE these medications which have NOT CHANGED    Details   benztropine (COGENTIN) 0 5 mg tablet Take 0 5 mg by mouth 3 (three) times a day, Starting Mon 6/4/2018, Historical Med      divalproex sodium (DEPAKOTE) 500 mg EC tablet Take 500 mg by mouth daily at bedtime, Starting Thu 6/7/2018, Historical Med loxapine (LOXITANE) 25 mg capsule Take 25 mg by mouth 3 (three) times a day, Starting Mon 6/4/2018, Historical Med           No discharge procedures on file      PDMP Review     None          ED Provider  Electronically Signed by           Joceline Barrett MD  09/10/20 0182

## 2020-09-11 ENCOUNTER — HOSPITAL ENCOUNTER (EMERGENCY)
Facility: HOSPITAL | Age: 35
Discharge: HOME | End: 2020-09-11
Attending: EMERGENCY MEDICINE | Admitting: EMERGENCY MEDICINE
Payer: COMMERCIAL

## 2020-09-11 ENCOUNTER — APPOINTMENT (EMERGENCY)
Dept: RADIOLOGY | Facility: HOSPITAL | Age: 35
End: 2020-09-11
Payer: COMMERCIAL

## 2020-09-11 VITALS
WEIGHT: 113.1 LBS | RESPIRATION RATE: 16 BRPM | OXYGEN SATURATION: 100 % | BODY MASS INDEX: 19.41 KG/M2 | TEMPERATURE: 98 F | DIASTOLIC BLOOD PRESSURE: 60 MMHG | HEART RATE: 78 BPM | SYSTOLIC BLOOD PRESSURE: 144 MMHG

## 2020-09-11 DIAGNOSIS — S62.639A DISTAL PHALANX OR PHALANGES, CLOSED FRACTURE: Primary | ICD-10-CM

## 2020-09-11 LAB
ATRIAL RATE: 98 BPM
P AXIS: 66 DEGREES
PR INTERVAL: 116 MS
QRS AXIS: 80 DEGREES
QRSD INTERVAL: 86 MS
QT INTERVAL: 344 MS
QTC INTERVAL: 439 MS
T WAVE AXIS: 54 DEGREES
VENTRICULAR RATE: 98 BPM

## 2020-09-11 PROCEDURE — 73130 X-RAY EXAM OF HAND: CPT

## 2020-09-11 PROCEDURE — 99282 EMERGENCY DEPT VISIT SF MDM: CPT | Performed by: EMERGENCY MEDICINE

## 2020-09-11 PROCEDURE — 99283 EMERGENCY DEPT VISIT LOW MDM: CPT

## 2020-09-11 PROCEDURE — 93010 ELECTROCARDIOGRAM REPORT: CPT | Performed by: INTERNAL MEDICINE
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
## 2020-09-27 ENCOUNTER — HOSPITAL ENCOUNTER (EMERGENCY)
Facility: HOSPITAL | Age: 35
Discharge: HOME | End: 2020-09-27
Attending: EMERGENCY MEDICINE
Payer: COMMERCIAL

## 2020-09-27 VITALS
WEIGHT: 108.03 LBS | OXYGEN SATURATION: 97 % | BODY MASS INDEX: 18.54 KG/M2 | HEART RATE: 86 BPM | RESPIRATION RATE: 18 BRPM | SYSTOLIC BLOOD PRESSURE: 123 MMHG | DIASTOLIC BLOOD PRESSURE: 68 MMHG | TEMPERATURE: 97.5 F

## 2020-09-27 DIAGNOSIS — Z76.0 MEDICATION REFILL: Primary | ICD-10-CM

## 2020-09-27 PROCEDURE — 99281 EMR DPT VST MAYX REQ PHY/QHP: CPT

## 2020-09-27 PROCEDURE — 99284 EMERGENCY DEPT VISIT MOD MDM: CPT | Performed by: EMERGENCY MEDICINE

## 2020-09-27 RX ORDER — LOXAPINE SUCCINATE 25 MG/1
25 TABLET ORAL EVERY 8 HOURS
Qty: 21 CAPSULE | Refills: 0 | Status: SHIPPED | OUTPATIENT
Start: 2020-09-27 | End: 2020-10-21 | Stop reason: SDUPTHER

## 2020-10-21 ENCOUNTER — HOSPITAL ENCOUNTER (EMERGENCY)
Facility: HOSPITAL | Age: 35
Discharge: HOME | End: 2020-10-21
Attending: EMERGENCY MEDICINE | Admitting: EMERGENCY MEDICINE
Payer: COMMERCIAL

## 2020-10-21 ENCOUNTER — TELEPHONE (OUTPATIENT)
Dept: ENDOCRINOLOGY | Facility: HOSPITAL | Age: 35
End: 2020-10-21

## 2020-10-21 VITALS
BODY MASS INDEX: 19.49 KG/M2 | DIASTOLIC BLOOD PRESSURE: 68 MMHG | HEART RATE: 95 BPM | OXYGEN SATURATION: 100 % | WEIGHT: 113.54 LBS | RESPIRATION RATE: 14 BRPM | SYSTOLIC BLOOD PRESSURE: 127 MMHG | TEMPERATURE: 97.3 F

## 2020-10-21 DIAGNOSIS — F25.9 SCHIZOAFFECTIVE DISORDER (HCC): ICD-10-CM

## 2020-10-21 DIAGNOSIS — Z76.0 MEDICATION REFILL: Primary | ICD-10-CM

## 2020-10-21 PROCEDURE — 99282 EMERGENCY DEPT VISIT SF MDM: CPT | Performed by: EMERGENCY MEDICINE

## 2020-10-21 PROCEDURE — 99281 EMR DPT VST MAYX REQ PHY/QHP: CPT

## 2020-10-21 RX ORDER — LOXAPINE SUCCINATE 25 MG/1
25 TABLET ORAL EVERY 8 HOURS
Qty: 21 CAPSULE | Refills: 0 | Status: SHIPPED | OUTPATIENT
Start: 2020-10-21 | End: 2021-03-12 | Stop reason: HOSPADM

## 2021-02-11 ENCOUNTER — HOSPITAL ENCOUNTER (EMERGENCY)
Facility: HOSPITAL | Age: 36
Discharge: HOME | End: 2021-02-11
Attending: EMERGENCY MEDICINE
Payer: COMMERCIAL

## 2021-02-11 VITALS
TEMPERATURE: 98.1 F | RESPIRATION RATE: 14 BRPM | SYSTOLIC BLOOD PRESSURE: 122 MMHG | OXYGEN SATURATION: 98 % | HEART RATE: 96 BPM | DIASTOLIC BLOOD PRESSURE: 68 MMHG

## 2021-02-11 DIAGNOSIS — T50.901A OVERDOSE: Primary | ICD-10-CM

## 2021-02-11 PROCEDURE — 99282 EMERGENCY DEPT VISIT SF MDM: CPT | Performed by: EMERGENCY MEDICINE

## 2021-02-11 PROCEDURE — 99284 EMERGENCY DEPT VISIT MOD MDM: CPT

## 2021-02-11 NOTE — ED NOTES
Pt sleeping on litter w/ side rails up and call light within reach, pt awakens easily to verbal stimuli and has no complaints     Ni Mendoza RN  02/11/21 4701

## 2021-02-11 NOTE — ED PROVIDER NOTES
History  Chief Complaint   Patient presents with    Overdose - Accidental     pt brought in by EMS due to drug overdose  pt states he is here because he tripped and fell  History provided by:  Patient  Overdose - Accidental  Ingested substance:  Illicit drugs  Suspected agents: K2   Context: recreational    Associated symptoms: no abdominal pain, no chest pain, no cough, no diarrhea, no headaches, no nausea and no shortness of breath        Prior to Admission Medications   Prescriptions Last Dose Informant Patient Reported? Taking?   benztropine (COGENTIN) 0 5 mg tablet   Yes No   Sig: Take 0 5 mg by mouth 3 (three) times a day   divalproex sodium (DEPAKOTE) 500 mg EC tablet   Yes No   Sig: Take 500 mg by mouth daily at bedtime   loxapine (LOXITANE) 25 mg capsule   No No   Sig: Take 1 capsule (25 mg total) by mouth every 8 (eight) hours for 8 days      Facility-Administered Medications: None       Past Medical History:   Diagnosis Date    Bipolar disorder ([facility] )     Resolved 05/16/14    Schizo affective schizophrenia ([facility] )     resolved 05/16/14       History reviewed  No pertinent surgical history  Family History   Problem Relation Age of Onset    Anxiety disorder Mother     Depression Mother     Dysphagia Mother     Stroke Mother         cva    Diabetes type II Mother     Other Mother         Hyponatremia    Psychosis Mother      I have reviewed and agree with the history as documented  E-Cigarette/Vaping    E-Cigarette Use Never User      E-Cigarette/Vaping Substances     Social History     Tobacco Use    Smoking status: Current Every Day Smoker     Packs/day: 1 00     Types: Cigarettes    Smokeless tobacco: Never Used   Substance Use Topics    Alcohol use: Yes     Comment: social    Drug use: Yes     Types: Marijuana       Review of Systems   Constitutional: Negative for chills and fever  HENT: Negative for rhinorrhea, sore throat and trouble swallowing  Eyes: Negative for pain  Respiratory: Negative for cough, shortness of breath, wheezing and stridor  Cardiovascular: Negative for chest pain and leg swelling  Gastrointestinal: Negative for abdominal pain, diarrhea and nausea  Endocrine: Negative for polyuria  Genitourinary: Negative for dysuria, flank pain and urgency  Musculoskeletal: Negative for joint swelling, myalgias and neck stiffness  Skin: Negative for rash  Allergic/Immunologic: Negative for immunocompromised state  Neurological: Negative for dizziness, syncope, weakness, numbness and headaches  Psychiatric/Behavioral: Negative for confusion and suicidal ideas  All other systems reviewed and are negative  Physical Exam  Physical Exam  Vitals signs and nursing note reviewed  Constitutional:       Appearance: He is well-developed  Comments: Appears disheveled cool to the touch,   HENT:      Head: Normocephalic and atraumatic  Eyes:      Pupils: Pupils are equal, round, and reactive to light  Neck:      Musculoskeletal: Normal range of motion and neck supple  Cardiovascular:      Rate and Rhythm: Normal rate and regular rhythm  Heart sounds: No murmur  No friction rub  Pulmonary:      Effort: No respiratory distress  Breath sounds: Normal breath sounds  No wheezing or rales  Abdominal:      General: Bowel sounds are normal  There is no distension  Palpations: Abdomen is soft  Tenderness: There is no abdominal tenderness  Musculoskeletal: Normal range of motion  General: No tenderness  Skin:     General: Skin is warm  Findings: No rash  Neurological:      Mental Status: He is alert and oriented to person, place, and time           Vital Signs  ED Triage Vitals [02/11/21 1230]   Temperature Pulse Respirations Blood Pressure SpO2   98 1 °F (36 7 °C) 96 14 122/68 98 %      Temp Source Heart Rate Source Patient Position - Orthostatic VS BP Location FiO2 (%)   Tympanic Monitor Sitting Left arm --      Pain Score       --           Vitals:    02/11/21 1230   BP: 122/68   Pulse: 96   Patient Position - Orthostatic VS: Sitting         Visual Acuity      ED Medications  Medications - No data to display    Diagnostic Studies  Results Reviewed     None                 No orders to display              Procedures  Procedures         ED Course                                           MDM  Number of Diagnoses or Management Options  Overdose: new and requires workup  Diagnosis management comments: This is a 89year-old male who presents emergency department with intoxication from synthetic marijuana  The patient presented to the emergency department after being found on the street with noted intoxication of substances  Denies any alcohol consumption  The patient is awake alert in the emergency department able to may ambulate with no difficulty  Observed for some period of time vital signs stable no evidence of hypothermia, no evidence of abnormal vital signs at this point time evaluation the plan will be for outpatient management follow-up given strict when to return back to the emergency  Pt re-examined and evaluated after testing and treatment  Spoke with the patient and feeling improved and sxs have resolved  Will discharge home with close f/u with pcp and instructed to return to the ED if sxs worsen or continue  Pt agrees with the plan for discharge and feels comfortable to go home with proper f/u  Advised to return for worsening or additional problems  Diagnostic tests were reviewed and questions answered  Diagnosis, care plan and treatment options were discussed  The patient understand instructions and will follow up as directed      Counseling: I had a detailed discussion with the patient and/or guardian regarding: the historical points, exam findings, and any diagnostic results supporting the discharge diagnosis, lab results, radiology results, discharge instructions reviewed with patient and/or family/caregiver and understanding was verbalized  Instructions given to return to the emergency department if symptoms worsen or persist, or if there are any questions or concerns that arise at home  All labs reviewed and utilized in the medical decision making process    All radiology studies independently viewed by me and interpreted by the radiologist           Disposition  Final diagnoses:   Overdose     Time reflects when diagnosis was documented in both MDM as applicable and the Disposition within this note     Time User Action Codes Description Comment    2/11/2021  1:42 PM Georgette Franks [address] Overdose       ED Disposition     ED Disposition Condition Date/Time Comment    Discharge Stable Thu Feb 11, 2021  1:42 PM Cornellsanthosh Dean discharge to home/self care  Follow-up Information    None         Discharge Medication List as of 2/11/2021  1:42 PM      CONTINUE these medications which have NOT CHANGED    Details   benztropine (COGENTIN) 0 5 mg tablet Take 0 5 mg by mouth 3 (three) times a day, Starting Mon 6/4/2018, Historical Med      divalproex sodium (DEPAKOTE) 500 mg EC tablet Take 500 mg by mouth daily at bedtime, Starting Thu 6/7/2018, Historical Med      loxapine (LOXITANE) 25 mg capsule Take 1 capsule (25 mg total) by mouth every 8 (eight) hours for 8 days, Starting Wed 10/21/2020, Until Thu 10/29/2020, Print           No discharge procedures on file      PDMP Review     None          ED Provider  Electronically Signed by           Angelo Waters DO  02/11/21 5033

## 2021-02-16 ENCOUNTER — HOSPITAL ENCOUNTER (EMERGENCY)
Facility: HOSPITAL | Age: 36
DRG: 880 | End: 2021-02-16
Attending: EMERGENCY MEDICINE | Admitting: PSYCHIATRY & NEUROLOGY
Payer: COMMERCIAL

## 2021-02-16 ENCOUNTER — HOSPITAL ENCOUNTER (INPATIENT)
Facility: HOSPITAL | Age: 36
LOS: 24 days | Discharge: HOME | DRG: 885 | End: 2021-03-12
Attending: PSYCHIATRY & NEUROLOGY | Admitting: PSYCHIATRY & NEUROLOGY
Payer: COMMERCIAL

## 2021-02-16 VITALS
OXYGEN SATURATION: 100 % | BODY MASS INDEX: 20.25 KG/M2 | DIASTOLIC BLOOD PRESSURE: 65 MMHG | WEIGHT: 117.95 LBS | HEART RATE: 81 BPM | TEMPERATURE: 98.4 F | SYSTOLIC BLOOD PRESSURE: 138 MMHG | RESPIRATION RATE: 20 BRPM

## 2021-02-16 DIAGNOSIS — F12.10 CANNABIS ABUSE, CONTINUOUS: Chronic | ICD-10-CM

## 2021-02-16 DIAGNOSIS — F19.10 SYNTHETIC CANNABINOID ABUSE (HCC): Chronic | ICD-10-CM

## 2021-02-16 DIAGNOSIS — R46.89 AGGRESSIVE BEHAVIOR: ICD-10-CM

## 2021-02-16 DIAGNOSIS — Z72.0 TOBACCO USE: ICD-10-CM

## 2021-02-16 DIAGNOSIS — G25.9 EXTRAPYRAMIDAL REACTION: ICD-10-CM

## 2021-02-16 DIAGNOSIS — F25.9 SCHIZOAFFECTIVE DISORDER, UNSPECIFIED TYPE (HCC): ICD-10-CM

## 2021-02-16 DIAGNOSIS — G47.00 INSOMNIA: ICD-10-CM

## 2021-02-16 DIAGNOSIS — Z00.8 ENCOUNTER FOR PSYCHOLOGICAL EVALUATION: Primary | ICD-10-CM

## 2021-02-16 DIAGNOSIS — F25.0 SCHIZOAFFECTIVE DISORDER, BIPOLAR TYPE (HCC): Primary | Chronic | ICD-10-CM

## 2021-02-16 LAB
AMPHETAMINES SERPL QL SCN: NEGATIVE
BARBITURATES UR QL: NEGATIVE
BENZODIAZ UR QL: NEGATIVE
COCAINE UR QL: NEGATIVE
ETHANOL EXG-MCNC: 0 MG/DL
FLUAV RNA RESP QL NAA+PROBE: NEGATIVE
FLUBV RNA RESP QL NAA+PROBE: NEGATIVE
METHADONE UR QL: NEGATIVE
OPIATES UR QL SCN: NEGATIVE
OXYCODONE+OXYMORPHONE UR QL SCN: NEGATIVE
PCP UR QL: NEGATIVE
RSV RNA RESP QL NAA+PROBE: NEGATIVE
SARS-COV-2 RNA RESP QL NAA+PROBE: NEGATIVE
THC UR QL: NEGATIVE
VALPROATE SERPL-MCNC: 22.4 UG/ML (ref 50–120)

## 2021-02-16 PROCEDURE — 99203 OFFICE O/P NEW LOW 30 MIN: CPT | Performed by: PSYCHIATRY & NEUROLOGY

## 2021-02-16 PROCEDURE — 80053 COMPREHEN METABOLIC PANEL: CPT | Performed by: PSYCHIATRY & NEUROLOGY

## 2021-02-16 PROCEDURE — 80164 ASSAY DIPROPYLACETIC ACD TOT: CPT | Performed by: PSYCHIATRY & NEUROLOGY

## 2021-02-16 PROCEDURE — 99285 EMERGENCY DEPT VISIT HI MDM: CPT | Performed by: EMERGENCY MEDICINE

## 2021-02-16 PROCEDURE — 84481 FREE ASSAY (FT-3): CPT | Performed by: PSYCHIATRY & NEUROLOGY

## 2021-02-16 PROCEDURE — 36415 COLL VENOUS BLD VENIPUNCTURE: CPT | Performed by: PSYCHIATRY & NEUROLOGY

## 2021-02-16 PROCEDURE — 0241U HB NFCT DS VIR RESP RNA 4 TRGT: CPT | Performed by: EMERGENCY MEDICINE

## 2021-02-16 PROCEDURE — 84443 ASSAY THYROID STIM HORMONE: CPT | Performed by: PSYCHIATRY & NEUROLOGY

## 2021-02-16 PROCEDURE — 82075 ASSAY OF BREATH ETHANOL: CPT | Performed by: EMERGENCY MEDICINE

## 2021-02-16 PROCEDURE — 80307 DRUG TEST PRSMV CHEM ANLYZR: CPT | Performed by: EMERGENCY MEDICINE

## 2021-02-16 PROCEDURE — 80061 LIPID PANEL: CPT | Performed by: PSYCHIATRY & NEUROLOGY

## 2021-02-16 PROCEDURE — 84439 ASSAY OF FREE THYROXINE: CPT | Performed by: PSYCHIATRY & NEUROLOGY

## 2021-02-16 PROCEDURE — 99285 EMERGENCY DEPT VISIT HI MDM: CPT

## 2021-02-16 RX ORDER — LOXAPINE SUCCINATE 25 MG/1
25 TABLET ORAL 3 TIMES DAILY
Status: DISCONTINUED | OUTPATIENT
Start: 2021-02-16 | End: 2021-02-16

## 2021-02-16 RX ORDER — ACETAMINOPHEN 325 MG/1
650 TABLET ORAL EVERY 4 HOURS PRN
Status: CANCELLED | OUTPATIENT
Start: 2021-02-16

## 2021-02-16 RX ORDER — HYDROXYZINE HYDROCHLORIDE 25 MG/1
25 TABLET, FILM COATED ORAL
Status: DISCONTINUED | OUTPATIENT
Start: 2021-02-16 | End: 2021-03-12 | Stop reason: HOSPADM

## 2021-02-16 RX ORDER — RISPERIDONE 1 MG/1
2 TABLET, ORALLY DISINTEGRATING ORAL
Status: DISCONTINUED | OUTPATIENT
Start: 2021-02-16 | End: 2021-02-16

## 2021-02-16 RX ORDER — HYDROXYZINE HYDROCHLORIDE 25 MG/1
25 TABLET, FILM COATED ORAL
Status: DISCONTINUED | OUTPATIENT
Start: 2021-02-16 | End: 2021-02-16 | Stop reason: SDUPTHER

## 2021-02-16 RX ORDER — ACETAMINOPHEN 325 MG/1
975 TABLET ORAL EVERY 6 HOURS PRN
Status: DISCONTINUED | OUTPATIENT
Start: 2021-02-16 | End: 2021-02-16 | Stop reason: SDUPTHER

## 2021-02-16 RX ORDER — HYDROXYZINE 50 MG/1
100 TABLET, FILM COATED ORAL
Status: DISCONTINUED | OUTPATIENT
Start: 2021-02-16 | End: 2021-02-23

## 2021-02-16 RX ORDER — HALOPERIDOL 1 MG/1
2 TABLET ORAL
Status: DISCONTINUED | OUTPATIENT
Start: 2021-02-16 | End: 2021-03-12 | Stop reason: HOSPADM

## 2021-02-16 RX ORDER — NICOTINE 21 MG/24HR
1 PATCH, TRANSDERMAL 24 HOURS TRANSDERMAL DAILY
Status: CANCELLED | OUTPATIENT
Start: 2021-02-17

## 2021-02-16 RX ORDER — DIVALPROEX SODIUM 500 MG/1
500 TABLET, DELAYED RELEASE ORAL EVERY 8 HOURS SCHEDULED
Status: DISCONTINUED | OUTPATIENT
Start: 2021-02-16 | End: 2021-02-16 | Stop reason: HOSPADM

## 2021-02-16 RX ORDER — BENZTROPINE MESYLATE 1 MG/ML
0.5 INJECTION INTRAMUSCULAR; INTRAVENOUS
Status: DISCONTINUED | OUTPATIENT
Start: 2021-02-16 | End: 2021-03-12 | Stop reason: HOSPADM

## 2021-02-16 RX ORDER — BENZTROPINE MESYLATE 1 MG/ML
0.5 INJECTION INTRAMUSCULAR; INTRAVENOUS
Status: DISCONTINUED | OUTPATIENT
Start: 2021-02-16 | End: 2021-02-16 | Stop reason: SDUPTHER

## 2021-02-16 RX ORDER — LORAZEPAM 2 MG/ML
1 INJECTION INTRAMUSCULAR
Status: DISCONTINUED | OUTPATIENT
Start: 2021-02-16 | End: 2021-03-12 | Stop reason: HOSPADM

## 2021-02-16 RX ORDER — HALOPERIDOL 5 MG
10 TABLET ORAL ONCE
Status: COMPLETED | OUTPATIENT
Start: 2021-02-16 | End: 2021-02-16

## 2021-02-16 RX ORDER — LORAZEPAM 1 MG/1
1 TABLET ORAL
Status: DISCONTINUED | OUTPATIENT
Start: 2021-02-16 | End: 2021-03-12 | Stop reason: HOSPADM

## 2021-02-16 RX ORDER — HALOPERIDOL 5 MG
5 TABLET ORAL ONCE
Status: COMPLETED | OUTPATIENT
Start: 2021-02-16 | End: 2021-02-16

## 2021-02-16 RX ORDER — HALOPERIDOL 5 MG
5 TABLET ORAL EVERY 8 HOURS PRN
Status: CANCELLED | OUTPATIENT
Start: 2021-02-16

## 2021-02-16 RX ORDER — ACETAMINOPHEN 325 MG/1
650 TABLET ORAL EVERY 6 HOURS PRN
Status: DISCONTINUED | OUTPATIENT
Start: 2021-02-16 | End: 2021-02-16 | Stop reason: SDUPTHER

## 2021-02-16 RX ORDER — DIPHENHYDRAMINE HYDROCHLORIDE 50 MG/ML
50 INJECTION INTRAMUSCULAR; INTRAVENOUS EVERY 6 HOURS PRN
Status: CANCELLED | OUTPATIENT
Start: 2021-02-16

## 2021-02-16 RX ORDER — LORAZEPAM 2 MG/ML
2 INJECTION INTRAMUSCULAR EVERY 6 HOURS PRN
Status: DISCONTINUED | OUTPATIENT
Start: 2021-02-16 | End: 2021-02-23

## 2021-02-16 RX ORDER — RISPERIDONE 1 MG/1
1 TABLET, ORALLY DISINTEGRATING ORAL
Status: DISCONTINUED | OUTPATIENT
Start: 2021-02-16 | End: 2021-02-16 | Stop reason: SDUPTHER

## 2021-02-16 RX ORDER — BENZTROPINE MESYLATE 1 MG/ML
1 INJECTION INTRAMUSCULAR; INTRAVENOUS
Status: DISCONTINUED | OUTPATIENT
Start: 2021-02-16 | End: 2021-03-12 | Stop reason: HOSPADM

## 2021-02-16 RX ORDER — LORAZEPAM 2 MG/ML
1 INJECTION INTRAMUSCULAR
Status: DISCONTINUED | OUTPATIENT
Start: 2021-02-16 | End: 2021-02-16 | Stop reason: SDUPTHER

## 2021-02-16 RX ORDER — TRAZODONE HYDROCHLORIDE 100 MG/1
50 TABLET ORAL
Status: CANCELLED | OUTPATIENT
Start: 2021-02-16

## 2021-02-16 RX ORDER — AMOXICILLIN 250 MG
1 CAPSULE ORAL DAILY PRN
Status: DISCONTINUED | OUTPATIENT
Start: 2021-02-16 | End: 2021-03-12 | Stop reason: HOSPADM

## 2021-02-16 RX ORDER — DIPHENHYDRAMINE HYDROCHLORIDE 50 MG/ML
50 INJECTION INTRAMUSCULAR; INTRAVENOUS EVERY 6 HOURS PRN
Status: DISCONTINUED | OUTPATIENT
Start: 2021-02-16 | End: 2021-02-23

## 2021-02-16 RX ORDER — HALOPERIDOL 5 MG/ML
5 INJECTION INTRAMUSCULAR EVERY 6 HOURS PRN
Status: CANCELLED | OUTPATIENT
Start: 2021-02-16

## 2021-02-16 RX ORDER — ACETAMINOPHEN 325 MG/1
650 TABLET ORAL EVERY 4 HOURS PRN
Status: DISCONTINUED | OUTPATIENT
Start: 2021-02-16 | End: 2021-02-16 | Stop reason: SDUPTHER

## 2021-02-16 RX ORDER — HYDROXYZINE 50 MG/1
50 TABLET, FILM COATED ORAL
Status: DISCONTINUED | OUTPATIENT
Start: 2021-02-16 | End: 2021-03-12 | Stop reason: HOSPADM

## 2021-02-16 RX ORDER — LORAZEPAM 2 MG/ML
2 INJECTION INTRAMUSCULAR
Status: DISCONTINUED | OUTPATIENT
Start: 2021-02-16 | End: 2021-03-12 | Stop reason: HOSPADM

## 2021-02-16 RX ORDER — HALOPERIDOL 5 MG/ML
5 INJECTION INTRAMUSCULAR
Status: DISCONTINUED | OUTPATIENT
Start: 2021-02-16 | End: 2021-03-12 | Stop reason: HOSPADM

## 2021-02-16 RX ORDER — HALOPERIDOL 5 MG
5 TABLET ORAL
Status: DISCONTINUED | OUTPATIENT
Start: 2021-02-16 | End: 2021-03-12 | Stop reason: HOSPADM

## 2021-02-16 RX ORDER — TRAZODONE HYDROCHLORIDE 50 MG/1
50 TABLET ORAL
Status: DISCONTINUED | OUTPATIENT
Start: 2021-02-16 | End: 2021-03-12 | Stop reason: HOSPADM

## 2021-02-16 RX ORDER — HALOPERIDOL 5 MG/ML
2.5 INJECTION INTRAMUSCULAR
Status: DISCONTINUED | OUTPATIENT
Start: 2021-02-16 | End: 2021-03-12 | Stop reason: HOSPADM

## 2021-02-16 RX ORDER — LANOLIN ALCOHOL/MO/W.PET/CERES
3 CREAM (GRAM) TOPICAL
Status: DISCONTINUED | OUTPATIENT
Start: 2021-02-16 | End: 2021-03-12 | Stop reason: HOSPADM

## 2021-02-16 RX ORDER — HYDROXYZINE 50 MG/1
50 TABLET, FILM COATED ORAL
Status: DISCONTINUED | OUTPATIENT
Start: 2021-02-16 | End: 2021-02-23

## 2021-02-16 RX ORDER — NICOTINE 21 MG/24HR
1 PATCH, TRANSDERMAL 24 HOURS TRANSDERMAL DAILY
Status: DISCONTINUED | OUTPATIENT
Start: 2021-02-17 | End: 2021-03-12 | Stop reason: HOSPADM

## 2021-02-16 RX ORDER — LORAZEPAM 2 MG/ML
1 INJECTION INTRAMUSCULAR EVERY 4 HOURS PRN
Status: DISCONTINUED | OUTPATIENT
Start: 2021-02-16 | End: 2021-03-12 | Stop reason: HOSPADM

## 2021-02-16 RX ORDER — HALOPERIDOL 5 MG/ML
2.5 INJECTION INTRAMUSCULAR
Status: DISCONTINUED | OUTPATIENT
Start: 2021-02-16 | End: 2021-02-16 | Stop reason: SDUPTHER

## 2021-02-16 RX ORDER — LORAZEPAM 1 MG/1
2 TABLET ORAL ONCE
Status: DISCONTINUED | OUTPATIENT
Start: 2021-02-16 | End: 2021-02-16

## 2021-02-16 RX ORDER — ACETAMINOPHEN 325 MG/1
650 TABLET ORAL EVERY 6 HOURS PRN
Status: DISCONTINUED | OUTPATIENT
Start: 2021-02-16 | End: 2021-03-12 | Stop reason: HOSPADM

## 2021-02-16 RX ORDER — ACETAMINOPHEN 325 MG/1
650 TABLET ORAL EVERY 4 HOURS PRN
Status: DISCONTINUED | OUTPATIENT
Start: 2021-02-16 | End: 2021-03-12 | Stop reason: HOSPADM

## 2021-02-16 RX ORDER — LORAZEPAM 1 MG/1
1 TABLET ORAL EVERY 6 HOURS PRN
Status: DISCONTINUED | OUTPATIENT
Start: 2021-02-16 | End: 2021-02-16 | Stop reason: HOSPADM

## 2021-02-16 RX ORDER — LORAZEPAM 1 MG/1
1 TABLET ORAL ONCE
Status: COMPLETED | OUTPATIENT
Start: 2021-02-16 | End: 2021-02-16

## 2021-02-16 RX ORDER — BENZTROPINE MESYLATE 0.5 MG/1
0.5 TABLET ORAL 2 TIMES DAILY
Status: DISCONTINUED | OUTPATIENT
Start: 2021-02-16 | End: 2021-02-16 | Stop reason: HOSPADM

## 2021-02-16 RX ORDER — ACETAMINOPHEN 325 MG/1
975 TABLET ORAL EVERY 6 HOURS PRN
Status: DISCONTINUED | OUTPATIENT
Start: 2021-02-16 | End: 2021-03-12 | Stop reason: HOSPADM

## 2021-02-16 RX ORDER — RISPERIDONE 1 MG/1
1 TABLET, ORALLY DISINTEGRATING ORAL
Status: DISCONTINUED | OUTPATIENT
Start: 2021-02-16 | End: 2021-02-16

## 2021-02-16 RX ADMIN — LORAZEPAM 1 MG: 1 TABLET ORAL at 18:20

## 2021-02-16 RX ADMIN — HALOPERIDOL 10 MG: 5 TABLET ORAL at 10:51

## 2021-02-16 RX ADMIN — HALOPERIDOL 5 MG: 5 TABLET ORAL at 18:30

## 2021-02-16 RX ADMIN — DIVALPROEX SODIUM 500 MG: 500 TABLET, DELAYED RELEASE ORAL at 16:52

## 2021-02-16 RX ADMIN — LORAZEPAM 1 MG: 1 TABLET ORAL at 10:51

## 2021-02-16 RX ADMIN — BENZTROPINE MESYLATE 0.5 MG: 0.5 TABLET ORAL at 16:52

## 2021-02-16 NOTE — ED NOTES
Patient is resting comfortably in bed, patient is on Q7 min checks   No s/s of distress noted       Korina Agostor, RN  02/16/21 6396
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
## 2021-02-16 NOTE — ED NOTES
Attempted to contact patient's mother multiple times to review medication list, phone message states mailbox not set up yet  Provider, Dr Williams Lara aware and okay with giving meds ordered  See HAYDEE Conn  02/16/21 3968

## 2021-02-16 NOTE — ED NOTES
Insurance Authorization for admission:   Primary insurance is [employer] medicare assured   Faxed forms to [facility] completed with [facility] at [phone]  Spoke to Cherelle

## 2021-02-16 NOTE — ED NOTES
Patient is resting comfortably in bed, patient is on Q7 min checks  No s/s of distress noted        Yamilet Malloy RN  02/16/21 0651

## 2021-02-16 NOTE — ED NOTES
Patient is calm and cooperative, patient is on Q7 min checks  No s/s of distress noted        Buck Glasgow RN  02/16/21 3997

## 2021-02-16 NOTE — ED NOTES
Dr Tresa Choudhary, psychiatrist at bedside speaking with patient        Katelynn Rascon  02/16/21 9359

## 2021-02-16 NOTE — ED NOTES
Patient is resting comfortably in bed, patient is on Q7 min checks  No s/s of distress noted             Paulina Kamara RN  02/16/21 4535

## 2021-02-16 NOTE — ED NOTES
302 completed and faxed to [facility]  Confirmed with Kali that it was received

## 2021-02-16 NOTE — ED NOTES
Patient is resting comfortably in bed, patient is on Q7 min checks  No s/s of distress noted        Tarik Mari RN  02/16/21 0250

## 2021-02-16 NOTE — ED NOTES
Patient cooperative, given a sandwich, chips and water  Offered blanket and declined at this time        [place] Georgeiciaside  02/16/21 1101

## 2021-02-16 NOTE — ED NOTES
Safety meal tray given to patient   Cola given to patient, per request       Rosaline Kapoor  02/16/21 2585

## 2021-02-16 NOTE — ED NOTES
Patient is resting comfortably in bed, patient is on Q7 min checks  No s/s of distress noted        Deepika Bennett RN  02/16/21 5894

## 2021-02-16 NOTE — CONSULTS
Consultation - Maciej 30 y o  male MRN: [id number]  Unit/Bed#: ED 11 Encounter: [id number]      Chief Complaint:  Dakota Salazar was arguing with my mom, and brought into the hospital by police    History of Present Illness   Physician Requesting Consult: Angeline Cota DO  Reason for Consult / Principal Problem:  Disorganized behavior    Carole Noyola is a 30 y o  male with past medical history of substance abuse (possible K2), along with patient reported schizoaffective disorder and bipolar  Patient reports that he was arguing with mom, and was brought to the hospital following the argument by a PD  Per crisis worker, mom called crisis, wanted to petition a 36, because patient had poor sleep, talking to self, trashed house, inability care for self  Patient was recently in ED 02/11/2021 for accidental drug OD (possible K2), patient said he tripped and fell at this admission the  In ED, had bizarre behavior, questionable accuracy during interview, patient reported med compliance and denied SI, HI, AVH, depression, anxiety  Feel safe in hospital, no paranoia  He reports a 1 week ago he went to his therapist and things were well  During interview, patient took 4 seconds before answering questions, denied any significant recent drug use, and stared at interview were the entire interview without breaking eye contact  He could not answer questions as interview went on, and seemed to be responding to internal stimuli  Three hundred two completed  Outpatient, patient is on Depakote 500 mg q h s , loxapine 25 mg t i d , and Cogentin 0 5 mg t i d  Interviewer attempted to reach out to mother to gather further details, her phone answering machine mailbox was full      Psychiatric Review Of Systems:  Problems with sleep: yes, decreased  Appetite changes: no  Weight changes: no  Low energy/anergy: no  Low interest/pleasure/anhedonia: no  Somatic symptoms: no  Anxiety/panic: no  Brittany: no  Guilt/hopeless: no  Self injurious behavior/risky behavior: no    Historical Information   Prior psychiatric diagnoses:  paranoid schizophrenia  [facility] with acute exaserbathion of paranoid schizophrenia  Suicide attempts: Denies  Self-harm behaviors: Denies  Violent behavior: Denies  Outpatient treatment:  [facility], last went last week, "went alright"  Psychiatric medication trial: Multiple, patient is unsure which medications have been trialed previously    Substance Abuse History:  Social History     Tobacco Use    Smoking status: Current Every Day Smoker     Packs/day: 1 00     Types: Cigarettes    Smokeless tobacco: Never Used   Substance Use Topics    Alcohol use: Yes     Comment: social    Drug use: Yes     Types: Marijuana      Patient denies use of tobacco, alcohol, or illicit drugs  I have assessed this patient for substance use within the past 12 months  History of IP/OP rehabilitation program: Denies    Family Psychiatric History: Only reports psychosis in mother    Social History  Marital history: Single  Children: no  Living arrangement: Lives in a home with mother (apartment)  Functioning Relationships: good relationship with mother  Occupational History:  On disability for bipolar and schizophrenia, unemployed    Other Pertinent History: None    Past Medical History:   Diagnosis Date    Bipolar disorder ([facility] )     Resolved 05/16/14    Schizo affective schizophrenia ([facility] )     resolved 05/16/14       Medical Review Of Systems:  Review of Systems - Negative except as noted in HPI    Meds/Allergies   all current active meds have been reviewed  No Known Allergies    Objective   Vital signs in last 24 hours:  Temp:  [98 4 °F (36 9 °C)] 98 4 °F (36 9 °C)  HR:  [84] 84  Resp:  [19] 19  BP: (165)/(96) 165/96    Mental Status Exam:  Appearance:  marginal grooming/hygiene, poor dentition and dirty nails covered with dirt, eye contact would not look away entire interview, glaring, intermittent squinting   Behavior:  calm, limited cooperativity and glaring at interviewer entire interview, not breaking eye contact   Motor: no abnormal movements   Speech:  delayed initiation, loud, poverty of content, coherent and takes 4 seconds to answer any questions, answers loudly in one or two words   Mood:  euthymic   Affect:  mood-congruent   Thought Process:  tangential   Thought Content: no verbalized delusions or overt paranoia, apparent poverty of thought   Perceptual disturbances: no reported hallucinations and does not appear to be responding to internal stimuli at this time and appears to be responding to internal stimuli   Risk Potential: No active or passive suicidal or homicidal ideation was verbalized during interview, Low potential for aggression based on previous behavior   Cognition: oriented to self and situation and cognition not formally tested   Insight:  Poor   Judgment: Poor     Laboratory results:  I have personally reviewed all pertinent laboratory/tests results  Assessment/Plan     Diagnosis:  Schizoaffective bipolar type, rule out substance induced mood disorder  Rule out schizophrenia  Recommended Treatment:   Patient requires inpatient psychiatric hospitalization  Patient is unwilling or unable to sign a 201, thus a 302 will be petitioned  Case Management following for inpatient placement  1-1 for patient and staff safety  Will defer starting maintenance medications pending transfer to inpatient psychiatry  Continue Depakote 500 mg t i d  And benztropine 0 5 mg b i d  Risks, benefits and possible side effects of Medications:   Risks, benefits, and possible side effects of medications have been explained to the patient, who verbalizes understanding            Jennifer Domínguez MD    This note was not shared with the patient due to this is a psychotherapy note

## 2021-02-16 NOTE — ED NOTES
Patient alert and awake, requested more food  Patient was advised that dinner would be served soon  Patient back in bed resting comfortably in bed, patient is on Q7 min checks  No s/s of distress noted        Néstor Platt RN  02/16/21 [id number]

## 2021-02-16 NOTE — ED NOTES
Patient is accepted at [facility]  Patient is accepted by Dr Tierra Villegas may go to the floor at **  Nurse report is to be called to [phone]* prior to patient transfer

## 2021-02-16 NOTE — ED NOTES
Patient alert,awake and cooperative  Patient escorted to restroom by [facility]  Returned to room safely  No s/s of distress noted  Patient on Q7 mins  check  Shortly after patient requested to use the restroom again, patient escorted by staff, patient trying to MD armando made aware, see MAR for orders  Patient easily redirected        Ana Grady RN  02/16/21 Jorge Espinoza  02/16/21 Jorge Silver RN  02/16/21 1389
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
## 2021-02-17 PROBLEM — F19.10 SYNTHETIC CANNABINOID ABUSE (HCC): Chronic | Status: ACTIVE | Noted: 2021-02-17

## 2021-02-17 PROBLEM — F12.10 CANNABIS ABUSE, CONTINUOUS: Chronic | Status: ACTIVE | Noted: 2021-02-17

## 2021-02-17 PROBLEM — R79.89 ELEVATED TSH: Status: ACTIVE | Noted: 2021-02-17

## 2021-02-17 PROBLEM — F25.0 SCHIZOAFFECTIVE DISORDER, BIPOLAR TYPE (HCC): Chronic | Status: ACTIVE | Noted: 2017-10-12

## 2021-02-17 PROBLEM — F25.9 SCHIZOAFFECTIVE DISORDER (HCC): Chronic | Status: ACTIVE | Noted: 2017-10-12

## 2021-02-17 PROBLEM — F25.0 SCHIZOAFFECTIVE DISORDER, BIPOLAR TYPE (HCC): Status: ACTIVE | Noted: 2017-10-12

## 2021-02-17 LAB
ALBUMIN SERPL BCP-MCNC: 3.9 G/DL (ref 3–5.2)
ALP SERPL-CCNC: 72 U/L (ref 43–122)
ALT SERPL W P-5'-P-CCNC: 30 U/L (ref 9–52)
ANION GAP SERPL CALCULATED.3IONS-SCNC: 6 MMOL/L (ref 5–14)
AST SERPL W P-5'-P-CCNC: 43 U/L (ref 17–59)
BILIRUB SERPL-MCNC: 0.5 MG/DL
BUN SERPL-MCNC: 21 MG/DL (ref 5–25)
CALCIUM SERPL-MCNC: 9.7 MG/DL (ref 8.4–10.2)
CHLORIDE SERPL-SCNC: 104 MMOL/L (ref 97–108)
CHOLEST SERPL-MCNC: 153 MG/DL
CO2 SERPL-SCNC: 27 MMOL/L (ref 22–30)
CREAT SERPL-MCNC: 0.88 MG/DL (ref 0.7–1.5)
GFR SERPL CREATININE-BSD FRML MDRD: 130 ML/MIN/1.73SQ M
GLUCOSE SERPL-MCNC: 155 MG/DL (ref 70–99)
HDLC SERPL-MCNC: 59 MG/DL
LDLC SERPL CALC-MCNC: 75 MG/DL
NONHDLC SERPL-MCNC: 94 MG/DL
POTASSIUM SERPL-SCNC: 4.5 MMOL/L (ref 3.6–5)
PROT SERPL-MCNC: 6.6 G/DL (ref 5.9–8.4)
SODIUM SERPL-SCNC: 137 MMOL/L (ref 137–147)
T3FREE SERPL-MCNC: 3.68 PG/ML (ref 2.3–4.2)
T4 FREE SERPL-MCNC: 1.1 NG/DL (ref 0.76–1.46)
TRIGL SERPL-MCNC: 93 MG/DL
TSH SERPL DL<=0.05 MIU/L-ACNC: 4.71 UIU/ML (ref 0.47–4.68)

## 2021-02-17 PROCEDURE — 99222 1ST HOSP IP/OBS MODERATE 55: CPT | Performed by: PSYCHIATRY & NEUROLOGY

## 2021-02-17 RX ORDER — DIVALPROEX SODIUM 500 MG/1
500 TABLET, DELAYED RELEASE ORAL
COMMUNITY
End: 2021-03-12 | Stop reason: HOSPADM

## 2021-02-17 RX ORDER — DIVALPROEX SODIUM 500 MG/1
500 TABLET, DELAYED RELEASE ORAL
Status: DISCONTINUED | OUTPATIENT
Start: 2021-02-17 | End: 2021-02-26

## 2021-02-17 RX ORDER — LOXAPINE SUCCINATE 25 MG/1
25 TABLET ORAL EVERY 8 HOURS
Status: DISCONTINUED | OUTPATIENT
Start: 2021-02-17 | End: 2021-02-17

## 2021-02-17 RX ORDER — BENZTROPINE MESYLATE 0.5 MG/1
0.5 TABLET ORAL 3 TIMES DAILY
Status: DISCONTINUED | OUTPATIENT
Start: 2021-02-17 | End: 2021-03-04

## 2021-02-17 RX ORDER — LOXAPINE SUCCINATE 25 MG/1
25 TABLET ORAL 3 TIMES DAILY
Status: DISCONTINUED | OUTPATIENT
Start: 2021-02-17 | End: 2021-02-19

## 2021-02-17 RX ORDER — BUPROPION HYDROCHLORIDE 75 MG/1
75 TABLET ORAL 2 TIMES DAILY
COMMUNITY
End: 2021-03-12 | Stop reason: HOSPADM

## 2021-02-17 RX ORDER — DIVALPROEX SODIUM 250 MG/1
250 TABLET, DELAYED RELEASE ORAL DAILY
Status: DISCONTINUED | OUTPATIENT
Start: 2021-02-17 | End: 2021-02-25

## 2021-02-17 RX ADMIN — BENZTROPINE MESYLATE 0.5 MG: 0.5 TABLET ORAL at 21:51

## 2021-02-17 RX ADMIN — LOXAPINE 25 MG: 25 CAPSULE ORAL at 21:51

## 2021-02-17 RX ADMIN — BENZTROPINE MESYLATE 0.5 MG: 0.5 TABLET ORAL at 17:19

## 2021-02-17 RX ADMIN — MELATONIN TAB 3 MG 3 MG: 3 TAB at 21:51

## 2021-02-17 RX ADMIN — LOXAPINE 25 MG: 25 CAPSULE ORAL at 17:19

## 2021-02-17 RX ADMIN — DIVALPROEX SODIUM 500 MG: 500 TABLET, DELAYED RELEASE ORAL at 21:51

## 2021-02-17 RX ADMIN — DIVALPROEX SODIUM 250 MG: 250 TABLET, DELAYED RELEASE ORAL at 12:37

## 2021-02-17 NOTE — NURSING NOTE
Pt given a band aid for biting the skin off around his nails on his right hand  Pts finger was bleeding slightly  Band aid applied pt reported no pain  Will monitor

## 2021-02-17 NOTE — NURSING NOTE
Pt denies SI, HI, AH and VH  Pt is calm and cooperative  Pt appears to be responding to internal stimuli, pt can have an inappropriate smile at times  Pt is visible in the milieu but remains isolative to self  Meal compliant  Will monitor

## 2021-02-17 NOTE — ASSESSMENT & PLAN NOTE
TSH 4 7 (H), Normal T3 and T4  Patient does not report symptoms  No treatment indicated at this time

## 2021-02-17 NOTE — PROGRESS NOTES
02/17/21 0841   Team Meeting   Meeting Type Daily Rounds   Team Members Present   Team Members Present Physician;;Nurse   Physician Team Member [address] Team Member [facility] Team Member [place]   Patient/Family Present   Patient Present No   Patient's Family Present No   Readmit score 23  Pt new 302 admission brought in by [employer] due to aggression and bizarre behaviors

## 2021-02-17 NOTE — PLAN OF CARE
Problem: PSYCHOSIS  Goal: Will report no hallucinations or delusions  Description: Interventions:  - Administer medication as  ordered  - Every waking shifts and PRN assess for the presence of hallucinations and or delusions  - Assist with reality testing to support increasing orientation  - Assess if patient's hallucinations or delusions are encouraging self-harm or harm to others and intervene as appropriate  Outcome: Not Progressing     Problem: BEHAVIOR  Goal: Pt/Family maintain appropriate behavior and adhere to behavioral management agreement, if implemented  Description: INTERVENTIONS:  - Assess the family dynamic   - Encourage verbalization of thoughts and concerns in a socially appropriate manner  - Assess patient/family's coping skills and non-compliant behavior (including use of illegal substances)  - Utilize positive, consistent limit setting strategies supporting safety of patient, staff and others  - Initiate consult with Case Management, Spiritual Care or other ancillary services as appropriate  - If a patient's/visitor's behavior jeopardizes the safety of the patient, staff, or others, refer to organization procedure     - Notify Security of behavior or suspected illegal substances which indicate the need for search of the patient and/or belongings  - Encourage participation in the decision making process about a behavioral management agreement; implement if patient meets criteria  Outcome: Not Progressing     Problem: ANXIETY  Goal: Will report anxiety at manageable levels  Description: INTERVENTIONS:  - Administer medication as ordered  - Teach and encourage coping skills  - Provide emotional support  - Assess patient/family for anxiety and ability to cope  Outcome: Not Progressing  Goal: By discharge: Patient will verbalize 2 strategies to deal with anxiety  Description: Interventions:  - Identify any obvious source/trigger to anxiety  - Staff will assist patient in applying identified coping technique/skills  - Encourage attendance of scheduled groups and activities  Outcome: Not Progressing     Problem: INVOLUNTARY ADMIT  Goal: Will cooperate with staff recommendations and doctor's orders and will demonstrate appropriate behavior  Description: INTERVENTIONS:  - Treat underlying conditions and offer medication as ordered  - Educate regarding involuntary admission procedures and rules  - Utilize positive consistent limit setting strategies to support patient and staff safety  Outcome: Not Progressing     Problem: SELF CARE DEFICIT  Goal: Return ADL status to a safe level of function  Description: INTERVENTIONS:  - Administer medication as ordered  - Assess ADL deficits and provide assistive devices as needed  - Obtain PT/OT consults as needed  - Assist and instruct patient to increase activity and self care as tolerated  Outcome: Not Progressing

## 2021-02-17 NOTE — PROGRESS NOTES
Occupational Therapy Student Group Note    Entered and exited group room several times and unable to attend to or participate in group activities  Appeared scant and guarded while in group room

## 2021-02-17 NOTE — TREATMENT PLAN
TREATMENT PLAN REVIEW - Bob [age] y o  1985 male MRN: [id number]    [address] Room / Bed: Judd Omervelt [address] [place] [address] [id number] Encounter: [id number]        Admit Date/Time:  2/16/2021  7:51 PM    Treatment Team: Attending Provider: Robbie Latham MD; Patient Care Assistant: Erick Ferrell; Patient Care Assistant: Romario Wong; : Grazyna Shafer; Consulting Physician: Veronica Vargas MD; Patient Care Technician: Adalberto Vera; Nursing Student: Diana Hong; Registered Nurse: Evita Cervantes RN; Consulting Physician: Tereso Darden MD; Patient Care Assistant: Daylin Ryan;  Patient Care Technician: Deion Escobedo    Diagnosis: Principal Problem:    Schizoaffective disorder, bipolar type ([facility] )  Active Problems:    Cannabis abuse, continuous    Synthetic cannabinoid abuse ([facility] )    Elevated TSH      Patient Strengths/Assets: negotiates basic needs, stable housing, supportive family      Patient Barriers/Limitations: chronic mental illness, difficulty adapting, patient is on an involuntary commitment    Short Term Goals: decrease in psychotic symptoms, improvement in reality testing, improvement in self care, tolerate medications, mood stabilization    Long Term Goals: stabilization of mood, resolution of psychotic symptoms, improved reality testing, improved reasoning ability, improved impulse control, improved insight    Progress Towards Goals: restarting psychiatric medications as prescribed    Recommended Treatment: medication management, patient medication education, group therapy, milieu therapy, continued Behavioral Health psychiatric evaluation/assessment process     Treatment Frequency: daily medication monitoring, group and milieu therapy daily, monitoring through interdisciplinary rounds, monitoring through weekly patient care conferences    Expected Discharge Date: 15 days - 3/4/2021    Discharge Plan: referral for outpatient medication management with a psychiatrist, referral for outpatient psychotherapy, return to previous living arrangement    Treatment Plan Created/Updated By: Lukas Fernández MD

## 2021-02-17 NOTE — PROGRESS NOTES
Updated Troy Archibald from [facility] on Pt admission to unit  UR to contact [address] [place] [address] at discharge to complete COB

## 2021-02-17 NOTE — H&P
Psychiatric Evaluation - Behavioral Health     Identification Data:Rhys Esparza 30 y o  male MRN: [id number]  Unit/Bed#: Steffen Gregg 040-94 Encounter: [id number]    Chief Complaint: unstable mood and psychotic symptoms    History of Present Illness     Ivana Padron is a 30 y o  male with a history of Schizoaffective Disorder who was admitted to the inpatient psychiatric unit on a involuntary 302 commitment basis due to psychotic symptoms and increased agitation  Symptoms prior to admission included poor concentration, difficulty sleeping, mood swings, increased irritability, bizarre behavior, agitation, aggressive behavior, auditory hallucinations, delusional thoughts, disorganized behavior, disorganized thinking process, difficulty attending to activities of daily living and poor self-care  Onset of symptoms was gradual starting 2 weeks ago with progressively worsening course since that time  Stressors preceding admission included drug use problems and chronic mental illness  Wanita Gaucher was brought in to ED by police  His mother called police as Wanita Gaucher was decompensating at home: not sleeping, hallucinating and talking to himself, not taking care of his ADLs  He also was becoming increasingly aggressive with mother, pushed his mother and was throwing things  While in ED he was unable to provide psychiatric history, had delayed responses, was staring in place and appeared responding to internal stimuli  On initial evaluation after admission to the inpatient psychiatric unit Wanita Gaucher had difficulty describing events leading to admission "I was arguing with my mom, police brought me here for a simple check"  He was at times laughing inappropriately, seemed distracted and internally stimulated  His thinking process was disorganized, mood was labile  He agreed to restart his psychiatric medications and reported being compliant although he had difficulty confirming what medications he was prescribed      Psychiatric Review Of Systems:    Sleep changes: yes, decreased  Appetite changes: no  Weight changes: no  Energy/anergy: no  Interest/pleasure/anhedonia: no  Somatic symptoms: no  Anxiety/panic: no  Brittany: yes, past manic episodes, mood swings  Guilty/hopeless: no  Self injurious behavior/risky behavior: no  Suicidal ideation: no  Homicidal ideation: not at present, but was agitated at home  Auditory hallucinations: denies when asked, but appears responding to internal stimuli  Visual hallucinations: no  Other hallucinations: no  Delusional thinking: yes, persecutory delusions  Eating disorder history: no  Obsessive/compulsive symptoms: no    Historical Information     Past Psychiatric History:     Past Inpatient Psychiatric Treatment:   Several past inpatient psychiatric admissions at [address] [place] [address] and [facility]  Past Outpatient Psychiatric Treatment:    Currently in outpatient psychiatric treatment with a psychiatrist at [facility] ([facility]  Has a therapist at [facility] ([facility])  Past Suicide Attempts: no  Past Violent Behavior: no  Past Psychiatric Medication Trials: Trazodone, Depakote, Risperdal, Loxitane, Invega Sustenna, Atarax, Ambien and Cogentin     Substance Abuse History:    Social History     Tobacco History     Smoking Status  Current Every Day Smoker Smoking Frequency  1 pack/day Smoking Tobacco Type  Cigarettes    Smokeless Tobacco Use  Never Used          Alcohol History     Alcohol Use Status  Yes Comment  social          Drug Use     Drug Use Status  Yes Types  Marijuana          Sexual Activity     Sexually Active  Not Currently          Activities of Daily Living    Not Asked               Additional Substance Use Detail     Questions Responses    Problems Due to Past Use of Alcohol? No    Problems Due to Past Use of Substances?  No    Substance Use Assessment Substance use within the past 12 months    Alcohol Use Frequency 1 or 2 times/week    Cannabis frequency Daily    Comment: Daily on 2/17/2021     Heroin Frequency Denies use in past 12 months    Cocaine frequency Never used    Comment: Never used on 2/17/2021     Crack Cocaine Frequency Denies use in past 12 months    Methamphetamine Frequency Denies use in past 12 months    Narcotic Frequency Denies use in past 12 months    Benzodiazepine Frequency Denies use in past 12 months    Amphetamine frequency Denies use in past 12 months    Barbiturate Frequency Denies use in past 12 months    Inhalant frequency Never used    Comment: Never used on 2/17/2021     Hallucinogen frequency Never used    Comment: Never used on 2/17/2021     Ecstasy frequency Never used    Comment: Never used on 2/17/2021     Other drug frequency Never used    Comment: Never used on 2/17/2021     Opiate frequency Denies use in past 12 months    Last reviewed by La Baumann RN on 2/17/2021        I have assessed this patient for substance use within the past 12 months    Alcohol use: occasional, social use  Recreational drug use:   Cocaine:  denies use  Heroin:  denies use  Marijuana:  current use, uses few times per month, last use was 1 month ago  Other drugs: K2: current use, uses occasionally, last use was 2 months ago   Longest clean time: few months  History of Inpatient/Outpatient rehabilitation program: no  Smoking history: 10 cigarettes per day  Use of caffeine: 5 cups of coffee per day    Family Psychiatric History:     Psychiatric Illness:   Mother - psychotic disorder, Brother - mental illness  Substance Abuse:  no family history of substance abuse  Suicide Attempts:  no family history of suicide attempts    Social History:    Education: 11th grade  Learning Disabilities: none  Marital History: single  Children: none  Living Arrangement: lives in home with mother and brother  Occupational History: worked at [employer] in the past, on permanent disability  Functioning Relationships: mother is supportive  Legal History: no current legal problems, past arrests due to drug possession   History: None    Traumatic History:     Abuse: none  Other Traumatic Events: none     Past Medical History:    History of Seizures: no  History of Head injury with loss of consciousness: no    Past Medical History:   Diagnosis Date    Schizoaffective disorder ([facility] [address] )     resolved 05/16/14     Past Surgical History:   Procedure Laterality Date    NO PAST SURGERIES         Medical Review Of Systems:    A comprehensive review of systems was negative except for: Behavioral/Psych: positive for aggressive behavior, agitation, delusional thoughts, hallucinations, irritability, mood swings, poor self care and sleep disturbance    Allergies:    No Known Allergies    Medications: All current active medications have been reviewed  Medications prior to admission:    Prior to Admission Medications   Prescriptions Last Dose Informant Patient Reported?  Taking?   benztropine (COGENTIN) 0 5 mg tablet Not Taking at Unknown time  Yes No   Sig: Take 0 5 mg by mouth 3 (three) times a day   divalproex sodium (DEPAKOTE) 500 mg EC tablet Not Taking at Unknown time  Yes No   Sig: Take 500 mg by mouth daily at bedtime   loxapine (LOXITANE) 25 mg capsule   No No   Sig: Take 1 capsule (25 mg total) by mouth every 8 (eight) hours for 8 days      Facility-Administered Medications: None       OBJECTIVE:    Vital signs in last 24 hours:    Temp:  [97 8 °F (36 6 °C)-98 4 °F (36 9 °C)] 97 8 °F (36 6 °C)  HR:  [79-89] 79  Resp:  [16-20] 16  BP: (105-165)/(63-96) 105/67    No intake or output data in the 24 hours ending 02/17/21 0925     Mental Status Evaluation:    Appearance:  disheveled, marginal hygiene   Behavior:  bizarre, poor eye contact, psychomotor agitation   Speech:  tangential, disorganized   Mood:  dysphoric, labile, irritable   Affect:  inappropriate, labile   Language: naming objects and repeating phrases   Thought Process: disorganized, illogical   Associations: tangential associations   Thought Content:  persecutory delusions   Perceptual Disturbances: denies auditory or visual hallucinations when asked, but appears responding to internal stimuli   Risk Potential: Suicidal ideation - None  Homicidal ideation - Yes, angry, hostile feelings with no homicidal plan  Potential for aggression - Yes, due to poor impulse control   Sensorium:  oriented to person, place and time/date   Memory:  recent and remote memory grossly intact   Consciousness:  alert and awake   Attention/Concentration: poor concentration and poor attention span   Intellect: average   Fund of Knowledge: awareness of current events: yes  past history: yes  vocabulary: normal   Insight:  poor   Judgment: poor   Muscle Strength Muscle Tone: normal  normal   Gait/Station: normal gait/station, normal balance   Motor Activity: no abnormal movements       Laboratory Results: I have personally reviewed all pertinent laboratory/tests results    Most Recent Labs:   Lab Results   Component Value Date    WBC 6 80 09/10/2020    RBC 4 15 (L) 09/10/2020    HGB 13 2 (L) 09/10/2020    HCT 38 4 (L) 09/10/2020     09/10/2020    RDW 14 4 09/10/2020    NEUTROABS 3 80 09/10/2020    SODIUM 137 02/16/2021    K 4 5 02/16/2021     02/16/2021    CO2 27 02/16/2021    BUN 21 02/16/2021    CREATININE 0 88 02/16/2021    GLUC 155 (H) 02/16/2021    CALCIUM 9 7 02/16/2021    AST 43 02/16/2021    ALT 30 02/16/2021    ALKPHOS 72 02/16/2021    TP 6 6 02/16/2021    ALB 3 9 02/16/2021    TBILI 0 50 02/16/2021    CHOLESTEROL 153 02/16/2021    HDL 59 02/16/2021    TRIG 93 02/16/2021    LDLCALC 75 02/16/2021    NONHDLC 94 02/16/2021    VALPROICTOT 22 4 (L) 02/16/2021    PLB3EVJHSAUF 4 710 (H) 02/16/2021   COVID19:   Lab Results   Component Value Date    SARSCOV2 Negative 02/16/2021   Drug Screen:   Lab Results   Component Value Date    AMPMETHUR Negative 02/16/2021    BARBTUR Negative 02/16/2021 BDZUR Negative 02/16/2021    THCUR Negative 02/16/2021    COCAINEUR Negative 02/16/2021    METHADONEUR Negative 02/16/2021    OPIATEUR Negative 02/16/2021    PCPUR Negative 02/16/2021   Influenza A/B/RSV   Lab Results   Component Value Date    INFLUA Negative 02/16/2021    INFLUB Negative 02/16/2021    RSV Negative 02/16/2021       Imaging Studies: No results found  Code Status: Level 1 - Full Code  Advance Directive and Living Will: <no information>    Suicide/Homicide Risk Assessment:    Risk of Harm to Self:   Nursing Suicide Risk Assessment Last 24 hours: Low Risk to Self: No evidence of suicidal thoughts or history; Moderate Risk to Self: (DENIED); High Risk to Self: (DENIED)  Demographic risk factors include: never , male  Historical Risk Factors include: chronic psychiatric problems, substance use  Current Specific Risk Factors include: mental illness diagnosis, current psychotic symptoms, poor self care, poor reasoning  Protective Factors: no current suicidal ideation, taking medications as ordered on the unit, stable housing, supportive family  Weapons/Firearms: none  The following steps have been taken to ensure weapons are properly secured: not applicable  Based on today's assessment, Denis Davison presents the following risk of harm to self: low    Risk of Harm to Others:  Nursing Homicide Risk Assessment: Violence Risk to Others: Denies within past 6 months  Demographic Risk Factors include: male, unemployed, under age 37    Historical Risk Factors include: prior arrest   Current Specific Risk Factors include: poor impulse control, current psychotic symptoms, concomitant thought disorder, poor insight  Protective Factors: compliant with medications on the unit as ordered, stable living environment, supportive family  Based on today's assessment, Denis Davison presents the following risk of harm to others: moderate    The following interventions are recommended: behavioral checks every 7 minutes, continued hospitalization on locked unit     Assessment/Plan   Principal Problem:    Schizoaffective disorder, bipolar type ([facility] [address] )  Active Problems:    Cannabis abuse, continuous    Synthetic cannabinoid abuse ([facility] [address] )    Elevated TSH      Patient Strengths: negotiates basic needs, stable housing, supportive family     Patient Barriers: chronic mental illness, difficulty adapting, patient is on an involuntary commitment    Treatment Plan:     Planned Treatment and Medication Changes:     All current active medications have been reviewed  Encourage group therapy, milieu therapy and occupational therapy  Behavioral Health checks every 7 minutes  303 hearing this week  Restart Loxitane 25 mg tid to help with psychotic symptoms - he reports compliance, but that is questionable  Restart Depakote at slightly higher dose 250 mg daily and 500 mg at bedtime to help with mood stabilization  Restart Cogentin 0 5 mg tid to prevent extrapyramidal symptoms  Check Depakote level, CBC/diff and CMP in 3 days   Check free T4 and free T3 due to elevated TSH    Current medications:    Current Facility-Administered Medications   Medication Dose Route Frequency Provider Last Rate    acetaminophen  650 mg Oral Q6H PRN Leona Borreggine, DO      acetaminophen  650 mg Oral Q4H PRN Leona Borreggine, DO      acetaminophen  975 mg Oral Q6H PRN Leona Borreggine, DO      haloperidol lactate  2 5 mg Intramuscular Q6H PRN Max 4/day Leona Borreggine, DO      And    LORazepam  1 mg Intramuscular Q6H PRN Max 4/day Leona Borreggine, DO      And    benztropine  0 5 mg Intramuscular Q6H PRN Max 4/day Leona Borreggine, DO      haloperidol lactate  5 mg Intramuscular Q4H PRN Max 4/day Seldon Duke, DO      And    LORazepam  2 mg Intramuscular Q4H PRN Max 4/day Seldon Duke, DO      And    benztropine  1 mg Intramuscular Q4H PRN Max 4/day Seldon Duke, DO      benztropine  0 5 mg Oral TID Luther Almazan MD      hydrOXYzine HCL  50 mg Oral Q6H PRN Max 4/day Neelam Lincoln, DO      Or    diphenhydrAMINE  50 mg Intramuscular Q6H PRN Neelam Lincoln, DO      divalproex sodium  250 mg Oral Daily Lele Mendoza MD      divalproex sodium  500 mg Oral HS Lele Mendoza MD      haloperidol  2 mg Oral Q4H PRN Max 6/day Leona Borreggine, DO      haloperidol  5 mg Oral Q6H PRN Max 4/day Leona Borreggine, DO      haloperidol  5 mg Oral Q4H PRN Max 4/day Leona Borreggine, DO      hydrOXYzine HCL  100 mg Oral Q6H PRN Max 4/day Neelam Lincoln, DO      Or    LORazepam  2 mg Intramuscular Q6H PRN Neelam Lincoln, DO      hydrOXYzine HCL  25 mg Oral Q6H PRN Max 4/day Leona Borreggine, DO      hydrOXYzine HCL  50 mg Oral Q4H PRN Max 4/day Leona Borreggine, DO      Or    LORazepam  1 mg Intramuscular Q4H PRN Leona Borreggine, DO      LORazepam  1 mg Oral Q4H PRN Max 6/day Leona Borreggine, DO      Or    LORazepam  2 mg Intramuscular Q6H PRN Max 3/day Leona Borreggine, DO      loxapine  25 mg Oral TID Lele Mendoza MD      melatonin  3 mg Oral HS Leona Borreggine, DO      nicotine  1 patch Transdermal Daily Daneen Salmon, DO      nicotine polacrilex  4 mg Oral Q2H PRN Daneen Salmon, DO      senna-docusate sodium  1 tablet Oral Daily PRN Rhys He Borreggine, DO      traZODone  50 mg Oral HS PRN Daneen Salmon, DO       Risks / Benefits of Treatment:    Risks, benefits, and possible side effects of medications explained to patient including risk of liver impairment related to treatment with Depakote and risk of parkinsonian symptoms, Tardive Dyskinesia and metabolic syndrome related to treatment with antipsychotic medications  Patient has limited understanding of risks and benefits treatment at this time, but agrees to take medications as prescribed      Counseling / Coordination of Care:    Patient's presentation on admission and proposed treatment plan discussed with treatment team   Diagnosis, medication changes and treatment plan reviewed with patient  Stressors preceding admission discussed with patient including drug use problems and chronic mental illness  Events leading to admission reviewed with patient  Inpatient Psychiatric Certification:    Estimated length of stay: 15 midnights    Based upon physical, mental and social evaluations, I certify that inpatient psychiatric services are medically necessary for this patient for a duration of 15 midnights for the treatment of Schizoaffective disorder, bipolar type [facility])  Available alternative community resources do not meet the patient's mental health care needs  I further attest that an established written individualized plan of care has been implemented and is outlined in the patient's medical records    The patient has been released from the Emergency Department and medically cleared as per Emergency Department documentation for psychiatric admission for Schizoaffective disorder, bipolar type ([facility] [address] )      Chin Keller MD 02/17/21

## 2021-02-17 NOTE — QUICK NOTE
Psychiatric Evaluation - [facility] 30 y o  male MRN: [id number]  Unit/Bed#: Fletcher Gonzáles 272-19 Encounter: [id number]      Chief Complaint: aggressive behavior    History of Present Illness       Robert Hernández is a 30 y o  male with history of Schizoaffective Disorder Bipolar type who was admitted to the inpatient psychiatric unit under an involuntary 302 commitment due to aggressive behavior towards his mother during an argument  Patient claims he was arguing with his mother over minor things, but claims he cannot remember the extent of the argument  He denies using violence against his mother  Prior to this episode, he reports he has not had violent incidents in the past  He admits to decreased sleep  He denies changes in appetite, energy level and mood  He denies SI/HI, and denies AH/VH  Prior to admission stressors include drug use and chronic mental illness  He reports having a history of schizophrenia and bipolar disorder, and claims he sees a Psychiatrist [facility] but cannot remember her name  He reports he has used multiple medications in the past but cannot remember what they were  He reports most recently he is taking Risperidone and Depakote, and claims he is taking them as prescribed  On exam he is alert and oriented x3  He answers questions appropriately, but often seems distracted and needs questions repeated  He often stares around the room and began laughing to himself spontaneously  Patient is poor historian  He changed his answers to several questions when asked multiple times             Psychiatric Review Of Systems:  Problems with sleep: yes, decreased  Appetite changes: no  Weight changes: no  Low energy/anergy: no  Low interest/pleasure/anhedonia: no  Somatic symptoms: no  Anxiety/panic: no  Brittany: past manic episodes, mood swings  Guilt/hopeless: no  Self injurious behavior/risky behavior: no    Historical Information   Prior psychiatric diagnoses: Schizophrenia, Bipolar d/o  Inpatient hospitalizations: Multiple at [facility] and [facility] for Schizophrenia  Suicide attempts: Denies  Self-harm behaviors: Denies  Violent behavior: Denies  Outpatient treatment: Outpatient psychiatry [facility]  Psychiatric medication trial: Multiple, patient is unsure which medications have been trialed previously; Patient claims he is currently on depakote and risperidone  Substance Abuse History:  Social History     Tobacco Use    Smoking status: Current Every Day Smoker     Packs/day: 1 00     Types: Cigarettes    Smokeless tobacco: Never Used   Substance Use Topics    Alcohol use: Yes     Comment: social    Drug use: Yes     Types: Marijuana      Patient reports 1/2 pack of cigarettes per day  Patient reports recent K2 use and history of marijuana use  He reports he tried alcohol with his mom but didn't care for it  History of IP/OP rehabilitation program: Denies    Family Psychiatric History:   Brother - mental illness  Mother - psychotic disorder    Social History  Marital history: Single  Children: no  Living arrangement: Lives in an apartment with mother and brother    Functioning Relationships: good support system in mother and brother  Education: 11th grade  Occupational History: worked at [employer] in the past; on disability  Other Pertinent History: Legal: arrested for drug possession      Traumatic History:   Abuse: none reported  Other Traumatic Events: none reported    Past Medical History:   Diagnosis Date    Schizoaffective disorder ([facility] [address] )     resolved 05/16/14       Medical Review Of Systems:  Review of Systems - Negative except as noted in HPI    Meds/Allergies   all current active meds have been reviewed  No Known Allergies    Objective   Vital signs in last 24 hours:  Temp:  [97 8 °F (36 6 °C)-97 9 °F (36 6 °C)] 97 8 °F (36 6 °C)  HR:  [79-89] 79  Resp:  [16-20] 16  BP: (105-138)/(63-79) 105/67    Mental Status Exam:  Appearance:  alert, good eye contact, marginal grooming/hygiene and poor dentition   Behavior:  calm, limited cooperativity, guarded and sitting comfortably   Motor: no abnormal movements and restless and fidgety   Speech:  delayed initiation, normal rate and coherent   Mood:  anxious   Affect:  mood-incongruent, anxious and inappropriate laughing at times   Thought Process:  disorganized   Thought Content: no verbalized delusions; mild paranoia   Perceptual disturbances: appears to be responding to internal stimuli   Risk Potential: No active or passive suicidal or homicidal ideation was verbalized during interview, Recent aggressive behavior PTA   Cognition: oriented to self and situation, appears to be below average intelligence and cognition not formally tested   Insight:  Poor   Judgment: Poor     Laboratory results:  I have personally reviewed all pertinent laboratory/tests results  Assessment/Plan     Principal Problem:    Schizoaffective disorder, bipolar type ([facility] [address] )  Active Problems:    Cannabis abuse, continuous    S[facility] cannabinoid [facility] ([facility] )    Recommended Treatment:   - patient on 302 commitment; hearing scheduled for 2/19  - behavioral health checks every 7 minutes  - get baseline behavior from collateral  - TSH is elevated; f/u free T4  - restart depakote  - verify medication with collateral      Risks, benefits and possible side effects of Medications:   Risks, benefits, and possible side effects of medications have been explained to the patient, who verbalizes understanding              This note was not shared with the patient due to this is a psychotherapy note

## 2021-02-17 NOTE — NURSING NOTE
attempted to call mother multiple times per psychiatrist request to confirm medications with her  Mother did not answer phone calls and has a voicemail box that is not set up yet

## 2021-02-17 NOTE — CONSULTS
Inpatient Consultation - [facility]    Patient Information: Amparo Nguyen 30 y o  male MRN: [id number]  Unit/Bed#: [facility] 347-02 Encounter: [id number]  PCP: Adilson Price PA-C  Date of Admission:  2/16/2021  Date of Consultation: 02/18/21  Requesting Physician: Raul Dang MD    Reason For Consultation:   Medical clearance    Assessment/Plan:    Subclinical hypothyroidism  Assessment & Plan  TSH 4 7 (H), Normal T3 and T4  Patient does not report symptoms  No treatment indicated at this time  Synthetic cannabinoid abuse [facility])  Assessment & Plan  Management as per psych    Cannabis abuse, continuous  Assessment & Plan  Management as per psych    * Schizoaffective disorder, bipolar type [facility])  Assessment & Plan  Management as per psych      VTE Prophylaxis: Reason for no pharmacologic prophylaxis Patient ambulating  / reason for no mechanical VTE prophylaxis Patient ambulating     Recommendations for Discharge:  · Assistance an connection to resources to quit smoking  Counseling / Coordination of Care Time: 15 minutes  Greater than 50% of total time spent on patient counseling and coordination of care  Collaboration of Care: Were Recommendations Directly Discussed with Primary Treatment Team? - No     History of Present Illness:    Amparo Nguyen is a 30 y o  male who is originally admitted to the [facility] service on 2/16/2021 due to accidental overdose on synthetic cannabinoid drugs  Patient has no medical complaints at this time  Coughing noted during interview and upon further questioning patient remarks having coughing for 1 month, dry, not associated with shortness of breath, wheezing, fever or chest pain  We are consulted for medical clearance for admission  Review of Systems:    Review of Systems   Constitutional: Negative for fever  Eyes: Negative  Respiratory: Positive for cough  Negative for chest tightness, shortness of breath and wheezing  Cardiovascular: Negative  Negative for chest pain, palpitations and leg swelling  Gastrointestinal: Negative  Negative for abdominal pain, constipation, diarrhea, nausea and vomiting  Endocrine: Negative  Genitourinary: Negative  Negative for difficulty urinating and hematuria  Musculoskeletal: Negative  Skin: Negative  Negative for rash  Neurological: Negative  Negative for headaches  Past Medical and Surgical History:   Past Medical History:   Diagnosis Date    Schizoaffective disorder ([facility] [address] )     resolved 05/16/14     Past Surgical History:   Procedure Laterality Date    NO PAST SURGERIES       Meds/Allergies: Allergies: No Known Allergies  Prior to Admission Medications   Prescriptions Last Dose Informant Patient Reported?  Taking?   benztropine (COGENTIN) 1 mg tablet Not Taking at Unknown time  Yes No   Sig: Take 1 mg by mouth 2 (two) times a day    buPROPion (WELLBUTRIN) 75 mg tablet   Yes Yes   Sig: Take 75 mg by mouth 2 (two) times a day   divalproex sodium (DEPAKOTE) 250 mg EC tablet Not Taking at Unknown time  Yes No   Sig: Take 250 mg by mouth daily    divalproex sodium (DEPAKOTE) 500 mg EC tablet   Yes Yes   Sig: Take 500 mg by mouth daily at bedtime   loxapine (LOXITANE) 25 mg capsule   No No   Sig: Take 1 capsule (25 mg total) by mouth every 8 (eight) hours for 8 days   Patient taking differently: Take 25 mg by mouth 3 (three) times a day       Facility-Administered Medications: None     Social History:     Social History     Socioeconomic History    Marital status: Single     Spouse name: Not on file    Number of children: Not on file    Years of education: Not on file    Highest education level: Not on file   Occupational History    Not on file   Social Needs    Financial resource strain: Not on file    Food insecurity     Worry: Not on file     Inability: Not on file    Transportation needs     Medical: Not on file     Non-medical: Not on file   Tobacco Use    Smoking status: Current Every Day Smoker     Packs/day: 1 00     Types: Cigarettes    Smokeless tobacco: Never Used   Substance and Sexual Activity    Alcohol use: Yes     Comment: social    Drug use: Yes     Types: Marijuana    Sexual activity: Not Currently   Lifestyle    Physical activity     Days per week: Not on file     Minutes per session: Not on file    Stress: Not on file   Relationships    Social connections     Talks on phone: Not on file     Gets together: Not on file     Attends Mosque service: Not on file     Active member of club or organization: Not on file     Attends meetings of clubs or organizations: Not on file     Relationship status: Not on file    Intimate partner violence     Fear of current or ex partner: Not on file     Emotionally abused: Not on file     Physically abused: Not on file     Forced sexual activity: Not on file   Other Topics Concern    Not on file   Social History Narrative    Not on file       Family History:  Family History   Problem Relation Age of Onset    Anxiety disorder Mother     Depression Mother     Dysphagia Mother     Stroke Mother         cva    Diabetes type II Mother     Other Mother         Hyponatremia    Psychosis Mother        Physical Exam:     Vitals:   Blood Pressure: 111/67 (02/17/21 1459)  Pulse: 83 (02/17/21 1459)  Temperature: 98 2 °F (36 8 °C) (02/17/21 1459)  Temp Source: Temporal (02/17/21 1459)  Respirations: 16 (02/17/21 1459)  Height: 5' 5" (165 1 cm) (02/16/21 2008)  Weight - Scale: 52 6 kg (116 lb) (02/16/21 2008)  SpO2: 99 % (02/16/21 2008)    Physical Exam  Vitals signs reviewed  Constitutional:       General: He is not in acute distress  Appearance: He is well-developed  HENT:      Head: Atraumatic  Eyes:      Conjunctiva/sclera: Conjunctivae normal    Neck:      Musculoskeletal: Normal range of motion  Cardiovascular:      Rate and Rhythm: Normal rate and regular rhythm  Pulses: Normal pulses  Heart sounds: Normal heart sounds  No murmur  Pulmonary:      Effort: Pulmonary effort is normal       Breath sounds: Normal breath sounds  No wheezing, rhonchi or rales  Abdominal:      General: Abdomen is flat  Bowel sounds are normal  There is no distension  Palpations: Abdomen is soft  Tenderness: There is no abdominal tenderness  Hernia: No hernia is present  Musculoskeletal: Normal range of motion  General: No tenderness  Skin:     General: Skin is warm and dry  Neurological:      Mental Status: He is alert  He is disoriented  Cranial Nerves: No cranial nerve deficit  Gait: Gait normal       Comments: Patient able to recall year correctly however unsure of Day and month  Additional Data:     Lab Results: I have personally reviewed pertinent reports  Results from last 7 days   Lab Units 02/16/21  1735   POTASSIUM mmol/L 4 5   CHLORIDE mmol/L 104   CO2 mmol/L 27   BUN mg/dL 21   CREATININE mg/dL 0 88   CALCIUM mg/dL 9 7   ALK PHOS U/L 72   ALT U/L 30   AST U/L 43           Imaging: I have personally reviewed pertinent reports  No results found  EKG, Pathology, and Other Studies Reviewed on Admission:   EKG - None done for this admission    ** Please Note: This note has been constructed using a voice recognition system   Jennifer Vallejo MD  02/18/21  7:04 AM

## 2021-02-17 NOTE — PLAN OF CARE
Problem: PSYCHOSIS  Goal: Will report no hallucinations or delusions  Description: Interventions:  - Administer medication as  ordered  - Every waking shifts and PRN assess for the presence of hallucinations and or delusions  - Assist with reality testing to support increasing orientation  - Assess if patient's hallucinations or delusions are encouraging self-harm or harm to others and intervene as appropriate  Outcome: Progressing     Problem: BEHAVIOR  Goal: Pt/Family maintain appropriate behavior and adhere to behavioral management agreement, if implemented  Description: INTERVENTIONS:  - Assess the family dynamic   - Encourage verbalization of thoughts and concerns in a socially appropriate manner  - Assess patient/family's coping skills and non-compliant behavior (including use of illegal substances)  - Utilize positive, consistent limit setting strategies supporting safety of patient, staff and others  - Initiate consult with Case Management, Spiritual Care or other ancillary services as appropriate  - If a patient's/visitor's behavior jeopardizes the safety of the patient, staff, or others, refer to organization procedure     - Notify Security of behavior or suspected illegal substances which indicate the need for search of the patient and/or belongings  - Encourage participation in the decision making process about a behavioral management agreement; implement if patient meets criteria  Outcome: Progressing     Problem: ANXIETY  Goal: Will report anxiety at manageable levels  Description: INTERVENTIONS:  - Administer medication as ordered  - Teach and encourage coping skills  - Provide emotional support  - Assess patient/family for anxiety and ability to cope  Outcome: Progressing  Goal: By discharge: Patient will verbalize 2 strategies to deal with anxiety  Description: Interventions:  - Identify any obvious source/trigger to anxiety  - Staff will assist patient in applying identified coping technique/skills  - Encourage attendance of scheduled groups and activities  Outcome: Progressing     Problem: INVOLUNTARY ADMIT  Goal: Will cooperate with staff recommendations and doctor's orders and will demonstrate appropriate behavior  Description: INTERVENTIONS:  - Treat underlying conditions and offer medication as ordered  - Educate regarding involuntary admission procedures and rules  - Utilize positive consistent limit setting strategies to support patient and staff safety  Outcome: Progressing     Problem: SELF CARE DEFICIT  Goal: Return ADL status to a safe level of function  Description: INTERVENTIONS:  - Administer medication as ordered  - Assess ADL deficits and provide assistive devices as needed  - Obtain PT/OT consults as needed  - Assist and instruct patient to increase activity and self care as tolerated  Outcome: Not Progressing
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
## 2021-02-18 PROBLEM — E03.8 SUBCLINICAL HYPOTHYROIDISM: Status: ACTIVE | Noted: 2021-02-17

## 2021-02-18 LAB
BASOPHILS # BLD AUTO: 0.12 THOUSAND/UL (ref 0–0.1)
BASOPHILS NFR MAR MANUAL: 2 % (ref 0–1)
EOSINOPHIL # BLD AUTO: 0.23 THOUSAND/UL (ref 0–0.4)
EOSINOPHIL NFR BLD MANUAL: 4 % (ref 0–6)
ERYTHROCYTE [DISTWIDTH] IN BLOOD BY AUTOMATED COUNT: 14.7 %
EST. AVERAGE GLUCOSE BLD GHB EST-MCNC: 117 MG/DL
HBA1C MFR BLD: 5.7 %
HCT VFR BLD AUTO: 43.4 % (ref 41–53)
HGB BLD-MCNC: 14.3 G/DL (ref 13.5–17.5)
LYMPHOCYTES # BLD AUTO: 3.19 THOUSAND/UL (ref 0.5–4)
LYMPHOCYTES # BLD AUTO: 55 % (ref 25–45)
MCH RBC QN AUTO: 30.6 PG (ref 26–34)
MCHC RBC AUTO-ENTMCNC: 33 G/DL (ref 31–36)
MCV RBC AUTO: 93 FL (ref 80–100)
MONOCYTES # BLD AUTO: 0.64 THOUSAND/UL (ref 0.2–0.9)
MONOCYTES NFR BLD AUTO: 11 % (ref 1–10)
NEUTS BAND NFR BLD MANUAL: 1 % (ref 0–8)
NEUTS SEG # BLD: 1.62 THOUSAND/UL (ref 1.8–7.8)
NEUTS SEG NFR BLD AUTO: 27 %
PLATELET # BLD AUTO: 304 THOUSANDS/UL (ref 150–450)
PLATELET BLD QL SMEAR: ADEQUATE
PMV BLD AUTO: 8.5 FL (ref 8.9–12.7)
RBC # BLD AUTO: 4.67 MILLION/UL (ref 4.5–5.9)
RBC MORPH BLD: NORMAL
TOTAL CELLS COUNTED SPEC: 100
WBC # BLD AUTO: 5.8 THOUSAND/UL (ref 4.5–11)

## 2021-02-18 PROCEDURE — 99221 1ST HOSP IP/OBS SF/LOW 40: CPT | Performed by: FAMILY MEDICINE

## 2021-02-18 PROCEDURE — 93005 ELECTROCARDIOGRAM TRACING: CPT

## 2021-02-18 PROCEDURE — 99232 SBSQ HOSP IP/OBS MODERATE 35: CPT | Performed by: PSYCHIATRY & NEUROLOGY

## 2021-02-18 PROCEDURE — 86592 SYPHILIS TEST NON-TREP QUAL: CPT | Performed by: PSYCHIATRY & NEUROLOGY

## 2021-02-18 PROCEDURE — 83036 HEMOGLOBIN GLYCOSYLATED A1C: CPT | Performed by: PSYCHIATRY & NEUROLOGY

## 2021-02-18 PROCEDURE — 85027 COMPLETE CBC AUTOMATED: CPT | Performed by: PSYCHIATRY & NEUROLOGY

## 2021-02-18 PROCEDURE — 85007 BL SMEAR W/DIFF WBC COUNT: CPT | Performed by: PSYCHIATRY & NEUROLOGY

## 2021-02-18 RX ADMIN — LOXAPINE 25 MG: 25 CAPSULE ORAL at 08:04

## 2021-02-18 RX ADMIN — LOXAPINE 25 MG: 25 CAPSULE ORAL at 16:50

## 2021-02-18 RX ADMIN — LOXAPINE 25 MG: 25 CAPSULE ORAL at 21:36

## 2021-02-18 RX ADMIN — BENZTROPINE MESYLATE 0.5 MG: 0.5 TABLET ORAL at 08:04

## 2021-02-18 RX ADMIN — HALOPERIDOL 5 MG: 5 TABLET ORAL at 18:09

## 2021-02-18 RX ADMIN — DIVALPROEX SODIUM 250 MG: 250 TABLET, DELAYED RELEASE ORAL at 08:04

## 2021-02-18 RX ADMIN — BENZTROPINE MESYLATE 0.5 MG: 0.5 TABLET ORAL at 16:50

## 2021-02-18 RX ADMIN — BENZTROPINE MESYLATE 0.5 MG: 0.5 TABLET ORAL at 21:35

## 2021-02-18 RX ADMIN — MELATONIN TAB 3 MG 3 MG: 3 TAB at 21:36

## 2021-02-18 RX ADMIN — LORAZEPAM 1 MG: 1 TABLET ORAL at 18:09

## 2021-02-18 RX ADMIN — DIVALPROEX SODIUM 500 MG: 500 TABLET, DELAYED RELEASE ORAL at 21:35

## 2021-02-18 NOTE — PROGRESS NOTES
02/18/21 0841   Team Meeting   Meeting Type Daily Rounds   Team Members Present   Team Members Present Physician;Nurse;   Physician Team Member [address] Team Member [facility] Management Team Member [place]   Patient/Family Present   Patient Present No   Patient's Family Present No   Pt appears internally preoccupied  Pt with delayed responses  Pt with bizarre behaviors  Med/meal compliant

## 2021-02-18 NOTE — NURSING NOTE
PT denies S/S of S/I,H/I,V/H,A/H  PT out in  Milieu  PT frequently asking for coffee or juice, PT informed there are certain times for coffee and juice, but he may have water at anytime of the day  PT also observed laughing to himself appears to be responding to internal stimuli  Medication and meal compliant  Isolative to his room  Will monitor

## 2021-02-18 NOTE — PLAN OF CARE
Problem: PSYCHOSIS  Goal: Will report no hallucinations or delusions  Description: Interventions:  - Administer medication as  ordered  - Every waking shifts and PRN assess for the presence of hallucinations and or delusions  - Assist with reality testing to support increasing orientation  - Assess if patient's hallucinations or delusions are encouraging self-harm or harm to others and intervene as appropriate  Outcome: Progressing     Problem: BEHAVIOR  Goal: Pt/Family maintain appropriate behavior and adhere to behavioral management agreement, if implemented  Description: INTERVENTIONS:  - Assess the family dynamic   - Encourage verbalization of thoughts and concerns in a socially appropriate manner  - Assess patient/family's coping skills and non-compliant behavior (including use of illegal substances)  - Utilize positive, consistent limit setting strategies supporting safety of patient, staff and others  - Initiate consult with Case Management, Spiritual Care or other ancillary services as appropriate  - If a patient's/visitor's behavior jeopardizes the safety of the patient, staff, or others, refer to organization procedure     - Notify Security of behavior or suspected illegal substances which indicate the need for search of the patient and/or belongings  - Encourage participation in the decision making process about a behavioral management agreement; implement if patient meets criteria  Outcome: Progressing     Problem: ANXIETY  Goal: Will report anxiety at manageable levels  Description: INTERVENTIONS:  - Administer medication as ordered  - Teach and encourage coping skills  - Provide emotional support  - Assess patient/family for anxiety and ability to cope  Outcome: Progressing  Goal: By discharge: Patient will verbalize 2 strategies to deal with anxiety  Description: Interventions:  - Identify any obvious source/trigger to anxiety  - Staff will assist patient in applying identified coping technique/skills  - Encourage attendance of scheduled groups and activities  Outcome: Progressing     Problem: INVOLUNTARY ADMIT  Goal: Will cooperate with staff recommendations and doctor's orders and will demonstrate appropriate behavior  Description: INTERVENTIONS:  - Treat underlying conditions and offer medication as ordered  - Educate regarding involuntary admission procedures and rules  - Utilize positive consistent limit setting strategies to support patient and staff safety  Outcome: Progressing     Problem: SELF CARE DEFICIT  Goal: Return ADL status to a safe level of function  Description: INTERVENTIONS:  - Administer medication as ordered  - Assess ADL deficits and provide assistive devices as needed  - Obtain PT/OT consults as needed  - Assist and instruct patient to increase activity and self care as tolerated  Outcome: Progressing
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
## 2021-02-18 NOTE — PROGRESS NOTES
Progress Note - [address] 30 y o  male MRN: [id number]   Unit/Bed#: [facility] 347-02 Encounter: [id number]    Behavior over the last 24 hours: vern Smith is still very bizarre and disorganized  He continues to talk and laugh to himself, seems very preoccupied during the session and answers with significant delay  Intense stare noted  Poor self care, disheveled  Compliant with medications      Sleep: normal  Appetite: normal  Medication side effects: No   ROS: no complaints, denies any shortness of breath, chest pain or abdominal pain, all other systems are negative    Mental Status Evaluation:    Appearance:  disheveled, marginal hygiene   Behavior:  cooperative, guarded, intense eye contact   Speech:  scant, increased latency of response   Mood:  labile, irritable   Affect:  labile, inappropriate laugh   Thought Process:  disorganized, illogical   Associations: thought blocking   Thought Content:  persecutory delusions   Perceptual Disturbances: denies auditory or visual hallucinations when asked, but appears responding to internal stimuli   Risk Potential: Suicidal ideation - None  Homicidal ideation - None at present  Potential for aggression - Yes, due to poor impulse control   Sensorium:  oriented to person, place and time/date   Memory:  recent and remote memory grossly intact   Consciousness:  alert and awake   Attention/Concentration: poor concentration and poor attention span   Insight:  poor   Judgment: poor   Gait/Station: normal gait/station, normal balance   Motor Activity: no abnormal movements     Vital signs in last 24 hours:    Temp:  [97 8 °F (36 6 °C)-98 2 °F (36 8 °C)] 98 2 °F (36 8 °C)  HR:  [79-83] 83  Resp:  [16] 16  BP: (105-111)/(67) 111/67    Laboratory results: I have personally reviewed all pertinent laboratory/tests results    Thyroid Studies:   Lab Results   Component Value Date    [id number] 4 710 (H) 02/16/2021    T3FREE 3 68 02/16/2021    FREET4 1 10 02/16/2021   CBC:   Lab Results   Component Value Date    WBC 5 80 02/18/2021    RBC 4 67 02/18/2021    HGB 14 3 02/18/2021    HCT 43 4 02/18/2021    MCV 93 02/18/2021     02/18/2021    MCH 30 6 02/18/2021    MCHC 33 0 02/18/2021    RDW 14 7 02/18/2021    MPV 8 5 (L) 02/18/2021    NRBC 0 07/03/2020    NEUTROABS 1 62 (L) 02/18/2021       Suicide/Homicide Risk Assessment:    Risk of Harm to Self:   Nursing Suicide Risk Assessment Last 24 hours: Low Risk to Self: No evidence of suicidal thoughts or history;  ;    Current Specific Risk Factors include: mental illness diagnosis, current psychotic symptoms, poor reasoning, poor impulse control  Protective Factors: no current suicidal ideation, taking medications as ordered on the unit  Based on today's assessment, Nicole Epps presents the following risk of harm to self: low    Risk of Harm to Others:  Nursing Homicide Risk Assessment: Violence Risk to Others: Denies within past 6 months  Based on today's assessment, Nicole Epps presents the following risk of harm to others: low    The following interventions are recommended: behavioral checks every 7 minutes, continued hospitalization on locked unit    Progress Toward Goals: no significant progress, still disorganized, still labile, remains psychotic, poor insight, poor reality testing    Assessment/Plan   Principal Problem:    Schizoaffective disorder, bipolar type ([facility] [address] )  Active Problems:    Cannabis abuse, continuous    Synthetic cannabinoid abuse ([facility] [address] )    Subclinical hypothyroidism      Recommended Treatment:     Planned medication and treatment changes: All current active medications have been reviewed  Encourage group therapy, milieu therapy and occupational therapy  Behavioral Health checks every 7 minutes  303 hearing scheduled for tomorrow  Recheck CBC/diff in the morning - decreased ANC noted (1 62 today)   If ANC remains low - may need to change Depakote to another mood stabilizer (likely Trileptal)    Continue all other medications:    Current Facility-Administered Medications   Medication Dose Route Frequency Provider Last Rate    acetaminophen  650 mg Oral Q6H PRN Leona Borreggine, DO      acetaminophen  650 mg Oral Q4H PRN Leona Borreggine, DO      acetaminophen  975 mg Oral Q6H PRN Leona Borreggine, DO      haloperidol lactate  2 5 mg Intramuscular Q6H PRN Max 4/day Leona Borreggine, DO      And    LORazepam  1 mg Intramuscular Q6H PRN Max 4/day Leona Borreggine, DO      And    benztropine  0 5 mg Intramuscular Q6H PRN Max 4/day Leona Borreggine, DO      haloperidol lactate  5 mg Intramuscular Q4H PRN Max 4/day Jorje Spatz, DO      And    LORazepam  2 mg Intramuscular Q4H PRN Max 4/day Jorje Spatz, DO      And    benztropine  1 mg Intramuscular Q4H PRN Max 4/day Jorje Spatz, DO      benztropine  0 5 mg Oral TID Roseann Rivera MD      hydrOXYzine HCL  50 mg Oral Q6H PRN Max 4/day Jorje Spatz, DO      Or    diphenhydrAMINE  50 mg Intramuscular Q6H PRN Jorje Spatz, DO      divalproex sodium  250 mg Oral Daily Roseann Rivera MD      divalproex sodium  500 mg Oral HS Roseann Rivera MD      haloperidol  2 mg Oral Q4H PRN Max 6/day Leona Borreggine, DO      haloperidol  5 mg Oral Q6H PRN Max 4/day Leona Borreggine, DO      haloperidol  5 mg Oral Q4H PRN Max 4/day Leona Borreggine, DO      hydrOXYzine HCL  100 mg Oral Q6H PRN Max 4/day Jorje Spatz, DO      Or    LORazepam  2 mg Intramuscular Q6H PRN Jorje Spatz, DO      hydrOXYzine HCL  25 mg Oral Q6H PRN Max 4/day Leona Borreggine, DO      hydrOXYzine HCL  50 mg Oral Q4H PRN Max 4/day Leona Borreggine, DO      Or    LORazepam  1 mg Intramuscular Q4H PRN Leona Borreggine, DO      LORazepam  1 mg Oral Q4H PRN Max 6/day Leona Borreggine, DO      Or    LORazepam  2 mg Intramuscular Q6H PRN Max 3/day Leona Borreggine, DO      loxapine  25 mg Oral TID Roseann Rivera MD      melatonin 3 mg Oral HS Leona Ocampo, DO      nicotine  1 patch Transdermal Daily Segundo Davis, DO      nicotine polacrilex  4 mg Oral Q2H PRN Segundo Davis,       senna-docusate sodium  1 tablet Oral Daily PRN Konrad Ocampo, DO      traZODone  50 mg Oral HS PRN Segundo Davis, DO       Risks / Benefits of Treatment:    Risks, benefits, and possible side effects of medications explained to patient  Patient has limited understanding of risks and benefits of treatment at this time, but agrees to take medications as prescribed  Counseling / Coordination of Care:    Patient's progress discussed with staff in treatment team meeting  Medications, treatment progress and treatment plan reviewed with patient      Deysi Foley MD 02/18/21
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
## 2021-02-18 NOTE — PROGRESS NOTES
Did not attend AM groups  Although able to make needs/wants known verbally, will selectively cease speaking if asked to do something undesirable to him  Unsuccessful attempt to attain Admission Self Assessment/DERs
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
## 2021-02-18 NOTE — PROGRESS NOTES
02/18/21 1012   [address] Admission Notification   Notification of Admission Provided to: Therapist;Psychiatrist   Psychiatrist Notified via: Phone call   Therapist Notified via: Phone call     CM notified NANCY of pt's admission   Pt not scheduled until 3/22 with psychiatrist

## 2021-02-19 LAB
ATRIAL RATE: 62 BPM
BASOPHILS # BLD AUTO: 0 THOUSANDS/ΜL (ref 0–0.1)
BASOPHILS NFR BLD AUTO: 1 % (ref 0–1)
EOSINOPHIL # BLD AUTO: 0.3 THOUSAND/ΜL (ref 0–0.4)
EOSINOPHIL NFR BLD AUTO: 5 % (ref 0–6)
ERYTHROCYTE [DISTWIDTH] IN BLOOD BY AUTOMATED COUNT: 14.8 %
HCT VFR BLD AUTO: 40 % (ref 41–53)
HGB BLD-MCNC: 13.1 G/DL (ref 13.5–17.5)
LYMPHOCYTES # BLD AUTO: 1.8 THOUSANDS/ΜL (ref 0.5–4)
LYMPHOCYTES NFR BLD AUTO: 34 % (ref 25–45)
MCH RBC QN AUTO: 30.4 PG (ref 26–34)
MCHC RBC AUTO-ENTMCNC: 32.7 G/DL (ref 31–36)
MCV RBC AUTO: 93 FL (ref 80–100)
MONOCYTES # BLD AUTO: 0.6 THOUSAND/ΜL (ref 0.2–0.9)
MONOCYTES NFR BLD AUTO: 11 % (ref 1–10)
NEUTROPHILS # BLD AUTO: 2.6 THOUSANDS/ΜL (ref 1.8–7.8)
NEUTS SEG NFR BLD AUTO: 49 % (ref 45–65)
P AXIS: 18 DEGREES
PLATELET # BLD AUTO: 285 THOUSANDS/UL (ref 150–450)
PMV BLD AUTO: 8 FL (ref 8.9–12.7)
PR INTERVAL: 120 MS
QRS AXIS: 80 DEGREES
QRSD INTERVAL: 82 MS
QT INTERVAL: 406 MS
QTC INTERVAL: 412 MS
RBC # BLD AUTO: 4.3 MILLION/UL (ref 4.5–5.9)
RPR SER QL: NORMAL
T WAVE AXIS: 64 DEGREES
VENTRICULAR RATE: 62 BPM
WBC # BLD AUTO: 5.4 THOUSAND/UL (ref 4.5–11)

## 2021-02-19 PROCEDURE — 85025 COMPLETE CBC W/AUTO DIFF WBC: CPT | Performed by: PSYCHIATRY & NEUROLOGY

## 2021-02-19 PROCEDURE — 93010 ELECTROCARDIOGRAM REPORT: CPT | Performed by: INTERNAL MEDICINE

## 2021-02-19 PROCEDURE — 99233 SBSQ HOSP IP/OBS HIGH 50: CPT | Performed by: PSYCHIATRY & NEUROLOGY

## 2021-02-19 RX ORDER — LOXAPINE SUCCINATE 25 MG/1
25 TABLET ORAL 2 TIMES DAILY
Status: DISCONTINUED | OUTPATIENT
Start: 2021-02-19 | End: 2021-02-22

## 2021-02-19 RX ORDER — LOXAPINE SUCCINATE 25 MG/1
50 TABLET ORAL
Status: DISCONTINUED | OUTPATIENT
Start: 2021-02-19 | End: 2021-03-01

## 2021-02-19 RX ADMIN — DIVALPROEX SODIUM 500 MG: 500 TABLET, DELAYED RELEASE ORAL at 21:25

## 2021-02-19 RX ADMIN — LOXAPINE 25 MG: 25 CAPSULE ORAL at 15:54

## 2021-02-19 RX ADMIN — BENZTROPINE MESYLATE 0.5 MG: 0.5 TABLET ORAL at 21:25

## 2021-02-19 RX ADMIN — BENZTROPINE MESYLATE 0.5 MG: 0.5 TABLET ORAL at 15:54

## 2021-02-19 RX ADMIN — DIVALPROEX SODIUM 250 MG: 250 TABLET, DELAYED RELEASE ORAL at 08:18

## 2021-02-19 RX ADMIN — LOXAPINE 25 MG: 25 CAPSULE ORAL at 08:18

## 2021-02-19 RX ADMIN — MELATONIN TAB 3 MG 3 MG: 3 TAB at 21:25

## 2021-02-19 RX ADMIN — BENZTROPINE MESYLATE 0.5 MG: 0.5 TABLET ORAL at 08:18

## 2021-02-19 RX ADMIN — LOXAPINE 50 MG: 25 CAPSULE ORAL at 21:26

## 2021-02-19 NOTE — PLAN OF CARE
303 hearing held this morning [facility]  Pt was present  303 upheld for up to 20 days   303 as of 2/19, exp 3/11

## 2021-02-19 NOTE — PROGRESS NOTES
Progress Note - [address] 30 y o  male MRN: [id number]   Unit/Bed#: Monty Dubois 347-02 Encounter: [id number]    Behavior over the last 24 hours: vern Moreno remains bizarre, disorganized and labile  He is frequently observed laughing inappropriately, talking to himself as per staff report, also still has significantly delayed responses at times  Poor ADLS  Limited participation in milieu  Has been taking medications      Sleep: normal  Appetite: normal  Medication side effects: No   ROS: no complaints, denies any shortness of breath, chest pain or back pain, all other systems are negative    Mental Status Evaluation:    Appearance:  disheveled, marginal hygiene   Behavior:  bizarre, guarded, intense eye contact   Speech:  increased latency of response, disorganized   Mood:  labile, irritable   Affect:  labile   Thought Process:  disorganized, illogical   Associations: thought blocking   Thought Content:  persecutory delusions   Perceptual Disturbances: denies auditory or visual hallucinations when asked, but appears responding to internal stimuli   Risk Potential: Suicidal ideation - None  Homicidal ideation - None at present  Potential for aggression - Yes, due to poor impulse control   Sensorium:  oriented to person, place and time/date   Memory:  recent and remote memory grossly intact   Consciousness:  alert and awake   Attention/Concentration: poor concentration and poor attention span   Insight:  poor   Judgment: poor   Gait/Station: normal gait/station, normal balance   Motor Activity: no abnormal movements     Vital signs in last 24 hours:    Temp:  [97 6 °F (36 4 °C)-98 3 °F (36 8 °C)] 97 6 °F (36 4 °C)  HR:  [69-79] 69  Resp:  [16] 16  BP: (111-119)/(57-67) 119/57    Laboratory results: I have personally reviewed all pertinent laboratory/tests results    CBC:   Lab Results   Component Value Date    WBC 5 40 02/19/2021    RBC 4 30 (L) 02/19/2021    HGB 13 1 (L) 02/19/2021    HCT 40 0 (L) 02/19/2021    MCV 93 02/19/2021     02/19/2021    MCH 30 4 02/19/2021    MCHC 32 7 02/19/2021    RDW 14 8 02/19/2021    MPV 8 0 (L) 02/19/2021    NEUTROABS 2 60 02/19/2021       Suicide/Homicide Risk Assessment:    Risk of Harm to Self:   Nursing Suicide Risk Assessment Last 24 hours: Low Risk to Self: No evidence of suicidal thoughts or history; Moderate Risk to Self: Presence of hallucinations  ;    Current Specific Risk Factors include: mental illness diagnosis, current unstable mood, current psychotic symptoms, poor reasoning  Protective Factors: no current suicidal ideation, taking medications as ordered on the unit  Based on today's assessment, Farhana Clock presents the following risk of harm to self: moderate    Risk of Harm to Others:  Nursing Homicide Risk Assessment: Violence Risk to Others: Denies within past 6 months  Based on today's assessment, Farhana Clock presents the following risk of harm to others: low    The following interventions are recommended: behavioral checks every 7 minutes, continued hospitalization on locked unit    Progress Toward Goals: no improvement, still very disorganized, still irritable, insight remains poor, poor reality testing, still has psychotic symptoms    Assessment/Plan   Principal Problem:    Schizoaffective disorder, bipolar type ([facility] [address] )  Active Problems:    Cannabis abuse, continuous    Synthetic cannabinoid abuse ([facility] [address] )    Subclinical hypothyroidism      Recommended Treatment:     Planned medication and treatment changes: All current active medications have been reviewed  Encourage group therapy, milieu therapy and occupational therapy  Behavioral Health checks every 7 minutes  On 303 commitment up to 20 days - 303 hearing was held today  Increase Loxapine to 25 mg bid and 50 mg at bedtime to help with psychotic symptoms  Recheck Depakote level, CBC/diff and CMP in the morning   ANC is normal today at 2 60    Continue all other medications:    Current Facility-Administered Medications   Medication Dose Route Frequency Provider Last Rate    acetaminophen  650 mg Oral Q6H PRN Leona Borreggine, DO      acetaminophen  650 mg Oral Q4H PRN Leona Borreggine, DO      acetaminophen  975 mg Oral Q6H PRN Elona Borreggine, DO      haloperidol lactate  2 5 mg Intramuscular Q6H PRN Max 4/day Leona Borreggine, DO      And    LORazepam  1 mg Intramuscular Q6H PRN Max 4/day Leona Borreggine, DO      And    benztropine  0 5 mg Intramuscular Q6H PRN Max 4/day Leona Borreggine, DO      haloperidol lactate  5 mg Intramuscular Q4H PRN Max 4/day Stella Cherokee, DO      And    LORazepam  2 mg Intramuscular Q4H PRN Max 4/day Stella Cherokee, DO      And    benztropine  1 mg Intramuscular Q4H PRN Max 4/day Stella Cherokee, DO      benztropine  0 5 mg Oral TID Tushar Saeed MD      hydrOXYzine HCL  50 mg Oral Q6H PRN Max 4/day Stella Cherokee, DO      Or    diphenhydrAMINE  50 mg Intramuscular Q6H PRN Stella Cherokee, DO      divalproex sodium  250 mg Oral Daily Tushar Saeed MD      divalproex sodium  500 mg Oral HS Tushar Saeed MD      haloperidol  2 mg Oral Q4H PRN Max 6/day Leona Borreggine, DO      haloperidol  5 mg Oral Q6H PRN Max 4/day Leona Borreggine, DO      haloperidol  5 mg Oral Q4H PRN Max 4/day Leona Borreggine, DO      hydrOXYzine HCL  100 mg Oral Q6H PRN Max 4/day Stella Cherokee, DO      Or    LORazepam  2 mg Intramuscular Q6H PRN Stella Cherokee, DO      hydrOXYzine HCL  25 mg Oral Q6H PRN Max 4/day Leona Borreggine, DO      hydrOXYzine HCL  50 mg Oral Q4H PRN Max 4/day Leona Borreggine, DO      Or    LORazepam  1 mg Intramuscular Q4H PRN Leona Borreggine, DO      LORazepam  1 mg Oral Q4H PRN Max 6/day Leona Borreggine, DO      Or    LORazepam  2 mg Intramuscular Q6H PRN Max 3/day Leona Borreggine, DO      loxapine  25 mg Oral BID Verloneetu Saeed MD      loxapine  50 mg Oral HS Verlon Christophe, MD      melatonin  3 mg Oral HS Leona Ocampo, DO      nicotine  1 patch Transdermal Daily Ria Bean, DO      nicotine polacrilex  4 mg Oral Q2H PRN Ria Bean,       senna-docusate sodium  1 tablet Oral Daily PRN Katherine Ocampo, DO      traZODone  50 mg Oral HS PRN Ria Bean, DO       Risks / Benefits of Treatment:    Risks, benefits, and possible side effects of medications explained to patient  Patient has limited understanding of risks and benefits of treatment at this time, but agrees to take medications as prescribed  Counseling / Coordination of Care: Total floor / unit time spent today 35 minutes  Greater than 50% of total time was spent with the patient and / or family counseling and / or coordination of care  A description of counseling / coordination of care:    Patient's progress discussed with staff in treatment team meeting  Medications, treatment progress and treatment plan reviewed with patient  Medication changes discussed with patient  Importance of medication and treatment compliance reviewed with patient  Reoriented to reality and reassured  Encouraged participation in milieu and group therapy on the unit  I attended and testified at the [address] [place] [address] today, with patient present at the [facility]  The patient was committed for further inpatient treatment at the [facility]      Betty Frank MD 02/19/21

## 2021-02-19 NOTE — QUICK NOTE
Progress Note - Behavioral Health   Krystal Delgadillo 30 y o  male MRN: [id number]  Unit/Bed#: Kalie [facility] 233-42 Encounter: [id number]      Subjective  Krystal Delgadillo is still visibly distracted  He is seen acting with bizarre behavior, pacing the hallway, and laughing to himself  He was calm and mostly cooperative with questioning  He responds slowly and with significant delay  He claims he has good sleep, appetite, and energy  He denies SI/HI/AH/VH  He denies any other complaints  He is compliant with his medications  Behavior over the last 24 hours: unchanged  Sleep: normal  Appetite: adequate  Medication side effects: none verbalized  ROS: Complete review of systems is negative except as noted above  Mental Status Exam:  Appearance:  alert, poor eye contact, disheveled and poor dentition   Behavior:  calm, limited cooperativity, guarded and evasive   Motor: no abnormal movements and normal gait and balance   Speech:  delayed initiation, slow, scant and poverty of content   Mood:  labile   Affect:  labile   Thought Process:  disorganized, illogical, poverty of thought   Thought Content: paranoid ideation   Perceptual disturbances: denies but appears to be responding to internal stimuli   Risk Potential: No active or passive suicidal or homicidal ideation was verbalized during interview, Potential for aggression due to poor impulse control and history of aggression   Cognition: oriented to self and situation, appears to be below average intelligence and attention span appeared shorter than expected for age   Insight:  Poor   Judgment: Poor     Risks, benefits and possible side effects of Medications:   Risks, benefits, and possible side effects of medications have been explained to the patient, who verbalizes understanding  Labs: I have personally reviewed all pertinent laboratory results         Assessment/Plan    Diagnosis:   Principal Problem:    [facility]  Active Problems: Cannabis abuse, continuous    Synthetic cannabinoid abuse ([facility] [address] )    Subclinical hypothyroidism      Recommended Treatment:   - encourage group therapy, milieu therapy, and occupational therapy  - behavioral health checks every 7 minutes  - on 303 commitment up to 20 days - 303 hearing was held today  - increase loxapine to 25 mg bid and 50 mg QHS for psychotic symptoms  - ANC improved to normal today at 2 60  - recheck Depakote level, CBC and CMP tomorrow AM       This note was not shared with the patient due to this is a psychotherapy note

## 2021-02-19 NOTE — PROGRESS NOTES
02/19/21 0845   Team Meeting   Meeting Type Daily Rounds   Team Members Present   Team Members Present Physician;Nurse;   Physician Team Member [address] Team Member [facility] Team Member [place]   Patient/Family Present   Patient Present No   Patient's Family Present No   Pt remains bizarre and internally preoccupied  Pt with an agitated outburst yesterday  Med/meal compliant

## 2021-02-19 NOTE — NURSING NOTE
Pt visible in milieu throughout morning  Very disheveled in appearance  Appears internally preoccupied and is delayed in response; however, denies AH/VH when asked  Appears suspicious of staff  Very scant and guarded  Denies all symptoms when asked  Ate breakfast and was compliant with medications as scheduled  No behavioral issues, irritability, or agitation observed

## 2021-02-19 NOTE — NURSING NOTE
Pt frequently observed lingering near RN station, staring at peers in milieu, appearing paranoid and internally preoccupied  Delayed in response and observed RIS at times  Remains medication compliant  Made paranoid statements to female peer who then became agitated with him  Pt was redirectable at that time

## 2021-02-20 LAB
ALBUMIN SERPL BCP-MCNC: 4.2 G/DL (ref 3–5.2)
ALP SERPL-CCNC: 77 U/L (ref 43–122)
ALT SERPL W P-5'-P-CCNC: 33 U/L (ref 9–52)
ANION GAP SERPL CALCULATED.3IONS-SCNC: 3 MMOL/L (ref 5–14)
AST SERPL W P-5'-P-CCNC: 37 U/L (ref 17–59)
BASOPHILS # BLD AUTO: 0 THOUSANDS/ΜL (ref 0–0.1)
BASOPHILS NFR BLD AUTO: 1 % (ref 0–1)
BILIRUB SERPL-MCNC: 0.5 MG/DL
BUN SERPL-MCNC: 15 MG/DL (ref 5–25)
CALCIUM SERPL-MCNC: 9.6 MG/DL (ref 8.4–10.2)
CHLORIDE SERPL-SCNC: 98 MMOL/L (ref 97–108)
CO2 SERPL-SCNC: 34 MMOL/L (ref 22–30)
CREAT SERPL-MCNC: 0.68 MG/DL (ref 0.7–1.5)
EOSINOPHIL # BLD AUTO: 0.2 THOUSAND/ΜL (ref 0–0.4)
EOSINOPHIL NFR BLD AUTO: 4 % (ref 0–6)
ERYTHROCYTE [DISTWIDTH] IN BLOOD BY AUTOMATED COUNT: 15 %
GFR SERPL CREATININE-BSD FRML MDRD: 144 ML/MIN/1.73SQ M
GLUCOSE P FAST SERPL-MCNC: 85 MG/DL (ref 70–99)
GLUCOSE SERPL-MCNC: 85 MG/DL (ref 70–99)
HCT VFR BLD AUTO: 40.3 % (ref 41–53)
HGB BLD-MCNC: 13 G/DL (ref 13.5–17.5)
LYMPHOCYTES # BLD AUTO: 1.9 THOUSANDS/ΜL (ref 0.5–4)
LYMPHOCYTES NFR BLD AUTO: 38 % (ref 25–45)
MCH RBC QN AUTO: 30.2 PG (ref 26–34)
MCHC RBC AUTO-ENTMCNC: 32.3 G/DL (ref 31–36)
MCV RBC AUTO: 94 FL (ref 80–100)
MONOCYTES # BLD AUTO: 0.5 THOUSAND/ΜL (ref 0.2–0.9)
MONOCYTES NFR BLD AUTO: 10 % (ref 1–10)
NEUTROPHILS # BLD AUTO: 2.4 THOUSANDS/ΜL (ref 1.8–7.8)
NEUTS SEG NFR BLD AUTO: 47 % (ref 45–65)
PLATELET # BLD AUTO: 273 THOUSANDS/UL (ref 150–450)
PMV BLD AUTO: 8.4 FL (ref 8.9–12.7)
POTASSIUM SERPL-SCNC: 3.9 MMOL/L (ref 3.6–5)
PROT SERPL-MCNC: 6.8 G/DL (ref 5.9–8.4)
RBC # BLD AUTO: 4.31 MILLION/UL (ref 4.5–5.9)
SODIUM SERPL-SCNC: 135 MMOL/L (ref 137–147)
VALPROATE SERPL-MCNC: 79.4 UG/ML (ref 50–120)
WBC # BLD AUTO: 5.2 THOUSAND/UL (ref 4.5–11)

## 2021-02-20 PROCEDURE — 80053 COMPREHEN METABOLIC PANEL: CPT | Performed by: PSYCHIATRY & NEUROLOGY

## 2021-02-20 PROCEDURE — 85025 COMPLETE CBC W/AUTO DIFF WBC: CPT | Performed by: PSYCHIATRY & NEUROLOGY

## 2021-02-20 PROCEDURE — 80164 ASSAY DIPROPYLACETIC ACD TOT: CPT | Performed by: PSYCHIATRY & NEUROLOGY

## 2021-02-20 PROCEDURE — 99232 SBSQ HOSP IP/OBS MODERATE 35: CPT | Performed by: PSYCHIATRY & NEUROLOGY

## 2021-02-20 RX ADMIN — DIVALPROEX SODIUM 500 MG: 500 TABLET, DELAYED RELEASE ORAL at 21:28

## 2021-02-20 RX ADMIN — DIVALPROEX SODIUM 250 MG: 250 TABLET, DELAYED RELEASE ORAL at 08:08

## 2021-02-20 RX ADMIN — LOXAPINE 25 MG: 25 CAPSULE ORAL at 08:08

## 2021-02-20 RX ADMIN — BENZTROPINE MESYLATE 0.5 MG: 0.5 TABLET ORAL at 21:28

## 2021-02-20 RX ADMIN — MELATONIN TAB 3 MG 3 MG: 3 TAB at 21:28

## 2021-02-20 RX ADMIN — NICOTINE 1 PATCH: 14 PATCH, EXTENDED RELEASE TRANSDERMAL at 08:08

## 2021-02-20 RX ADMIN — BENZTROPINE MESYLATE 0.5 MG: 0.5 TABLET ORAL at 08:08

## 2021-02-20 RX ADMIN — TRAZODONE HYDROCHLORIDE 50 MG: 50 TABLET ORAL at 21:28

## 2021-02-20 RX ADMIN — LOXAPINE 50 MG: 25 CAPSULE ORAL at 21:28

## 2021-02-20 RX ADMIN — LOXAPINE 25 MG: 25 CAPSULE ORAL at 15:33

## 2021-02-20 RX ADMIN — BENZTROPINE MESYLATE 0.5 MG: 0.5 TABLET ORAL at 15:33

## 2021-02-20 NOTE — NURSING NOTE
Pt continues to linger at RN station and in milieu, staring at staff/peers  Inappropriately laughs  Appears paranoid  Pt remains delayed in response with evident internal preoccupation, but denies perceptual disturbances when asked  Compliant with scheduled medications with no reported side effects  Reports sleeping well which is contradictory to previous shift's report  No SI/HI

## 2021-02-20 NOTE — PROGRESS NOTES
Progress Note - Maciej 30 y o  male MRN: [id number]  Unit/Bed#: Robby Ramsey 973-95 Encounter: [id number]  PT was seen for continuation of care  I reviewed records and discussed with staff   " This note has been constructed using a voice recognition system  There may be translation, syntax or grammatical errors  If you have any questions please contact dictating provider, Cinthya Gunter MD"    when I met with patient he seemed to be a bit vigilant almost suspicious that I wanted to meet with him    He stated he is doing okay he is eating sleeping and does not have any suicidal thoughts and plans he was somewhat superficially and guarded, but cooperated    Behavior over the last 24 hours:  unchanged  Sleep: normal  Appetite: normal  Medication side effects: No  ROS: no complaints    Medications:   Current Facility-Administered Medications   Medication Dose Route Frequency Provider Last Rate Last Admin    acetaminophen (TYLENOL) tablet 650 mg  650 mg Oral Q6H PRN Leona Borreggine, DO        acetaminophen (TYLENOL) tablet 650 mg  650 mg Oral Q4H PRN Leona Borreggine, DO        acetaminophen (TYLENOL) tablet 975 mg  975 mg Oral Q6H PRN Leona Borreggine, DO        haloperidol lactate (HALDOL) injection 2 5 mg  2 5 mg Intramuscular Q6H PRN Max 4/day Leona Borreggine, DO        And    LORazepam (ATIVAN) injection 1 mg  1 mg Intramuscular Q6H PRN Max 4/day Leona Borreggine, DO        And    benztropine (COGENTIN) injection 0 5 mg  0 5 mg Intramuscular Q6H PRN Max 4/day Leona Borreggine, DO        haloperidol lactate (HALDOL) injection 5 mg  5 mg Intramuscular Q4H PRN Max 4/day Francis Sauce, DO        And    LORazepam (ATIVAN) injection 2 mg  2 mg Intramuscular Q4H PRN Max 4/day Francis Sauce, DO        And    benztropine (COGENTIN) injection 1 mg  1 mg Intramuscular Q4H PRN Max 4/day Francis Sauce, DO        benztropine (COGENTIN) tablet 0 5 mg  0 5 mg Oral TID [place] Marisa Cadet MD   0 5 mg at 02/19/21 1554    hydrOXYzine HCL (ATARAX) tablet 50 mg  50 mg Oral Q6H PRN Max 4/day Vamsi Dey DO        Or    diphenhydrAMINE (BENADRYL) injection 50 mg  50 mg Intramuscular Q6H PRN Vamsi Royal, DO        divalproex sodium (DEPAKOTE) EC tablet 250 mg  250 mg Oral Daily Donna Sutherland MD   250 mg at 02/19/21 0818    divalproex sodium (DEPAKOTE) EC tablet 500 mg  500 mg Oral HS Donna Sutherland MD   500 mg at 02/18/21 2135    haloperidol (HALDOL) tablet 2 mg  2 mg Oral Q4H PRN Max 6/day Leona Gauthiere,         haloperidol (HALDOL) tablet 5 mg  5 mg Oral Q6H PRN Max 4/day Leona Marinogine, DO   5 mg at 02/18/21 1809    haloperidol (HALDOL) tablet 5 mg  5 mg Oral Q4H PRN Max 4/day Leona Ocampo,         hydrOXYzine HCL (ATARAX) tablet 100 mg  100 mg Oral Q6H PRN Max 4/day Vamsi Dey,         Or    LORazepam (ATIVAN) injection 2 mg  2 mg Intramuscular Q6H PRN Vamsi Dey DO        hydrOXYzine HCL (ATARAX) tablet 25 mg  25 mg Oral Q6H PRN Max 4/day Leona Gauthiere, DO        hydrOXYzine HCL (ATARAX) tablet 50 mg  50 mg Oral Q4H PRN Max 4/day Leona Ocampo DO        Or    LORazepam (ATIVAN) injection 1 mg  1 mg Intramuscular Q4H PRN Leona Gauthiere, DO        LORazepam (ATIVAN) tablet 1 mg  1 mg Oral Q4H PRN Max 6/day Leona Marinogine, DO   1 mg at 02/18/21 9417    Or    LORazepam (ATIVAN) injection 2 mg  2 mg Intramuscular Q6H PRN Max 3/day Leona Gauthiere, DO        loxapine (LOXITANE) capsule 25 mg  25 mg Oral BID Donna Sutherland MD   25 mg at 02/19/21 1554    loxapine (LOXITANE) capsule 50 mg  50 mg Oral HS Donna Sutherland MD        melatonin tablet 3 mg  3 mg Oral HS Leona Ocampo DO   3 mg at 02/18/21 2136    nicotine (NICODERM CQ) 14 mg/24hr TD 24 hr patch 1 patch  1 patch Transdermal Daily Vamsi Dey DO   Stopped at 02/19/21 0818    nicotine polacrilex (NICORETTE) gum 4 mg  4 mg Oral Q2H PRN Alissa Villalba DO        senna-docusate sodium (SENOKOT S) 8 6-50 mg per tablet 1 tablet  1 tablet Oral Daily PRN Prabhakar Ocampo DO        traZODone (DESYREL) tablet 50 mg  50 mg Oral HS PRN Alissa Villalba DO         Medications Prior to Admission   Medication    buPROPion (WELLBUTRIN) 75 mg tablet    divalproex sodium (DEPAKOTE) 500 mg EC tablet    benztropine (COGENTIN) 1 mg tablet    divalproex sodium (DEPAKOTE) 250 mg EC tablet    loxapine (LOXITANE) 25 mg capsule       Labs:   Admission on 02/16/2021   Component Date Value    Sodium 02/16/2021 137     Potassium 02/16/2021 4 5     Chloride 02/16/2021 104     CO2 02/16/2021 27     ANION GAP 02/16/2021 6     BUN 02/16/2021 21     Creatinine 02/16/2021 0 88     Glucose 02/16/2021 155*    Calcium 02/16/2021 9 7     AST 02/16/2021 43     ALT 02/16/2021 30     Alkaline Phosphatase 02/16/2021 72     Total Protein 02/16/2021 6 6     Albumin 02/16/2021 3 9     Total Bilirubin 02/16/2021 0 50     eGFR 02/16/2021 130     Cholesterol 02/16/2021 153     Triglycerides 02/16/2021 93     HDL, Direct 02/16/2021 59     LDL Calculated 02/16/2021 75     Non-HDL-Chol (CHOL-HDL) 02/16/2021 94     TSH 3RD GENERATON 02/16/2021 4 710*    T3, Free 02/16/2021 3 68     Free T4 02/16/2021 1 10     WBC 02/18/2021 5 80     RBC 02/18/2021 4 67     Hemoglobin 02/18/2021 14 3     Hematocrit 02/18/2021 43 4     MCV 02/18/2021 93     MCH 02/18/2021 30 6     MCHC 02/18/2021 33 0     RDW 02/18/2021 14 7     MPV 02/18/2021 8 5*    Platelets 37/57/0770 304     Hemoglobin A1C 02/18/2021 5 7*    EAG 02/18/2021 117     RPR 02/18/2021 Non-Reactive     Segmented % 02/18/2021 27*    Bands % 02/18/2021 1     Lymphocytes % 02/18/2021 55*    Monocytes % 02/18/2021 11*    Eosinophils, % 02/18/2021 4     Basophils % 02/18/2021 2*    Neutrophils Absolute 02/18/2021 1 62*    Lymphocytes Absolute 02/18/2021 3 19     Monocytes Absolute 02/18/2021 0 64  Eosinophils Absolute 02/18/2021 0 23     Basophils Absolute 02/18/2021 0 12*    Total Counted 02/18/2021 100     RBC Morphology 02/18/2021 Normal     Platelet Estimate 16/27/4376 Adequate     WBC 02/19/2021 5 40     RBC 02/19/2021 4 30*    Hemoglobin 02/19/2021 13 1*    Hematocrit 02/19/2021 40 0*    MCV 02/19/2021 93     MCH 02/19/2021 30 4     MCHC 02/19/2021 32 7     RDW 02/19/2021 14 8     MPV 02/19/2021 8 0*    Platelets 03/85/7875 285     Neutrophils Relative 02/19/2021 49     Lymphocytes Relative 02/19/2021 34     Monocytes Relative 02/19/2021 11*    Eosinophils Relative 02/19/2021 5     Basophils Relative 02/19/2021 1     Neutrophils Absolute 02/19/2021 2 60     Lymphocytes Absolute 02/19/2021 1 80     Monocytes Absolute 02/19/2021 0 60     Eosinophils Absolute 02/19/2021 0 30     Basophils Absolute 02/19/2021 0 00     Ventricular Rate 02/18/2021 62     Atrial Rate 02/18/2021 62     NH Interval 02/18/2021 120     QRSD Interval 02/18/2021 82     QT Interval 02/18/2021 406     QTC Interval 02/18/2021 412     P Axis 02/18/2021 18     QRS Axis 02/18/2021 80     T Wave Axis 02/18/2021 64        Mental Status Evaluation:  Appearance:  age appropriate, disheveled and poor hygiene   Behavior:  minimally cooperative   Speech:  normal pitch and normal volume   Mood:  less labile   Affect:  mood-congruent   Associations: perseverative   Thought Process:  illogical   Thought Content:  No overt delusions   Perceptual Disturbances: Denied auditory and visual hallucinations but at times he does appear like he has read responding to internal stimuli   Risk Potential: Denies suicidal homicidal thoughts or plans   Sensorium:  person and place   Memory recent and remote memory grossly intact   Consciousness:  alert    Attention: attention span appeared shorter than expected for age   Insight:  limited   Judgment: limited   Gait/Station: normal gait/station   Motor Activity: no abnormal movements     Progress Toward Goals:  Oumou De La Rosa tends to be close to the nurse's station he does interact with a few peers and is staying in his medications  Slow improvement    Assessment/Plan   Principal Problem:    Schizoaffective disorder, bipolar type ([facility])  Active Problems:    Cannabis abuse, continuous    Synthetic cannabinoid abuse ([facility] [address] )    Subclinical hypothyroidism   medications:    Cogentin 0 5 mg 3 times a day  Depakote  mg on 02/17 two  On February 17, 2021 increased to 500 mg at bedtime   Loxitane 25 mg twice a day and Loxitane 50 mg at bedtime    Recommended Treatment: Continue with group therapy, milieu therapy and occupational therapy  Risks, benefits and possible side effects of Medications:   Risks, benefits, and possible side effects of medications explained to patient and patient verbalizes understanding  Counseling / Coordination of Care  Total floor / unit time spent today 15 minutes  Greater than 50% of total time was spent with the patient and / or family counseling and / or coordination of care   A description of the counseling / coordination of care:  Medication management supportive psychotherapy

## 2021-02-21 PROCEDURE — 99232 SBSQ HOSP IP/OBS MODERATE 35: CPT | Performed by: PSYCHIATRY & NEUROLOGY

## 2021-02-21 RX ADMIN — LOXAPINE 25 MG: 25 CAPSULE ORAL at 16:20

## 2021-02-21 RX ADMIN — LOXAPINE 25 MG: 25 CAPSULE ORAL at 08:23

## 2021-02-21 RX ADMIN — LOXAPINE 50 MG: 25 CAPSULE ORAL at 21:45

## 2021-02-21 RX ADMIN — BENZTROPINE MESYLATE 0.5 MG: 0.5 TABLET ORAL at 16:20

## 2021-02-21 RX ADMIN — DIVALPROEX SODIUM 500 MG: 500 TABLET, DELAYED RELEASE ORAL at 21:46

## 2021-02-21 RX ADMIN — BENZTROPINE MESYLATE 0.5 MG: 0.5 TABLET ORAL at 21:46

## 2021-02-21 RX ADMIN — BENZTROPINE MESYLATE 0.5 MG: 0.5 TABLET ORAL at 08:23

## 2021-02-21 RX ADMIN — MELATONIN TAB 3 MG 3 MG: 3 TAB at 21:45

## 2021-02-21 RX ADMIN — DIVALPROEX SODIUM 250 MG: 250 TABLET, DELAYED RELEASE ORAL at 08:23

## 2021-02-21 RX ADMIN — TRAZODONE HYDROCHLORIDE 50 MG: 50 TABLET ORAL at 21:46

## 2021-02-21 NOTE — NURSING NOTE
Pt continues to linger in the periphery of milieu, staring intensely at staff and peers  Appears quite paranoid  Continues to deny perceptual disturbances but there is evident internal preoccupation  Pt asks writer to repeat self several times as he is unable to focus on questions asked  No SI/HI  Remains compliant with medications with no reported side effects  Poor hygiene  Offers no concerns at this time

## 2021-02-21 NOTE — PROGRESS NOTES
02/20/21 1400   Activity/Group Checklist   Group Other (Comment)  (OPEN STUDIO/Art Therapy, Social Interaction-Free Expression)   Attendance Attended   Attendance Duration (min) 46-60   Interactions Did not interact   Affect/Mood Blunted/flat   Goals Achieved Able to listen to others

## 2021-02-21 NOTE — PROGRESS NOTES
Progress Note - Maciej 30 y o  male MRN: [id number]  Unit/Bed#: [facility] [place] 244-52 Encounter: [id number]  PT was seen for continuation of care  I reviewed records and discussed with staff  When I spoke with PT his roommate was having a hard time and PT seemed frightened  He did not want to talk and stated he was fine  PT does appear to be responding to internal stimuli      Behavior over the last 24 hours:  unchanged  Sleep: at times stays up late wandering in the unit around nurses station  Appetite: normal  Medication side effects: No  ROS: no complaints    Medications:   Current Facility-Administered Medications   Medication Dose Route Frequency Provider Last Rate Last Admin    acetaminophen (TYLENOL) tablet 650 mg  650 mg Oral Q6H PRN Leona Borreggine, DO        acetaminophen (TYLENOL) tablet 650 mg  650 mg Oral Q4H PRN Leona Borreggine, DO        acetaminophen (TYLENOL) tablet 975 mg  975 mg Oral Q6H PRN Leona Borreggine, DO        haloperidol lactate (HALDOL) injection 2 5 mg  2 5 mg Intramuscular Q6H PRN Max 4/day Leona Borreggine, DO        And    LORazepam (ATIVAN) injection 1 mg  1 mg Intramuscular Q6H PRN Max 4/day Leona Borreggine, DO        And    benztropine (COGENTIN) injection 0 5 mg  0 5 mg Intramuscular Q6H PRN Max 4/day Leona Borreggine, DO        haloperidol lactate (HALDOL) injection 5 mg  5 mg Intramuscular Q4H PRN Max 4/day Lugene Reels, DO        And    LORazepam (ATIVAN) injection 2 mg  2 mg Intramuscular Q4H PRN Max 4/day Lugene Reels, DO        And    benztropine (COGENTIN) injection 1 mg  1 mg Intramuscular Q4H PRN Max 4/day Lugene Reels, DO        benztropine (COGENTIN) tablet 0 5 mg  0 5 mg Oral TID Arie Cunningham MD   0 5 mg at 02/21/21 0823    hydrOXYzine HCL (ATARAX) tablet 50 mg  50 mg Oral Q6H PRN Max 4/day Lugene Reels, DO        Or    diphenhydrAMINE (BENADRYL) injection 50 mg  50 mg Intramuscular Q6H PRN Matthew Ravi Flakita Navas DO        divalproex sodium (DEPAKOTE) EC tablet 250 mg  250 mg Oral Daily Avery Sr MD   250 mg at 02/21/21 4243    divalproex sodium (DEPAKOTE) EC tablet 500 mg  500 mg Oral HS Avery Sr MD   500 mg at 02/20/21 2128    haloperidol (HALDOL) tablet 2 mg  2 mg Oral Q4H PRN Max 6/day Leona Marinogine, DO        haloperidol (HALDOL) tablet 5 mg  5 mg Oral Q6H PRN Max 4/day Leona Borreggine, DO   5 mg at 02/18/21 1809    haloperidol (HALDOL) tablet 5 mg  5 mg Oral Q4H PRN Max 4/day Leona Gauthiere, DO        hydrOXYzine HCL (ATARAX) tablet 100 mg  100 mg Oral Q6H PRN Max 4/day Tony Thomson DO        Or    LORazepam (ATIVAN) injection 2 mg  2 mg Intramuscular Q6H PRN Tony Thomson DO        hydrOXYzine HCL (ATARAX) tablet 25 mg  25 mg Oral Q6H PRN Max 4/day Leona Rodriguezreggine, DO        hydrOXYzine HCL (ATARAX) tablet 50 mg  50 mg Oral Q4H PRN Max 4/day Leona Gauthiere, DO        Or    LORazepam (ATIVAN) injection 1 mg  1 mg Intramuscular Q4H PRN Leona Marinogine, DO        LORazepam (ATIVAN) tablet 1 mg  1 mg Oral Q4H PRN Max 6/day Leona Marinogine, DO   1 mg at 02/18/21 9950    Or    LORazepam (ATIVAN) injection 2 mg  2 mg Intramuscular Q6H PRN Max 3/day Leona Marinogine, DO        loxapine (LOXITANE) capsule 25 mg  25 mg Oral BID Avery Sr MD   25 mg at 02/21/21 0823    loxapine (LOXITANE) capsule 50 mg  50 mg Oral HS Avery Sr MD   50 mg at 02/20/21 2128    melatonin tablet 3 mg  3 mg Oral HS Leona Marinogine, DO   3 mg at 02/20/21 2128    nicotine (NICODERM CQ) 14 mg/24hr TD 24 hr patch 1 patch  1 patch Transdermal Daily Jimenez Laud, DO   Stopped at 02/21/21 6448    nicotine polacrilex (NICORETTE) gum 4 mg  4 mg Oral Q2H PRN Tony Thomson DO        senna-docusate sodium (SENOKOT S) 8 6-50 mg per tablet 1 tablet  1 tablet Oral Daily PRN Leona Ocampo DO        traZODone (DESYREL) tablet 50 mg  50 mg Oral HS PRN Rosaandreas VillalbaDO   50 mg at 02/20/21 2128     Medications Prior to Admission   Medication    buPROPion (WELLBUTRIN) 75 mg tablet    divalproex sodium (DEPAKOTE) 500 mg EC tablet    benztropine (COGENTIN) 1 mg tablet    divalproex sodium (DEPAKOTE) 250 mg EC tablet    loxapine (LOXITANE) 25 mg capsule       Labs:   Admission on 02/16/2021   Component Date Value    Sodium 02/16/2021 137     Potassium 02/16/2021 4 5     Chloride 02/16/2021 104     CO2 02/16/2021 27     ANION GAP 02/16/2021 6     BUN 02/16/2021 21     Creatinine 02/16/2021 0 88     Glucose 02/16/2021 155*    Calcium 02/16/2021 9 7     AST 02/16/2021 43     ALT 02/16/2021 30     Alkaline Phosphatase 02/16/2021 72     Total Protein 02/16/2021 6 6     Albumin 02/16/2021 3 9     Total Bilirubin 02/16/2021 0 50     eGFR 02/16/2021 130     Cholesterol 02/16/2021 153     Triglycerides 02/16/2021 93     HDL, Direct 02/16/2021 59     LDL Calculated 02/16/2021 75     Non-HDL-Chol (CHOL-HDL) 02/16/2021 94     TSH 3RD GENERATON 02/16/2021 4 710*    T3, Free 02/16/2021 3 68     Free T4 02/16/2021 1 10     WBC 02/18/2021 5 80     RBC 02/18/2021 4 67     Hemoglobin 02/18/2021 14 3     Hematocrit 02/18/2021 43 4     MCV 02/18/2021 93     MCH 02/18/2021 30 6     MCHC 02/18/2021 33 0     RDW 02/18/2021 14 7     MPV 02/18/2021 8 5*    Platelets 33/39/9601 304     Hemoglobin A1C 02/18/2021 5 7*    EAG 02/18/2021 117     RPR 02/18/2021 Non-Reactive     Segmented % 02/18/2021 27*    Bands % 02/18/2021 1     Lymphocytes % 02/18/2021 55*    Monocytes % 02/18/2021 11*    Eosinophils, % 02/18/2021 4     Basophils % 02/18/2021 2*    Neutrophils Absolute 02/18/2021 1 62*    Lymphocytes Absolute 02/18/2021 3 19     Monocytes Absolute 02/18/2021 0 64     Eosinophils Absolute 02/18/2021 0 23     Basophils Absolute 02/18/2021 0 12*    Total Counted 02/18/2021 100     RBC Morphology 02/18/2021 Normal     Platelet Estimate 02/18/2021 Adequate     WBC 02/19/2021 5 40     RBC 02/19/2021 4 30*    Hemoglobin 02/19/2021 13 1*    Hematocrit 02/19/2021 40 0*    MCV 02/19/2021 93     MCH 02/19/2021 30 4     MCHC 02/19/2021 32 7     RDW 02/19/2021 14 8     MPV 02/19/2021 8 0*    Platelets 66/14/0579 285     Neutrophils Relative 02/19/2021 49     Lymphocytes Relative 02/19/2021 34     Monocytes Relative 02/19/2021 11*    Eosinophils Relative 02/19/2021 5     Basophils Relative 02/19/2021 1     Neutrophils Absolute 02/19/2021 2 60     Lymphocytes Absolute 02/19/2021 1 80     Monocytes Absolute 02/19/2021 0 60     Eosinophils Absolute 02/19/2021 0 30     Basophils Absolute 02/19/2021 0 00     Ventricular Rate 02/18/2021 62     Atrial Rate 02/18/2021 62     ID Interval 02/18/2021 120     QRSD Interval 02/18/2021 82     QT Interval 02/18/2021 406     QTC Interval 02/18/2021 412     P Axis 02/18/2021 18     QRS Axis 02/18/2021 80     T Wave Axis 02/18/2021 64     Sodium 02/20/2021 135*    Potassium 02/20/2021 3 9     Chloride 02/20/2021 98     CO2 02/20/2021 34*    ANION GAP 02/20/2021 3*    BUN 02/20/2021 15     Creatinine 02/20/2021 0 68*    Glucose 02/20/2021 85     Glucose, Fasting 02/20/2021 85     Calcium 02/20/2021 9 6     AST 02/20/2021 37     ALT 02/20/2021 33     Alkaline Phosphatase 02/20/2021 77     Total Protein 02/20/2021 6 8     Albumin 02/20/2021 4 2     Total Bilirubin 02/20/2021 0 50     eGFR 02/20/2021 144     Valproic Acid, Total 02/20/2021 79 4     WBC 02/20/2021 5 20     RBC 02/20/2021 4 31*    Hemoglobin 02/20/2021 13 0*    Hematocrit 02/20/2021 40 3*    MCV 02/20/2021 94     MCH 02/20/2021 30 2     MCHC 02/20/2021 32 3     RDW 02/20/2021 15 0     MPV 02/20/2021 8 4*    Platelets 36/61/4262 273     Neutrophils Relative 02/20/2021 47     Lymphocytes Relative 02/20/2021 38     Monocytes Relative 02/20/2021 10     Eosinophils Relative 02/20/2021 4     Basophils Relative 02/20/2021 1     Neutrophils Absolute 02/20/2021 2 40     Lymphocytes Absolute 02/20/2021 1 90     Monocytes Absolute 02/20/2021 0 50     Eosinophils Absolute 02/20/2021 0 20     Basophils Absolute 02/20/2021 0 00        Mental Status Evaluation:  Appearance:  disheveled and unkempt   Behavior:  evasive and guarded   Speech:  soft   Mood:  constricted and labile   Affect:  mood-congruent   Associations: unable to assess - does not answer   Thought Process:  Disorganized, illogical   Thought Content:  delusions  persecutory   Perceptual Disturbances: Appears to be responding to internal stimuli   Risk Potential: No evidence of self-injurious behaviors   Sensorium:  person   Memory patient does not answer   Consciousness:  alert and awake    Attention: attention span appeared shorter than expected for age   Insight:  poor   Judgment: poor   Gait/Station: normal gait/station   Motor Activity: no abnormal movements     Progress Toward Goals:  Patient has been staring towards the milieu not engaging with anyone in particular he seems anxious and almost frightened at times has been compliant with medication  Poor hygiene  Slow progress    Assessment/Plan   Principal Problem:    Schizoaffective disorder, bipolar type ([facility])  Active Problems:    Cannabis abuse, continuous    Synthetic cannabinoid abuse ([facility] [address] )    Subclinical hypothyroidism      Recommended Treatment: Continue with group therapy, milieu therapy and occupational therapy  Risks, benefits and possible side effects of Medications:   Patient does not verbalize understanding at this time and will require further explanation  Counseling / Coordination of Care  Total floor / unit time spent today 15 minutes  Greater than 50% of total time was spent with the patient and / or family counseling and / or coordination of care   A description of the counseling / coordination of care:  Medication management supportive staff  And patient as needed

## 2021-02-21 NOTE — NURSING NOTE
Patient wandering around the nurses station and watching staff with a close eye all evening  Comes right back after being redirected  Patient appears to be responding to internal stimuli  Patient not seen interacting with anyone  Isolative to self but visible  Patient is medication compliant but appears restless and received Trazadone 50 mg po prn given at this time

## 2021-02-22 PROCEDURE — 99232 SBSQ HOSP IP/OBS MODERATE 35: CPT | Performed by: PSYCHIATRY & NEUROLOGY

## 2021-02-22 RX ORDER — LOXAPINE SUCCINATE 25 MG/1
50 TABLET ORAL 2 TIMES DAILY
Status: DISCONTINUED | OUTPATIENT
Start: 2021-02-22 | End: 2021-03-12 | Stop reason: HOSPADM

## 2021-02-22 RX ADMIN — DIVALPROEX SODIUM 500 MG: 500 TABLET, DELAYED RELEASE ORAL at 21:21

## 2021-02-22 RX ADMIN — DIVALPROEX SODIUM 250 MG: 250 TABLET, DELAYED RELEASE ORAL at 08:16

## 2021-02-22 RX ADMIN — LOXAPINE 50 MG: 25 CAPSULE ORAL at 21:20

## 2021-02-22 RX ADMIN — BENZTROPINE MESYLATE 0.5 MG: 0.5 TABLET ORAL at 17:08

## 2021-02-22 RX ADMIN — LOXAPINE 50 MG: 25 CAPSULE ORAL at 17:07

## 2021-02-22 RX ADMIN — LOXAPINE 25 MG: 25 CAPSULE ORAL at 08:16

## 2021-02-22 RX ADMIN — MELATONIN TAB 3 MG 3 MG: 3 TAB at 21:21

## 2021-02-22 RX ADMIN — BENZTROPINE MESYLATE 0.5 MG: 0.5 TABLET ORAL at 08:16

## 2021-02-22 RX ADMIN — BENZTROPINE MESYLATE 0.5 MG: 0.5 TABLET ORAL at 21:21

## 2021-02-22 NOTE — PROGRESS NOTES
02/22/21 0839   Team Meeting   Meeting Type Daily Rounds   Team Members Present   Team Members Present Physician;Nurse;   Physician Team Member [address] Team Member [facility] Management Team Member [place]   Patient/Family Present   Patient Present No   Patient's Family Present No   Pt with limited improvement  Pt appears internally preoccupied, delayed in responses and with irritable edge  Med/meal compliant

## 2021-02-22 NOTE — NURSING NOTE
Patient appears to be responding as he stares intensely at staff working behind the nurses station  Otherwise easily directed  Irritable edge note  Medication compliant  Not seen interacting with anyone  Patient visible but isolative to self

## 2021-02-22 NOTE — NURSING NOTE
Pt denies SI, HI, AH and VH  Pt appears to be internally preoccupied, is delayed in response and stares intensely when staff or peers talk to him  Pt is observed in the milieu but remains isolative to self  Pt scant in conversation and has poor hygiene  Medication and meal compliant  Will monitor

## 2021-02-22 NOTE — PROGRESS NOTES
Progress Note - [address] 30 y o  male MRN: [id number]   Unit/Bed#: [facility] 347-02 Encounter: [id number]    Behavior over the last 24 hours: vern Fink is still bizarre, labile and appears internally stimulated  He still has significant delay in responding to questions and thought blocking - looks around before answering questions, laughs inappropriately at times  He has been compliant with medications and says that medications "help to get rid of my tension"  Limited participation in milieu      Sleep: normal  Appetite: normal  Medication side effects: No   ROS: no complaints, denies any headache, chest pain or back pain, all other systems are negative    Mental Status Evaluation:    Appearance:  disheveled, marginal hygiene   Behavior:  bizarre, guarded, intense eye contact   Speech:  increased latency of response, disorganized   Mood:  irritable   Affect:  labile   Thought Process:  disorganized, illogical   Associations: thought blocking   Thought Content:  persecutory delusions   Perceptual Disturbances: denies auditory or visual hallucinations when asked, but appears responding to internal stimuli   Risk Potential: Suicidal ideation - None  Homicidal ideation - None at present  Potential for aggression - Not at present   Sensorium:  oriented to person, place and time/date   Memory:  recent and remote memory grossly intact   Consciousness:  alert and awake   Attention/Concentration: poor concentration and poor attention span   Insight:  poor   Judgment: poor   Gait/Station: normal gait/station, normal balance   Motor Activity: no abnormal movements     Vital signs in last 24 hours:    Temp:  [97 6 °F (36 4 °C)-98 4 °F (36 9 °C)] 97 6 °F (36 4 °C)  HR:  [85] 85  Resp:  [16] 16  BP: ()/(58-59) 92/59    Laboratory results: I have personally reviewed all pertinent laboratory/tests results    Last Laboratory Results with Depakote and/or Tegretol levels:   Lab Results   Component Value Date    WBC 5 20 02/20/2021    RBC 4 31 (L) 02/20/2021    HGB 13 0 (L) 02/20/2021    HCT 40 3 (L) 02/20/2021     02/20/2021    RDW 15 0 02/20/2021    NEUTROABS 2 40 02/20/2021    SODIUM 135 (L) 02/20/2021    K 3 9 02/20/2021    CL 98 02/20/2021    CO2 34 (H) 02/20/2021    BUN 15 02/20/2021    CREATININE 0 68 (L) 02/20/2021    GLUC 85 02/20/2021    CALCIUM 9 6 02/20/2021    AST 37 02/20/2021    ALT 33 02/20/2021    ALKPHOS 77 02/20/2021    TP 6 8 02/20/2021    ALB 4 2 02/20/2021    TBILI 0 50 02/20/2021    VALPROICTOT 79 4 02/20/2021       Suicide/Homicide Risk Assessment:    Risk of Harm to Self:   Nursing Suicide Risk Assessment Last 24 hours: Low Risk to Self: No evidence of suicidal thoughts or history; Moderate Risk to Self: Presence of hallucinations  ;    Current Specific Risk Factors include: mental illness diagnosis, current psychotic symptoms, poor reasoning  Protective Factors: no current suicidal ideation, taking medications as ordered on the unit  Based on today's assessment, Abiodun Severino presents the following risk of harm to self: moderate    Risk of Harm to Others:  Nursing Homicide Risk Assessment: Violence Risk to Others: Denies within past 6 months  Based on today's assessment, Abiodun Severino presents the following risk of harm to others: low    The following interventions are recommended: behavioral checks every 7 minutes, continued hospitalization on locked unit    Progress Toward Goals: no significant progress, still disorganized, still psychotic, poor insight, reality testing remains poor    Assessment/Plan   Principal Problem:    Schizoaffective disorder, bipolar type ([facility] [address] )  Active Problems:    Cannabis abuse, continuous    Synthetic cannabinoid abuse ([facility] [address] )    Subclinical hypothyroidism      Recommended Treatment:     Planned medication and treatment changes:     All current active medications have been reviewed  Encourage group therapy, milieu therapy and occupational therapy  Thanh Pickett checks every 7 minutes  On 303 commitment up to 20 days  Increase Loxitane to 50 mg bid and at bedtime to help with psychotic symptoms    Continue all other medications:    Current Facility-Administered Medications   Medication Dose Route Frequency Provider Last Rate    acetaminophen  650 mg Oral Q6H PRN [place] Borreggine, DO      acetaminophen  650 mg Oral Q4H PRN Leona Borreggine, DO      acetaminophen  975 mg Oral Q6H PRN Leona Borreggine, DO      haloperidol lactate  2 5 mg Intramuscular Q6H PRN Max 4/day Leona Borreggine, DO      And    LORazepam  1 mg Intramuscular Q6H PRN Max 4/day Leona Borreggine, DO      And    benztropine  0 5 mg Intramuscular Q6H PRN Max 4/day Leona Borreggine, DO      haloperidol lactate  5 mg Intramuscular Q4H PRN Max 4/day Stella [place], DO      And    LORazepam  2 mg Intramuscular Q4H PRN Max 4/day Stella [place], DO      And    benztropine  1 mg Intramuscular Q4H PRN Max 4/day Stella [place], DO      benztropine  0 5 mg Oral TID Tushar Saeed MD      hydrOXYzine HCL  50 mg Oral Q6H PRN Max 4/day Stella [place], DO      Or    diphenhydrAMINE  50 mg Intramuscular Q6H PRN Stella [place], DO      divalproex sodium  250 mg Oral Daily Tushar Saeed MD      divalproex sodium  500 mg Oral HS Tushar Saeed MD      haloperidol  2 mg Oral Q4H PRN Max 6/day Leona Borreggine, DO      haloperidol  5 mg Oral Q6H PRN Max 4/day Leona Borreggine, DO      haloperidol  5 mg Oral Q4H PRN Max 4/day Leona Borreggine, DO      hydrOXYzine HCL  100 mg Oral Q6H PRN Max 4/day Stella [place], DO      Or    LORazepam  2 mg Intramuscular Q6H PRN Stella [place], DO      hydrOXYzine HCL  25 mg Oral Q6H PRN Max 4/day Leona Borreggine, DO      hydrOXYzine HCL  50 mg Oral Q4H PRN Max 4/day Leona Borreggine, DO      Or    LORazepam  1 mg Intramuscular Q4H PRN Leona Borreggine, DO      LORazepam  1 mg Oral Q4H PRN Max 6/day [employer], DO Or    LORazepam  2 mg Intramuscular Q6H PRN Max 3/day Heather Jessie Borreggine, DO      loxapine  50 mg Oral HS Sultana Thorpe MD      loxapine  50 mg Oral BID Sultana Thorpe MD      melatonin  3 mg Oral HS Regina Alina, DO      nicotine  1 patch Transdermal Daily Tej Russo, DO      nicotine polacrilex  4 mg Oral Q2H PRN Tej Russo, DO      senna-docusate sodium  1 tablet Oral Daily PRN Heather Roman Borreggine, DO      traZODone  50 mg Oral HS PRN Tej Russo, DO       Risks / Benefits of Treatment:    Risks, benefits, and possible side effects of medications explained to patient  Patient has limited understanding of risks and benefits of treatment at this time, but agrees to take medications as prescribed  Counseling / Coordination of Care:    Patient's progress discussed with staff in treatment team meeting  Medications, treatment progress and treatment plan reviewed with patient  Medication changes discussed with patient      Arun Avila MD 02/22/21

## 2021-02-22 NOTE — PROGRESS NOTES
02/22/21 1100   Activity/Group Checklist   Group   (recovery group)   Attendance Attended   Attendance Duration (min) 16-30   Interactions Disorganized interaction   Affect/Mood Blunted/flat   Goals Achieved Able to listen to others; Able to engage in interactions; Able to self-disclose  (easily distracted, internally stimuli)

## 2021-02-22 NOTE — PROGRESS NOTES
Occupational Therapy Student Group Note    Observed patient frequently entering/exiting group room  Encouraged to participate in group activities and refused

## 2021-02-22 NOTE — PROGRESS NOTES
Occupational Therapy Student Group Note    Intermittently entering day room during community meeting  Visible in milieu but not social  Pt  Is scant and guarded  Delayed responses  He continues to refuse group attendance when asked  Will continue to encourage group attendance

## 2021-02-23 PROCEDURE — 99232 SBSQ HOSP IP/OBS MODERATE 35: CPT | Performed by: PSYCHIATRY & NEUROLOGY

## 2021-02-23 RX ADMIN — DIVALPROEX SODIUM 250 MG: 250 TABLET, DELAYED RELEASE ORAL at 08:26

## 2021-02-23 RX ADMIN — LOXAPINE 50 MG: 25 CAPSULE ORAL at 08:26

## 2021-02-23 RX ADMIN — BENZTROPINE MESYLATE 0.5 MG: 0.5 TABLET ORAL at 21:18

## 2021-02-23 RX ADMIN — LOXAPINE 50 MG: 25 CAPSULE ORAL at 17:30

## 2021-02-23 RX ADMIN — BENZTROPINE MESYLATE 0.5 MG: 0.5 TABLET ORAL at 08:26

## 2021-02-23 RX ADMIN — BENZTROPINE MESYLATE 0.5 MG: 0.5 TABLET ORAL at 17:30

## 2021-02-23 RX ADMIN — MELATONIN TAB 3 MG 3 MG: 3 TAB at 21:18

## 2021-02-23 RX ADMIN — DIVALPROEX SODIUM 500 MG: 500 TABLET, DELAYED RELEASE ORAL at 21:18

## 2021-02-23 RX ADMIN — LOXAPINE 50 MG: 25 CAPSULE ORAL at 21:18

## 2021-02-23 NOTE — NURSING NOTE
Pt denies all psych symptoms  Pt appears to be responding to internal stimuli, pt staring intensely when writer speaking to him, delayed in responses  Pt also smiling inappropriately  Medication and meal compliant  Will monitor

## 2021-02-23 NOTE — PROGRESS NOTES
02/23/21 1300   Activity/Group Checklist   Group   (recovery group)   Attendance Attended  (came late)   Attendance Duration (min) 16-30   Interactions Disorganized interaction   Affect/Mood Blunted/flat   Goals Achieved Able to listen to others; Able to engage in interactions; Able to self-disclose

## 2021-02-23 NOTE — PROGRESS NOTES
02/23/21 0847   Team Meeting   Meeting Type Daily Rounds   Team Members Present   Team Members Present Physician;Nurse;   Physician Team Member [address] Team Member [facility] Management Team Member [place]   Patient/Family Present   Patient Present No   Patient's Family Present No   Pt remains bizarre and appears internally preoccupied  Pt stares intensely  Med/meal compliant

## 2021-02-23 NOTE — NURSING NOTE
Pt denies SI, HI, AH and VH  Pt appears to be responding to internal stimuli, pt observed laughing to himself and smiling inappropriately at times  Pt is visible in the milieu but remains isolative to self  Medication and meal compliant  Will monitor

## 2021-02-23 NOTE — PROGRESS NOTES
Progress Note - [address] 30 y o  male MRN: [id number]   Unit/Bed#: Kalie Sharon 347-02 Encounter: [id number]    Behavior over the last 24 hours: unchanged  Radha Lanza seems very irritable today - preoccupied with discharge and upset that he is not being discharged today  He still has very poor insight into his illness "There was nothing wrong with me to start with  There is nothing going on"  He continues to have significant thought blocking - stares intensely and does not answer questions for extended period of time or sometimes not at all  Limited group attendance  Has been taking medications      Sleep: normal  Appetite: normal  Medication side effects: No   ROS: no complaints, denies any shortness of breath, chest pain or abdominal pain, all other systems are negative    Mental Status Evaluation:    Appearance:  disheveled, poor hygiene   Behavior:  bizarre, guarded, intense eye contact   Speech:  increased latency of response, delayed, disorganized   Mood:  irritable   Affect:  labile   Thought Process:  disorganized, illogical   Associations: thought blocking   Thought Content:  persecutory delusions   Perceptual Disturbances: denies auditory or visual hallucinations when asked, but appears responding to internal stimuli   Risk Potential: Suicidal ideation - None  Homicidal ideation - None at present  Potential for aggression - Yes, due to acute psychosis   Sensorium:  oriented to person, place and time/date   Memory:  recent and remote memory grossly intact   Consciousness:  alert and awake   Attention/Concentration: poor concentration and poor attention span   Insight:  poor   Judgment: poor   Gait/Station: normal gait/station, normal balance   Motor Activity: no abnormal movements     Vital signs in last 24 hours:    Temp:  [97 4 °F (36 3 °C)] 97 4 °F (36 3 °C)  HR:  [79] 79  Resp:  [16] 16  BP: (113)/(70) 113/70    Laboratory results: I have personally reviewed all pertinent laboratory/tests results        Suicide/Homicide Risk Assessment:    Risk of Harm to Self:   Nursing Suicide Risk Assessment Last 24 hours: Low Risk to Self: No evidence of suicidal thoughts or history; Moderate Risk to Self: Presence of hallucinations  , Presence of delusions  ;    Current Specific Risk Factors include: mental illness diagnosis, current psychotic symptoms, poor reasoning  Protective Factors: no current suicidal ideation, taking medications as ordered on the unit  Based on today's assessment, Tae Montejo presents the following risk of harm to self: moderate    Risk of Harm to Others:  Nursing Homicide Risk Assessment: Violence Risk to Others: Denies within past 6 months  Based on today's assessment, Tae Montejo presents the following risk of harm to others: low    The following interventions are recommended: behavioral checks every 7 minutes, continued hospitalization on locked unit    Progress Toward Goals: no improvement, still irritable, remains psychotic, insight remains poor    Assessment/Plan   Principal Problem:    Schizoaffective disorder, bipolar type ([facility] )  Active Problems:    Cannabis abuse, continuous    Synthetic cannabinoid abuse ([facility] )    Subclinical hypothyroidism      Recommended Treatment:     Planned medication and treatment changes:     All current active medications have been reviewed  Encourage group therapy, milieu therapy and occupational therapy  Behavioral Health checks every 7 minutes  On 303 commitment up to 20 days     Continue current medications:    Current Facility-Administered Medications   Medication Dose Route Frequency Provider Last Rate    acetaminophen  650 mg Oral Q6H PRN Leona Borreggine, DO      acetaminophen  650 mg Oral Q4H PRN Leona Borreggine, DO      acetaminophen  975 mg Oral Q6H PRN Leona Borreggine, DO      haloperidol lactate  2 5 mg Intramuscular Q6H PRN Max 4/day Leona Borreggine, DO      And    LORazepam  1 mg Intramuscular Q6H PRN Max 4/day [employer], DO And    benztropine  0 5 mg Intramuscular Q6H PRN Max 4/day Leona Borreggine, DO      haloperidol lactate  5 mg Intramuscular Q4H PRN Max 4/day Vamsi Royal, DO      And    LORazepam  2 mg Intramuscular Q4H PRN Max 4/day Vamsi Royal, DO      And    benztropine  1 mg Intramuscular Q4H PRN Max 4/day Vamsi Royal, DO      benztropine  0 5 mg Oral TID Donna Sutherland MD      hydrOXYzine HCL  50 mg Oral Q6H PRN Max 4/day Vamsi Royal, DO      Or    diphenhydrAMINE  50 mg Intramuscular Q6H PRN Vamsi Royal, DO      divalproex sodium  250 mg Oral Daily Donna Sutherland MD      divalproex sodium  500 mg Oral HS Donna Sutherland MD      haloperidol  2 mg Oral Q4H PRN Max 6/day Leona Borreggine, DO      haloperidol  5 mg Oral Q6H PRN Max 4/day Leona Borreggine, DO      haloperidol  5 mg Oral Q4H PRN Max 4/day Leona Borreggine, DO      hydrOXYzine HCL  100 mg Oral Q6H PRN Max 4/day Vamsi Royal, DO      Or    LORazepam  2 mg Intramuscular Q6H PRN Vamsi Royal, DO      hydrOXYzine HCL  25 mg Oral Q6H PRN Max 4/day Leona Borreggine, DO      hydrOXYzine HCL  50 mg Oral Q4H PRN Max 4/day Leona Borreggine, DO      Or    LORazepam  1 mg Intramuscular Q4H PRN Leona Borreggine, DO      LORazepam  1 mg Oral Q4H PRN Max 6/day Leona Borreggine, DO      Or    LORazepam  2 mg Intramuscular Q6H PRN Max 3/day Leona Borreggine, DO      loxapine  50 mg Oral HS Donna Sutherland MD      loxapine  50 mg Oral BID Donna Sutherland MD      melatonin  3 mg Oral HS Delilialeti Beaumont, DO      nicotine  1 patch Transdermal Daily Vamsi [place], DO      nicotine polacrilex  4 mg Oral Q2H PRN Vamsi Royal, DO      senna-docusate sodium  1 tablet Oral Daily PRN Hyman Medico Borreggine, DO      traZODone  50 mg Oral HS PRN Vamsi [place], DO       Risks / Benefits of Treatment:    Risks, benefits, and possible side effects of medications explained to patient   Patient has limited understanding of risks and benefits of treatment at this time, but agrees to take medications as prescribed  Counseling / Coordination of Care:    Patient's progress discussed with staff in treatment team meeting  Medications, treatment progress and treatment plan reviewed with patient      Nadine Nath MD 02/23/21

## 2021-02-23 NOTE — NURSING NOTE
Patient compliant with all his evening meds  Patient visible on the unit but guarded to self  Patient denies all but conttinue to stares intensely at staff working behind the nurses station  Will monitor

## 2021-02-23 NOTE — QUICK NOTE
Progress Note - Behavioral Health   Maci Alfred 30 y o  male MRN: [id number]  Unit/Bed#: Kylah Choi 056-96 Encounter: [id number]      Subjective  Maci Alfred seen today at bedside  He was pacing around the unit just prior to our encounter  He has been seen smiling and laughing to himself inappropriately  He claims today he feels good and that his mood has been relatively good  He says his appetite and his sleep have been adequate and claims he enjoys the food here  He denies SI/HI/AH/VH  On exam his answers are significantly delayed and he requires questions to be repeated often  He appears distracted  He answers questions with short and evasive answers and answers several questions inappropriately  He stares throughout the interview  He seems on edge, but is cooperative with the interview  Upon finishing the interview he returned to pacing around the unit  Behavior over the last 24 hours: unchanged  Sleep: normal  Appetite: adequate  Medication side effects: none verbalized  ROS: Complete review of systems is negative except as noted above      Mental Status Exam:  Appearance:  alert, poor eye contact, marginal grooming/hygiene and poor dentition   Behavior:  limited cooperativity, guarded and evasive   Motor: no abnormal movements   Speech:  delayed initiation, scant and poverty of content   Mood:  anxious and irritable   Affect:  flat   Thought Process:  poverty of thought   Thought Content: no verbalized delusions or overt paranoia   Perceptual disturbances: appears to be responding to internal stimuli   Risk Potential: No active or passive suicidal or homicidal ideation was verbalized during interview, Potential for aggression due to poor impulse control and irritable mood   Cognition: oriented to self and situation and appears to be below average intelligence   Insight:  Poor   Judgment: Poor     Risks, benefits and possible side effects of Medications:   Risks, benefits, and possible side effects of medications have previously been explained  No new medications at this time         Labs: I have personally reviewed all pertinent laboratory results         Assessment/Plan    Principal Problem:    Schizoaffective disorder, bipolar type ([facility])  Active Problems:    Cannabis abuse, continuous    Synthetic cannabinoid abuse ([facility] )    Subclinical hypothyroidism    Recommended Treatment:   - behavioral health checks every 7 minutes  - encourage group therapy, milieu therapy, and occupational therapy  - on 303 commitment up to 20 days  - loxitane was increased yesterday to 50 mg bid and 50 mg QHS; continue at current dose  - continue other medications        This note was not shared with the patient due to this is a psychotherapy note

## 2021-02-24 PROCEDURE — 99232 SBSQ HOSP IP/OBS MODERATE 35: CPT | Performed by: PSYCHIATRY & NEUROLOGY

## 2021-02-24 RX ADMIN — LOXAPINE 50 MG: 25 CAPSULE ORAL at 08:16

## 2021-02-24 RX ADMIN — LOXAPINE 50 MG: 25 CAPSULE ORAL at 15:57

## 2021-02-24 RX ADMIN — DIVALPROEX SODIUM 500 MG: 500 TABLET, DELAYED RELEASE ORAL at 21:14

## 2021-02-24 RX ADMIN — BENZTROPINE MESYLATE 0.5 MG: 0.5 TABLET ORAL at 21:14

## 2021-02-24 RX ADMIN — BENZTROPINE MESYLATE 0.5 MG: 0.5 TABLET ORAL at 08:16

## 2021-02-24 RX ADMIN — LOXAPINE 50 MG: 25 CAPSULE ORAL at 21:14

## 2021-02-24 RX ADMIN — DIVALPROEX SODIUM 250 MG: 250 TABLET, DELAYED RELEASE ORAL at 08:16

## 2021-02-24 RX ADMIN — BENZTROPINE MESYLATE 0.5 MG: 0.5 TABLET ORAL at 15:57

## 2021-02-24 RX ADMIN — MELATONIN TAB 3 MG 3 MG: 3 TAB at 21:14

## 2021-02-24 NOTE — PROGRESS NOTES
02/18/21 1323   Team Members Present   Team Members Present Physician;Nurse;   Physician Team Member Leanne England Team Member LARRY AGUILARFREDRICK [facility] Management Team Member [place]   Patient/Family Present   Patient Present Yes   Patient's Family Present No   Pt seen for tx team meeting  Pt educated on med management, case management and group therapy  Pt with intense stare and did not participate  Tx plan reviewed and signed

## 2021-02-24 NOTE — CMS CERTIFICATION NOTE
Recertification: Based upon physical, mental and social evaluations, I certify that inpatient psychiatric services continue to be medically necessary for this patient for a duration of 10 midnights for the treatment of  Schizoaffective disorder, bipolar type [facility])   Available alternative community resources still do not meet the patient's mental health care needs  I further attest that an established written individualized plan of care has been updated and is outlined in the patient's medical records

## 2021-02-24 NOTE — NURSING NOTE
Pt visible, but withdrawn from peers in the milieu and appears to display responsiveness to internal stimulation  Pt speech is delayed when communicating and inappropriately smiles at times while other times staring with a hostile demeanor  Pt denies SI/HI and AVH  Will continue to monitor

## 2021-02-24 NOTE — NURSING NOTE
Pt denies SI,HI,AH, VH  Pt still appears internally preoccupied  Pt is visible in the milieu but remains isolative to self  Medication and meal compliant  Pt stares intensely during conversation with delayed responses  Pt smiles inappropriately at times  Will continue to monitor

## 2021-02-24 NOTE — PROGRESS NOTES
Occupational Therapy Student Group Note    Observed patient frequently entering and exiting room during group meetings  Flat and blunted on approach  Appeared preoccupied and disinterested in group sessions

## 2021-02-24 NOTE — PROGRESS NOTES
Progress Note - [address] 30 y o  male MRN: [id number]   Unit/Bed#: Bhupinder Magana 347-02 Encounter: [id number]    Behavior over the last 24 hours: limited improvement  Ramiro Osler is slightly less irritable and more cooperative with assessment today - he answered all the questions, although still with significant delay  He continues to respond to internal stimuli, laugh inappropriately at times and occasionally talks to himself  Limited participation in milieu  Has been taking medications      Sleep: normal  Appetite: normal  Medication side effects: No   ROS: no complaints, denies any headache, chest pain or back pain, all other systems are negative    Mental Status Evaluation:    Appearance:  disheveled   Behavior:  bizarre, guarded, intense eye contact   Speech:  scant, delayed   Mood:  less irritable   Affect:  labile, inappropriate laugh   Thought Process:  disorganized, illogical   Associations: thought blocking   Thought Content:  persecutory delusions   Perceptual Disturbances: denies auditory or visual hallucinations when asked, but appears responding to internal stimuli   Risk Potential: Suicidal ideation - None  Homicidal ideation - None  Potential for aggression - Not at present   Sensorium:  oriented to person, place and time/date   Memory:  recent and remote memory grossly intact   Consciousness:  alert and awake   Attention/Concentration: poor concentration and poor attention span   Insight:  poor   Judgment: poor   Gait/Station: normal gait/station, normal balance   Motor Activity: no abnormal movements     Vital signs in last 24 hours:    Temp:  [97 5 °F (36 4 °C)-98 2 °F (36 8 °C)] 98 2 °F (36 8 °C)  HR:  [100-101] 100  Resp:  [16] 16  BP: (104-105)/(65-76) 105/76    Laboratory results: I have personally reviewed all pertinent laboratory/tests results        Suicide/Homicide Risk Assessment:    Risk of Harm to Self:   Nursing Suicide Risk Assessment Last 24 hours: Low Risk to Self: No evidence of suicidal thoughts or history; Moderate Risk to Self: Presence of hallucinations  , Presence of delusions  ;    Current Specific Risk Factors include: mental illness diagnosis, current psychotic symptoms, poor reasoning  Protective Factors: no current suicidal ideation, taking medications as ordered on the unit  Based on today's assessment, Wanita Gaucher presents the following risk of harm to self: moderate    Risk of Harm to Others:  Nursing Homicide Risk Assessment: Violence Risk to Others: Denies within past 6 months  Based on today's assessment, Wanita Gaucher presents the following risk of harm to others: low    The following interventions are recommended: behavioral checks every 7 minutes, continued hospitalization on locked unit    Progress Toward Goals: limited progress, less irritable, still psychotic, poor insight, reality testing remains poor    Assessment/Plan   Principal Problem:    Schizoaffective disorder, bipolar type ([facility] )  Active Problems:    Cannabis abuse, continuous    Synthetic cannabinoid abuse ([facility] )    Subclinical hypothyroidism      Recommended Treatment:     Planned medication and treatment changes:     All current active medications have been reviewed  Encourage group therapy, milieu therapy and occupational therapy  Behavioral Health checks every 7 minutes  On 303 commitment up to 20 days  Recheck Depakote level, CBC/diff and CMP in the morning     Continue all other medications:    Current Facility-Administered Medications   Medication Dose Route Frequency Provider Last Rate    acetaminophen  650 mg Oral Q6H PRN Leona Borreggine, DO      acetaminophen  650 mg Oral Q4H PRN Leona Borreggine, DO      acetaminophen  975 mg Oral Q6H PRN Leona Borreggine, DO      haloperidol lactate  2 5 mg Intramuscular Q6H PRN Max 4/day Leona Borreggine, DO      And    LORazepam  1 mg Intramuscular Q6H PRN Max 4/day Leona Borreggine, DO      And    benztropine  0 5 mg Intramuscular Q6H PRN Max 4/day Connie Garcia, DO      haloperidol lactate  5 mg Intramuscular Q4H PRN Max 4/day Freddie Henry, DO      And    LORazepam  2 mg Intramuscular Q4H PRN Max 4/day Freddie Henry, DO      And    benztropine  1 mg Intramuscular Q4H PRN Max 4/day Freddie Henry, DO      benztropine  0 5 mg Oral TID Favian Friedman MD      divalproex sodium  250 mg Oral Daily Favian Friedman MD      divalproex sodium  500 mg Oral HS Favian Friedman MD      haloperidol  2 mg Oral Q4H PRN Max 6/day Leona Borreggine, DO      haloperidol  5 mg Oral Q6H PRN Max 4/day Leona Borreggine, DO      haloperidol  5 mg Oral Q4H PRN Max 4/day Leona Borreggine, DO      hydrOXYzine HCL  25 mg Oral Q6H PRN Max 4/day Leona Borreggine, DO      hydrOXYzine HCL  50 mg Oral Q4H PRN Max 4/day Leona Borreggine, DO      Or    LORazepam  1 mg Intramuscular Q4H PRN Leona Borreggine, DO      LORazepam  1 mg Oral Q4H PRN Max 6/day Leona Borreggine, DO      Or    LORazepam  2 mg Intramuscular Q6H PRN Max 3/day Leona Borreggine, DO      loxapine  50 mg Oral HS Favian Friedman MD      loxapine  50 mg Oral BID Favian Friedman MD      melatonin  3 mg Oral HS Leona Borreggine, DO      nicotine  1 patch Transdermal Daily Freddie Henry, DO      nicotine polacrilex  4 mg Oral Q2H PRN Freddie Henry, DO      senna-docusate sodium  1 tablet Oral Daily PRN Kristy Marinogine, DO      traZODone  50 mg Oral HS PRN Freddie Henry, DO       Risks / Benefits of Treatment:    Risks, benefits, and possible side effects of medications explained to patient  Patient has limited understanding of risks and benefits of treatment at this time, but agrees to take medications as prescribed  Counseling / Coordination of Care:    Patient's progress discussed with staff in treatment team meeting  Medications, treatment progress and treatment plan reviewed with patient      Dafne Adame MD 02/24/21

## 2021-02-24 NOTE — PROGRESS NOTES
02/24/21 0834   Team Meeting   Meeting Type Daily Rounds   Team Members Present   Team Members Present Physician;Nurse;   Physician Team Member [address] Team Member [facility] Team Member [place]   Patient/Family Present   Patient Present No   Patient's Family Present No   Pt remains internally preoccupied and smiles/laughs inappropriately  Pt delayed in responses  Pt stares intently  Pt improved minimally but was able to answer all assessment questions  Pt's responses were still delayed but all questions were answered

## 2021-02-25 LAB
ALBUMIN SERPL BCP-MCNC: 4.1 G/DL (ref 3–5.2)
ALP SERPL-CCNC: 70 U/L (ref 43–122)
ALT SERPL W P-5'-P-CCNC: 23 U/L (ref 9–52)
ANION GAP SERPL CALCULATED.3IONS-SCNC: 5 MMOL/L (ref 5–14)
AST SERPL W P-5'-P-CCNC: 22 U/L (ref 17–59)
BASOPHILS # BLD AUTO: 0.1 THOUSANDS/ΜL (ref 0–0.1)
BASOPHILS NFR BLD AUTO: 2 % (ref 0–1)
BILIRUB SERPL-MCNC: 0.3 MG/DL
BUN SERPL-MCNC: 16 MG/DL (ref 5–25)
CALCIUM SERPL-MCNC: 9.4 MG/DL (ref 8.4–10.2)
CHLORIDE SERPL-SCNC: 99 MMOL/L (ref 97–108)
CO2 SERPL-SCNC: 34 MMOL/L (ref 22–30)
CREAT SERPL-MCNC: 0.71 MG/DL (ref 0.7–1.5)
EOSINOPHIL # BLD AUTO: 0.1 THOUSAND/ΜL (ref 0–0.4)
EOSINOPHIL NFR BLD AUTO: 2 % (ref 0–6)
ERYTHROCYTE [DISTWIDTH] IN BLOOD BY AUTOMATED COUNT: 14.8 %
GFR SERPL CREATININE-BSD FRML MDRD: 142 ML/MIN/1.73SQ M
GLUCOSE P FAST SERPL-MCNC: 94 MG/DL (ref 70–99)
GLUCOSE SERPL-MCNC: 94 MG/DL (ref 70–99)
HCT VFR BLD AUTO: 42.6 % (ref 41–53)
HGB BLD-MCNC: 13.7 G/DL (ref 13.5–17.5)
LYMPHOCYTES # BLD AUTO: 2.6 THOUSANDS/ΜL (ref 0.5–4)
LYMPHOCYTES NFR BLD AUTO: 43 % (ref 25–45)
MCH RBC QN AUTO: 30.1 PG (ref 26–34)
MCHC RBC AUTO-ENTMCNC: 32.2 G/DL (ref 31–36)
MCV RBC AUTO: 94 FL (ref 80–100)
MONOCYTES # BLD AUTO: 0.7 THOUSAND/ΜL (ref 0.2–0.9)
MONOCYTES NFR BLD AUTO: 11 % (ref 1–10)
NEUTROPHILS # BLD AUTO: 2.5 THOUSANDS/ΜL (ref 1.8–7.8)
NEUTS SEG NFR BLD AUTO: 42 % (ref 45–65)
PLATELET # BLD AUTO: 287 THOUSANDS/UL (ref 150–450)
PMV BLD AUTO: 8.2 FL (ref 8.9–12.7)
POTASSIUM SERPL-SCNC: 4.6 MMOL/L (ref 3.6–5)
PROT SERPL-MCNC: 6.8 G/DL (ref 5.9–8.4)
RBC # BLD AUTO: 4.56 MILLION/UL (ref 4.5–5.9)
SODIUM SERPL-SCNC: 138 MMOL/L (ref 137–147)
VALPROATE SERPL-MCNC: 65.3 UG/ML (ref 50–120)
WBC # BLD AUTO: 6 THOUSAND/UL (ref 4.5–11)

## 2021-02-25 PROCEDURE — 99232 SBSQ HOSP IP/OBS MODERATE 35: CPT | Performed by: PSYCHIATRY & NEUROLOGY

## 2021-02-25 PROCEDURE — 80164 ASSAY DIPROPYLACETIC ACD TOT: CPT | Performed by: PSYCHIATRY & NEUROLOGY

## 2021-02-25 PROCEDURE — 85025 COMPLETE CBC W/AUTO DIFF WBC: CPT | Performed by: PSYCHIATRY & NEUROLOGY

## 2021-02-25 PROCEDURE — 80053 COMPREHEN METABOLIC PANEL: CPT | Performed by: PSYCHIATRY & NEUROLOGY

## 2021-02-25 RX ORDER — DIVALPROEX SODIUM 500 MG/1
500 TABLET, DELAYED RELEASE ORAL DAILY
Status: DISCONTINUED | OUTPATIENT
Start: 2021-02-26 | End: 2021-02-26

## 2021-02-25 RX ORDER — DIVALPROEX SODIUM 250 MG/1
250 TABLET, DELAYED RELEASE ORAL ONCE
Status: COMPLETED | OUTPATIENT
Start: 2021-02-25 | End: 2021-02-25

## 2021-02-25 RX ADMIN — BENZTROPINE MESYLATE 0.5 MG: 0.5 TABLET ORAL at 21:18

## 2021-02-25 RX ADMIN — DIVALPROEX SODIUM 500 MG: 500 TABLET, DELAYED RELEASE ORAL at 21:18

## 2021-02-25 RX ADMIN — DIVALPROEX SODIUM 250 MG: 250 TABLET, DELAYED RELEASE ORAL at 08:21

## 2021-02-25 RX ADMIN — BENZTROPINE MESYLATE 0.5 MG: 0.5 TABLET ORAL at 16:56

## 2021-02-25 RX ADMIN — DIVALPROEX SODIUM 250 MG: 250 TABLET, DELAYED RELEASE ORAL at 09:10

## 2021-02-25 RX ADMIN — MELATONIN TAB 3 MG 3 MG: 3 TAB at 21:18

## 2021-02-25 RX ADMIN — LOXAPINE 50 MG: 25 CAPSULE ORAL at 21:18

## 2021-02-25 RX ADMIN — BENZTROPINE MESYLATE 0.5 MG: 0.5 TABLET ORAL at 08:21

## 2021-02-25 RX ADMIN — LOXAPINE 50 MG: 25 CAPSULE ORAL at 08:21

## 2021-02-25 RX ADMIN — LOXAPINE 50 MG: 25 CAPSULE ORAL at 16:56

## 2021-02-25 NOTE — PLAN OF CARE
Pt remains bizarre, delayed responses, internally preoccupied  CM to gather further information when pt's mental status begins to improve

## 2021-02-25 NOTE — PROGRESS NOTES
Entered room during Andre Aletha  Sat among peers when invited to come in for brief period of time, and attempted to listen to group facilitation  Continues to appear to be distracted  May be secondary to internal stimuli  Emerging comfort remaining in high signal areas is evident

## 2021-02-25 NOTE — PROGRESS NOTES
02/25/21 0839   Team Meeting   Meeting Type Daily Rounds   Team Members Present   Team Members Present Physician;Nurse;   Physician Team Member Tena Paez [facility]   Care Management Team Member [place]   Patient/Family Present   Patient Present No   Patient's Family Present No   Pt internally preoccupied  Pt intensely stares  Pt visible but not social  Pt very disheveled  Depakote to be increased  Pt smiles inappropriately at times  Med/meal compliant

## 2021-02-25 NOTE — NURSING NOTE
Pt denies SI, HI, AH, VH but appears internally preoccupied  Pt appears disheveled but reports that he has been showering  Pt stares inappropriately and is delayed when answering questions  Pt present in dayroom and milieu and remains in behavioral control  Medication and meal compliant  Will continue to monitor

## 2021-02-25 NOTE — PROGRESS NOTES
Progress Note - [address] 30 y o  male MRN: [id number]   Unit/Bed#: [facility] [place] 347-02 Encounter: [id number]    Behavior over the last 24 hours: unchanged Wanita Gaucher is still bizarre and disorganized, still has inappropriate affect and irritability  He continues to have delayed responses - frequently stares at staff without answering questions  Minimal participation in milieu  Has been taking medications      Sleep: normal  Appetite: normal  Medication side effects: No   ROS: no complaints, denies any shortness of breath, chest pain or abdominal pain, all other systems are negative    Mental Status Evaluation:    Appearance:  disheveled, marginal hygiene   Behavior:  bizarre, guarded, intense eye contact   Speech:  scant, increased latency of response   Mood:  irritable   Affect:  labile, inappropriate smile   Thought Process:  disorganized, illogical   Associations: thought blocking   Thought Content:  persecutory delusions   Perceptual Disturbances: denies auditory or visual hallucinations when asked, but appears responding to internal stimuli   Risk Potential: Suicidal ideation - None  Homicidal ideation - None  Potential for aggression - Not at present   Sensorium:  oriented to person, place and time/date   Memory:  recent and remote memory grossly intact   Consciousness:  alert and awake   Attention/Concentration: poor concentration and poor attention span   Insight:  poor   Judgment: poor   Gait/Station: normal gait/station, normal balance   Motor Activity: no abnormal movements     Vital signs in last 24 hours:    Temp:  [97 8 °F (36 6 °C)-97 9 °F (36 6 °C)] 97 8 °F (36 6 °C)  HR:  [84-86] 86  Resp:  [16] 16  BP: (114-115)/(66-67) 114/67    Laboratory results: I have personally reviewed all pertinent laboratory/tests results    Last Laboratory Results with Depakote and/or Tegretol levels:   Lab Results   Component Value Date    WBC 6 00 02/25/2021    RBC 4 56 02/25/2021    HGB 13 7 02/25/2021 HCT 42 6 02/25/2021     02/25/2021    RDW 14 8 02/25/2021    NEUTROABS 2 50 02/25/2021    SODIUM 138 02/25/2021    K 4 6 02/25/2021    CL 99 02/25/2021    CO2 34 (H) 02/25/2021    BUN 16 02/25/2021    CREATININE 0 71 02/25/2021    GLUC 94 02/25/2021    CALCIUM 9 4 02/25/2021    AST 22 02/25/2021    ALT 23 02/25/2021    ALKPHOS 70 02/25/2021    TP 6 8 02/25/2021    ALB 4 1 02/25/2021    TBILI 0 30 02/25/2021    VALPROICTOT 65 3 02/25/2021       Suicide/Homicide Risk Assessment:    Risk of Harm to Self:   Nursing Suicide Risk Assessment Last 24 hours: Low Risk to Self: No evidence of suicidal thoughts or history; Moderate Risk to Self: Presence of hallucinations  , Presence of delusions  ;    Current Specific Risk Factors include: mental illness diagnosis, current psychotic symptoms, poor reasoning  Protective Factors: no current suicidal ideation, taking medications as ordered on the unit  Based on today's assessment, Noemí Madsen presents the following risk of harm to self: moderate    Risk of Harm to Others:  Nursing Homicide Risk Assessment: Violence Risk to Others: Denies within past 6 months  Based on today's assessment, Noemí Madsen presents the following risk of harm to others: low    The following interventions are recommended: behavioral checks every 7 minutes, continued hospitalization on locked unit    Progress Toward Goals: no significant progress, still disorganized, still irritable, insight remains poor, poor reality testing    Assessment/Plan   Principal Problem:    Schizoaffective disorder, bipolar type ([facility] )  Active Problems:    Cannabis abuse, continuous    [facility] )    Subclinical hypothyroidism      Recommended Treatment:     Planned medication and treatment changes:     All current active medications have been reviewed  Encourage group therapy, milieu therapy and occupational therapy  Behavioral Health checks every 7 minutes  On 303 commitment up to 20 days  Increase Depakote to 500 mg bid for mood stabilization  Recheck Depakote level in 3 days     Continue all other medications:    Current Facility-Administered Medications   Medication Dose Route Frequency Provider Last Rate    acetaminophen  650 mg Oral Q6H PRN Leona Borreggine, DO      acetaminophen  650 mg Oral Q4H PRN Leona Borreggine, DO      acetaminophen  975 mg Oral Q6H PRN Leona Borreggine, DO      haloperidol lactate  2 5 mg Intramuscular Q6H PRN Max 4/day Leona Borreggine, DO      And    LORazepam  1 mg Intramuscular Q6H PRN Max 4/day Leona Borreggine, DO      And    benztropine  0 5 mg Intramuscular Q6H PRN Max 4/day Leona Borreggine, DO      haloperidol lactate  5 mg Intramuscular Q4H PRN Max 4/day Edwin Plain, DO      And    LORazepam  2 mg Intramuscular Q4H PRN Max 4/day Edwin Plain, DO      And    benztropine  1 mg Intramuscular Q4H PRN Max 4/day Edwin Plain, DO      benztropine  0 5 mg Oral TID Katie Grace MD      divalproex sodium  500 mg Oral HS Katie Grace MD      [START ON 2/26/2021] divalproex sodium  500 mg Oral Daily Katie Grace MD      haloperidol  2 mg Oral Q4H PRN Max 6/day Leona Borreggine, DO      haloperidol  5 mg Oral Q6H PRN Max 4/day Leona Borreggine, DO      haloperidol  5 mg Oral Q4H PRN Max 4/day Leona Borreggine, DO      hydrOXYzine HCL  25 mg Oral Q6H PRN Max 4/day Leona Borreggine, DO      hydrOXYzine HCL  50 mg Oral Q4H PRN Max 4/day Leona Borreggine, DO      Or    LORazepam  1 mg Intramuscular Q4H PRN Leona Borreggine, DO      LORazepam  1 mg Oral Q4H PRN Max 6/day Leona Borreggine, DO      Or    LORazepam  2 mg Intramuscular Q6H PRN Max 3/day Leona Borreggine, DO      loxapine  50 mg Oral HS Katie Grace MD      loxapine  50 mg Oral BID Katie Grace MD      melatonin  3 mg Oral HS Leona Borreggine, DO      nicotine  1 patch Transdermal Daily Edwin Plain, DO      nicotine polacrilex  4 mg Oral Q2H PRN Mario Semen, DO      senna-docusate sodium  1 tablet Oral Daily PRN Keshav Marley Borreggine, DO      traZODone  50 mg Oral HS PRN Mario Semen, DO       Risks / Benefits of Treatment:    Risks, benefits, and possible side effects of medications explained to patient  Patient has limited understanding of risks and benefits of treatment at this time, but agrees to take medications as prescribed  Counseling / Coordination of Care:    Patient's progress discussed with staff in treatment team meeting  Medications, treatment progress and treatment plan reviewed with patient  Medication changes discussed with patient      Olamide Larios MD 02/25/21

## 2021-02-26 PROCEDURE — 99233 SBSQ HOSP IP/OBS HIGH 50: CPT | Performed by: PSYCHIATRY & NEUROLOGY

## 2021-02-26 RX ORDER — DIVALPROEX SODIUM 500 MG/1
500 TABLET, DELAYED RELEASE ORAL 2 TIMES DAILY
Status: DISCONTINUED | OUTPATIENT
Start: 2021-02-26 | End: 2021-02-28

## 2021-02-26 RX ADMIN — LOXAPINE 50 MG: 25 CAPSULE ORAL at 17:05

## 2021-02-26 RX ADMIN — MELATONIN TAB 3 MG 3 MG: 3 TAB at 21:33

## 2021-02-26 RX ADMIN — DIVALPROEX SODIUM 500 MG: 500 TABLET, DELAYED RELEASE ORAL at 08:24

## 2021-02-26 RX ADMIN — LOXAPINE 50 MG: 25 CAPSULE ORAL at 08:25

## 2021-02-26 RX ADMIN — BENZTROPINE MESYLATE 0.5 MG: 0.5 TABLET ORAL at 08:24

## 2021-02-26 RX ADMIN — BENZTROPINE MESYLATE 0.5 MG: 0.5 TABLET ORAL at 21:33

## 2021-02-26 RX ADMIN — LOXAPINE 50 MG: 25 CAPSULE ORAL at 21:33

## 2021-02-26 RX ADMIN — DIVALPROEX SODIUM 500 MG: 500 TABLET, DELAYED RELEASE ORAL at 17:10

## 2021-02-26 RX ADMIN — BENZTROPINE MESYLATE 0.5 MG: 0.5 TABLET ORAL at 17:05

## 2021-02-26 NOTE — NURSING NOTE
Pt denies SI,HI,AH,VH but appears internally preoccupied  Disheveled appearance  Pt stares inappropriately and has delayed responses with communication  Pt is present in dayroom and milieu  Remains in behavioral control  Medication and meal compliant  No complaints or concerns observed or reported at this time  Will continue to monitor

## 2021-02-26 NOTE — PROGRESS NOTES
02/26/21 0839   Team Meeting   Meeting Type Daily Rounds   Team Members Present   Team Members Present Physician;Nurse;   Physician Team Member [address] Team Member [facility] Management Team Member [place]   Patient/Family Present   Patient Present No   Patient's Family Present No   Pt appears paranoid, irritable and internally preoccupied  Med/meal compliant  Depakote level to be drawn on Sunday

## 2021-02-26 NOTE — NURSING NOTE
Pt is visible on unit intermittently, withdrawn to self  Pt has flat affect and is scant, quiet, guarded, delayed in conversation  Pt denies SS but appears distracted and preoccupied internally as well as appearing paranoid  Pt can be observed talking to himself  Pt is pleasant, cooperative and compliant with treatment and medications

## 2021-02-26 NOTE — NURSING NOTE
Pt denies S/S of SI/HI/AH/VH  Appears to be responding internally  Pt out in milieu but isolative to self  Pt medication and meal compliant  Pt denies any unmet needs or complaints at this time  Will monitor

## 2021-02-26 NOTE — PLAN OF CARE
Problem: PSYCHOSIS  Goal: Will report no hallucinations or delusions  Description: Interventions:  - Administer medication as  ordered  - Every waking shifts and PRN assess for the presence of hallucinations and or delusions  - Assist with reality testing to support increasing orientation  - Assess if patient's hallucinations or delusions are encouraging self-harm or harm to others and intervene as appropriate  2/26/2021 0943 by Micaela Unger RN  Outcome: Progressing  2/26/2021 0943 by Micaela Unger RN  Outcome: Progressing     Problem: BEHAVIOR  Goal: Pt/Family maintain appropriate behavior and adhere to behavioral management agreement, if implemented  Description: INTERVENTIONS:  - Assess the family dynamic   - Encourage verbalization of thoughts and concerns in a socially appropriate manner  - Assess patient/family's coping skills and non-compliant behavior (including use of illegal substances)  - Utilize positive, consistent limit setting strategies supporting safety of patient, staff and others  - Initiate consult with Case Management, Spiritual Care or other ancillary services as appropriate  - If a patient's/visitor's behavior jeopardizes the safety of the patient, staff, or others, refer to organization procedure     - Notify Security of behavior or suspected illegal substances which indicate the need for search of the patient and/or belongings  - Encourage participation in the decision making process about a behavioral management agreement; implement if patient meets criteria  2/26/2021 0943 by Micaela Unger RN  Outcome: Progressing  2/26/2021 0943 by Micaela Unger RN  Outcome: Progressing     Problem: ANXIETY  Goal: Will report anxiety at manageable levels  Description: INTERVENTIONS:  - Administer medication as ordered  - Teach and encourage coping skills  - Provide emotional support  - Assess patient/family for anxiety and ability to cope  2/26/2021 0943 by Micaela Unger RN  Outcome: Progressing  2/26/2021 0943 by Raul Garcia RN  Outcome: Progressing  Goal: By discharge: Patient will verbalize 2 strategies to deal with anxiety  Description: Interventions:  - Identify any obvious source/trigger to anxiety  - Staff will assist patient in applying identified coping technique/skills  - Encourage attendance of scheduled groups and activities  2/26/2021 0943 by Raul Garcia RN  Outcome: Progressing  2/26/2021 0943 by Raul Garcia RN  Outcome: Progressing     Problem: INVOLUNTARY ADMIT  Goal: Will cooperate with staff recommendations and doctor's orders and will demonstrate appropriate behavior  Description: INTERVENTIONS:  - Treat underlying conditions and offer medication as ordered  - Educate regarding involuntary admission procedures and rules  - Utilize positive consistent limit setting strategies to support patient and staff safety  Outcome: Progressing     Problem: SELF CARE DEFICIT  Goal: Return ADL status to a safe level of function  Description: INTERVENTIONS:  - Administer medication as ordered  - Assess ADL deficits and provide assistive devices as needed  - Obtain PT/OT consults as needed  - Assist and instruct patient to increase activity and self care as tolerated  Outcome: Progressing

## 2021-02-26 NOTE — PROGRESS NOTES
Progress Note - [address] 30 y o  male MRN: [id number]   Unit/Bed#: [facility] 347-02 Encounter: [id number]    Behavior over the last 24 hours: unchanged  Velinda Sober remains bizarre, disorganized and irritable  He has intense start during the interview, still answers questions with significant delay and appears internally stimulated  Poor self-care, disheveled appearance  Limited participation in milieu  Has been taking medications      Sleep: normal  Appetite: normal  Medication side effects: No   ROS: no complaints, denies any shortness of breath, chest pain or abdominal pain, all other systems are negative    Mental Status Evaluation:    Appearance:  disheveled   Behavior:  bizarre, guarded, intense eye contact   Speech:  scant, delayed   Mood:  irritable   Affect:  labile   Thought Process:  disorganized, illogical   Associations: thought blocking   Thought Content:  persecutory delusions   Perceptual Disturbances: denies auditory or visual hallucinations when asked, but appears responding to internal stimuli   Risk Potential: Suicidal ideation - None at present  Homicidal ideation - None at present  Potential for aggression - Not at present   Sensorium:  oriented to person, place and time/date   Memory:  recent and remote memory grossly intact   Consciousness:  alert and awake   Attention/Concentration: poor concentration and poor attention span   Insight:  poor   Judgment: poor   Gait/Station: normal gait/station, normal balance   Motor Activity: no abnormal movements     Vital signs in last 24 hours:    Temp:  [97 8 °F (36 6 °C)-98 9 °F (37 2 °C)] 98 9 °F (37 2 °C)  HR:  [] 100  Resp:  [16] 16  BP: (108-117)/(70) 108/70    Laboratory results: I have personally reviewed all pertinent laboratory/tests results        Suicide/Homicide Risk Assessment:    Risk of Harm to Self:   Nursing Suicide Risk Assessment Last 24 hours: Low Risk to Self: No evidence of suicidal thoughts or history; Moderate Risk to Self: Presence of hallucinations  ;    Current Specific Risk Factors include: mental illness diagnosis, current psychotic symptoms, poor reasoning  Protective Factors: no current suicidal ideation, taking medications as ordered on the unit  Based on today's assessment, Mckinley Garcia presents the following risk of harm to self: moderate    Risk of Harm to Others:  Nursing Homicide Risk Assessment: Violence Risk to Others: Denies within past 6 months  Based on today's assessment, Mckinley Gracia presents the following risk of harm to others: low    The following interventions are recommended: behavioral checks every 7 minutes, continued hospitalization on locked unit    Progress Toward Goals: no improvement, still disorganized, still psychotic, insight remains poor    Assessment/Plan   Principal Problem:    [facility]ffective disorder, [facility] )  Active Problems:    Cannabis abuse, continuous    Synthetic cannabinoid abuse ([facility] )    Subclinical hypothyroidism      Recommended Treatment:     Planned medication and treatment changes:     All current active medications have been reviewed  Encourage group therapy, milieu therapy and occupational therapy  Behavioral Health checks every 7 minutes  On 303 commitment up to 20 days  Recheck Depakote level in 2 days     Continue all other medications:    Current Facility-Administered Medications   Medication Dose Route Frequency Provider Last Rate    acetaminophen  650 mg Oral Q6H PRN Leona Borreggine, DO      acetaminophen  650 mg Oral Q4H PRN Leona Borreggine, DO      acetaminophen  975 mg Oral Q6H PRN Leona Borreggine, DO      haloperidol lactate  2 5 mg Intramuscular Q6H PRN Max 4/day Leona Borreggine, DO      And    LORazepam  1 mg Intramuscular Q6H PRN Max 4/day Leona Borreggine, DO      And    benztropine  0 5 mg Intramuscular Q6H PRN Max 4/day Leona Borreggine, DO      haloperidol lactate  5 mg Intramuscular Q4H PRN Max 4/day Rashmi Blanchard DO And    LORazepam  2 mg Intramuscular Q4H PRN Max 4/day Freddie Henry DO      And    benztropine  1 mg Intramuscular Q4H PRN Max 4/day Freddie Henry, DO      benztropine  0 5 mg Oral TID Favian Friedman MD      divalproex sodium  500 mg Oral BID Favian Friedman MD      haloperidol  2 mg Oral Q4H PRN Max 6/day Leona Borreggine, DO      haloperidol  5 mg Oral Q6H PRN Max 4/day Leona Borreggine, DO      haloperidol  5 mg Oral Q4H PRN Max 4/day Leona Borreggine, DO      hydrOXYzine HCL  25 mg Oral Q6H PRN Max 4/day Leona Borreggine, DO      hydrOXYzine HCL  50 mg Oral Q4H PRN Max 4/day Leona Borreggine, DO      Or    LORazepam  1 mg Intramuscular Q4H PRN Leona Borreggine, DO      LORazepam  1 mg Oral Q4H PRN Max 6/day Leona Borreggine, DO      Or    LORazepam  2 mg Intramuscular Q6H PRN Max 3/day Leona Borreggine, DO      loxapine  50 mg Oral HS Favian Friedman MD      loxapine  50 mg Oral BID Favian Friedman MD      melatonin  3 mg Oral HS Leona Marinogine, DO      nicotine  1 patch Transdermal Daily Freddie Henry,       nicotine polacrilex  4 mg Oral Q2H PRN Freddie Henry,       senna-docusate sodium  1 tablet Oral Daily PRN Kristy Gauthiere, DO      traZODone  50 mg Oral HS PRN Freddie Henry DO       Risks / Benefits of Treatment:    Risks, benefits, and possible side effects of medications explained to patient  Patient has limited understanding of risks and benefits of treatment at this time, but agrees to take medications as prescribed  Counseling / Coordination of Care:    Patient's progress discussed with staff in treatment team meeting  Medications, treatment progress and treatment plan reviewed with patient      Dafne Adame MD 02/26/21

## 2021-02-27 PROCEDURE — 99233 SBSQ HOSP IP/OBS HIGH 50: CPT | Performed by: PSYCHIATRY & NEUROLOGY

## 2021-02-27 RX ADMIN — BENZTROPINE MESYLATE 0.5 MG: 0.5 TABLET ORAL at 21:04

## 2021-02-27 RX ADMIN — LOXAPINE 50 MG: 25 CAPSULE ORAL at 21:05

## 2021-02-27 RX ADMIN — DIVALPROEX SODIUM 500 MG: 500 TABLET, DELAYED RELEASE ORAL at 09:08

## 2021-02-27 RX ADMIN — DIVALPROEX SODIUM 500 MG: 500 TABLET, DELAYED RELEASE ORAL at 17:12

## 2021-02-27 RX ADMIN — MELATONIN TAB 3 MG 3 MG: 3 TAB at 21:04

## 2021-02-27 RX ADMIN — BENZTROPINE MESYLATE 0.5 MG: 0.5 TABLET ORAL at 09:08

## 2021-02-27 RX ADMIN — LOXAPINE 50 MG: 25 CAPSULE ORAL at 17:11

## 2021-02-27 RX ADMIN — BENZTROPINE MESYLATE 0.5 MG: 0.5 TABLET ORAL at 17:12

## 2021-02-27 RX ADMIN — LOXAPINE 50 MG: 25 CAPSULE ORAL at 09:07

## 2021-02-27 NOTE — PROGRESS NOTES
Occupational Therapy Student Group Note    Patient entered group room and was encouraged to stay and participate  Patient was informed about the topic of the group and politely declined to attend  Did not enter room during group session  Observed patient entering and exiting group room during afternoon life skills session  Did not participate in group activities  Remains quiet and scant on approach

## 2021-02-27 NOTE — NURSING NOTE
Pt is paranoid and scant with speech  He appears internally preoccupied  Pt denies psych symptoms but stares inappropriately when engaging with staff  He is meal and medication compliant  Pt remains isolative to self however wanders around the unit  Pt does not attend groups

## 2021-02-27 NOTE — PROGRESS NOTES
Progress Note - [address] 30 y o  male MRN: [id number]   Unit/Bed#: Kylah Choi 347-02 Encounter: [id number]    Behavior over the last 24 hours: vern Florian is still bizarre, irritable and paranoid  He remains preoccupied with discharge and does not understand why he needs to be in the hospital "I am doing all right"  Staff reports that he is frequently pacing the unit, does not interact with peers and seems very suspicious of others  Has been taking medications      Sleep: normal  Appetite: normal  Medication side effects: No   ROS: no complaints, denies any shortness of breath, chest pain or abdominal pain, all other systems are negative    Mental Status Evaluation:    Appearance:  disheveled   Behavior:  still bizarre, intense eye contact   Speech:  scant, delayed   Mood:  irritable   Affect:  labile   Thought Process:  disorganized, illogical   Associations: thought blocking   Thought Content:  paranoid delusions   Perceptual Disturbances: denies auditory or visual hallucinations when asked, but appears responding to internal stimuli   Risk Potential: Suicidal ideation - None  Homicidal ideation - None  Potential for aggression - Not at present   Sensorium:  oriented to person, place and time/date   Memory:  recent and remote memory grossly intact   Consciousness:  alert and awake   Attention/Concentration: poor concentration and poor attention span   Insight:  poor   Judgment: poor   Gait/Station: normal gait/station, normal balance   Motor Activity: no abnormal movements     Vital signs in last 24 hours:    Temp:  [97 7 °F (36 5 °C)-98 3 °F (36 8 °C)] 97 7 °F (36 5 °C)  HR:  [86-97] 97  Resp:  [16] 16  BP: (102-112)/(62-67) 102/62    Laboratory results: I have personally reviewed all pertinent laboratory/tests results    Depakote:   Lab Results   Component Value Date    VALPROICTOT 69 3 02/28/2021       Suicide/Homicide Risk Assessment:    Risk of Harm to Self:   Nursing Suicide Risk Assessment Last 24 hours: Low Risk to Self: No evidence of suicidal thoughts or history; Moderate Risk to Self: (denied); High Risk to Self: (denied)  Current Specific Risk Factors include: mental illness diagnosis, current unstable mood, current psychotic symptoms, poor reasoning  Protective Factors: no current suicidal ideation, taking medications as ordered on the unit  Based on today's assessment, Jon Palacio presents the following risk of harm to self: moderate    Risk of Harm to Others:  Nursing Homicide Risk Assessment: Violence Risk to Others: Denies within past 6 months  Based on today's assessment, Jon Palacio presents the following risk of harm to others: low    The following interventions are recommended: behavioral checks every 7 minutes, continued hospitalization on locked unit    Progress Toward Goals: no improvement, still very disorganized, still irritable, poor insight, poor reality testing, still has psychotic symptoms    Assessment/Plan   Principal Problem:    [facility]  Active Problems:    Cannabis abuse, continuous    Synthetic cannabinoid abuse ([facility] )    Subclinical hypothyroidism      Recommended Treatment:     Planned medication and treatment changes: All current active medications have been reviewed  Encourage group therapy, milieu therapy and occupational therapy  Behavioral Health checks every 7 minutes  On 303 commitment up to 20 days  Increase Depakote to 500 mg daily and 750 mg in the evening for mood stabilization   Will try to keep Depakote level around 100  Recheck Depakote level, CBC/diff and CMP in 3 days     Continue all other medications:    Current Facility-Administered Medications   Medication Dose Route Frequency Provider Last Rate    acetaminophen  650 mg Oral Q6H PRN Leona Borreggine, DO      acetaminophen  650 mg Oral Q4H PRN Leona Borreggine, DO      acetaminophen  975 mg Oral Q6H PRN Leona Borreggine, DO      haloperidol lactate  2 5 mg Intramuscular Q6H PRN Max 4/day Leona Borreggine, DO      And    LORazepam  1 mg Intramuscular Q6H PRN Max 4/day Leona Borreggine, DO      And    benztropine  0 5 mg Intramuscular Q6H PRN Max 4/day Leona Borreggine, DO      haloperidol lactate  5 mg Intramuscular Q4H PRN Max 4/day Alleghany Fossa, DO      And    LORazepam  2 mg Intramuscular Q4H PRN Max 4/day Maxim Fossa, DO      And    benztropine  1 mg Intramuscular Q4H PRN Max 4/day Maxim Fossa, DO      benztropine  0 5 mg Oral TID Diogo Mar MD      [START ON 3/1/2021] divalproex sodium  500 mg Oral Daily Diogo Mar MD      divalproex sodium  750 mg Oral QPM Diogo Mar MD      haloperidol  2 mg Oral Q4H PRN Max 6/day Leona Borreggine, DO      haloperidol  5 mg Oral Q6H PRN Max 4/day Leona Borreggine, DO      haloperidol  5 mg Oral Q4H PRN Max 4/day Leona Borreggine, DO      hydrOXYzine HCL  25 mg Oral Q6H PRN Max 4/day Leona Borreggine, DO      hydrOXYzine HCL  50 mg Oral Q4H PRN Max 4/day Leona Borreggine, DO      Or    LORazepam  1 mg Intramuscular Q4H PRN Leona Borreggine, DO      LORazepam  1 mg Oral Q4H PRN Max 6/day Leona Borreggine, DO      Or    LORazepam  2 mg Intramuscular Q6H PRN Max 3/day Leona Borreggine, DO      loxapine  50 mg Oral HS Diogo Mar MD      loxapine  50 mg Oral BID Diogo Mar MD      melatonin  3 mg Oral HS Leona Borreggine, DO      nicotine  1 patch Transdermal Daily Maxim Fossa, DO      nicotine polacrilex  4 mg Oral Q2H PRN Alleghany Fossa, DO      senna-docusate sodium  1 tablet Oral Daily PRN Magdalena Beckford Borreggine, DO      traZODone  50 mg Oral HS PRN Alleghany Fossa, DO       Risks / Benefits of Treatment:    Risks, benefits, and possible side effects of medications explained to patient  Patient has limited understanding of risks and benefits of treatment at this time, but agrees to take medications as prescribed      Counseling / Coordination of Care:    Patient's progress discussed with staff in treatment team meeting  Medications, treatment progress and treatment plan reviewed with patient  Medication changes discussed with patient      Nasima Lane MD 02/28/21

## 2021-02-27 NOTE — PROGRESS NOTES
Progress Note - [address] 30 y o  male MRN: [id number]   Unit/Bed#: Julieta Moss 347-02 Encounter: [id number]    Behavior over the last 24 hours: unchanged  Josafat Sadler remains bizarre and preoccupied, still has significant thoughts blocking with delayed responses  He appears responding to internal stimuli during the session  He is able to specify some benefit from his medications and has been complaint on the unit "I feel better about myself"  Limited participation in milieu       Sleep: normal  Appetite: normal  Medication side effects: No   ROS: no complaints, denies any headache, shortness of breath or abdominal pain, all other systems are negative    Mental Status Evaluation:    Appearance:  disheveled   Behavior:  bizarre, guarded, intense eye contact   Speech:  scant, increased latency of response   Mood:  irritable   Affect:  labile   Thought Process:  disorganized, illogical   Associations: thought blocking   Thought Content:  paranoid delusions   Perceptual Disturbances: denies auditory or visual hallucinations when asked, but appears responding to internal stimuli   Risk Potential: Suicidal ideation - None  Homicidal ideation - None  Potential for aggression - Not at present   Sensorium:  oriented to person, place and time/date   Memory:  recent and remote memory grossly intact   Consciousness:  alert and awake   Attention/Concentration: poor concentration and poor attention span   Insight:  poor   Judgment: poor   Gait/Station: normal gait/station, normal balance   Motor Activity: no abnormal movements     Vital signs in last 24 hours:    Temp:  [97 2 °F (36 2 °C)-99 2 °F (37 3 °C)] 97 2 °F (36 2 °C)  HR:  [] 97  Resp:  [16] 16  BP: (102-116)/(65-69) 102/69    Laboratory results: I have personally reviewed all pertinent laboratory/tests results        Suicide/Homicide Risk Assessment:    Risk of Harm to Self:   Nursing Suicide Risk Assessment Last 24 hours: Low Risk to Self: No evidence of suicidal thoughts or history; Moderate Risk to Self: Presence of delusions  ;    Current Specific Risk Factors include: mental illness diagnosis, current psychotic symptoms, poor reasoning  Protective Factors: no current suicidal ideation, taking medications as ordered on the unit  Based on today's assessment, Jon Palacio presents the following risk of harm to self: moderate    Risk of Harm to Others:  Nursing Homicide Risk Assessment: Violence Risk to Others: Denies within past 6 months  Based on today's assessment, Jon Palacio presents the following risk of harm to others: low    The following interventions are recommended: behavioral checks every 7 minutes, continued hospitalization on locked unit    Progress Toward Goals: no significant progress today, still psychotic, poor insight, reality testing remains poor    Assessment/Plan   Principal Problem:    Schizoaffective disorder, bipolar type ([facility] )  Active Problems:    Cannabis abuse, continuous    Synthetic cannabinoid abuse ([facility] )    Subclinical hypothyroidism      Recommended Treatment:     Planned medication and treatment changes:     All current active medications have been reviewed  Encourage group therapy, milieu therapy and occupational therapy  Behavioral Health checks every 7 minutes  On 303 commitment up to 20 days  Recheck Depakote level in the morning     Continue all other medications:    Current Facility-Administered Medications   Medication Dose Route Frequency Provider Last Rate    acetaminophen  650 mg Oral Q6H PRN Leona Borreggine, DO      acetaminophen  650 mg Oral Q4H PRN Leona Borreggine, DO      acetaminophen  975 mg Oral Q6H PRN Leona Borreggine, DO      haloperidol lactate  2 5 mg Intramuscular Q6H PRN Max 4/day Leona Borreggine, DO      And    LORazepam  1 mg Intramuscular Q6H PRN Max 4/day Leona Borreggine, DO      And    benztropine  0 5 mg Intramuscular Q6H PRN Max 4/day Leona Borreggine, DO      haloperidol lactate 5 mg Intramuscular Q4H PRN Max 4/day Pipe Mcghee DO      And    LORazepam  2 mg Intramuscular Q4H PRN Max 4/day Pipe Mcghee DO      And    benztropine  1 mg Intramuscular Q4H PRN Max 4/day Pipe Mcghee,       benztropine  0 5 mg Oral TID Diana Peralta MD      divalproex sodium  500 mg Oral BID Diana Peralta MD      haloperidol  2 mg Oral Q4H PRN Max 6/day Leona Borreggine, DO      haloperidol  5 mg Oral Q6H PRN Max 4/day Leona Borreggine, DO      haloperidol  5 mg Oral Q4H PRN Max 4/day Leona Borreggine, DO      hydrOXYzine HCL  25 mg Oral Q6H PRN Max 4/day Leona Borreggine, DO      hydrOXYzine HCL  50 mg Oral Q4H PRN Max 4/day Leona Borreggine, DO      Or    LORazepam  1 mg Intramuscular Q4H PRN Leona Borreggine, DO      LORazepam  1 mg Oral Q4H PRN Max 6/day Leona Borreggine, DO      Or    LORazepam  2 mg Intramuscular Q6H PRN Max 3/day Leona Borreggine, DO      loxapine  50 mg Oral HS Diana Peralta MD      loxapine  50 mg Oral BID Diana Peralta MD      melatonin  3 mg Oral HS Leona Borreggine, DO      nicotine  1 patch Transdermal Daily Pipe Mcghee,       nicotine polacrilex  4 mg Oral Q2H PRN Pipe Mcghee,       senna-docusate sodium  1 tablet Oral Daily PRN Redell Soulana Rodriguezregginalissa, DO      traZODone  50 mg Oral HS PRN Pipe Mcghee DO       Risks / Benefits of Treatment:    Risks, benefits, and possible side effects of medications explained to patient  Patient has limited understanding of risks and benefits of treatment at this time, but agrees to take medications as prescribed  Counseling / Coordination of Care:    Patient's progress discussed with staff in treatment team meeting  Medications, treatment progress and treatment plan reviewed with patient      Abdifatah Ortiz MD 02/27/21

## 2021-02-28 LAB — VALPROATE SERPL-MCNC: 69.3 UG/ML (ref 50–120)

## 2021-02-28 PROCEDURE — 99232 SBSQ HOSP IP/OBS MODERATE 35: CPT | Performed by: PSYCHIATRY & NEUROLOGY

## 2021-02-28 PROCEDURE — 80164 ASSAY DIPROPYLACETIC ACD TOT: CPT | Performed by: PSYCHIATRY & NEUROLOGY

## 2021-02-28 RX ORDER — DIVALPROEX SODIUM 500 MG/1
500 TABLET, DELAYED RELEASE ORAL DAILY
Status: DISCONTINUED | OUTPATIENT
Start: 2021-03-01 | End: 2021-03-12 | Stop reason: HOSPADM

## 2021-02-28 RX ADMIN — MELATONIN TAB 3 MG 3 MG: 3 TAB at 21:16

## 2021-02-28 RX ADMIN — BENZTROPINE MESYLATE 0.5 MG: 0.5 TABLET ORAL at 08:15

## 2021-02-28 RX ADMIN — DIVALPROEX SODIUM 750 MG: 250 TABLET, DELAYED RELEASE ORAL at 17:14

## 2021-02-28 RX ADMIN — LOXAPINE 50 MG: 25 CAPSULE ORAL at 21:16

## 2021-02-28 RX ADMIN — LOXAPINE 50 MG: 25 CAPSULE ORAL at 08:15

## 2021-02-28 RX ADMIN — LOXAPINE 50 MG: 25 CAPSULE ORAL at 17:15

## 2021-02-28 RX ADMIN — BENZTROPINE MESYLATE 0.5 MG: 0.5 TABLET ORAL at 21:16

## 2021-02-28 RX ADMIN — DIVALPROEX SODIUM 500 MG: 500 TABLET, DELAYED RELEASE ORAL at 08:15

## 2021-02-28 RX ADMIN — BENZTROPINE MESYLATE 0.5 MG: 0.5 TABLET ORAL at 17:15

## 2021-02-28 NOTE — PROGRESS NOTES
02/27/21 1400   Activity/Group Checklist   Group Other (Comment)  (OPEN STUDIO/Art Therapy, Social Interaction-Free Expression)   Attendance Attended   Attendance Duration (min) Greater than 60   Interactions Did not interact   Affect/Mood Blunted/flat   Goals Achieved Able to listen to others

## 2021-02-28 NOTE — NURSING NOTE
Patient remained in his room throughout the evening  Sleeping when observed during initial rounds  Easy to wake for HS snack and medications  Compliant  Denied any unmet needs  Will monitor

## 2021-02-28 NOTE — NURSING NOTE
Pt is visible on unit, appears disheveled and suspicious, paces bran and does not interact with peers  He sits alone at dinner and is quiet and to self  He is delayed and irritable on approach  He denies symptoms but appears preoccupied  He accepts medications but does not know what they are for despite education  He has poor insight  His Depakote has been increased and he has been made aware

## 2021-02-28 NOTE — NURSING NOTE
Pt is meal and medication compliant  He has an intense stare when speaking with staff  Pt is guarded and paranoid with poor hygiene  He denies psych symptoms, however becomes angry and supicous when questioned about relationship with mother  He wanders around unit and stares at peers but does not engage with them  He attended open art therapy today with minimal socialization

## 2021-03-01 PROCEDURE — 99232 SBSQ HOSP IP/OBS MODERATE 35: CPT | Performed by: PSYCHIATRY & NEUROLOGY

## 2021-03-01 RX ORDER — LOXAPINE SUCCINATE 25 MG/1
75 TABLET ORAL
Status: DISCONTINUED | OUTPATIENT
Start: 2021-03-01 | End: 2021-03-09

## 2021-03-01 RX ADMIN — MELATONIN TAB 3 MG 3 MG: 3 TAB at 21:09

## 2021-03-01 RX ADMIN — LOXAPINE 50 MG: 25 CAPSULE ORAL at 16:56

## 2021-03-01 RX ADMIN — DIVALPROEX SODIUM 750 MG: 250 TABLET, DELAYED RELEASE ORAL at 17:06

## 2021-03-01 RX ADMIN — LOXAPINE 75 MG: 25 CAPSULE ORAL at 21:10

## 2021-03-01 RX ADMIN — BENZTROPINE MESYLATE 0.5 MG: 0.5 TABLET ORAL at 16:56

## 2021-03-01 RX ADMIN — BENZTROPINE MESYLATE 0.5 MG: 0.5 TABLET ORAL at 08:37

## 2021-03-01 RX ADMIN — BENZTROPINE MESYLATE 0.5 MG: 0.5 TABLET ORAL at 21:09

## 2021-03-01 RX ADMIN — LOXAPINE 50 MG: 25 CAPSULE ORAL at 08:37

## 2021-03-01 RX ADMIN — DIVALPROEX SODIUM 500 MG: 500 TABLET, DELAYED RELEASE ORAL at 08:37

## 2021-03-01 NOTE — NURSING NOTE
Patient constantly at the nurse's station staring in  When asked if he needs something he states "no, I"m just hanging out "  Patient redirected multiple times about the fact that he cannot just stand at the nurse's station  Patient will move but then eventually comes back again

## 2021-03-01 NOTE — PROGRESS NOTES
Progress Note - [address] 30 y o  male MRN: [id number]   Unit/Bed#: Brittny Espinoza 347-02 Encounter: [id number]    Behavior over the last 24 hours: unchanged  Jean-Pierre Form remains bizarre and paranoid  He still has slow responses and still seems internally stimulated  Some irritability noted  Does not want to attend groups  Minimal participation in milieu - does not interact with peers  Has been taking medications  Sleep: normal  Appetite: normal  Medication side effects: No   ROS: no complaints, denies any headache, shortness of breath or chest pain, all other systems are negative    Mental Status Evaluation:    Appearance:  disheveled   Behavior:  bizarre, intense eye contact   Speech:  scant, increased latency of response   Mood:  irritable   Affect:  labile   Thought Process:  disorganized, illogical   Associations: thought blocking   Thought Content:  persecutory delusions   Perceptual Disturbances: denies auditory or visual hallucinations when asked, but appears responding to internal stimuli   Risk Potential: Suicidal ideation - None at present  Homicidal ideation - None at present  Potential for aggression - Not at present   Sensorium:  oriented to person, place and time/date   Memory:  recent and remote memory grossly intact   Consciousness:  alert and awake   Attention/Concentration: poor concentration and poor attention span   Insight:  poor   Judgment: poor   Gait/Station: normal gait/station, normal balance   Motor Activity: no abnormal movements     Vital signs in last 24 hours:    Temp:  [97 1 °F (36 2 °C)-97 5 °F (36 4 °C)] 97 1 °F (36 2 °C)  HR:  [] 88  Resp:  [16] 16  BP: (107-120)/(65-74) 120/74    Laboratory results: I have personally reviewed all pertinent laboratory/tests results        Suicide/Homicide Risk Assessment:    Risk of Harm to Self:   Nursing Suicide Risk Assessment Last 24 hours: Low Risk to Self: No evidence of suicidal thoughts or history;  Moderate Risk to Self: Presence of hallucinations  , Presence of delusions  (denies but appears internally preoccupied );    Current Specific Risk Factors include: mental illness diagnosis, current psychotic symptoms, poor reasoning  Protective Factors: no current suicidal ideation, taking medications as ordered on the unit  Based on today's assessment, Amelia Doyle presents the following risk of harm to self: moderate    Risk of Harm to Others:  Nursing Homicide Risk Assessment: Violence Risk to Others: Denies within past 6 months  Based on today's assessment, Amelia Doyle presents the following risk of harm to others: low    The following interventions are recommended: behavioral checks every 7 minutes, continued hospitalization on locked unit    Progress Toward Goals: no significant progress, still irritable, still psychotic, insight remains poor    Assessment/Plan   Principal Problem:    Schizoaffective disorder, bipolar type ([facility] )  Active Problems:    Cannabis abuse, continuous    Synthetic cannabinoid abuse ([facility] )    Subclinical hypothyroidism      Recommended Treatment:     Planned medication and treatment changes:     All current active medications have been reviewed  Encourage group therapy, milieu therapy and occupational therapy  Behavioral Health checks every 7 minutes  On 303 commitment up to 20 days  Increase Loxapine to 50 mg bid and 75 mg at bedtime to help with psychotic symptoms  Recheck Depakote level, CBC/diff and CMP in 2 days     Continue all other medications:    Current Facility-Administered Medications   Medication Dose Route Frequency Provider Last Rate    acetaminophen  650 mg Oral Q6H PRN Leona Borreggine, DO      acetaminophen  650 mg Oral Q4H PRN Leona Borreggine, DO      acetaminophen  975 mg Oral Q6H PRN Leona Borreggine, DO      haloperidol lactate  2 5 mg Intramuscular Q6H PRN Max 4/day Leona Borreggine, DO      And    LORazepam  1 mg Intramuscular Q6H PRN Max 4/day Destineamon Schreiber DO      And   Ruby Islas benztropine  0 5 mg Intramuscular Q6H PRN Max 4/day Leona Borreggine, DO      haloperidol lactate  5 mg Intramuscular Q4H PRN Max 4/day Creasie Caras, DO      And    LORazepam  2 mg Intramuscular Q4H PRN Max 4/day Creasie Caras, DO      And    benztropine  1 mg Intramuscular Q4H PRN Max 4/day Creasie Caras, DO      benztropine  0 5 mg Oral TID Cisco Cuellar MD      divalproex sodium  500 mg Oral Daily Cisco Cuellar MD      divalproex sodium  750 mg Oral QPM Cisco Cuellar MD      haloperidol  2 mg Oral Q4H PRN Max 6/day Leona Borreggine, DO      haloperidol  5 mg Oral Q6H PRN Max 4/day Leona Borreggine, DO      haloperidol  5 mg Oral Q4H PRN Max 4/day Leona Borreggine, DO      hydrOXYzine HCL  25 mg Oral Q6H PRN Max 4/day Leona Borreggine, DO      hydrOXYzine HCL  50 mg Oral Q4H PRN Max 4/day Leona Borreggine, DO      Or    LORazepam  1 mg Intramuscular Q4H PRN Leona Borreggine, DO      LORazepam  1 mg Oral Q4H PRN Max 6/day Leona Borreggine, DO      Or    LORazepam  2 mg Intramuscular Q6H PRN Max 3/day Leona Borreggine, DO      loxapine  50 mg Oral BID Cisco Cuellar MD      loxapine  75 mg Oral HS Cisco Cuellar MD      melatonin  3 mg Oral HS Leona Borreggine, DO      nicotine  1 patch Transdermal Daily Creasie Caras, DO      nicotine polacrilex  4 mg Oral Q2H PRN Creasie Caras, DO      senna-docusate sodium  1 tablet Oral Daily PRN Gricel Pardon Borreggine, DO      traZODone  50 mg Oral HS PRN Creasie Caras, DO       Risks / Benefits of Treatment:    Risks, benefits, and possible side effects of medications explained to patient  Patient has limited understanding of risks and benefits of treatment at this time, but agrees to take medications as prescribed  Counseling / Coordination of Care:    Patient's progress discussed with staff in treatment team meeting    Medications, treatment progress and treatment plan reviewed with patient  Medication changes discussed with patient      Nasima Lane MD 03/01/21

## 2021-03-01 NOTE — NURSING NOTE
Pt denies SI,HI,AH,VH but appears to be internally preoccupied  Pt observed inappropriately laughing to self and inappropriately staring  Medication and meal compliant  Present in day room and milieu  Pt is calm and cooperative  No further complaints or concerns observed or reported at this time  Will continue to monitor

## 2021-03-01 NOTE — NURSING NOTE
Patient remained isolative to room throughout the evening  Observed sleeping  Easy to wake for scheduled HS medications and received snack  Denied any unmet needs  Speech continues to be delayed  No irritability noted  Will monitor

## 2021-03-01 NOTE — PROGRESS NOTES
03/01/21 0840   Team Meeting   Meeting Type Daily Rounds   Team Members Present   Team Members Present Physician;Nurse;   Physician Team Member [address] Team Member [facility] Management Team Member [place]   Patient/Family Present   Patient Present No   Patient's Family Present No   Pt remains with intense stare  Pt reports delay in responding is his baseline  Med/meal compliant

## 2021-03-02 PROCEDURE — 99232 SBSQ HOSP IP/OBS MODERATE 35: CPT | Performed by: PSYCHIATRY & NEUROLOGY

## 2021-03-02 RX ADMIN — BENZTROPINE MESYLATE 0.5 MG: 0.5 TABLET ORAL at 21:27

## 2021-03-02 RX ADMIN — BENZTROPINE MESYLATE 0.5 MG: 0.5 TABLET ORAL at 08:24

## 2021-03-02 RX ADMIN — DIVALPROEX SODIUM 500 MG: 500 TABLET, DELAYED RELEASE ORAL at 08:24

## 2021-03-02 RX ADMIN — LOXAPINE 50 MG: 25 CAPSULE ORAL at 16:07

## 2021-03-02 RX ADMIN — BENZTROPINE MESYLATE 0.5 MG: 0.5 TABLET ORAL at 16:07

## 2021-03-02 RX ADMIN — DIVALPROEX SODIUM 750 MG: 250 TABLET, DELAYED RELEASE ORAL at 17:00

## 2021-03-02 RX ADMIN — LOXAPINE 50 MG: 25 CAPSULE ORAL at 08:24

## 2021-03-02 RX ADMIN — LOXAPINE 75 MG: 25 CAPSULE ORAL at 21:27

## 2021-03-02 RX ADMIN — MELATONIN TAB 3 MG 3 MG: 3 TAB at 21:27

## 2021-03-02 NOTE — PROGRESS NOTES
Occupational Therapy Student Group Note    Observed patient entering room and sitting at the group table away from peers  Patient walked out of group room before group facilitator acknowledged his presence

## 2021-03-02 NOTE — NURSING NOTE
Pt has been at the nurses station and had to be told that he cannot stand at nurses station unless he needs something   Pt denies SI/HI and AVH even after  Pt was observed laughing to himself loudly   Pt remains with his intense stares and delayed responses  Pt is compliant with medications and treatment  Will continue to monitor

## 2021-03-02 NOTE — PROGRESS NOTES
Progress Note - [address] 30 y o  male MRN: [id number]   Unit/Bed#: Darol Goldmann 347-02 Encounter: [id number]    Behavior over the last 24 hours: vern Malone is still paranoid and preoccupied, still answers questions with significant delay and still seems distracted  Intense start during the session, bizarre behavior noted - staff also reports that he stays for an extended time next to the nursing station without any particular reason saying that he is "just hanging out"  Limited participation in milieu  Has been taking medications       Sleep: normal  Appetite: normal  Medication side effects: No   ROS: no complaints, denies any headache, shortness of breath or chest pain, all other systems are negative    Mental Status Evaluation:    Appearance:  disheveled   Behavior:  bizarre, intense eye contact   Speech:  scant, delayed   Mood:  irritable   Affect:  labile   Thought Process:  disorganized, illogical   Associations: thought blocking   Thought Content:  persecutory delusions   Perceptual Disturbances: denies auditory or visual hallucinations when asked, but appears preoccupied   Risk Potential: Suicidal ideation - None at present  Homicidal ideation - None at present  Potential for aggression - Not at present   Sensorium:  oriented to person, place and time/date   Memory:  recent and remote memory grossly intact   Consciousness:  alert and awake   Attention/Concentration: poor concentration and poor attention span   Insight:  poor   Judgment: poor   Gait/Station: normal gait/station, normal balance   Motor Activity: no abnormal movements     Vital signs in last 24 hours:    Temp:  [98 7 °F (37 1 °C)-98 8 °F (37 1 °C)] 98 7 °F (37 1 °C)  HR:  [101-103] 101  Resp:  [16] 16  BP: (116-122)/(68-82) 122/82    Laboratory results: I have personally reviewed all pertinent laboratory/tests results        Suicide/Homicide Risk Assessment:    Risk of Harm to Self:   Nursing Suicide Risk Assessment Last 24 hours: Low Risk to Self: No evidence of suicidal thoughts or history; Moderate Risk to Self: Presence of hallucinations  , Presence of delusions  ;    Current Specific Risk Factors include: mental illness diagnosis, current psychotic symptoms, poor reasoning  Protective Factors: no current suicidal ideation, taking medications as ordered on the unit  Based on today's assessment, Tae Montejo presents the following risk of harm to self: moderate    Risk of Harm to Others:  Nursing Homicide Risk Assessment: Violence Risk to Others: Denies within past 6 months  Based on today's assessment, Tae Montejo presents the following risk of harm to others: low    The following interventions are recommended: behavioral checks every 7 minutes, continued hospitalization on locked unit    Progress Toward Goals: no improvement, still disorganized, still paranoid, remains preoccupied, poor insight, poor reality testing    Assessment/Plan   Principal Problem:    Schizoaffective disorder, bipolar type ([facility] )  Active Problems:    Cannabis abuse, continuous    S[facility] cann[facility]oid abuse ([facility] )    Subclinical hypothyroidism      Recommended Treatment:     Planned medication and treatment changes:     All current active medications have been reviewed  Encourage group therapy, milieu therapy and occupational therapy  Behavioral Health checks every 7 minutes  On 303 commitment up to 20 days  Recheck Depakote level, CBC/diff and CMP in the morning     Continue all other medications:    Current Facility-Administered Medications   Medication Dose Route Frequency Provider Last Rate    acetaminophen  650 mg Oral Q6H PRN Leona Borreggine, DO      acetaminophen  650 mg Oral Q4H PRN Leona Borreggine, DO      acetaminophen  975 mg Oral Q6H PRN Leona Borreggine, DO      haloperidol lactate  2 5 mg Intramuscular Q6H PRN Max 4/day Leona Borreggine, DO      And    LORazepam  1 mg Intramuscular Q6H PRN Max 4/day Sascha Cuellar DO      And   [address] [place] [address] benztropine  0 5 mg Intramuscular Q6H PRN Max 4/day Leona Borreggine, DO      haloperidol lactate  5 mg Intramuscular Q4H PRN Max 4/day Neelam Jarratt, DO      And    LORazepam  2 mg Intramuscular Q4H PRN Max 4/day Neelam Jarratt, DO      And    benztropine  1 mg Intramuscular Q4H PRN Max 4/day Neelam Jarratt, DO      benztropine  0 5 mg Oral TID Lele Mendoza MD      divalproex sodium  500 mg Oral Daily Lele Mendoza MD      divalproex sodium  750 mg Oral QPM Lele Mendoza MD      haloperidol  2 mg Oral Q4H PRN Max 6/day Leona Borreggine, DO      haloperidol  5 mg Oral Q6H PRN Max 4/day Loena Borreggine, DO      haloperidol  5 mg Oral Q4H PRN Max 4/day Leona Borreggine, DO      hydrOXYzine HCL  25 mg Oral Q6H PRN Max 4/day Leona Borreggine, DO      hydrOXYzine HCL  50 mg Oral Q4H PRN Max 4/day Leona Borreggine, DO      Or    LORazepam  1 mg Intramuscular Q4H PRN Leona Borreggine, DO      LORazepam  1 mg Oral Q4H PRN Max 6/day Leona Borreggine, DO      Or    LORazepam  2 mg Intramuscular Q6H PRN Max 3/day Leona Borreggine, DO      loxapine  50 mg Oral BID Lele Mendoza MD      loxapine  75 mg Oral HS Lele Mendoza MD      melatonin  3 mg Oral HS Leona Borreggine, DO      nicotine  1 patch Transdermal Daily Daneen Jarratt, DO      nicotine polacrilex  4 mg Oral Q2H PRN Neelam Jarratt, DO      senna-docusate sodium  1 tablet Oral Daily PRN Rhys Ebbing Borreggine, DO      traZODone  50 mg Oral HS PRN Neelam Jarratt, DO       Risks / Benefits of Treatment:    Risks, benefits, and possible side effects of medications explained to patient  Patient has limited understanding of risks and benefits of treatment at this time, but agrees to take medications as prescribed  Counseling / Coordination of Care:    Patient's progress discussed with staff in treatment team meeting    Medications, treatment progress and treatment plan reviewed with patient      Brayden Callejas MD 03/02/21

## 2021-03-02 NOTE — NURSING NOTE
Pt denies SI,HI,AH,VH but appears to be internally preoccupied  Pt present in dayroom and milieu  Interactive with select peers  Pt observed laughing to self while walking in the milieu  Delayed speech  Medication and meal compliant  Pt continues to have inappropriate stare  And needs redirection to not continuously stand at nurses station  Remains in behavioral control  No further complaints or concerns reported or observed at this time  Will continue to monitor

## 2021-03-02 NOTE — NURSING NOTE
Pt is bizarre, and stares intensely when approached  Pt is guarded, scant, suspicious, delayed in conversation  Pt hesitates before answering, usually denying all  Pt appears to be internally preoccupied and paranoid  Pt is visible on milieu, but is withdrawn to self and not social with staff or peers  Pt is meal and medication compliant

## 2021-03-03 LAB
ALBUMIN SERPL BCP-MCNC: 3.9 G/DL (ref 3–5.2)
ALP SERPL-CCNC: 72 U/L (ref 43–122)
ALT SERPL W P-5'-P-CCNC: 16 U/L (ref 9–52)
ANION GAP SERPL CALCULATED.3IONS-SCNC: 6 MMOL/L (ref 5–14)
AST SERPL W P-5'-P-CCNC: 29 U/L (ref 17–59)
BASOPHILS # BLD AUTO: 0 THOUSANDS/ΜL (ref 0–0.1)
BASOPHILS NFR BLD AUTO: 1 % (ref 0–1)
BILIRUB SERPL-MCNC: 0.3 MG/DL
BUN SERPL-MCNC: 16 MG/DL (ref 5–25)
CALCIUM SERPL-MCNC: 9.3 MG/DL (ref 8.4–10.2)
CHLORIDE SERPL-SCNC: 98 MMOL/L (ref 97–108)
CO2 SERPL-SCNC: 32 MMOL/L (ref 22–30)
CREAT SERPL-MCNC: 0.62 MG/DL (ref 0.7–1.5)
EOSINOPHIL # BLD AUTO: 0.2 THOUSAND/ΜL (ref 0–0.4)
EOSINOPHIL NFR BLD AUTO: 3 % (ref 0–6)
ERYTHROCYTE [DISTWIDTH] IN BLOOD BY AUTOMATED COUNT: 14.6 %
GFR SERPL CREATININE-BSD FRML MDRD: 150 ML/MIN/1.73SQ M
GLUCOSE P FAST SERPL-MCNC: 101 MG/DL (ref 70–99)
GLUCOSE SERPL-MCNC: 101 MG/DL (ref 70–99)
HCT VFR BLD AUTO: 40.8 % (ref 41–53)
HGB BLD-MCNC: 13.3 G/DL (ref 13.5–17.5)
LYMPHOCYTES # BLD AUTO: 2.6 THOUSANDS/ΜL (ref 0.5–4)
LYMPHOCYTES NFR BLD AUTO: 46 % (ref 25–45)
MCH RBC QN AUTO: 30.2 PG (ref 26–34)
MCHC RBC AUTO-ENTMCNC: 32.6 G/DL (ref 31–36)
MCV RBC AUTO: 93 FL (ref 80–100)
MONOCYTES # BLD AUTO: 0.6 THOUSAND/ΜL (ref 0.2–0.9)
MONOCYTES NFR BLD AUTO: 11 % (ref 1–10)
NEUTROPHILS # BLD AUTO: 2.2 THOUSANDS/ΜL (ref 1.8–7.8)
NEUTS SEG NFR BLD AUTO: 39 % (ref 45–65)
PLATELET # BLD AUTO: 246 THOUSANDS/UL (ref 150–450)
PMV BLD AUTO: 8.2 FL (ref 8.9–12.7)
POTASSIUM SERPL-SCNC: 4.4 MMOL/L (ref 3.6–5)
PROT SERPL-MCNC: 6.4 G/DL (ref 5.9–8.4)
RBC # BLD AUTO: 4.4 MILLION/UL (ref 4.5–5.9)
SODIUM SERPL-SCNC: 136 MMOL/L (ref 137–147)
VALPROATE SERPL-MCNC: 73.9 UG/ML (ref 50–120)
WBC # BLD AUTO: 5.7 THOUSAND/UL (ref 4.5–11)

## 2021-03-03 PROCEDURE — 99233 SBSQ HOSP IP/OBS HIGH 50: CPT | Performed by: PSYCHIATRY & NEUROLOGY

## 2021-03-03 PROCEDURE — 85025 COMPLETE CBC W/AUTO DIFF WBC: CPT | Performed by: PSYCHIATRY & NEUROLOGY

## 2021-03-03 PROCEDURE — 80053 COMPREHEN METABOLIC PANEL: CPT | Performed by: PSYCHIATRY & NEUROLOGY

## 2021-03-03 PROCEDURE — 80164 ASSAY DIPROPYLACETIC ACD TOT: CPT | Performed by: PSYCHIATRY & NEUROLOGY

## 2021-03-03 RX ADMIN — BENZTROPINE MESYLATE 0.5 MG: 0.5 TABLET ORAL at 21:43

## 2021-03-03 RX ADMIN — LOXAPINE 50 MG: 25 CAPSULE ORAL at 08:35

## 2021-03-03 RX ADMIN — LOXAPINE 75 MG: 25 CAPSULE ORAL at 21:42

## 2021-03-03 RX ADMIN — DIVALPROEX SODIUM 750 MG: 250 TABLET, DELAYED RELEASE ORAL at 17:10

## 2021-03-03 RX ADMIN — BENZTROPINE MESYLATE 0.5 MG: 0.5 TABLET ORAL at 08:36

## 2021-03-03 RX ADMIN — LOXAPINE 50 MG: 25 CAPSULE ORAL at 17:10

## 2021-03-03 RX ADMIN — DIVALPROEX SODIUM 500 MG: 500 TABLET, DELAYED RELEASE ORAL at 08:36

## 2021-03-03 RX ADMIN — MELATONIN TAB 3 MG 3 MG: 3 TAB at 21:42

## 2021-03-03 RX ADMIN — BENZTROPINE MESYLATE 0.5 MG: 0.5 TABLET ORAL at 17:10

## 2021-03-03 NOTE — SOCIAL WORK
CM spoke with pt's mother, Alanna Danielle  Mother provided some background  Pt lives with his mother and brother  Pt began having trouble with his mental health at age 24  Family hx of mental health including his mother (bipolar) and siblings  Pt has 2 siblings  Mother is unsure if pt has any current legal issues, but does not believe so to her knowledge  There are no weapons in the home  Mother gives pt his medicine every morning and night  Pt goes to [facility] for outpatient  Pt's last inpatient hospitalization was about 3 years ago  At [facility]:  Pt likes to walk around outside  Pt typically responds slowly  Pt completes his ADLs  Pt is not violent  Mother reports she spoke with him yesterday and she feels he sounds like he is doing well  Pt can return home when ready

## 2021-03-03 NOTE — PROGRESS NOTES
03/03/21 0839   Team Meeting   Meeting Type Daily Rounds   Team Members Present   Team Members Present Physician;Nurse;   Physician Team Member [address] Team Member [facility] Management Team Member [place]   Patient/Family Present   Patient Present No   Patient's Family Present No   Pt remains much the same  Pt with intense stare  Pt disheveled  Pt hangs around the nurse's station most of the day  Med/meal compliant

## 2021-03-03 NOTE — NURSING NOTE
Pt denies SI,HI,AH,VH  Pt is bright upon approach  Present in Milieu and dayroom  Medication and meal compliant  No further complaints or concerns observed or reported at this time  Pt is progressing with communication today and has not needed redirection this morning  Will continue to monitor

## 2021-03-03 NOTE — PROGRESS NOTES
03/02/21 0842   Team Meeting   Meeting Type Daily Rounds   Team Members Present   Team Members Present Physician;Nurse;   Physician Team Member [facility]ak Pagan [facility]   Care Management Team Member [place]   Patient/Family Present   Patient Present No   Patient's Family Present No   Pt still with intense stare  Pt guarded and suspicious  Med/meal compliant

## 2021-03-03 NOTE — PROGRESS NOTES
03/03/21 0969   Activity/Group Checklist   Group   (recovery group)   Attendance Did not attend   Writer attempted to encourage patient to come to group  He refused stating "I am angry and want to be alone"; shutting his door, He was polite and was not loud

## 2021-03-03 NOTE — PROGRESS NOTES
Progress Note - [address] 30 y o  male MRN: [id number]   Unit/Bed#: CATARINA [facility] 347-02 Encounter: [id number]    Behavior over the last 24 hours: vern Moreno remains bizarre, paranoid and preoccupied  He continues to stare frequently at staff and gives bizarre responses to questions  He seems irritable, does not interact with peers as per staff report  Minimal participation in milieu  Has been taking medications      Sleep: normal  Appetite: normal  Medication side effects: No   ROS: no complaints, denies any headache, abdominal pain or back pain, all other systems are negative    Mental Status Evaluation:    Appearance:  disheveled   Behavior:  bizarre, guarded   Speech:  scant, increased latency of response   Mood:  irritable   Affect:  labile   Thought Process:  disorganized, illogical   Associations: blocking   Thought Content:  persecutory delusions   Perceptual Disturbances: denies auditory or visual hallucinations when asked, but appears preoccupied   Risk Potential: Suicidal ideation - None at present  Homicidal ideation - None at present  Potential for aggression - Not at present   Sensorium:  oriented to person, place and time/date   Memory:  recent and remote memory grossly intact   Consciousness:  alert and awake   Attention/Concentration: poor concentration and poor attention span   Insight:  poor   Judgment: poor   Gait/Station: normal gait/station, normal balance   Motor Activity: no abnormal movements     Vital signs in last 24 hours:    Temp:  [97 2 °F (36 2 °C)-97 9 °F (36 6 °C)] 97 2 °F (36 2 °C)  HR:  [] 97  Resp:  [16] 16  BP: (110-123)/(62) 110/62    Laboratory results: I have personally reviewed all pertinent laboratory/tests results    Last Laboratory Results with Depakote and/or Tegretol levels:   Lab Results   Component Value Date    WBC 5 70 03/03/2021    RBC 4 40 (L) 03/03/2021    HGB 13 3 (L) 03/03/2021    HCT 40 8 (L) 03/03/2021     03/03/2021    RDW 14 6 03/03/2021    NEUTROABS 2 20 03/03/2021    SODIUM 136 (L) 03/03/2021    K 4 4 03/03/2021    CL 98 03/03/2021    CO2 32 (H) 03/03/2021    BUN 16 03/03/2021    CREATININE 0 62 (L) 03/03/2021    GLUC 101 (H) 03/03/2021    CALCIUM 9 3 03/03/2021    AST 29 03/03/2021    ALT 16 03/03/2021    ALKPHOS 72 03/03/2021    TP 6 4 03/03/2021    ALB 3 9 03/03/2021    TBILI 0 30 03/03/2021    VALPROICTOT 73 9 03/03/2021       Suicide/Homicide Risk Assessment:    Risk of Harm to Self:   Nursing Suicide Risk Assessment Last 24 hours: Low Risk to Self: No evidence of suicidal thoughts or history; Moderate Risk to Self: Presence of hallucinations  , Presence of delusions  ;    Current Specific Risk Factors include: mental illness diagnosis, current psychotic symptoms, poor reasoning  Protective Factors: no current suicidal ideation, taking medications as ordered on the unit  Based on today's assessment, Karlee Damon presents the following risk of harm to self: moderate    Risk of Harm to Others:  Nursing Homicide Risk Assessment: Violence Risk to Others: Denies within past 6 months  Based on today's assessment, Karlee Damon presents the following risk of harm to others: low    The following interventions are recommended: behavioral checks every 7 minutes, continued hospitalization on locked unit    Progress Toward Goals: no significant progress, still irritable, remains labile, still psychotic, insight remains poor    Assessment/Plan   Principal Problem:    Schizoaffective disorder, bipolar type ([facility] )  Active Problems:    Cannabis abuse, continuous    [facility] ([facility] )    Subclinical hypothyroidism      Recommended Treatment:     Planned medication and treatment changes:     All current active medications have been reviewed  Encourage group therapy, milieu therapy and occupational therapy  Behavioral Health checks every 7 minutes  On 303 commitment up to 20 days     Continue current medications:    Current Facility-Administered Medications   Medication Dose Route Frequency Provider Last Rate    acetaminophen  650 mg Oral Q6H PRN Leona Borreggine, DO      acetaminophen  650 mg Oral Q4H PRN Leona Borreggine, DO      acetaminophen  975 mg Oral Q6H PRN Leona Borreggine, DO      haloperidol lactate  2 5 mg Intramuscular Q6H PRN Max 4/day Leona Borreggine, DO      And    LORazepam  1 mg Intramuscular Q6H PRN Max 4/day Leona Borreggine, DO      And    benztropine  0 5 mg Intramuscular Q6H PRN Max 4/day Leona Borreggine, DO      haloperidol lactate  5 mg Intramuscular Q4H PRN Max 4/day Maxim Fossa, DO      And    LORazepam  2 mg Intramuscular Q4H PRN Max 4/day [place] Fossa, DO      And    benztropine  1 mg Intramuscular Q4H PRN Max 4/day [place] Fossa, DO      benztropine  0 5 mg Oral TID Diogo Mar MD      divalproex sodium  500 mg Oral Daily Diogo Mar MD      divalproex sodium  750 mg Oral QPM Diogo Mar MD      haloperidol  2 mg Oral Q4H PRN Max 6/day Leona Borreggine, DO      haloperidol  5 mg Oral Q6H PRN Max 4/day Leona Borreggine, DO      haloperidol  5 mg Oral Q4H PRN Max 4/day Leona Borreggine, DO      hydrOXYzine HCL  25 mg Oral Q6H PRN Max 4/day Leona Borreggine, DO      hydrOXYzine HCL  50 mg Oral Q4H PRN Max 4/day Leona Borreggine, DO      Or    LORazepam  1 mg Intramuscular Q4H PRN Leona Borreggine, DO      LORazepam  1 mg Oral Q4H PRN Max 6/day Leona Borreggine, DO      Or    LORazepam  2 mg Intramuscular Q6H PRN Max 3/day Leona Borreggine, DO      loxapine  50 mg Oral BID Diogo Mar MD      loxapine  75 mg Oral HS Diogo Mar MD      melatonin  3 mg Oral HS Leona Borreggine, DO      nicotine  1 patch Transdermal Daily [place] Fossa, DO      nicotine polacrilex  4 mg Oral Q2H PRN [place] Fossa, DO      senna-docusate sodium  1 tablet Oral Daily PRN Leona Borreggine, DO      traZODone  50 mg Oral HS PRN [place] Fossa, DO Risks / Benefits of Treatment:    Risks, benefits, and possible side effects of medications explained to patient  Patient has limited understanding of risks and benefits of treatment at this time, but agrees to take medications as prescribed  Counseling / Coordination of Care:    Patient's progress discussed with staff in treatment team meeting  Medications, treatment progress and treatment plan reviewed with patient      Nadine Nath MD 03/03/21

## 2021-03-03 NOTE — PROGRESS NOTES
Occupational Therapy Student Group Note    Patient frequently entered/exited group room during afternoon leisure session  Did not engage in group games or socialize with peers  Observed patient sitting and watching peers play table top game  Patient appropriately reacted to game by laughing

## 2021-03-03 NOTE — NURSING NOTE
Pt visible, but withdrawn while pacing the milieu  Pt continues to stare and smile inappropriately at staff, but is pleasant and cooperative when approached  Pt speech continues to be delayed and appears as pt is in deep thought about how to respond to simple questions  Pt denies SI/HI and AVH and reports sleeping well and good appetite  Will continue to monitor

## 2021-03-04 PROCEDURE — 99232 SBSQ HOSP IP/OBS MODERATE 35: CPT | Performed by: PSYCHIATRY & NEUROLOGY

## 2021-03-04 RX ORDER — BENZTROPINE MESYLATE 1 MG/1
1 TABLET ORAL 2 TIMES DAILY
Status: DISCONTINUED | OUTPATIENT
Start: 2021-03-04 | End: 2021-03-12 | Stop reason: HOSPADM

## 2021-03-04 RX ORDER — BENZTROPINE MESYLATE 0.5 MG/1
0.5 TABLET ORAL ONCE
Status: COMPLETED | OUTPATIENT
Start: 2021-03-04 | End: 2021-03-04

## 2021-03-04 RX ADMIN — LOXAPINE 75 MG: 25 CAPSULE ORAL at 21:15

## 2021-03-04 RX ADMIN — LOXAPINE 50 MG: 25 CAPSULE ORAL at 08:45

## 2021-03-04 RX ADMIN — BENZTROPINE MESYLATE 0.5 MG: 0.5 TABLET ORAL at 08:45

## 2021-03-04 RX ADMIN — MELATONIN TAB 3 MG 3 MG: 3 TAB at 21:14

## 2021-03-04 RX ADMIN — LOXAPINE 50 MG: 25 CAPSULE ORAL at 15:59

## 2021-03-04 RX ADMIN — DIVALPROEX SODIUM 500 MG: 500 TABLET, DELAYED RELEASE ORAL at 08:45

## 2021-03-04 RX ADMIN — DIVALPROEX SODIUM 750 MG: 250 TABLET, DELAYED RELEASE ORAL at 17:06

## 2021-03-04 RX ADMIN — BENZTROPINE MESYLATE 1 MG: 1 TABLET ORAL at 17:07

## 2021-03-04 RX ADMIN — BENZTROPINE MESYLATE 0.5 MG: 0.5 TABLET ORAL at 09:58

## 2021-03-04 NOTE — QUICK NOTE
Patient seen today on the behavioral unit  Endorses no immediate concerns or questions at this time

## 2021-03-04 NOTE — PROGRESS NOTES
Progress Note - [address] 30 y o  male MRN: [id number]   Unit/Bed#: Brittny Espinoza 347-02 Encounter: [id number]    Behavior over the last 24 hours: limited improvement  Jean-Pierre Form still has bizarre behavior and paranoid ideation - he was observed flashing the toilet multiple times after using it, could not explain why  Per staff report he still laughs to self at times and stares inappropriately at peers and staff  He seems less irritable otherwise and denies all symptoms when asked "My vision is getting better  Probably my attitude  I do things better"  Per  his mother reported that he always would answer with delay; mother also thought that Codey Stark was improving  Minimal participation in milieu  Has been taking medications      Sleep: normal  Appetite: normal  Medication side effects: Yes - mild hand tremor noted   ROS: mild hand tremor noted, denies any shortness of breath or chest pain, all other systems are negative    Mental Status Evaluation:    Appearance:  disheveled   Behavior:  guarded, still somewhat bizarre   Speech:  scant, delayed, soft   Mood:  anxious, less irritable   Affect:  labile   Thought Process:  disorganized, illogical   Associations: thought blocking   Thought Content:  paranoid ideation   Perceptual Disturbances: denies auditory or visual hallucinations when asked, but appears preoccupied   Risk Potential: Suicidal ideation - None  Homicidal ideation - None  Potential for aggression - Not at present   Sensorium:  oriented to person, place and time/date   Memory:  recent and remote memory grossly intact   Consciousness:  alert and awake   Attention/Concentration: poor concentration and poor attention span   Insight:  poor   Judgment: poor   Gait/Station: normal gait/station, normal balance   Motor Activity: abnormal movement noted: mild hand tremor present     Vital signs in last 24 hours:    Temp:  [97 1 °F (36 2 °C)-97 8 °F (36 6 °C)] 97 8 °F (36 6 °C)  HR:  [100] 100  Resp:  [16] 16  BP: (113118)/5771 118/57    Laboratory results: I have personally reviewed all pertinent laboratory/tests results        Suicide/Homicide Risk Assessment:    Risk of Harm to Self:   Nursing Suicide Risk Assessment Last 24 hours: Low Risk to Self: No evidence of suicidal thoughts or history; Moderate Risk to Self: Presence of hallucinations  , Presence of delusions  ;    Current Specific Risk Factors include: mental illness diagnosis, current psychotic symptoms  Protective Factors: no current suicidal ideation, taking medications as ordered on the unit  Based on today's assessment, Radha Lanza presents the following risk of harm to self: moderate    Risk of Harm to Others:  Nursing Homicide Risk Assessment: Violence Risk to Others: Denies within past 6 months  Based on today's assessment, Radha Lanza presents the following risk of harm to others: low    The following interventions are recommended: behavioral checks every 7 minutes, continued hospitalization on locked unit    Progress Toward Goals: limited progress, less irritable, still somewhat paranoid, insight remains poor, still has psychotic symptoms    Assessment/Plan   Principal Problem:    Schizoaffective disorder, bipolar type ([facility] )  Active Problems:    Cannabis abuse, continuous    Synthetic cannabinoid abuse ([facility] )    Subclinical hypothyroidism      Recommended Treatment:     Planned medication and treatment changes:     All current active medications have been reviewed  Encourage group therapy, milieu therapy and occupational therapy  Behavioral Health checks every 7 minutes  On 303 commitment up to 20 days  Increase Cogentin to 1 mg bid to help with hand tremor    Continue all other medications:    Current Facility-Administered Medications   Medication Dose Route Frequency Provider Last Rate    acetaminophen  650 mg Oral Q6H PRN Leona Borreggine, DO      acetaminophen  650 mg Oral Q4H PRN Leona Borreggine, DO      acetaminophen  975 mg Oral Q6H PRN Redell Sous Borreggine, DO      haloperidol lactate  2 5 mg Intramuscular Q6H PRN Max 4/day Leona Borreggine, DO      And    LORazepam  1 mg Intramuscular Q6H PRN Max 4/day Leona Borreggine, DO      And    benztropine  0 5 mg Intramuscular Q6H PRN Max 4/day Leona Borreggine, DO      haloperidol lactate  5 mg Intramuscular Q4H PRN Max 4/day Pipe Mcghee, DO      And    LORazepam  2 mg Intramuscular Q4H PRN Max 4/day Pipe Mcghee, DO      And    benztropine  1 mg Intramuscular Q4H PRN Max 4/day Pipe Mcghee, DO      benztropine  1 mg Oral BID Diana Peralta MD      divalproex sodium  500 mg Oral Daily Diana Peralta MD      divalproex sodium  750 mg Oral QPM Diana Peralta MD      haloperidol  2 mg Oral Q4H PRN Max 6/day Leona Borreggine, DO      haloperidol  5 mg Oral Q6H PRN Max 4/day Leona Borreggine, DO      haloperidol  5 mg Oral Q4H PRN Max 4/day Leona Borreggine, DO      hydrOXYzine HCL  25 mg Oral Q6H PRN Max 4/day Leona Borreggine, DO      hydrOXYzine HCL  50 mg Oral Q4H PRN Max 4/day Leona Borreggine, DO      Or    LORazepam  1 mg Intramuscular Q4H PRN Leona Borreggine, DO      LORazepam  1 mg Oral Q4H PRN Max 6/day Leona Borreggine, DO      Or    LORazepam  2 mg Intramuscular Q6H PRN Max 3/day Leona Borreggine, DO      loxapine  50 mg Oral BID Diana Peralta MD      loxapine  75 mg Oral HS Diana Peralta MD      melatonin  3 mg Oral HS Leonalou Marinogine, DO      nicotine  1 patch Transdermal Daily Pipe Mcghee DO      nicotine polacrilex  4 mg Oral Q2H PRN Pipe Mcghee DO      senna-docusate sodium  1 tablet Oral Daily PRN Redell Sous Borreggine, DO      traZODone  50 mg Oral HS PRN Pipe Mcghee DO       Risks / Benefits of Treatment:    Risks, benefits, and possible side effects of medications explained to patient   Patient has limited understanding of risks and benefits of treatment at this time, but agrees to take medications as prescribed  Counseling / Coordination of Care:    Patient's progress discussed with staff in treatment team meeting  Medications, treatment progress and treatment plan reviewed with patient  Medication changes discussed with patient      Xavier Lazo MD 03/04/21

## 2021-03-04 NOTE — PROGRESS NOTES
03/04/21 0823   Team Meeting   Meeting Type Daily Rounds   Team Members Present   Team Members Present Physician;Nurse;   Physician Team Member [address] Team Member [facility] Management Team Member [place]   Patient/Family Present   Patient Present No   Patient's Family Present No   Pt visible, but not social  Pt's delay is baseline  Pt appears to laugh/smile inappropriately at times  Pt disheveled and will be encouraged to improve ADLs  Med/meal compliant  Poss dc next week

## 2021-03-04 NOTE — PROGRESS NOTES
Occupational Therapy Student Group Note    Attended partial duration of community meeting  Flat affect and calm on approach  Observed inappropriately staring at peers at times  Delayed responses but shows improvement in socialization with facilitator  Stated goals as "I want to be social with people and talk more today " Pt  wrote on goal card and was encouraged to attend groups today

## 2021-03-04 NOTE — NURSING NOTE
Pt denies AH,HI,VH,SI  Pt observed laughing to self and staring inappropriately at peers but brightens on approach  Pt is calm, cooperative, and pleasant  Medication and meal compliant  Present in dayroom and milieu  Pt needs redirection to not stand at nurses station  No further complaints or concerns observed or reported  Will continue to monitor

## 2021-03-04 NOTE — PLAN OF CARE
Problem: Ineffective Coping  Goal: Participates in unit activities  Description: Interventions:  - Provide therapeutic environment   - Provide required programming   - Redirect inappropriate behaviors   Outcome: Progressing     Problem: Ineffective Coping  Goal: Participates in unit activities  Description: Interventions:  - Provide therapeutic environment   - Provide required programming   - Redirect inappropriate behaviors   Outcome: Progressing

## 2021-03-04 NOTE — NURSING NOTE
Pt visible in milieu withdrawn from peers and staff while continuing to stare inappropriately  Pt denies SI/HI/AVH, but appears to be internally stimulated  Pt compliant with medications and meals  Will continue to monitor

## 2021-03-05 PROCEDURE — 99232 SBSQ HOSP IP/OBS MODERATE 35: CPT | Performed by: PSYCHIATRY & NEUROLOGY

## 2021-03-05 RX ADMIN — DIVALPROEX SODIUM 750 MG: 250 TABLET, DELAYED RELEASE ORAL at 18:05

## 2021-03-05 RX ADMIN — NICOTINE 1 PATCH: 14 PATCH, EXTENDED RELEASE TRANSDERMAL at 08:47

## 2021-03-05 RX ADMIN — BENZTROPINE MESYLATE 1 MG: 1 TABLET ORAL at 08:45

## 2021-03-05 RX ADMIN — LOXAPINE 75 MG: 25 CAPSULE ORAL at 21:18

## 2021-03-05 RX ADMIN — LOXAPINE 50 MG: 25 CAPSULE ORAL at 08:45

## 2021-03-05 RX ADMIN — MELATONIN TAB 3 MG 3 MG: 3 TAB at 21:18

## 2021-03-05 RX ADMIN — LOXAPINE 50 MG: 25 CAPSULE ORAL at 18:07

## 2021-03-05 RX ADMIN — DIVALPROEX SODIUM 500 MG: 500 TABLET, DELAYED RELEASE ORAL at 08:45

## 2021-03-05 RX ADMIN — BENZTROPINE MESYLATE 1 MG: 1 TABLET ORAL at 18:05

## 2021-03-05 NOTE — PLAN OF CARE
Problem: PSYCHOSIS  Goal: Will report no hallucinations or delusions  Description: Interventions:  - Administer medication as  ordered  - Every waking shifts and PRN assess for the presence of hallucinations and or delusions  - Assist with reality testing to support increasing orientation  - Assess if patient's hallucinations or delusions are encouraging self-harm or harm to others and intervene as appropriate  Outcome: Progressing     Problem: BEHAVIOR  Goal: Pt/Family maintain appropriate behavior and adhere to behavioral management agreement, if implemented  Description: INTERVENTIONS:  - Assess the family dynamic   - Encourage verbalization of thoughts and concerns in a socially appropriate manner  - Assess patient/family's coping skills and non-compliant behavior (including use of illegal substances)  - Utilize positive, consistent limit setting strategies supporting safety of patient, staff and others  - Initiate consult with Case Management, Spiritual Care or other ancillary services as appropriate  - If a patient's/visitor's behavior jeopardizes the safety of the patient, staff, or others, refer to organization procedure     - Notify Security of behavior or suspected illegal substances which indicate the need for search of the patient and/or belongings  - Encourage participation in the decision making process about a behavioral management agreement; implement if patient meets criteria  Outcome: Progressing     Problem: ANXIETY  Goal: Will report anxiety at manageable levels  Description: INTERVENTIONS:  - Administer medication as ordered  - Teach and encourage coping skills  - Provide emotional support  - Assess patient/family for anxiety and ability to cope  Outcome: Progressing  Goal: By discharge: Patient will verbalize 2 strategies to deal with anxiety  Description: Interventions:  - Identify any obvious source/trigger to anxiety  - Staff will assist patient in applying identified coping technique/skills  - Encourage attendance of scheduled groups and activities  Outcome: Progressing     Problem: INVOLUNTARY ADMIT  Goal: Will cooperate with staff recommendations and doctor's orders and will demonstrate appropriate behavior  Description: INTERVENTIONS:  - Treat underlying conditions and offer medication as ordered  - Educate regarding involuntary admission procedures and rules  - Utilize positive consistent limit setting strategies to support patient and staff safety  Outcome: Progressing     Problem: SELF CARE DEFICIT  Goal: Return ADL status to a safe level of function  Description: INTERVENTIONS:  - Administer medication as ordered  - Assess ADL deficits and provide assistive devices as needed  - Obtain PT/OT consults as needed  - Assist and instruct patient to increase activity and self care as tolerated  Outcome: Progressing     Problem: Ineffective Coping  Goal: Participates in unit activities  Description: Interventions:  - Provide therapeutic environment   - Provide required programming   - Redirect inappropriate behaviors   Outcome: Progressing     Problem: DISCHARGE PLANNING  Goal: Discharge to home or other facility with appropriate resources  Description: INTERVENTIONS:  - Identify barriers to discharge w/patient and caregiver  - Arrange for needed discharge resources and transportation as appropriate  - Identify discharge learning needs (meds, wound care, etc )  - Arrange for interpretive services to assist at discharge as needed  - Refer to Case Management Department for coordinating discharge planning if the patient needs post-hospital services based on physician/advanced practitioner order or complex needs related to functional status, cognitive ability, or social support system  Outcome: Progressing

## 2021-03-05 NOTE — PROGRESS NOTES
03/05/21 0842   Team Meeting   Meeting Type Daily Rounds   Team Members Present   Team Members Present Physician;Nurse;   Physician Team Member [address] Team Member [facility] Management Team Member [place]   Patient/Family Present   Patient Present No   Patient's Family Present No   Pt denies all  Pt still appears to be internally stimulated, pacing hallways, visible but not social  Med/meal compliant

## 2021-03-05 NOTE — NURSING NOTE
Patient has been out on the unit and loiters around the nurses station  Patient still appears internally stimulated even though he denies all signs and symptoms  There is some delay in his response when he is asked a question

## 2021-03-05 NOTE — PROGRESS NOTES
03/05/21 1100   Activity/Group Checklist   Group   (recovery group)   Attendance Attended  (in and out)   Attendance Duration (min) 16-30   Interactions Interacted appropriately   Affect/Mood Appropriate   Goals Achieved Able to listen to others; Able to engage in interactions; Able to self-disclose   Patient is withdrawn in group but did well in sharing about himself when another peer asked him questions

## 2021-03-05 NOTE — PROGRESS NOTES
03/05/21 1300   Activity/Group Checklist   Group   (recovery group)   Attendance Attended   Attendance Duration (min) 46-60   Interactions Interacted appropriately   Affect/Mood Blunted/flat   Goals Achieved Able to listen to others; Able to engage in interactions; Able to self-disclose   He is grateful for what his mother has bought him and when asked how he needs to show it his response was "I think I need to buy her a [employer] card"

## 2021-03-05 NOTE — PROGRESS NOTES
03/04/21 1400   Activity/Group Checklist   Group Other (Comment)  (Surviving Trauma Group/Education, Open Discussion)   Attendance Attended   Attendance Duration (min) 0-15  (In and out)   Interactions Did not interact   Affect/Mood Blunted/flat

## 2021-03-05 NOTE — PROGRESS NOTES
Progress Note - [address] 30 y o  male MRN: [id number]   Unit/Bed#: [facility] 347-02 Encounter: [id number]    Behavior over the last 24 hours: some improvement  Hiren Diego still has delayed responses and often stares at people as per staff report, but otherwise has been less irritable, cooperative with sessions and more social with peers  He denies symptoms during the session - still seems preoccupied  Has been taking medications  Sleep: normal  Appetite: normal  Medication side effects: No   ROS: no complaints, denies any shortness of breath or chest pain, all other systems are negative    Mental Status Evaluation:    Appearance:  casually dressed   Behavior:  cooperative, still guarded   Speech:  scant, increased latency of response   Mood:  less anxious, less irritable   Affect:  less labile   Thought Process:  concrete   Associations: concrete associations, still some blocking   Thought Content:  paranoid ideation   Perceptual Disturbances: denies auditory or visual hallucinations when asked, still preoccupied   Risk Potential: Suicidal ideation - None  Homicidal ideation - None  Potential for aggression - Not at present   Sensorium:  oriented to person, place and time/date   Memory:  recent and remote memory grossly intact   Consciousness:  alert and awake   Attention/Concentration: poor concentration and poor attention span   Insight:  impaired   Judgment: impaired   Gait/Station: normal gait/station, normal balance   Motor Activity: no abnormal movements     Vital signs in last 24 hours:    Temp:  [97 5 °F (36 4 °C)-99 1 °F (37 3 °C)] 97 5 °F (36 4 °C)  HR:  [] 91  Resp:  [16-18] 16  BP: (117-122)/(68-69) 117/69    Laboratory results: I have personally reviewed all pertinent laboratory/tests results        Suicide/Homicide Risk Assessment:    Risk of Harm to Self:   Nursing Suicide Risk Assessment Last 24 hours: Low Risk to Self: No evidence of suicidal thoughts or history;  Moderate Risk to Self: Presence of hallucinations  ;    Current Specific Risk Factors include: mental illness diagnosis, current psychotic symptoms  Protective Factors: no current suicidal ideation, taking medications as ordered on the unit  Based on today's assessment, Tori Larose presents the following risk of harm to self: moderate    Risk of Harm to Others:  Nursing Homicide Risk Assessment: Violence Risk to Others: Denies within past 6 months  Based on today's assessment, Tori Larose presents the following risk of harm to others: low    The following interventions are recommended: behavioral checks every 7 minutes, continued hospitalization on locked unit    Progress Toward Goals: some progress, less irritable, still preoccupied, more social with peers    Assessment/Plan   Principal Problem:    Schizoaffective disorder, bipolar type ([facility] 75 )  Active Problems:    Cannabis abuse, continuous    Synthetic cannabinoid abuse ([facility] 75 )    Subclinical hypothyroidism      Recommended Treatment:     Planned medication and treatment changes:     All current active medications have been reviewed  Encourage group therapy, milieu therapy and occupational therapy  Behavioral Health checks every 7 minutes  304 hearing next week - further inpatient treatment versus outpatient if he improves  On 303 commitment up to 20 days     Continue current medications:    Current Facility-Administered Medications   Medication Dose Route Frequency Provider Last Rate    acetaminophen  650 mg Oral Q6H PRN Leona Borreggine, DO      acetaminophen  650 mg Oral Q4H PRN Leona Borreggine, DO      acetaminophen  975 mg Oral Q6H PRN Leona Borreggine, DO      haloperidol lactate  2 5 mg Intramuscular Q6H PRN Max 4/day Leona Borreggine, DO      And    LORazepam  1 mg Intramuscular Q6H PRN Max 4/day Leona Borreggine, DO      And    benztropine  0 5 mg Intramuscular Q6H PRN Max 4/day Leona Borreggine, DO      haloperidol lactate  5 mg Intramuscular Q4H PRN Max 4/day Segundo Davis, DO      And    LORazepam  2 mg Intramuscular Q4H PRN Max 4/day Segundo Davis DO      And    benztropine  1 mg Intramuscular Q4H PRN Max 4/day Segundo Davis DO      benztropine  1 mg Oral BID Raul Dang MD      divalproex sodium  500 mg Oral Daily Raul Dang MD      divalproex sodium  750 mg Oral QPM Raul Dang MD      haloperidol  2 mg Oral Q4H PRN Max 6/day Leona Borreggine, DO      haloperidol  5 mg Oral Q6H PRN Max 4/day Leona Borreggine, DO      haloperidol  5 mg Oral Q4H PRN Max 4/day Leona Borreggine, DO      hydrOXYzine HCL  25 mg Oral Q6H PRN Max 4/day Leona Borreggine, DO      hydrOXYzine HCL  50 mg Oral Q4H PRN Max 4/day Leona Borreggine, DO      Or    LORazepam  1 mg Intramuscular Q4H PRN Leona Borreggine, DO      LORazepam  1 mg Oral Q4H PRN Max 6/day Leona Borreggine, DO      Or    LORazepam  2 mg Intramuscular Q6H PRN Max 3/day Leona Borreggine, DO      loxapine  50 mg Oral BID Raul Dang MD      loxapine  75 mg Oral HS Raul Dang MD      melatonin  3 mg Oral HS Leona Marinogine, DO      nicotine  1 patch Transdermal Daily Segundo Davis DO      nicotine polacrilex  4 mg Oral Q2H PRN Segundo Davis,       senna-docusate sodium  1 tablet Oral Daily PRN Konrad Ocampo, DO      traZODone  50 mg Oral HS PRN Segundo Davis,        Risks / Benefits of Treatment:    Risks, benefits, and possible side effects of medications explained to patient  Patient has limited understanding of risks and benefits of treatment at this time, but agrees to take medications as prescribed  Counseling / Coordination of Care:    Patient's progress discussed with staff in treatment team meeting  Medications, treatment progress and treatment plan reviewed with patient      Deysi Foley MD 03/05/21

## 2021-03-05 NOTE — PROGRESS NOTES
03/05/21 0930   Activity/Group Checklist   Group   (goals group)   Attendance Attended  (left early)   Attendance Duration (min) 0-15   Interactions Interacted appropriately   Affect/Mood Appropriate   Goals Achieved Able to listen to others

## 2021-03-06 PROCEDURE — 99232 SBSQ HOSP IP/OBS MODERATE 35: CPT | Performed by: PSYCHIATRY & NEUROLOGY

## 2021-03-06 RX ADMIN — BENZTROPINE MESYLATE 1 MG: 1 TABLET ORAL at 17:22

## 2021-03-06 RX ADMIN — LOXAPINE 50 MG: 25 CAPSULE ORAL at 17:24

## 2021-03-06 RX ADMIN — BENZTROPINE MESYLATE 1 MG: 1 TABLET ORAL at 09:07

## 2021-03-06 RX ADMIN — DIVALPROEX SODIUM 750 MG: 250 TABLET, DELAYED RELEASE ORAL at 17:23

## 2021-03-06 RX ADMIN — LOXAPINE 75 MG: 25 CAPSULE ORAL at 21:03

## 2021-03-06 RX ADMIN — MELATONIN TAB 3 MG 3 MG: 3 TAB at 21:03

## 2021-03-06 RX ADMIN — LOXAPINE 50 MG: 25 CAPSULE ORAL at 09:07

## 2021-03-06 RX ADMIN — DIVALPROEX SODIUM 500 MG: 500 TABLET, DELAYED RELEASE ORAL at 09:08

## 2021-03-06 NOTE — PROGRESS NOTES
Progress Note - Behavioral Health     Crystal Perry 30 y o  male MRN: [id number]   Unit/Bed#: [facility] 347-02 Encounter: [id number]      Subjective: The patient's chart reviewed and case discussed with the nurse  The patient was seen in the milieu today  The patient reports he is doing all right  Patient though exhibited delayed response  He denies feeling depressed or anxious  Denies any auditory or visual hallucination  Reports sleep and appetite has been fine  Reports compliant with the medication and denies any concern  As per the nurse the patient seems to be responding to internal stimuli  Though during my evaluation he did not  Otherwise he has been doing well in the unit  Staff denies any concern about him  ROS: no complaints, all other systems are negative    Mental Status Evaluation:    Appearance:  casually dressed   Behavior:  cooperative   Speech:  delayed   Mood:  normal   Affect:  blunted   Thought Process:  organized   Associations: intact associations   Thought Content:  normal   Perceptual Disturbances: denies auditory or visual hallucinations when asked   Risk Potential: Suicidal ideation - None  Homicidal ideation - None  Potential for aggression - No   Sensorium:  oriented to person, place and time/date   Memory:  recent and remote memory grossly intact   Consciousness:  alert and awake   Attention: attention span and concentration are age appropriate   Insight:  fair   Judgment: fair   Gait/Station: normal gait/station   Motor Activity: no abnormal movements     Vital signs in last 24 hours:    Temp:  [97 3 °F (36 3 °C)-98 2 °F (36 8 °C)] 98 2 °F (36 8 °C)  HR:  [] 103  Resp:  [16] 16  BP: (115-118)/(59-97) 118/59    Laboratory results: I have personally reviewed all pertinent laboratory/tests results  Progress Toward Goals: improving    Assessment/Plan  the patient denies any concern  Probably responding to internal stimuli as noticed by the staff    The plan is to continue with the current medication at this time with no changes  Will continue monitor the patient for his mood, behavior, safety, sleep and response to meds  Principal Problem:    Schizoaffective disorder, bipolar type ([facility] 75 )  Active Problems:    Cannabis abuse, continuous    Synthetic cannabinoid abuse ([facility] 75 )    Subclinical hypothyroidism    Recommended Treatment:     Planned medication and treatment changes:     All current active medications have been reviewed  Encourage group therapy, milieu therapy and occupational therapy  Continue current medications:  Current Facility-Administered Medications   Medication Dose Route Frequency Provider Last Rate    acetaminophen  650 mg Oral Q6H PRN Leona Borreggine, DO      acetaminophen  650 mg Oral Q4H PRN Leona Borreggine, DO      acetaminophen  975 mg Oral Q6H PRN Leona Borreggine, DO      haloperidol lactate  2 5 mg Intramuscular Q6H PRN Max 4/day Leona Borreggine, DO      And    LORazepam  1 mg Intramuscular Q6H PRN Max 4/day Leona Borreggine, DO      And    benztropine  0 5 mg Intramuscular Q6H PRN Max 4/day Leona Borreggine, DO      haloperidol lactate  5 mg Intramuscular Q4H PRN Max 4/day Tammy Gearing, DO      And    LORazepam  2 mg Intramuscular Q4H PRN Max 4/day Tammy Gearing, DO      And    benztropine  1 mg Intramuscular Q4H PRN Max 4/day Tammy Gearing, DO      benztropine  1 mg Oral BID Rosibel Pittman MD      divalproex sodium  500 mg Oral Daily Rosibel Pittman MD      divalproex sodium  750 mg Oral QPM Rosibel Pittman MD      haloperidol  2 mg Oral Q4H PRN Max 6/day Leona Borreggine, DO      haloperidol  5 mg Oral Q6H PRN Max 4/day Leona Borreggine, DO      haloperidol  5 mg Oral Q4H PRN Max 4/day Leona Borreggine, DO      hydrOXYzine HCL  25 mg Oral Q6H PRN Max 4/day Leona Borreggine, DO      hydrOXYzine HCL  50 mg Oral Q4H PRN Max 4/day Leona Borreggine, DO      Or    LORazepam  1 mg Intramuscular Q4H PRN Leona Borreggine, DO      LORazepam  1 mg Oral Q4H PRN Max 6/day Leona Borreggine, DO      Or    LORazepam  2 mg Intramuscular Q6H PRN Max 3/day Hyman Medico Borreggine, DO      loxapine  50 mg Oral BID Donna Sutherland MD      loxapine  75 mg Oral HS Donna Sutherland MD      melatonin  3 mg Oral HS Leona Marinogine, DO      nicotine  1 patch Transdermal Daily Vamsi , DO      nicotine polacrilex  4 mg Oral Q2H PRN Vamsi Royal, DO      senna-docusate sodium  1 tablet Oral Daily PRN Hyman Medico Borreggine, DO      traZODone  50 mg Oral HS PRN Vamsi Royal, DO         Risks / Benefits of Treatment:    Risks, benefits, and possible side effects of medications explained to patient and patient verbalizes understanding and agreement for treatment  Counseling / Coordination of Care:    Patient's progress discussed with staff in treatment team meeting  Medications, treatment progress and treatment plan reviewed with patient      Socrates Cesar MD 03/06/21

## 2021-03-06 NOTE — PROGRESS NOTES
03/06/21 1030   Activity/Group Checklist   Group Other (Comment)  (Art Therapy Group; Tree Creation, Process Discussion )   Attendance Attended   Attendance Duration (min) 0-15   Interactions Interacted appropriately   Affect/Mood Appropriate;Calm   Goals Achieved Able to listen to others; Able to engage in interactions; Able to recieve feedback  (Did not engage with materials; Limited participation )

## 2021-03-06 NOTE — NURSING NOTE
Patient has been out on the unit and loiters around the nurses station  Patient still appears internally stimulated even though he denies all signs and symptoms  Patient exhibits delayed speech  Patient is med and meal compliant

## 2021-03-06 NOTE — NURSING NOTE
Spoke with patient briefly this evening  He denies SI/HI and states that he had a good day today  Pt's responses continue to be delayed when asked questions and he also continues to stare  Will continue to monitor

## 2021-03-07 PROCEDURE — 99232 SBSQ HOSP IP/OBS MODERATE 35: CPT | Performed by: PSYCHIATRY & NEUROLOGY

## 2021-03-07 RX ADMIN — LOXAPINE 75 MG: 25 CAPSULE ORAL at 21:18

## 2021-03-07 RX ADMIN — LOXAPINE 50 MG: 25 CAPSULE ORAL at 09:13

## 2021-03-07 RX ADMIN — MELATONIN TAB 3 MG 3 MG: 3 TAB at 21:18

## 2021-03-07 RX ADMIN — BENZTROPINE MESYLATE 1 MG: 1 TABLET ORAL at 17:11

## 2021-03-07 RX ADMIN — DIVALPROEX SODIUM 500 MG: 500 TABLET, DELAYED RELEASE ORAL at 09:13

## 2021-03-07 RX ADMIN — BENZTROPINE MESYLATE 1 MG: 1 TABLET ORAL at 09:13

## 2021-03-07 RX ADMIN — LOXAPINE 50 MG: 25 CAPSULE ORAL at 17:09

## 2021-03-07 RX ADMIN — DIVALPROEX SODIUM 750 MG: 250 TABLET, DELAYED RELEASE ORAL at 17:10

## 2021-03-07 NOTE — PROGRESS NOTES
03/06/21 1400   Activity/Group Checklist   Group Other (Comment)  (OPEN STUDIO/Art Therapy, Social Interaction-Free Expression)   Attendance Attended   Attendance Duration (min) Greater than 60   Interactions Interacted appropriately   Affect/Mood Blunted/flat   Goals Achieved Able to listen to others

## 2021-03-07 NOTE — PROGRESS NOTES
Progress Note - Behavioral Health Salbador Sheffield 30 y o  male MRN: [id number]   Unit/Bed#: [facility] 347-02 Encounter: [id number]      Subjective: The patient's chart reviewed and case discussed with the nurse  The patient was seen in the milieu today  The patient reports he is doing all right  Patient continue to exhibit delayed response  Will stare intensely and seems suspicious at times  He denies feeling depressed or anxious  Denies any auditory or visual hallucination  Reports sleep and appetite has been fine  Reports compliant with the medication and denies any concern  As per the nurse the patient seems to be responding to internal stimuli  Was noticed to be laughing to himself in the morning today  Though when asked about it became guarded  Otherwise he has been doing well in the unit  Staff denies any other concern about him  ROS: no complaints, all other systems are negative    Mental Status Evaluation:    Appearance:  casually dressed   Behavior:  cooperative   Speech:  delayed   Mood:  normal   Affect:  blunted   Thought Process:  organized   Associations: intact associations   Thought Content:  normal   Perceptual Disturbances: denies auditory or visual hallucinations when asked   Risk Potential: Suicidal ideation - None  Homicidal ideation - None  Potential for aggression - No   Sensorium:  oriented to person, place and time/date   Memory:  recent and remote memory grossly intact   Consciousness:  alert and awake   Attention: attention span and concentration are age appropriate   Insight:  fair   Judgment: fair   Gait/Station: normal gait/station   Motor Activity: no abnormal movements     Vital signs in last 24 hours:    Temp:  [97 1 °F (36 2 °C)-98 2 °F (36 8 °C)] 97 1 °F (36 2 °C)  HR:  [103-111] 111  Resp:  [16] 16  BP: (114-118)/(56-59) 114/56    Laboratory results: I have personally reviewed all pertinent laboratory/tests results      Progress Toward Goals: improving    Assessment/Plan  the patient denies any concern  Probably responding to internal stimuli as noticed by the staff  The plan is to continue with the current medication at this time with no changes  Will continue monitor the patient for his mood, behavior, safety, sleep and response to meds  Principal Problem:    Schizoaffective disorder, bipolar type ([facility] 75 )  Active Problems:    Cannabis abuse, continuous    Synthetic cannabinoid abuse ([facility] 75 )    Subclinical hypothyroidism    Recommended Treatment:     Planned medication and treatment changes:     All current active medications have been reviewed  Encourage group therapy, milieu therapy and occupational therapy  Continue current medications:  Current Facility-Administered Medications   Medication Dose Route Frequency Provider Last Rate    acetaminophen  650 mg Oral Q6H PRN Leona Borreggine, DO      acetaminophen  650 mg Oral Q4H PRN Leona Borreggine, DO      acetaminophen  975 mg Oral Q6H PRN Leona Borreggine, DO      haloperidol lactate  2 5 mg Intramuscular Q6H PRN Max 4/day Leona Borreggine, DO      And    LORazepam  1 mg Intramuscular Q6H PRN Max 4/day Leona Borreggine, DO      And    benztropine  0 5 mg Intramuscular Q6H PRN Max 4/day Leona Borreggine, DO      haloperidol lactate  5 mg Intramuscular Q4H PRN Max 4/day Honor Becky, DO      And    LORazepam  2 mg Intramuscular Q4H PRN Max 4/day Honor Becky, DO      And    benztropine  1 mg Intramuscular Q4H PRN Max 4/day Honor Becky, DO      benztropine  1 mg Oral BID Ortiz Palacio MD      divalproex sodium  500 mg Oral Daily Ortiz Palacio MD      divalproex sodium  750 mg Oral QPM Ortiz Palacio MD      haloperidol  2 mg Oral Q4H PRN Max 6/day Leona Borreggine, DO      haloperidol  5 mg Oral Q6H PRN Max 4/day Leona Borreggine, DO      haloperidol  5 mg Oral Q4H PRN Max 4/day Leona Borreggine, DO      hydrOXYzine HCL  25 mg Oral Q6H PRN Max 4/day Genaro Rodriguezreggine, DO      hydrOXYzine HCL  50 mg Oral Q4H PRN Max 4/day Leona Borreggine, DO      Or    LORazepam  1 mg Intramuscular Q4H PRN Genaro Munguia Borreggine, DO      LORazepam  1 mg Oral Q4H PRN Max 6/day Leona Borreggine, DO      Or    LORazepam  2 mg Intramuscular Q6H PRN Max 3/day Leona Marinogine, DO      loxapine  50 mg Oral BID Lionel Chapman MD      loxapine  75 mg Oral HS Lionel Chapman MD      melatonin  3 mg Oral HS Leona Ocampo, DO      nicotine  1 patch Transdermal Daily Margnoa Renee, DO      nicotine polacrilex  4 mg Oral Q2H PRN Matt Heal, DO      senna-docusate sodium  1 tablet Oral Daily PRN Genaro Rodriguezreggine, DO      traZODone  50 mg Oral HS PRN Matt Heal, DO         Risks / Benefits of Treatment:    Risks, benefits, and possible side effects of medications explained to patient and patient verbalizes understanding and agreement for treatment  Counseling / Coordination of Care:    Patient's progress discussed with staff in treatment team meeting  Medications, treatment progress and treatment plan reviewed with patient      Barb Alexander MD 03/07/21

## 2021-03-07 NOTE — NURSING NOTE
Patient out on the unit attempting to socialize but ut appears he lacks social skills  Patient has been laughing to himself  Patient states he is laughing because of something he "remembered "  The he stated "It's about women " Followed by "women aren't funny "  A conversation he was having was overheard and the patient admitted to hearing voices but denies that to nursing  Patient has been med/meal compliant, cooperative, and pleasant

## 2021-03-07 NOTE — NURSING NOTE
Pt currently sleeping comfortably in bed  Denies SI/HI and AVH  Pt states he is feeling good  Will continue to monitor

## 2021-03-07 NOTE — PLAN OF CARE
Problem: PSYCHOSIS  Goal: Will report no hallucinations or delusions  Description: Interventions:  - Administer medication as  ordered  - Every waking shifts and PRN assess for the presence of hallucinations and or delusions  - Assist with reality testing to support increasing orientation  - Assess if patient's hallucinations or delusions are encouraging self-harm or harm to others and intervene as appropriate  3/7/2021 1844 by Rachel Joseph RN  Outcome: Progressing  3/7/2021 1015 by Rachel Joseph RN  Outcome: Progressing     Problem: BEHAVIOR  Goal: Pt/Family maintain appropriate behavior and adhere to behavioral management agreement, if implemented  Description: INTERVENTIONS:  - Assess the family dynamic   - Encourage verbalization of thoughts and concerns in a socially appropriate manner  - Assess patient/family's coping skills and non-compliant behavior (including use of illegal substances)  - Utilize positive, consistent limit setting strategies supporting safety of patient, staff and others  - Initiate consult with Case Management, Spiritual Care or other ancillary services as appropriate  - If a patient's/visitor's behavior jeopardizes the safety of the patient, staff, or others, refer to organization procedure     - Notify Security of behavior or suspected illegal substances which indicate the need for search of the patient and/or belongings  - Encourage participation in the decision making process about a behavioral management agreement; implement if patient meets criteria  3/7/2021 1844 by Rachel Joseph RN  Outcome: Progressing  3/7/2021 1015 by Rachel Joseph RN  Outcome: Progressing     Problem: ANXIETY  Goal: Will report anxiety at manageable levels  Description: INTERVENTIONS:  - Administer medication as ordered  - Teach and encourage coping skills  - Provide emotional support  - Assess patient/family for anxiety and ability to cope  3/7/2021 1844 by Rachel Joseph RN  Outcome: Progressing  3/7/2021 1015 by Zayda Angel RN  Outcome: Progressing  Goal: By discharge: Patient will verbalize 2 strategies to deal with anxiety  Description: Interventions:  - Identify any obvious source/trigger to anxiety  - Staff will assist patient in applying identified coping technique/skills  - Encourage attendance of scheduled groups and activities  3/7/2021 1844 by Zayda Angel RN  Outcome: Progressing  3/7/2021 1015 by Zayda Angel RN  Outcome: Progressing     Problem: INVOLUNTARY ADMIT  Goal: Will cooperate with staff recommendations and doctor's orders and will demonstrate appropriate behavior  Description: INTERVENTIONS:  - Treat underlying conditions and offer medication as ordered  - Educate regarding involuntary admission procedures and rules  - Utilize positive consistent limit setting strategies to support patient and staff safety  3/7/2021 1844 by Zayda Angel RN  Outcome: Progressing  3/7/2021 1015 by Zayda Angel RN  Outcome: Progressing     Problem: SELF CARE DEFICIT  Goal: Return ADL status to a safe level of function  Description: INTERVENTIONS:  - Administer medication as ordered  - Assess ADL deficits and provide assistive devices as needed  - Obtain PT/OT consults as needed  - Assist and instruct patient to increase activity and self care as tolerated  3/7/2021 1844 by Zayda Angel RN  Outcome: Progressing  3/7/2021 1015 by Zayda Angel RN  Outcome: Progressing     Problem: Ineffective Coping  Goal: Participates in unit activities  Description: Interventions:  - Provide therapeutic environment   - Provide required programming   - Redirect inappropriate behaviors   3/7/2021 1844 by Zayda Angel RN  Outcome: Progressing  3/7/2021 1015 by Zayda Angel RN  Outcome: Progressing     Problem: DISCHARGE PLANNING  Goal: Discharge to home or other facility with appropriate resources  Description: INTERVENTIONS:  - Identify barriers to discharge w/patient and caregiver  - Arrange for needed discharge resources and transportation as appropriate  - Identify discharge learning needs (meds, wound care, etc )  - Arrange for interpretive services to assist at discharge as needed  - Refer to Case Management Department for coordinating discharge planning if the patient needs post-hospital services based on physician/advanced practitioner order or complex needs related to functional status, cognitive ability, or social support system  3/7/2021 1844 by Lindsay Carter, RN  Outcome: Progressing  3/7/2021 1015 by Lindsay Carter, RN  Outcome: Progressing

## 2021-03-08 PROCEDURE — 99232 SBSQ HOSP IP/OBS MODERATE 35: CPT | Performed by: PSYCHIATRY & NEUROLOGY

## 2021-03-08 RX ADMIN — BENZTROPINE MESYLATE 1 MG: 1 TABLET ORAL at 17:16

## 2021-03-08 RX ADMIN — DIVALPROEX SODIUM 750 MG: 250 TABLET, DELAYED RELEASE ORAL at 17:16

## 2021-03-08 RX ADMIN — BENZTROPINE MESYLATE 1 MG: 1 TABLET ORAL at 08:33

## 2021-03-08 RX ADMIN — DIVALPROEX SODIUM 500 MG: 500 TABLET, DELAYED RELEASE ORAL at 08:34

## 2021-03-08 RX ADMIN — LOXAPINE 50 MG: 25 CAPSULE ORAL at 08:33

## 2021-03-08 RX ADMIN — LOXAPINE 50 MG: 25 CAPSULE ORAL at 17:16

## 2021-03-08 RX ADMIN — MELATONIN TAB 3 MG 3 MG: 3 TAB at 21:20

## 2021-03-08 RX ADMIN — LOXAPINE 75 MG: 25 CAPSULE ORAL at 21:19

## 2021-03-08 NOTE — PROGRESS NOTES
Progress Note - [address] 30 y o  male MRN: [id number]   Unit/Bed#: [facility] 347-02 Encounter: [id number]    Behavior over the last 24 hours: limited improvement  Helene Bravo still has some delay when answering questions and still seems preoccupied at times as per staff reports, but otherwise is less bizarre and attempts to socialize more with peers  He denies auditory or visual hallucinations when asked - but staff reports that he was overheard talking about hearing voices  Compliant with medications      Sleep: normal  Appetite: normal  Medication side effects: No   ROS: no complaints, denies any headache, shortness of breath or chest pain, all other systems are negative    Mental Status Evaluation:    Appearance:  casually dressed   Behavior:  cooperative, still guarded   Speech:  scant, delayed   Mood:  less anxious, less irritable   Affect:  less labile   Thought Process:  concrete   Associations: concrete associations   Thought Content:  paranoid ideation   Perceptual Disturbances: denies auditory or visual hallucinations when asked, but appears preoccupied   Risk Potential: Suicidal ideation - None at present  Homicidal ideation - None at present  Potential for aggression - Not at present   Sensorium:  oriented to person, place and time/date   Memory:  recent and remote memory grossly intact   Consciousness:  alert and awake   Attention/Concentration: poor concentration and poor attention span   Insight:  impaired   Judgment: impaired   Gait/Station: normal gait/station, normal balance   Motor Activity: no abnormal movements     Vital signs in last 24 hours:    Temp:  [97 7 °F (36 5 °C)] 97 7 °F (36 5 °C)  HR:  [94-99] 99  Resp:  [16-18] 18  BP: ()/(68-73) 95/68    Laboratory results: I have personally reviewed all pertinent laboratory/tests results        Suicide/Homicide Risk Assessment:    Risk of Harm to Self:   Nursing Suicide Risk Assessment Last 24 hours: Low Risk to Self: No evidence of suicidal thoughts or history;  ;    Current Specific Risk Factors include: mental illness diagnosis, current psychotic symptoms  Protective Factors: no current suicidal ideation, taking medications as ordered on the unit  Based on today's assessment, [place] presents the following risk of harm to self: low    Risk of Harm to Others:  Nursing Homicide Risk Assessment: Violence Risk to Others: Denies within past 6 months  Based on today's assessment, [place] presents the following risk of harm to others: low    The following interventions are recommended: behavioral checks every 7 minutes, continued hospitalization on locked unit    Progress Toward Goals: limited progress, less irritable, insight remains poor, still has psychotic symptoms    Assessment/Plan   Principal Problem:    Schizoaffective disorder, [facility] ([facility] )  Active Problems:    Cannabis abuse, continuous    Synthetic cannabinoid abuse ([facility] )    Subclinical hypothyroidism      Recommended Treatment:     Planned medication and treatment changes:     All current active medications have been reviewed  Encourage group therapy, milieu therapy and occupational therapy  Behavioral Health checks every 7 minutes  304 hearing scheduled for tomorrow  On 303 commitment up to 20 days   to contact mother to assess progress and baseline    Continue current medications:    Current Facility-Administered Medications   Medication Dose Route Frequency Provider Last Rate    acetaminophen  650 mg Oral Q6H PRN Alfredito Perez Borreggine, DO      acetaminophen  650 mg Oral Q4H PRN Leona Borreggine, DO      acetaminophen  975 mg Oral Q6H PRN Leona Borreggine, DO      haloperidol lactate  2 5 mg Intramuscular Q6H PRN Max 4/day Leona Borreggine, DO      And    LORazepam  1 mg Intramuscular Q6H PRN Max 4/day Leona Borreggine, DO      And    benztropine  0 5 mg Intramuscular Q6H PRN Max 4/day Leona Borreggine, DO      haloperidol lactate  5 mg Intramuscular Q4H PRN Max 4/day Margaretmary Heal, DO      And    LORazepam  2 mg Intramuscular Q4H PRN Max 4/day Margaretmary Heal, DO      And    benztropine  1 mg Intramuscular Q4H PRN Max 4/day Margaretmary Heal, DO      benztropine  1 mg Oral BID Lionel Chapman MD      divalproex sodium  500 mg Oral Daily Lionel Chapman MD      divalproex sodium  750 mg Oral QPM Lionel Chapman MD      haloperidol  2 mg Oral Q4H PRN Max 6/day Leona Borreggine, DO      haloperidol  5 mg Oral Q6H PRN Max 4/day Leona Borreggine, DO      haloperidol  5 mg Oral Q4H PRN Max 4/day Leona Borreggine, DO      hydrOXYzine HCL  25 mg Oral Q6H PRN Max 4/day Leona Borreggine, DO      hydrOXYzine HCL  50 mg Oral Q4H PRN Max 4/day Leona Borreggine, DO      Or    LORazepam  1 mg Intramuscular Q4H PRN Leona Borreggine, DO      LORazepam  1 mg Oral Q4H PRN Max 6/day Leona Borreggine, DO      Or    LORazepam  2 mg Intramuscular Q6H PRN Max 3/day Leona Borreggine, DO      loxapine  50 mg Oral BID Lionel Chapman MD      loxapine  75 mg Oral HS Lionel Chapman MD      melatonin  3 mg Oral HS Leona Borreggine, DO      nicotine  1 patch Transdermal Daily [facility], DO      nicotine polacrilex  4 mg Oral Q2H PRN [facility] Heal, DO      senna-docusate sodium  1 tablet Oral Daily PRN Genaro Munguia Borreggine, DO      traZODone  50 mg Oral HS PRN Margaretmary Heal, DO       Risks / Benefits of Treatment:    Risks, benefits, and possible side effects of medications explained to patient  Patient has limited understanding of risks and benefits of treatment at this time, but agrees to take medications as prescribed  Counseling / Coordination of Care:    Patient's progress discussed with staff in treatment team meeting  Medications, treatment progress and treatment plan reviewed with patient      Chin Keller MD 03/08/21

## 2021-03-08 NOTE — SOCIAL WORK
Worker spoke with pt's mother Treyjoseluis Lydia regarding pt  She reports she spoke with the pt 2 days ago and he sounded good, feels he is at baseline at this time  Worker informed her that pt is pending discharge for later this week  Worker will contact pt's mother regarding specific discharge date

## 2021-03-08 NOTE — SOCIAL WORK
Worker spoke with pt about ICM referral, pt declined at this time  Worker reminded pt of 304 hearing for tomorrow at 8:00am, pt in agreement to attend  Pt reports speaking with his mother 2 days ago, worker will follow-up with pt's mother regarding progress  Pt continues to have delayed responses and stare but improving

## 2021-03-08 NOTE — PROGRESS NOTES
Occupational Therapy Student Group Note    Patient attended partial duration of community meeting  Sat at group table among peers but did not socialize with others  Able to sit and write daily goals without signs of agitation or frustration  Able to verbally share goal when prompted by group facilitator which reflected being more social with others  Observed leaving group room shortly after sharing and not returning  Continues to be scant and guarded on approach  Patient has increased group setting tolerance and exhibited appropriate behaviors during his attendance in the group room

## 2021-03-08 NOTE — NURSING NOTE
Patient cooperative but appears guarded on approach  He is delayed in answering questions  Patient lingers around the nurse's station but does not report unmet needs  He stares intently at staff and peers  At one point he walked around this nurse very closely and was redirected  It takes the patient a moment to stop  He denies all psych symptoms but appears internally preoccupied

## 2021-03-08 NOTE — PROGRESS NOTES
03/08/21 0754   Team Meeting   Meeting Type Daily Rounds   Team Members Present   Team Members Present Physician;Nurse;   Physician Team Member Dr Angelo Reynolds Team Member [address] [facility] Floor Management Team Member Anahi   Patient/Family Present   Patient Present No   Patient's Family Present No   Pt medication compliant  Pt has 304 hearing tomorrow at 8:00am with [facility]  Pt paces the unit but seen responding to internal stimuli  Pt does not engage with peers  Discharge pending for the end of the week

## 2021-03-08 NOTE — PLAN OF CARE
Pt has an appointment with NANCY on 3/15 at 3:00pm with his therapist Marisa Gonsalez in person and on 3/22 at 12:45pm with psychiatrist via phone

## 2021-03-08 NOTE — NURSING NOTE
Pt found resting comfortably in bed  Responses still delayed and continues to stare  Denies SI/HI AVH and says that he is feeling good but tired  Will continue to monitor

## 2021-03-08 NOTE — NURSING NOTE
Pt visible on milieu, withdrawn from peers, but is pleasant and cooperative  Pt continues to intensely stare, pace the unit and appears to be internally stimulated a/e/b pt laughing to self  Pt denies SI/HI/AVH and is med and meal compliant and bright upon approach  Pt remains delayed in responsiveness to speech  Will continue to monitor

## 2021-03-08 NOTE — DISCHARGE INSTR - OTHER ORDERS
Crisis Information  If you are experiencing a mental health emergency, you may call the [phone] [facility] 24 hours a day, 7 days per week at [phone]  In [facility], call [phone]  When you need someone to listen, the Manda Lancaster is available for 16 hours a day, 7 days a week, from the time of 7-10am and 2pm-2am   It is not available from the hours of 2am-6am and 10am-2pm  A representative can be reached at [phone]  Nayla Jackson RN, our [employer], will be calling you after your discharge, on the phone number that you provided  She will be available as an additional support, if needed  If you wish to speak with her, you may contact Jennie Barillas at [phone]  What you need to know aboutcoronavirus disease 2019 (COVID-19)     What is coronavirus disease 2019 (COVID-19)? Coronavirus disease 2019 (COVID-19) is a respiratory illness that can spread from person to person  The virus that causes COVID-19 is a novel coronavirus that was first identified during an investigation into an outbreak in [place], [place]  Can people in [place]  get COVID[place]19? Yes  COVID-19 is spreading from person to person in parts of the [place]  Risk of infection with COVID-19 is higher for people who are close contacts of someone known to have COVID-19, for example healthcare workers, or household members  Other people at higher risk for infection are those who live in or have recently been in an area with ongoing spread of COVID-19  Learn more about places with ongoing spread at   [employer] tn  html#geographic  Have there been cases of COVID-19 in the [place] ?   Yes  The first case of COVID-19 in the [place] was reported on January 21, 2020  The current count of cases of COVID-19 in the [place] is available on [employer] at [facility]    How does COVID-19 spread? The virus that causes COVID-19 probably emerged from an animal source, but is now spreading from person to person  The virus is thought to spread mainly between people who are in close contact with one another (within about 6 feet) through respiratory droplets produced when an infected person coughs or sneezes  It also may be possible that a person can get COVID-19 by touching a surface or object that has the virus on it and then touching their own mouth, nose, or possibly their eyes, but this is not thought to be the main way the virus spreads  Learn what is known about the spread of newly emerged coronaviruses at [facility]  What are the symptoms of COVID-19? Patients with COVID-19 have had mild to severe respiratory illness with symptoms of   fever   cough   shortness of breath  What are severe complications from this virus? Some patients have pneumonia in both lungs, multi-organ failure and in some cases death  How can I help protect myself? People can help protect themselves from respiratory illness with everyday preventive actions  Avoid close contact with people who are sick  Avoid touching your eyes, nose, and mouth withunwashed hands  Wash your hands often with soap and water for at least 20 seconds  Use an alcohol-based hand  that contains at least 60% alcohol if soap and water are not available  If you are sick, to keep from spreading respiratory illness to others, you should   Stay home when you are sick  Cover your cough or sneeze with a tissue, then throw the tissue in the trash  Clean and disinfect frequently touched objectsand surfaces  What should I do if I recently traveled from an area with ongoing spread of COVID-19? If you have traveled from an affected area, there may be restrictions on your movements for up to 2 weeks   If you develop symptoms during that period (fever, cough, trouble breathing), seek medical advice  Call the office of your health care provider before you go, and tell them about your travel and your symptoms  They will give you instructions on how to get care without exposing other people to your illness  While sick, avoid contact with people, don't go out and delay any travel to reduce the possibility of spreading illness to others  Is there a treatment? There is no specific antiviral treatment for COVID-19  People with COVID-19 can seek medical care to helprelieve symptoms  For more information: www cdc gov/IMNZV34GJ 023363-H 03/03/2020       What to do if you are sick withcoronavirus disease 2019 (COVID-19)     If you are sick with COVID-19 or suspect you are infected with the virus that causes COVID-19, follow the steps below to help prevent the disease from spreading to people in your home and community  Stay home except to get medical care   You should restrict activities outside your home, except for getting medical care  Do not go to work, school, or public areas  Avoid using public transportation, ride-sharing, or taxis  Separate yourself from other people and animals inyour home  People: As much as possible, you should stay in a specific room and away from other people in your home  Also, you should use a separate bathroom, if available  Animals: Do not handle pets or other animals while sick  See COVID-19 and Animals for more information  Call ahead before visiting your doctor   If you have a medical appointment, call the healthcare provider and tell them that you have or may have COVID-19  This will help the healthcare provider's office take steps to keep other people from getting infected or exposed  Wear a facemask  You should wear a facemask when you are around other people (e g , sharing a room or vehicle) or pets and before you enter a healthcare provider's office   If you are not able to wear a facemask (for example, because it causes trouble breathing), then people who live with you should not stay in the same room with you, or they should wear a facemask if they enteryour room  Cover your coughs and sneezes   Cover your mouth and nose with a tissue when you cough or sneeze  Throw used tissues in a lined trash can; immediately wash your hands with soap and water for at least 20 seconds or clean your hands with an alcohol-based hand  that contains at least 60 to 95% alcohol, covering all surfaces of your hands and rubbing them together until they feel dry  Soap and water should be used preferentially if hands are visibly dirty  Avoid sharing personal household items   You should not share dishes, drinking glasses, cups, eating utensils, towels, or bedding with other people or pets in your home  After using these items, they should be washed thoroughly with soap and water  Clean your hands often  Wash your hands often with soap and water for at least 20 seconds  If soap and water are not available, clean your hands with an alcohol-based hand  that contains at least 60% alcohol, covering all surfaces of your hands and rubbing them together until they feel dry  Soap and water should be used preferentially if hands are visibly dirty  Avoid touching your eyes, nose, and mouth with unwashed hands  Clean all "high-touch" surfaces every day  High touch surfaces include counters, tabletops, doorknobs, bathroom fixtures, toilets, phones, keyboards, tablets, and bedside tables  Also, clean any surfaces that may have blood, stool, or body fluids on them  Use a household cleaning spray or wipe, according to the label instructions  Labels contain instructions for safe and effective use of the cleaning product including precautions you should take when applying the product, such as wearing gloves and making sure you have good ventilation during use of the product    Monitor your symptoms  Seek prompt medical attention if your illness is worsening (e g , difficulty breathing)  Before seeking care, call your healthcare provider and tell them that you have, or are being evaluated for, COVID-19  Put on a facemask before you enter the facility  These steps will help the healthcare provider's office to keep other people in the office or waiting room from getting infectedor exposed  Ask your healthcare provider to call the local or state health department  Persons who are placed under active monitoring or facilitated self-monitoring should follow instructions provided by their local health department or occupational health professionals, as appropriate  If you have a medical emergency and need to call 911, notify the dispatch personnel that you have, or are being evaluated for COVID-19  If possible, put on a facemask before emergency medical services arrive  Discontinuing home isolation  Patients with confirmed COVID-19 should remain under home isolation precautions until the risk of secondary transmission to others is thought to be low  The decision to discontinue home isolation precautions should be made on a case-by-case basis, in consultation with healthcare providers and [facility] and [facility] health departments  For more information: www cdc gov/SWOFN85QH 637963-Q 02/24/2020       Stay home when you are sick,except to get medical care  Wash your hands often with soap and water for at least 20 seconds  Cover your cough or sneeze with a tissue, then throw the tissue in the trash  Clean and disinfect frequently touched objects and surfaces  Avoid touching your eyes, nose, and mouth  STOP THE SPREAD OF GERMS  For more information: www cdc gov/COVID19 Avoid close contact with people who are sick  Help prevent the spread of respiratory diseases like COVID-19

## 2021-03-08 NOTE — CMS CERTIFICATION NOTE
Recertification: Based upon physical, mental and social evaluations, I certify that inpatient psychiatric services continue to be medically necessary for this patient for a duration of 20 midnights for the treatment of  Schizoaffective disorder, bipolar type [facility])   Available alternative community resources still do not meet the patient's mental health care needs  I further attest that an established written individualized plan of care has been updated and is outlined in the patient's medical records

## 2021-03-09 PROCEDURE — 99232 SBSQ HOSP IP/OBS MODERATE 35: CPT | Performed by: PSYCHIATRY & NEUROLOGY

## 2021-03-09 RX ORDER — LOXAPINE SUCCINATE 25 MG/1
100 TABLET ORAL
Status: DISCONTINUED | OUTPATIENT
Start: 2021-03-09 | End: 2021-03-12 | Stop reason: HOSPADM

## 2021-03-09 RX ADMIN — BENZTROPINE MESYLATE 1 MG: 1 TABLET ORAL at 08:23

## 2021-03-09 RX ADMIN — BENZTROPINE MESYLATE 1 MG: 1 TABLET ORAL at 17:06

## 2021-03-09 RX ADMIN — LOXAPINE 100 MG: 25 CAPSULE ORAL at 21:01

## 2021-03-09 RX ADMIN — DIVALPROEX SODIUM 500 MG: 500 TABLET, DELAYED RELEASE ORAL at 08:23

## 2021-03-09 RX ADMIN — DIVALPROEX SODIUM 750 MG: 250 TABLET, DELAYED RELEASE ORAL at 17:06

## 2021-03-09 RX ADMIN — LOXAPINE 50 MG: 25 CAPSULE ORAL at 16:25

## 2021-03-09 RX ADMIN — LOXAPINE 50 MG: 25 CAPSULE ORAL at 08:23

## 2021-03-09 RX ADMIN — MELATONIN TAB 3 MG 3 MG: 3 TAB at 21:01

## 2021-03-09 NOTE — NURSING NOTE
Pt is visible on the unit, withdrawn to self  Pt is flat in affect, but brighter upon approach  Pt continues to demonstrate delayed speech and stares intensely  Pt is med and meal compliant and denies SI/HI/AVH  Will continue to monitor

## 2021-03-09 NOTE — PROGRESS NOTES
03/09/21 0739   Team Meeting   Meeting Type Daily Rounds   Team Members Present   Team Members Present Physician;Nurse;   Physician Team Member Dr Lo Arce Team Member [address] [facility],[address] [place] [address] Management Team Member Anahi   Patient/Family Present   Patient Present No   Patient's Family Present No   Pt had 304 hearing this morning  Pt medication compliant  Pt still has delayed responses and stares but per family that is his baseline  Pt pending discharge for Friday

## 2021-03-09 NOTE — PROGRESS NOTES
Occupational Therapy Student Group Note    Attended partial duration of afternoon leisure group and remained calm  Sat away from peers and did not socialize with others  Did not contribute to group Trivia game  Observed intermittently leaving day room

## 2021-03-09 NOTE — PROGRESS NOTES
Progress Note - [address] 30 y o  male MRN: [id number]   Unit/Bed#: [facility] [place] 347-02 Encounter: [id number]    Behavior over the last 24 hours: slowly improving  Josafat Sadler is cooperative with session and more pleasant  He stipulated today for further treatment at 304 hearing  He no longer seems irritable, has been compliant with milieu and medications  He continues to have some delay when answering questions (which is his baseline as per mother), but overall his speech is more spontaneous  He continues to deny auditory or visual hallucinations, although still at times seems distracted  Socializes slightly more with peers      Sleep: normal  Appetite: normal  Medication side effects: No   ROS: no complaints, denies any shortness of breath, chest pain or abdominal pain, all other systems are negative    Mental Status Evaluation:    Appearance:  casually dressed, improved grooming   Behavior:  cooperative, less guarded   Speech:  delayed, but more spontaneous   Mood:  less anxious   Affect:  less labile   Thought Process:  concrete   Associations: concrete associations   Thought Content:  mild paranoia   Perceptual Disturbances: denies auditory or visual hallucinations when asked, not as preoccupied   Risk Potential: Suicidal ideation - None  Homicidal ideation - None  Potential for aggression - Not at present   Sensorium:  oriented to person, place and time/date   Memory:  recent and remote memory grossly intact   Consciousness:  alert and awake   Attention/Concentration: attention span and concentration appear shorter than expected for age   Insight:  limited   Judgment: limited   Gait/Station: normal gait/station, normal balance   Motor Activity: no abnormal movements     Vital signs in last 24 hours:    Temp:  [97 2 °F (36 2 °C)-98 6 °F (37 °C)] 97 2 °F (36 2 °C)  HR:  [104-106] 106  Resp:  [16] 16  BP: (118-119)/(61-67) 119/67    Laboratory results: I have personally reviewed all pertinent laboratory/tests results        Suicide/Homicide Risk Assessment:    Risk of Harm to Self:   Nursing Suicide Risk Assessment Last 24 hours:  ; Moderate Risk to Self: Presence of hallucinations  ;    Current Specific Risk Factors include: mental illness diagnosis  Protective Factors: no current suicidal ideation, taking medications as ordered on the unit, improved psychotic symptoms  Based on today's assessment, Piero Hdz presents the following risk of harm to self: low    Risk of Harm to Others:  Nursing Homicide Risk Assessment: Violence Risk to Others: Denies within past 6 months  Based on today's assessment, Piero Hdz presents the following risk of harm to others: low    The following interventions are recommended: behavioral checks every 7 minutes, continued hospitalization on locked unit    Progress Toward Goals: progressing slowly, less anxious, no longer irritable, less preoccupied, working on coping skills, insight remains poor    Assessment/Plan   Principal Problem:    Schizoaffective disorder, bipolar type ([facility] )  Active Problems:    Cannabis abuse, continuous    [facility] )    Subclinical hypothyroidism      Recommended Treatment:     Planned medication and treatment changes: All current active medications have been reviewed  Encourage group therapy, milieu therapy and occupational therapy  Behavioral Health checks every 7 minutes  On 304 commitment up to 90 days - 304 hearing was held today  Possible discharge at the end of the week if he continues to make progress  Mother feels that he is doing much better and is approaching his baseline   Plan to convert 304 commitment to outpatient commitment at the time of discharge  Increase Loxitane to 50 mg bid and 100 mg at bedtime to help with psychotic symptoms  Recheck Depakote level, CBC/diff and CMP in the morning     Continue all other medications:    Current Facility-Administered Medications   Medication Dose Route Frequency Provider Last Rate    acetaminophen  650 mg Oral Q6H PRN Leona Borreggine, DO      acetaminophen  650 mg Oral Q4H PRN Leona Borreggine, DO      acetaminophen  975 mg Oral Q6H PRN Leona Borreggine, DO      haloperidol lactate  2 5 mg Intramuscular Q6H PRN Max 4/day Leona Borreggine, DO      And    LORazepam  1 mg Intramuscular Q6H PRN Max 4/day Leona Borreggine, DO      And    benztropine  0 5 mg Intramuscular Q6H PRN Max 4/day Leona Borreggine, DO      haloperidol lactate  5 mg Intramuscular Q4H PRN Max 4/day Gale Phlegm, DO      And    LORazepam  2 mg Intramuscular Q4H PRN Max 4/day Gale Phlegm, DO      And    benztropine  1 mg Intramuscular Q4H PRN Max 4/day Gale Phlegm, DO      benztropine  1 mg Oral BID Stephani Montejo MD      divalproex sodium  500 mg Oral Daily Stephani Montejo MD      divalproex sodium  750 mg Oral QPM Stephani Montejo MD      haloperidol  2 mg Oral Q4H PRN Max 6/day Leona Borreggine, DO      haloperidol  5 mg Oral Q6H PRN Max 4/day Leona Borreggine, DO      haloperidol  5 mg Oral Q4H PRN Max 4/day Leona Borreggine, DO      hydrOXYzine HCL  25 mg Oral Q6H PRN Max 4/day Leona Borreggine, DO      hydrOXYzine HCL  50 mg Oral Q4H PRN Max 4/day Leona Borreggine, DO      Or    LORazepam  1 mg Intramuscular Q4H PRN Leona Borreggine, DO      LORazepam  1 mg Oral Q4H PRN Max 6/day Leona Borreggine, DO      Or    LORazepam  2 mg Intramuscular Q6H PRN Max 3/day Leona Borreggine, DO      loxapine  100 mg Oral HS Stephani Montejo MD      loxapine  50 mg Oral BID Stephani Montejo MD      melatonin  3 mg Oral HS Leona Borreggine, DO      nicotine  1 patch Transdermal Daily Gale Phlegm, DO      nicotine polacrilex  4 mg Oral Q2H PRN Gale Phlegm, DO      senna-docusate sodium  1 tablet Oral Daily PRN Leona Borreggine, DO      traZODone  50 mg Oral HS PRN Gale Phlegm, DO       Risks / Benefits of Treatment:    Risks, benefits, and possible side effects of medications explained to patient  Patient has limited understanding of risks and benefits of treatment at this time, but agrees to take medications as prescribed  Counseling / Coordination of Care: Total floor / unit time spent today 35 minutes  Greater than 50% of total time was spent with the patient and / or family counseling and / or coordination of care  A description of counseling / coordination of care:    Patient's progress discussed with staff in treatment team meeting  Medications, treatment progress and treatment plan reviewed with patient  Coping with chronic mental illness and prolonged hospitalization reviewed with patient  Coping strategies including acquiring relapse prevention skills and compliance with medications reviewed with patient  Reassurance and supportive therapy provided  Encouraged participation in milieu and group therapy on the unit  Discharge plan discussed with patient  I attended and testified at the 304 hearing today with patient present at the hearing  The patient was committed for further inpatient treatment at the hearing      Dafne Adame MD 03/09/21

## 2021-03-09 NOTE — NURSING NOTE
Patient is visible on unit, pacing halls but is pleasant and calm on approach  Pt reports doing well and denies S/S  Pt is cooperative with care, and is medication and meal compliant  No behavioral issues noted at this time

## 2021-03-09 NOTE — PROGRESS NOTES
OT Student Note, Individual Session    Purpose of individual session to complete relapse prevention plan  Flat affect and scant  Continues to demonstrate delayed responses, but able to answer some questions  When asked about healthy coping skills pt  stated, "I like to talk to people " Identified mother and brother as support persons, and said he tends to isolate himself when feeling sad  Pt  encouraged to attend groups today

## 2021-03-09 NOTE — PROGRESS NOTES
03/09/21 1300   Activity/Group Checklist   Group   (recovery group)   Attendance Attended   Attendance Duration (min) 46-60   Interactions Interacted appropriately   Affect/Mood Appropriate   Goals Achieved Able to listen to others; Able to engage in interactions; Able to self-disclose

## 2021-03-10 LAB
ALBUMIN SERPL BCP-MCNC: 3.8 G/DL (ref 3–5.2)
ALP SERPL-CCNC: 65 U/L (ref 43–122)
ALT SERPL W P-5'-P-CCNC: 18 U/L (ref 9–52)
ANION GAP SERPL CALCULATED.3IONS-SCNC: 3 MMOL/L (ref 5–14)
AST SERPL W P-5'-P-CCNC: 26 U/L (ref 17–59)
BILIRUB SERPL-MCNC: 0.4 MG/DL
BUN SERPL-MCNC: 11 MG/DL (ref 5–25)
CALCIUM SERPL-MCNC: 9 MG/DL (ref 8.4–10.2)
CHLORIDE SERPL-SCNC: 91 MMOL/L (ref 97–108)
CO2 SERPL-SCNC: 33 MMOL/L (ref 22–30)
CREAT SERPL-MCNC: 0.62 MG/DL (ref 0.7–1.5)
EOSINOPHIL # BLD AUTO: 0.07 THOUSAND/UL (ref 0–0.4)
EOSINOPHIL NFR BLD MANUAL: 1 % (ref 0–6)
ERYTHROCYTE [DISTWIDTH] IN BLOOD BY AUTOMATED COUNT: 14.6 %
GFR SERPL CREATININE-BSD FRML MDRD: 150 ML/MIN/1.73SQ M
GLUCOSE P FAST SERPL-MCNC: 98 MG/DL (ref 70–99)
GLUCOSE SERPL-MCNC: 98 MG/DL (ref 70–99)
HCT VFR BLD AUTO: 37.5 % (ref 41–53)
HGB BLD-MCNC: 12.3 G/DL (ref 13.5–17.5)
LYMPHOCYTES # BLD AUTO: 2.81 THOUSAND/UL (ref 0.5–4)
LYMPHOCYTES # BLD AUTO: 39 % (ref 25–45)
MCH RBC QN AUTO: 30.1 PG (ref 26–34)
MCHC RBC AUTO-ENTMCNC: 32.8 G/DL (ref 31–36)
MCV RBC AUTO: 92 FL (ref 80–100)
MONOCYTES # BLD AUTO: 0.86 THOUSAND/UL (ref 0.2–0.9)
MONOCYTES NFR BLD AUTO: 12 % (ref 1–10)
NEUTS SEG # BLD: 3.31 THOUSAND/UL (ref 1.8–7.8)
NEUTS SEG NFR BLD AUTO: 46 %
PLATELET # BLD AUTO: 217 THOUSANDS/UL (ref 150–450)
PLATELET BLD QL SMEAR: ADEQUATE
PMV BLD AUTO: 7.9 FL (ref 8.9–12.7)
POTASSIUM SERPL-SCNC: 4.3 MMOL/L (ref 3.6–5)
PROT SERPL-MCNC: 6.2 G/DL (ref 5.9–8.4)
RBC # BLD AUTO: 4.09 MILLION/UL (ref 4.5–5.9)
RBC MORPH BLD: NORMAL
SODIUM SERPL-SCNC: 127 MMOL/L (ref 137–147)
TOTAL CELLS COUNTED SPEC: 100
VALPROATE SERPL-MCNC: 75.7 UG/ML (ref 50–120)
VARIANT LYMPHS # BLD AUTO: 2 % (ref 0–0)
WBC # BLD AUTO: 7.2 THOUSAND/UL (ref 4.5–11)

## 2021-03-10 PROCEDURE — 80053 COMPREHEN METABOLIC PANEL: CPT | Performed by: PSYCHIATRY & NEUROLOGY

## 2021-03-10 PROCEDURE — 80164 ASSAY DIPROPYLACETIC ACD TOT: CPT | Performed by: PSYCHIATRY & NEUROLOGY

## 2021-03-10 PROCEDURE — 85007 BL SMEAR W/DIFF WBC COUNT: CPT | Performed by: PSYCHIATRY & NEUROLOGY

## 2021-03-10 PROCEDURE — 99232 SBSQ HOSP IP/OBS MODERATE 35: CPT | Performed by: PSYCHIATRY & NEUROLOGY

## 2021-03-10 PROCEDURE — 85027 COMPLETE CBC AUTOMATED: CPT | Performed by: PSYCHIATRY & NEUROLOGY

## 2021-03-10 RX ADMIN — BENZTROPINE MESYLATE 1 MG: 1 TABLET ORAL at 08:07

## 2021-03-10 RX ADMIN — DIVALPROEX SODIUM 750 MG: 250 TABLET, DELAYED RELEASE ORAL at 17:25

## 2021-03-10 RX ADMIN — LOXAPINE 100 MG: 25 CAPSULE ORAL at 21:30

## 2021-03-10 RX ADMIN — MELATONIN TAB 3 MG 3 MG: 3 TAB at 21:29

## 2021-03-10 RX ADMIN — DIVALPROEX SODIUM 500 MG: 500 TABLET, DELAYED RELEASE ORAL at 08:07

## 2021-03-10 RX ADMIN — LOXAPINE 50 MG: 25 CAPSULE ORAL at 08:07

## 2021-03-10 RX ADMIN — BENZTROPINE MESYLATE 1 MG: 1 TABLET ORAL at 17:24

## 2021-03-10 RX ADMIN — LOXAPINE 50 MG: 25 CAPSULE ORAL at 17:24

## 2021-03-10 NOTE — PLAN OF CARE
Problem: PSYCHOSIS  Goal: Will report no hallucinations or delusions  Description: Interventions:  - Administer medication as  ordered  - Every waking shifts and PRN assess for the presence of hallucinations and or delusions  - Assist with reality testing to support increasing orientation  - Assess if patient's hallucinations or delusions are encouraging self-harm or harm to others and intervene as appropriate  3/10/2021 0714 by Lucio Mari RN  Outcome: Progressing  3/10/2021 0002 by Lucio Mari RN  Outcome: Progressing     Problem: BEHAVIOR  Goal: Pt/Family maintain appropriate behavior and adhere to behavioral management agreement, if implemented  Description: INTERVENTIONS:  - Assess the family dynamic   - Encourage verbalization of thoughts and concerns in a socially appropriate manner  - Assess patient/family's coping skills and non-compliant behavior (including use of illegal substances)  - Utilize positive, consistent limit setting strategies supporting safety of patient, staff and others  - Initiate consult with Case Management, Spiritual Care or other ancillary services as appropriate  - If a patient's/visitor's behavior jeopardizes the safety of the patient, staff, or others, refer to organization procedure     - Notify Security of behavior or suspected illegal substances which indicate the need for search of the patient and/or belongings  - Encourage participation in the decision making process about a behavioral management agreement; implement if patient meets criteria  3/10/2021 0714 by Lucio Mari RN  Outcome: Progressing  3/10/2021 0002 by Lucio Mari RN  Outcome: Progressing     Problem: ANXIETY  Goal: Will report anxiety at manageable levels  Description: INTERVENTIONS:  - Administer medication as ordered  - Teach and encourage coping skills  - Provide emotional support  - Assess patient/family for anxiety and ability to cope  3/10/2021 0714 by Lucio Mari RN  Outcome: Progressing  3/10/2021 0002 by Latesha Perez RN  Outcome: Progressing  Goal: By discharge: Patient will verbalize 2 strategies to deal with anxiety  Description: Interventions:  - Identify any obvious source/trigger to anxiety  - Staff will assist patient in applying identified coping technique/skills  - Encourage attendance of scheduled groups and activities  3/10/2021 0714 by Latesha Perez RN  Outcome: Progressing  3/10/2021 0002 by Latesha Perez RN  Outcome: Progressing     Problem: INVOLUNTARY ADMIT  Goal: Will cooperate with staff recommendations and doctor's orders and will demonstrate appropriate behavior  Description: INTERVENTIONS:  - Treat underlying conditions and offer medication as ordered  - Educate regarding involuntary admission procedures and rules  - Utilize positive consistent limit setting strategies to support patient and staff safety  3/10/2021 0714 by Latesha Perez RN  Outcome: Progressing  3/10/2021 0002 by Latesha Perez RN  Outcome: Progressing     Problem: SELF CARE DEFICIT  Goal: Return ADL status to a safe level of function  Description: INTERVENTIONS:  - Administer medication as ordered  - Assess ADL deficits and provide assistive devices as needed  - Obtain PT/OT consults as needed  - Assist and instruct patient to increase activity and self care as tolerated  3/10/2021 0714 by Latesha Perez RN  Outcome: Progressing  3/10/2021 0002 by Latesha Perez RN  Outcome: Progressing     Problem: Ineffective Coping  Goal: Participates in unit activities  Description: Interventions:  - Provide therapeutic environment   - Provide required programming   - Redirect inappropriate behaviors   3/10/2021 0714 by Latesha Perez RN  Outcome: Progressing  3/10/2021 0002 by Latesha Perez RN  Outcome: Progressing     Problem: DISCHARGE PLANNING  Goal: Discharge to home or other facility with appropriate resources  Description: INTERVENTIONS:  - Identify barriers to discharge w/patient and caregiver  - Arrange for needed discharge resources and transportation as appropriate  - Identify discharge learning needs (meds, wound care, etc )  - Arrange for interpretive services to assist at discharge as needed  - Refer to Case Management Department for coordinating discharge planning if the patient needs post-hospital services based on physician/advanced practitioner order or complex needs related to functional status, cognitive ability, or social support system  3/10/2021 0714 by Sandra Mullen RN  Outcome: Progressing  3/10/2021 0002 by Sandra Mullen RN  Outcome: Progressing

## 2021-03-10 NOTE — NURSING NOTE
Pt visible on unit but mainly quiet and to self  Remains delayed when answering assessment questions and continues to deny all symptoms including SI, HI and hallucinations  Pt reminded about his 1500cc fluid restriction and educated about importance of maintaining restriction  MHTs assisting pt to track how much intake he has had as he is having difficulty  Pt is compliant with medications and treatment

## 2021-03-10 NOTE — PROGRESS NOTES
Occupational Therapy Student Group Note    Attended partial duration of relaxation  Visible in milieu but not social with peers  Flat affect and quiet  Pt  was intermittently entering day room and engaging in session  Observed following movements from group facilitator  Continues to demonstrate delayed response and intensely  stares at times  Overall improvement in group attendance and participation  Leisure Drop in Center: (1600)    Attended partial duration of leisure group  Sat among peers and did not socialize with others  Did not participate in games available in day room  Observed pacing on unit

## 2021-03-10 NOTE — PROGRESS NOTES
Progress Note - [address] 30 y o  male MRN: [id number]   Unit/Bed#: [facility] 347-02 Encounter: [id number]    Behavior over the last 24 hours: improving  Jean-Pierre Form is calm, cooperative with session and pleasant  His speech is more spontaneous and he answers today with much less delay  He continues to deny all symptoms, seems less preoccupied and has been trying to socialize more with peers  His grooming is improved  He admitted today to increased fluid intake during the day after he was notified about low sodium level - agrees to follow fluid restriction as ordered by medical service  Compliant with medications      Sleep: normal  Appetite: normal  Medication side effects: No   ROS: no complaints, denies any shortness of breath, chest pain or abdominal pain, all other systems are negative    Mental Status Evaluation:    Appearance:  casually dressed, improved grooming   Behavior:  cooperative, less guarded   Speech:  soft, more spontaneous   Mood:  less anxious   Affect:  less labile   Thought Process:  concrete   Associations: concrete associations   Thought Content:  less paranoid   Perceptual Disturbances: denies auditory or visual hallucinations when asked, less preoccupied   Risk Potential: Suicidal ideation - None  Homicidal ideation - None  Potential for aggression - No   Sensorium:  oriented to person, place and time/date   Memory:  recent and remote memory grossly intact   Consciousness:  alert and awake   Attention/Concentration: attention span and concentration appear shorter than expected for age   Insight:  limited   Judgment: limited   Gait/Station: normal gait/station, normal balance   Motor Activity: no abnormal movements     Vital signs in last 24 hours:    Temp:  [97 7 °F (36 5 °C)-98 1 °F (36 7 °C)] 97 7 °F (36 5 °C)  HR:  [100-106] 100  Resp:  [16] 16  BP: (100-106)/(61-68) 100/61    Laboratory results: I have personally reviewed all pertinent laboratory/tests results    Last Laboratory Results with Depakote and/or Tegretol levels:   Lab Results   Component Value Date    WBC 7 20 03/10/2021    RBC 4 09 (L) 03/10/2021    HGB 12 3 (L) 03/10/2021    HCT 37 5 (L) 03/10/2021     03/10/2021    RDW 14 6 03/10/2021    NEUTROABS 3 31 03/10/2021    SODIUM 127 (L) 03/10/2021    K 4 3 03/10/2021    CL 91 (L) 03/10/2021    CO2 33 (H) 03/10/2021    BUN 11 03/10/2021    CREATININE 0 62 (L) 03/10/2021    GLUC 98 03/10/2021    CALCIUM 9 0 03/10/2021    AST 26 03/10/2021    ALT 18 03/10/2021    ALKPHOS 65 03/10/2021    TP 6 2 03/10/2021    ALB 3 8 03/10/2021    TBILI 0 40 03/10/2021    VALPROICTOT 75 7 03/10/2021       Suicide/Homicide Risk Assessment:    Risk of Harm to Self:   Nursing Suicide Risk Assessment Last 24 hours: Low Risk to Self: N/A; Moderate Risk to Self: Presence of hallucinations  ;    Current Specific Risk Factors include: mental illness diagnosis  Protective Factors: no current suicidal ideation, taking medications as ordered on the unit, improved psychotic symptoms  Based on today's assessment, Tori Larose presents the following risk of harm to self: low    Risk of Harm to Others:  Nursing Homicide Risk Assessment: Violence Risk to Others: Denies within past 6 months  Based on today's assessment, Tori Larose presents the following risk of harm to others: low    The following interventions are recommended: behavioral checks every 7 minutes, continued hospitalization on locked unit    Progress Toward Goals: progressing, reality testing is slowly improving, less labile, less paranoid    Assessment/Plan   Principal Problem:    [facility]  Active Problems:    Cannabis abuse, continuous    Synthetic cannabinoid abuse ([facility] )    Subclinical hypothyroidism      Recommended Treatment:     Planned medication and treatment changes:     All current active medications have been reviewed  Encourage group therapy, milieu therapy and occupational therapy  [facility] checks every 7 minutes  On 304 commitment up to 90 days  Will observe if continues to improve   On fluid restriction at present - will need to assure that sodium level is improved before discharge  Recheck BMP in the morning     Continue current medications:    Current Facility-Administered Medications   Medication Dose Route Frequency Provider Last Rate    acetaminophen  650 mg Oral Q6H PRN Janeth Purcell Borreggine, DO      acetaminophen  650 mg Oral Q4H PRN Leona Borreggine, DO      acetaminophen  975 mg Oral Q6H PRN Leona Borreggine, DO      haloperidol lactate  2 5 mg Intramuscular Q6H PRN Max 4/day Leona Borreggine, DO      And    LORazepam  1 mg Intramuscular Q6H PRN Max 4/day Leona Borreggine, DO      And    benztropine  0 5 mg Intramuscular Q6H PRN Max 4/day Leona Borreggine, DO      haloperidol lactate  5 mg Intramuscular Q4H PRN Max 4/day Baljeet Course, DO      And    LORazepam  2 mg Intramuscular Q4H PRN Max 4/day Baljeet Course, DO      And    benztropine  1 mg Intramuscular Q4H PRN Max 4/day Baljeet Course, DO      benztropine  1 mg Oral BID Catrachito Ponce MD      divalproex sodium  500 mg Oral Daily Catrachito Ponce MD      divalproex sodium  750 mg Oral QPM Catrachito Ponce MD      haloperidol  2 mg Oral Q4H PRN Max 6/day Leona Borreggine, DO      haloperidol  5 mg Oral Q6H PRN Max 4/day Leona Borreggine, DO      haloperidol  5 mg Oral Q4H PRN Max 4/day Leona Borreggine, DO      hydrOXYzine HCL  25 mg Oral Q6H PRN Max 4/day Leona Borreggine, DO      hydrOXYzine HCL  50 mg Oral Q4H PRN Max 4/day Leona Borreggine, DO      Or    LORazepam  1 mg Intramuscular Q4H PRN Leona Borreggine, DO      LORazepam  1 mg Oral Q4H PRN Max 6/day Leona Borreggine, DO      Or    LORazepam  2 mg Intramuscular Q6H PRN Max 3/day Leona Borreggine, DO      loxapine  100 mg Oral HS Catrachito Ponce MD      loxapine  50 mg Oral BID Catrachito Ponce MD      melatonin  3 mg Oral HS Leona Rodriguezreggine, DO      nicotine  1 patch Transdermal Daily Margnoa Renee, DO      nicotine polacrilex  4 mg Oral Q2H PRN Matt Renee, DO      senna-docusate sodium  1 tablet Oral Daily PRN Genaro Rodriguezreggine, DO      traZODone  50 mg Oral HS PRN Matt Heal, DO       Risks / Benefits of Treatment:    Risks, benefits, and possible side effects of medications explained to patient  Patient has limited understanding of risks and benefits of treatment at this time, but agrees to take medications as prescribed  Counseling / Coordination of Care:    Patient's progress discussed with staff in treatment team meeting  Medications, treatment progress and treatment plan reviewed with patient      Chin Keller MD 03/10/21

## 2021-03-10 NOTE — DISCHARGE SUMMARY
Discharge Summary - Maciej 30 y o  male MRN: [id number]  Unit/Bed#: Uriel Siddiqi 783-99 Encounter: [id number]     Admission Date: 2/16/2021         Discharge Date: 3/12/2021    Attending Psychiatrist: Cisco Cuellar MD    Reason for Admission/HPI:     Thiago Pablo is a 30 y o  male with a history of Schizoaffective Disorder who was admitted to the inpatient psychiatric unit on a involuntary 302 commitment basis due to psychotic symptoms and increased agitation      Symptoms prior to admission included poor concentration, difficulty sleeping, mood swings, increased irritability, bizarre behavior, agitation, aggressive behavior, auditory hallucinations, delusional thoughts, disorganized behavior, disorganized thinking process, difficulty attending to activities of daily living and poor self-care  Onset of symptoms was gradual starting 2 weeks ago with progressively worsening course since that time  Stressors preceding admission included drug use problems and chronic mental illness  Mile Elmore was brought in to ED by police  His mother called police as Mile Elmore was decompensating at home: not sleeping, hallucinating and talking to himself, not taking care of his ADLs  He also was becoming increasingly aggressive with mother, pushed his mother and was throwing things  While in ED he was unable to provide psychiatric history, had delayed responses, was staring in place and appeared responding to internal stimuli      On initial evaluation after admission to the inpatient psychiatric unit Mile Elmore had difficulty describing events leading to admission "I was arguing with my mom, police brought me here for a simple check"  He was at times laughing inappropriately, seemed distracted and internally stimulated  His thinking process was disorganized, mood was labile   He agreed to restart his psychiatric medications and reported being compliant although he had difficulty confirming what medications he was prescribed  Past Psychiatric History:      Past Inpatient Psychiatric Treatment:   Several past inpatient psychiatric admissions at [address] [place] [address] and [facility]  Past Outpatient Psychiatric Treatment:    Currently in outpatient psychiatric treatment with a psychiatrist at [address] ([address])  Has a therapist at [address] ([address])  Past Suicide Attempts: no  Past Violent Behavior: no  Past Psychiatric Medication Trials: Trazodone, Depakote, Risperdal, Loxitane, Invega Sustenna, Atarax, Ambien and Cogentin     Substance Abuse History:    Alcohol use: occasional, social use  Recreational drug use:   Cocaine:         denies use  Heroin:             denies use  Marijuana:        current use, uses few times per month, last use was 1 month ago  Other drugs:   K2: current use, uses occasionally, last use was 2 months ago   Longest clean time: few months  History of Inpatient/Outpatient rehabilitation program: no  Smoking history: 10 cigarettes per day  Use of caffeine: 5 cups of coffee per day     Family Psychiatric History:      Psychiatric Illness:      Mother - psychotic disorder, Brother - mental illness  Substance Abuse:      no family history of substance abuse  Suicide Attempts:       no family history of suicide attempts     Social History:     Education: 11th grade  Learning Disabilities: none  Marital History: single  Children: none  Living Arrangement: lives in home with mother and brother  Occupational History: worked at [employer] in the past, on permanent disability  Functioning Relationships: mother is supportive  Legal History: no current legal problems, past arrests due to drug possession   History: None     Traumatic History:      Abuse: none  Other Traumatic Events: none      Past Medical History:     History of Seizures: no  History of Head injury with loss of consciousness: no    Past Medical History:   Diagnosis Date    Schizoaffective disorder ([facility] [address] )     resolved 05/16/14     Past Surgical History:   Procedure Laterality Date    NO PAST SURGERIES         Medications: All current active medications have been reviewed  Medications prior to admission:    Prior to Admission Medications   Prescriptions Last Dose Informant Patient Reported? Taking?   benztropine (COGENTIN) 1 mg tablet Not Taking at Unknown time  Yes No   Sig: Take 1 mg by mouth 2 (two) times a day    buPROPion (WELLBUTRIN) 75 mg tablet Not Taking at Unknown time  Yes No   Sig: Take 75 mg by mouth 2 (two) times a day   divalproex sodium (DEPAKOTE) 250 mg EC tablet Not Taking at Unknown time  Yes No   Sig: Take 250 mg by mouth daily    divalproex sodium (DEPAKOTE) 500 mg EC tablet Not Taking at Unknown time  Yes No   Sig: Take 500 mg by mouth daily at bedtime   loxapine (LOXITANE) 25 mg capsule   No No   Sig: Take 1 capsule (25 mg total) by mouth every 8 (eight) hours for 8 days   Patient taking differently: Take 25 mg by mouth 3 (three) times a day       Facility-Administered Medications: None       Allergies:     No Known Allergies    Objective     Vital signs in last 24 hours:    Temp:  [97 9 °F (36 6 °C)-98 7 °F (37 1 °C)] 97 9 °F (36 6 °C)  HR:  [] 93  Resp:  [16] 16  BP: (106-121)/(65-69) 106/66      Intake/Output Summary (Last 24 hours) at 3/12/2021 1001  Last data filed at 3/11/2021 1300  Gross per 24 hour   Intake 600 ml   Output --   Net 600 ml       Hospital Course:     Radha Lanza was admitted to the inpatient psychiatric unit and started on Behavioral Health checks every 7 minutes  During the hospitalization he was encouraged to attend individual therapy, group therapy, milieu therapy and occupational therapy  Psychiatric medications were adjusted over the hospital stay   To address mood instability, irritability, psychotic symptoms, extrapyramidal symptoms due to antipsychotic medications and insomnia, Radha Lanza was treated with mood stabilizer Depakote, antipsychotic medication Loxitane, antiparkinsonian medication Cogentin and hypnotic medication Melatonin  Medication doses were gradually titrated during the hospital course  Loxitane was restarted and titrated to 50 mg bid and 100 mg at bedtime  Cogentin was adjusted to 1 mg bid  Melatonin was added at the dose of 3 mg at bedtime  Depakote was started and titrated to 500 mg daily and 750 mg every evening  On that dose Depakote level was therapeutic at 75 7 on 3/10/21  Prior to beginning of treatment medications risks and benefits and possible side effects including risk of liver impairment related to treatment with Depakote and risk of parkinsonian symptoms, Tardive Dyskinesia and metabolic syndrome related to treatment with antipsychotic medications were reviewed with Noemí Madsen  He verbalized understanding and agreement for treatment  Upon admission Noemí Madsen was seen by medical service for medical clearance for inpatient treatment and medical follow up  Ute symptoms slowly improved over the hospital course  Initially after admission he was still irritable, paranoid and confused  He also still had thought blocking and seemed responding to internal stimuli  Since Noemí Madsen had significant symptoms, 303 hearing was held on 2/19/21 and he was committed for further inpatient psychiatric treatment for up to 20 days  Despite ongoing treatment Noemí Madsen continued to experience psychiatric symptoms  304 hearing was petitioned and he was committed for further inpatient psychiatric treatment for up to 90 days at 304 hearing on 3/8/21  With adjustment of medications and therapeutic milieu his symptoms gradually resolved  At the end of treatment Noemí Madsen was doing much better  His mood was more stable at the time of discharge  He denied suicidal ideation, intent or plan at the time of discharge and denied homicidal ideation, intent or plan at the time of discharge   There was no overt psychosis at the time of discharge  Delusional thoughts were no longer present  He was participating appropriately in milieu at the time of discharge  Behavior was appropriate on the unit at the time of discharge  Sleep and appetite were improved  He was tolerating medications and was not reporting any significant side effects at the time of discharge  Since Melissa Ragsdale was doing well at the end of the hospitalization, treatment team felt that he could be safely discharged to outpatient care  We felt that Melissa Ragsdale achieved the maximum benefit of inpatient stay at that point, was at baseline at the end of the hospitalization and could now follow up with outpatient treatment  Prior to discharge  spoke with Ute mother to address support and his readiness for discharge  Ute mother felt that the he was at baseline and was ready for discharge  Melissa Ragsdale also felt stable and ready for discharge at the end of the hospital stay  The outpatient follow up with  [facility] for psychiatric follow up was arranged by the unit  upon discharge      Mental Status at Time of Discharge:     Appearance:  age appropriate, casually dressed   Behavior:  pleasant, cooperative, calm   Speech:  normal rate and volume, slightly delayed (baseline)   Mood:  euthymic   Affect:  normal range and intensity   Thought Process:  organized, concrete   Associations: concrete associations   Thought Content:  no overt delusions   Perceptual Disturbances: no auditory hallucinations, no visual hallucinations   Risk Potential: Suicidal ideation - None  Homicidal ideation - None  Potential for aggression - No   Sensorium:  oriented to person, place, time/date and situation   Memory:  recent and remote memory grossly intact   Consciousness:  alert and awake   Attention/Concentration: attention span and concentration are improved   Insight:  moderate   Judgment: moderate   Gait/Station: normal gait/station, normal balance Motor Activity: no abnormal movements       Suicide/Homicide Risk Assessment:    Risk of Harm to Self:   Demographic risk factors include: never , male  Historical Risk Factors include: chronic psychiatric problems, substance use  Current Specific Risk Factors include: recent inpatient psychiatric admission - being discharged today, mental illness diagnosis  Protective Factors: no current suicidal ideation, stable mood, improved psychotic symptoms, improved impulse control, ability to make plans for the future, outpatient psychiatric follow up established, family support established, compliant with medications, stable housing  Weapons/Firearms: none  The following steps have been taken to ensure weapons are properly secured: not applicable  Based on today's assessment, Lianne Gaucher presents the following risk of harm to self: low    Risk of Harm to Others:  Demographic Risk Factors include: male, unemployed, under age 37    Historical Risk Factors include: prior arrest   Current Specific Risk Factors include: recent difficulty with impulse control, concomitant thought disorder  Protective Factors: no current homicidal ideation, improved impulse control, stable mood, compliant with medications, willing to continue psychiatric treatment, willing to remain free from substance use, outpatient follow up established, stable living environment, supportive family  Based on today's assessment, Wanita Gaucher presents the following risk of harm to others: low    The following interventions are recommended: outpatient follow up with a psychiatrist, follow up with family physician for medical issues    Admission Diagnosis:    Principal Problem:    Schizoaffective disorder, bipolar type ([place] [address] )  Active Problems:    Cannabis abuse, continuous    Synthetic cannabinoid abuse ([place] [address] )    Subclinical hypothyroidism    Discharge Diagnosis:     Principal Problem:    Schizoaffective disorder, bipolar type ([place] [address] )  Active Problems:    Cannabis abuse, continuous    Synthetic cannabinoid abuse ([facility] [address] )    Subclinical hypothyroidism  Resolved Problems:    * No resolved hospital problems  *      Lab Results: I have personally reviewed all pertinent laboratory/tests results  Most Recent Labs:   Lab Results   Component Value Date    WBC 7 20 03/10/2021    RBC 4 09 (L) 03/10/2021    HGB 12 3 (L) 03/10/2021    HCT 37 5 (L) 03/10/2021     03/10/2021    RDW 14 6 03/10/2021    NEUTROABS 3 31 03/10/2021    SODIUM 138 03/12/2021    K 4 8 03/12/2021    CL 98 03/12/2021    CO2 35 (H) 03/12/2021    BUN 19 03/12/2021    CREATININE 0 70 03/12/2021    GLUC 96 03/12/2021    GLUF 98 03/10/2021    CALCIUM 9 8 03/12/2021    AST 26 03/10/2021    ALT 18 03/10/2021    ALKPHOS 65 03/10/2021    TP 6 2 03/10/2021    ALB 3 8 03/10/2021    TBILI 0 40 03/10/2021    CHOLESTEROL 153 02/16/2021    HDL 59 02/16/2021    TRIG 93 02/16/2021    LDLCALC 75 02/16/2021    NONHDLC 94 02/16/2021    VALPROICTOT 75 7 03/10/2021    AMMONIA <10 (L) 12/26/2019    LRM7QJRYIJEO 4 710 (H) 02/16/2021    FREET4 1 10 02/16/2021    T3FREE 3 68 02/16/2021    RPR Non-Reactive 02/18/2021    HGBA1C 5 7 (H) 02/18/2021     02/18/2021   COVID19:   Lab Results   Component Value Date    SARSCOV2 Negative 02/16/2021   Drug Screen:   Lab Results   Component Value Date    AMPMETHUR Negative 02/16/2021    BARBTUR Negative 02/16/2021    BDZUR Negative 02/16/2021    THCUR Negative 02/16/2021    COCAINEUR Negative 02/16/2021    METHADONEUR Negative 02/16/2021    OPIATEUR Negative 02/16/2021    PCPUR Negative 02/16/2021   EKG   Lab Results   Component Value Date    VENTRATE 62 02/18/2021    ATRIALRATE 62 02/18/2021    PRINT 120 02/18/2021    QRSDINT 82 02/18/2021    QTINT 406 02/18/2021    QTCINT 412 02/18/2021    PAXIS 18 02/18/2021    QRSAXIS 80 02/18/2021    TWAVEAXIS 64 02/18/2021       Discharge Medications:    See after visit summary for all reconciled discharge medications provided to patient and family  Discharge instructions/Information to patient and family:     See after visit summary for information provided to patient and family  Provisions for Follow-Up Care:    See after visit summary for information related to follow-up care and any pertinent home health orders  Discharge Statement:    I spent 40 minutes discharging the patient  This time was spent on the day of discharge  I had direct contact with the patient on the day of discharge  Additional documentation is required if more than 30 minutes were spent on discharge:    I reviewed with Ramiro Osler importance of compliance with medications and outpatient treatment after discharge  I discussed the medication regimen and possible side effects of the medications with Ramiro Osler prior to discharge  At the time of discharge he was tolerating psychiatric medications  I discussed outpatient follow up with Ramiro Osler  I reviewed with Ramiro Osler crisis plan and safety plan upon discharge  Ramiro Osler agreed to abstain from drug and alcohol use after discharge  Ramiro Osler was competent to understand risks and benefits of withholding information and risks and benefits of his actions  Outpatient Smoking Cessation referral was offered to Ramiro Osler  He declined the referral   Smoking Cessation medication was offered to Ramiro Osler  He declined Smoking Cessation medication  Ramiro Osler was advised to obtain Valproic Acid level, CBC/diff and CMP 1 week after discharge      Discharge on Two Antipsychotic Medications: Adela Delacruz MD 03/12/21

## 2021-03-10 NOTE — QUICK NOTE
Received call from nurse on salvador informing of worsening Sodium levels today 127  Suggested patient drinking a lot of fluids (not quantified) however due to this or alternative etiology related to medication side effect (Depakote)  Patient for possible discharge this Friday if etiology identified and corrected  Patient asymptomatic, with normal behaviour and no complaints of confusion or weakness  Will restrict fluid intake to 1500cc/day for repeat BMP daily x 2 days  If resolving then may discharge with similar fluid restriction recommenation however if hyponatremia persists or is worsening then Psych plans to adjust Depakote dosage to assist in resolving hyponatremia

## 2021-03-10 NOTE — PROGRESS NOTES
03/10/21 0733   Team Meeting   Meeting Type Daily Rounds   Team Members Present   Team Members Present Physician;Nurse;   Physician Team Member Dr Diandra Archer Team Member [address] Management Team Member Anahi   Patient/Family Present   Patient Present No   Patient's Family Present No   Pt medication compliant  Pt had 304 hearing yesterday was upheld to 90 days, will convert remaining days to outpatient commitment  Pt visible and paces the unit throughout the day  Pt pending discharge for Friday

## 2021-03-10 NOTE — NURSING NOTE
Patient awoke this morning and participated in morning programming  He was complaint with his medication, and was noted to be engaging with peers  Patient denies SI/HI/AH/VH, and is noted to be pacing the halls  Patient is cooperative with care and displayed no behavioral issues  Patient offers no complaints throughout the shift  Patient was placed on a fluid restriction due to decreased NA+ to see if the NA+ will self adjust   Patient will have a BMP on Thursday and then again on Friday to see if NA+ is WNL prior to D/C on Friday  If not WNL patient will stay through the weekend

## 2021-03-11 LAB
ANION GAP SERPL CALCULATED.3IONS-SCNC: 5 MMOL/L (ref 5–14)
BUN SERPL-MCNC: 17 MG/DL (ref 5–25)
CALCIUM SERPL-MCNC: 9.2 MG/DL (ref 8.4–10.2)
CHLORIDE SERPL-SCNC: 98 MMOL/L (ref 97–108)
CO2 SERPL-SCNC: 31 MMOL/L (ref 22–30)
CREAT SERPL-MCNC: 0.61 MG/DL (ref 0.7–1.5)
GFR SERPL CREATININE-BSD FRML MDRD: 151 ML/MIN/1.73SQ M
GLUCOSE SERPL-MCNC: 99 MG/DL (ref 70–99)
POTASSIUM SERPL-SCNC: 4.3 MMOL/L (ref 3.6–5)
SODIUM SERPL-SCNC: 134 MMOL/L (ref 137–147)

## 2021-03-11 PROCEDURE — 80048 BASIC METABOLIC PNL TOTAL CA: CPT | Performed by: PSYCHIATRY & NEUROLOGY

## 2021-03-11 PROCEDURE — 99232 SBSQ HOSP IP/OBS MODERATE 35: CPT | Performed by: PSYCHIATRY & NEUROLOGY

## 2021-03-11 RX ADMIN — DIVALPROEX SODIUM 500 MG: 500 TABLET, DELAYED RELEASE ORAL at 08:29

## 2021-03-11 RX ADMIN — LOXAPINE 50 MG: 25 CAPSULE ORAL at 08:28

## 2021-03-11 RX ADMIN — LOXAPINE 100 MG: 25 CAPSULE ORAL at 21:38

## 2021-03-11 RX ADMIN — BENZTROPINE MESYLATE 1 MG: 1 TABLET ORAL at 17:07

## 2021-03-11 RX ADMIN — LOXAPINE 50 MG: 25 CAPSULE ORAL at 17:06

## 2021-03-11 RX ADMIN — DIVALPROEX SODIUM 750 MG: 250 TABLET, DELAYED RELEASE ORAL at 17:07

## 2021-03-11 RX ADMIN — BENZTROPINE MESYLATE 1 MG: 1 TABLET ORAL at 08:29

## 2021-03-11 RX ADMIN — MELATONIN TAB 3 MG 3 MG: 3 TAB at 21:38

## 2021-03-11 NOTE — PROGRESS NOTES
Progress Note - [address] 30 y o  male MRN: [id number]   Unit/Bed#: Chad Padron 347-02 Encounter: [id number]    Behavior over the last 24 hours: improved  Amelia Doyle is doing better, is less preoccupied and answers with much less delay  He continues to deny all symptoms, does not seem irritated anymore and complies with unit milieu and fluid restriction  Has been taking medications      Sleep: normal  Appetite: normal  Medication side effects: No   ROS: no complaints, denies any headache, chest pain or back pain, all other systems are negative    Mental Status Evaluation:    Appearance:  casually dressed   Behavior:  cooperative, more pleasant   Speech:  soft, more spontaneous   Mood:  euthymic   Affect:  brighter   Thought Process:  concrete   Associations: concrete associations   Thought Content:  no overt delusions, paranoid ideation is resolved   Perceptual Disturbances: no auditory hallucinations, no visual hallucinations   Risk Potential: Suicidal ideation - None  Homicidal ideation - None  Potential for aggression - No   Sensorium:  oriented to person, place and time/date   Memory:  recent and remote memory grossly intact   Consciousness:  alert and awake   Attention/Concentration: attention span and concentration appear shorter than expected for age   Insight:  limited   Judgment: limited   Gait/Station: normal gait/station, normal balance   Motor Activity: no abnormal movements     Vital signs in last 24 hours:    Temp:  [98 °F (36 7 °C)-98 9 °F (37 2 °C)] 98 °F (36 7 °C)  HR:  [104-107] 107  Resp:  [16] 16  BP: (109-112)/(63-65) 109/65    Laboratory results: I have personally reviewed all pertinent laboratory/tests results    BMP:   Lab Results   Component Value Date    SODIUM 134 (L) 03/11/2021    K 4 3 03/11/2021    CL 98 03/11/2021    CO2 31 (H) 03/11/2021    AGAP 5 03/11/2021    BUN 17 03/11/2021    CREATININE 0 61 (L) 03/11/2021    GLUC 99 03/11/2021    CALCIUM 9 2 03/11/2021    EGFR 151 03/11/2021       Suicide/Homicide Risk Assessment:    Risk of Harm to Self:   Nursing Suicide Risk Assessment Last 24 hours: Low Risk to Self: N/A;  ;    Current Specific Risk Factors include: mental illness diagnosis  Protective Factors: no current suicidal ideation, taking medications as ordered on the unit, improved psychotic symptoms  Based on today's assessment, Tori Larose presents the following risk of harm to self: low    Risk of Harm to Others:  Nursing Homicide Risk Assessment: Violence Risk to Others: Denies within past 6 months  Based on today's assessment, Tori Larose presents the following risk of harm to others: low    The following interventions are recommended: behavioral checks every 7 minutes, continued hospitalization on locked unit    Progress Toward Goals: progressing, achieving admission goals, mood is stabilizing, discharge planning    Assessment/Plan   Principal Problem:    Schizoaffective disorder, bipolar type ([facility] [address] )  Active Problems:    Cannabis abuse, continuous    [facility] )    Subclinical hypothyroidism      Recommended Treatment:     Planned medication and treatment changes:     All current active medications have been reviewed  Encourage group therapy, milieu therapy and occupational therapy  Behavioral Health checks every 7 minutes  On 304 commitment up to 90 days  Possible discharge tomorrow if continues to improve and sodium levels are stable  Recheck BMP again in the morning - improved today    Continue current medications:    Current Facility-Administered Medications   Medication Dose Route Frequency Provider Last Rate    acetaminophen  650 mg Oral Q6H PRN Leona Borreggine, DO      acetaminophen  650 mg Oral Q4H PRN Leona Borreggine, DO      acetaminophen  975 mg Oral Q6H PRN Leona Borreggine, DO      haloperidol lactate  2 5 mg Intramuscular Q6H PRN Max 4/day Leona Gauthiere, DO      And    LORazepam  1 mg Intramuscular Q6H PRN Max 4/day Shelby Gongora Borreggine, DO      And    benztropine  0 5 mg Intramuscular Q6H PRN Max 4/day Leona Borreggine, DO      haloperidol lactate  5 mg Intramuscular Q4H PRN Max 4/day Gale Phlegm, DO      And    LORazepam  2 mg Intramuscular Q4H PRN Max 4/day Gale Phlegm, DO      And    benztropine  1 mg Intramuscular Q4H PRN Max 4/day Gale Phlegm, DO      benztropine  1 mg Oral BID Stephani Montejo MD      divalproex sodium  500 mg Oral Daily Stephani Montejo MD      divalproex sodium  750 mg Oral QPM Stephani Montejo MD      haloperidol  2 mg Oral Q4H PRN Max 6/day Leona Borreggine, DO      haloperidol  5 mg Oral Q6H PRN Max 4/day Leona Borreggine, DO      haloperidol  5 mg Oral Q4H PRN Max 4/day Leona Borreggine, DO      hydrOXYzine HCL  25 mg Oral Q6H PRN Max 4/day Leona Borreggine, DO      hydrOXYzine HCL  50 mg Oral Q4H PRN Max 4/day Leona Borreggine, DO      Or    LORazepam  1 mg Intramuscular Q4H PRN Leona Borreggine, DO      LORazepam  1 mg Oral Q4H PRN Max 6/day Leona Borreggine, DO      Or    LORazepam  2 mg Intramuscular Q6H PRN Max 3/day Leona Borreggine, DO      loxapine  100 mg Oral HS Stephani Montejo MD      loxapine  50 mg Oral BID Stephani Montejo MD      melatonin  3 mg Oral HS Leona Marinogine, DO      nicotine  1 patch Transdermal Daily Gale Phlegm, DO      nicotine polacrilex  4 mg Oral Q2H PRN Gale Phlegm, DO      senna-docusate sodium  1 tablet Oral Daily PRN [place] Pattee Borreggine, DO      traZODone  50 mg Oral HS PRN Gale Phlegm, DO       Risks / Benefits of Treatment:    Risks, benefits, and possible side effects of medications explained to patient and patient verbalizes understanding and agreement for treatment  Counseling / Coordination of Care:    Patient's progress discussed with staff in treatment team meeting  Medications, treatment progress and treatment plan reviewed with patient    Discharge plan discussed with patient      Nereida Patel MD 03/11/21

## 2021-03-11 NOTE — NURSING NOTE
Pt is aware he has reached his fluid restriction for the day and continues to request more to drink  The importance of maintaining restriction was again explained to pt

## 2021-03-11 NOTE — PROGRESS NOTES
03/11/21 0808   Team Meeting   Meeting Type Daily Rounds   Team Members Present   Team Members Present Physician;Nurse;   Physician Team Member   [address] Team Member [address] [facility],[address] [place] [address] Management Team Member Anahi   Patient/Family Present   Patient Present No   Patient's Family Present No   Pt medication compliant  Pt had labs drawn this morning, due to low sodium from yesterday  Pt was placed on a fluid restriction due to low sodium  Pt visible, still has stare but that is baseline for the pt  Pt pending discharge for tomorrow

## 2021-03-11 NOTE — PLAN OF CARE
Pt to be discharged tomorrow, Sebas Serrano will transport pt to home at 1300  Pt to follow-up with NANCY

## 2021-03-11 NOTE — NURSING NOTE
Patient cooperative with unit routine and meds  He still has the inappropriate stare  He denies all psych symptoms  His speech is delayed and he is scant in conversation  Patient isolative to himself but visible on the unit

## 2021-03-11 NOTE — SOCIAL WORK
Worker called pt's mother Maribel Robertson to inform her of discharge for tomorrow and outpatient appointments  Pt will need transportation home, worker emailed [employer] request form

## 2021-03-11 NOTE — NURSING NOTE
Pt denies SI,HI,AH,VH  Pt continues to have inappropriate stare and appears to be internally preoccupied  Medication and meal compliant  Present in dayroom and milieu  Pleasant and cooperative  No further complaints or concerns observed or reported  Educated pt on following 1500 ml fluid restriction  Will continue to monitor

## 2021-03-12 VITALS
TEMPERATURE: 97.9 F | DIASTOLIC BLOOD PRESSURE: 66 MMHG | WEIGHT: 122.4 LBS | RESPIRATION RATE: 16 BRPM | BODY MASS INDEX: 20.39 KG/M2 | HEART RATE: 93 BPM | HEIGHT: 65 IN | SYSTOLIC BLOOD PRESSURE: 106 MMHG | OXYGEN SATURATION: 100 %

## 2021-03-12 LAB
ANION GAP SERPL CALCULATED.3IONS-SCNC: 5 MMOL/L (ref 5–14)
BUN SERPL-MCNC: 19 MG/DL (ref 5–25)
CALCIUM SERPL-MCNC: 9.8 MG/DL (ref 8.4–10.2)
CHLORIDE SERPL-SCNC: 98 MMOL/L (ref 97–108)
CO2 SERPL-SCNC: 35 MMOL/L (ref 22–30)
CREAT SERPL-MCNC: 0.7 MG/DL (ref 0.7–1.5)
GFR SERPL CREATININE-BSD FRML MDRD: 142 ML/MIN/1.73SQ M
GLUCOSE SERPL-MCNC: 96 MG/DL (ref 70–99)
POTASSIUM SERPL-SCNC: 4.8 MMOL/L (ref 3.6–5)
SODIUM SERPL-SCNC: 138 MMOL/L (ref 137–147)

## 2021-03-12 PROCEDURE — 80048 BASIC METABOLIC PNL TOTAL CA: CPT | Performed by: PSYCHIATRY & NEUROLOGY

## 2021-03-12 PROCEDURE — 99239 HOSP IP/OBS DSCHRG MGMT >30: CPT | Performed by: PSYCHIATRY & NEUROLOGY

## 2021-03-12 RX ORDER — DIVALPROEX SODIUM 500 MG/1
500 TABLET, DELAYED RELEASE ORAL 2 TIMES DAILY
Qty: 60 TABLET | Refills: 1 | Status: SHIPPED | OUTPATIENT
Start: 2021-03-12 | End: 2021-07-12

## 2021-03-12 RX ORDER — LANOLIN ALCOHOL/MO/W.PET/CERES
3 CREAM (GRAM) TOPICAL
Qty: 30 TABLET | Refills: 1 | Status: SHIPPED | OUTPATIENT
Start: 2021-03-12 | End: 2021-05-11

## 2021-03-12 RX ORDER — BENZTROPINE MESYLATE 1 MG/1
1 TABLET ORAL 2 TIMES DAILY
Qty: 60 TABLET | Refills: 1 | Status: SHIPPED | OUTPATIENT
Start: 2021-03-12 | End: 2021-07-12

## 2021-03-12 RX ORDER — LOXAPINE SUCCINATE 50 MG/1
TABLET ORAL
Qty: 120 CAPSULE | Refills: 1 | Status: SHIPPED | OUTPATIENT
Start: 2021-03-12 | End: 2021-07-12

## 2021-03-12 RX ORDER — DIVALPROEX SODIUM 250 MG/1
250 TABLET, DELAYED RELEASE ORAL EVERY EVENING
Qty: 30 TABLET | Refills: 1 | Status: SHIPPED | OUTPATIENT
Start: 2021-03-12 | End: 2021-07-12

## 2021-03-12 RX ADMIN — LOXAPINE 50 MG: 25 CAPSULE ORAL at 08:04

## 2021-03-12 RX ADMIN — BENZTROPINE MESYLATE 1 MG: 1 TABLET ORAL at 08:04

## 2021-03-12 RX ADMIN — DIVALPROEX SODIUM 500 MG: 500 TABLET, DELAYED RELEASE ORAL at 08:04

## 2021-03-12 NOTE — BH TRANSITION RECORD
Contact Information: If you have any questions, concerns, pended studies, tests and/or procedures, or emergencies regarding your inpatient behavioral health visit  Please contact [facility] behavioral health unit 3B [phone]  and ask to speak to a , nurse or physician  A contact is available 24 hours/ 7 days a week at this number  Summary of Procedures Performed During your Stay:  Below is a list of major procedures performed during your hospital stay and a summary of results:  - Cardiac Procedures/Studies: EKG  Pending Studies (From admission, onward)     Start     Ordered    03/12/21 0000  CBC and differential      03/12/21 0958    03/12/21 0000  Comprehensive metabolic panel      73/45/29 0958    03/12/21 0000  Valproic acid level, total      03/12/21 0958              If studies are pending at discharge, follow up with your PCP and/or referring provider

## 2021-03-12 NOTE — SOCIAL WORK
Pt is being discharged today, escorted home by Michael Lr  Discharge address [address] [facility] [address] [place], [address] [facility]  Pt has appointments with NANCY on 3/15 at 1500 with therapist in person and on 3/22 at 12:45pm with the psychiatrist   Pt left with scripts  Pt denies all symptoms and in agreement with discharge

## 2021-03-12 NOTE — PROGRESS NOTES
03/12/21 0747   Team Meeting   Meeting Type Daily Rounds   Team Members Present   Team Members Present Physician;Nurse;   Physician Team Member Dr Oliva Galindo Team Member [address] [facility] Floor Management Team Member Anahi   Patient/Family Present   Patient Present No   Patient's Family Present No   Pt to be discharged today at 56 with Disha Whitfield, will return home and follow-up with NANCY

## 2021-03-12 NOTE — NURSING NOTE
Pt observed lingering near RN station throughout morning  Delayed in response  Intense stare throughout interaction  Compliant with AM medications  Pt denies SI/HI  Denies hallucinations but appears internally preoccupied  Pt says he feels ready for discharge today  When asked about plans after discharge, pt simply states he will be going home  Pt denies any issues/concerns at this time

## 2021-05-13 DIAGNOSIS — F25.0 SCHIZOAFFECTIVE DISORDER, BIPOLAR TYPE (HCC): Chronic | ICD-10-CM

## 2021-05-13 RX ORDER — DIVALPROEX SODIUM 500 MG/1
TABLET, DELAYED RELEASE ORAL
Qty: 60 TABLET | Refills: 1 | OUTPATIENT
Start: 2021-05-13

## 2021-05-13 RX ORDER — DIVALPROEX SODIUM 250 MG/1
TABLET, DELAYED RELEASE ORAL
Qty: 30 TABLET | Refills: 1 | OUTPATIENT
Start: 2021-05-13

## 2021-06-03 ENCOUNTER — HOSPITAL ENCOUNTER (EMERGENCY)
Facility: HOSPITAL | Age: 36
Discharge: HOME | End: 2021-06-03
Attending: EMERGENCY MEDICINE | Admitting: EMERGENCY MEDICINE
Payer: COMMERCIAL

## 2021-06-03 VITALS
RESPIRATION RATE: 13 BRPM | OXYGEN SATURATION: 100 % | DIASTOLIC BLOOD PRESSURE: 84 MMHG | SYSTOLIC BLOOD PRESSURE: 122 MMHG | TEMPERATURE: 97.3 F | HEART RATE: 68 BPM | WEIGHT: 124.56 LBS

## 2021-06-03 DIAGNOSIS — F19.10 DRUG ABUSE (HCC): Primary | ICD-10-CM

## 2021-06-03 PROCEDURE — 99282 EMERGENCY DEPT VISIT SF MDM: CPT | Performed by: EMERGENCY MEDICINE

## 2021-06-03 PROCEDURE — 99284 EMERGENCY DEPT VISIT MOD MDM: CPT

## 2021-06-03 NOTE — ED NOTES
This tech attempted to take the pants and shirt off the pt as they were wet    The pt agreed to the shirt but not the pants     Rosalind Dover  06/03/21 1793

## 2021-06-03 NOTE — ED PROVIDER NOTES
History  Chief Complaint   Patient presents with    Overdose - Accidental     found on sidewalk, able to be awakened to open eyes for a moment then returns to sleep; when questioned states name is Adrian, blanket stare and closes eyes and returns to sleep  Russ Min presents with apparent drug intoxication  Patient was found unresponsive along the side of the road somnolent  Pupils were not pinpoint and there is evidence of possible K2 use close to the individual   There are no signs of trauma and the patient is unable to offer any acute complaints  Altered Mental Status  Presenting symptoms: disorientation and partial responsiveness    Severity:  Severe  Episode history:  Single  Duration: unknown  Timing:  Constant  Progression:  Unchanged  Chronicity: unknown  Context: drug use (suspected)    Associated symptoms: vomiting    Associated symptoms: no bladder incontinence        None       History reviewed  No pertinent past medical history  History reviewed  No pertinent surgical history  History reviewed  No pertinent family history  I have reviewed and agree with the history as documented  E-Cigarette/Vaping     E-Cigarette/Vaping Substances     Social History     Tobacco Use    Smoking status: Unknown If Ever Smoked   Substance Use Topics    Alcohol use: Not on file    Drug use: Not on file       Review of Systems   Unable to perform ROS: Mental status change   Gastrointestinal: Positive for vomiting  Genitourinary: Negative for bladder incontinence  Physical Exam  Physical Exam  Vitals signs and nursing note reviewed  Constitutional:       Comments: Disheveled - vomit noted in beard     HENT:      Head: Normocephalic and atraumatic  Right Ear: External ear normal       Left Ear: External ear normal       Nose: Nose normal  No congestion or rhinorrhea  Mouth/Throat:      Mouth: Mucous membranes are moist       Pharynx: No oropharyngeal exudate     Eyes: Conjunctiva/sclera: Conjunctivae normal       Pupils: Pupils are equal, round, and reactive to light  Neck:      Musculoskeletal: Neck supple  No neck rigidity  Cardiovascular:      Rate and Rhythm: Normal rate  Pulses: Normal pulses  Pulmonary:      Effort: Pulmonary effort is normal  No respiratory distress  Breath sounds: Normal breath sounds  No wheezing  Abdominal:      General: Abdomen is flat  Bowel sounds are normal       Palpations: There is no mass  Musculoskeletal:         General: No deformity or signs of injury  Right lower leg: No edema  Left lower leg: No edema  Skin:     General: Skin is dry  Capillary Refill: Capillary refill takes less than 2 seconds  Neurological:      Mental Status: He is lethargic  GCS: GCS eye subscore is 2  GCS verbal subscore is 3  GCS motor subscore is 5  Comments: Somnolent     Psychiatric:         Speech: Speech is slurred  Behavior: Behavior is slowed  Vital Signs  ED Triage Vitals [06/03/21 1659]   Temperature Pulse Respirations Blood Pressure SpO2   (!) 97 3 °F (36 3 °C) (!) 129 16 103/67 95 %      Temp Source Heart Rate Source Patient Position - Orthostatic VS BP Location FiO2 (%)   Tympanic Monitor Lying Left arm --      Pain Score       --           Vitals:    06/03/21 1659 06/03/21 1709 06/03/21 1832   BP: 103/67 94/57 122/84   Pulse: (!) 129 102 68   Patient Position - Orthostatic VS: Lying Lying          Visual Acuity  Visual Acuity      Most Recent Value   L Pupil Size (mm)  6   R Pupil Size (mm)  6   L Pupil Shape  Round   R Pupil Shape  Round          ED Medications  Medications - No data to display    Diagnostic Studies  Results Reviewed     None                 No orders to display              Procedures  Procedures         ED Course  ED Course as of Jun 03 1833   Thu Jun 03, 2021   1722 Patient is increasingly alert and oriented  He offers no acute complaints    He has not yet returned to baseline and we will continue to monitor  [id number] Patient is fully awake, alert, oriented  He was able to tolerate food and is requesting discharge home  He admits to using K2  MDM    Disposition  Final diagnoses:   Drug abuse ([address] )     Time reflects when diagnosis was documented in both MDM as applicable and the Disposition within this note     Time User Action Codes Description Comment    6/3/2021  6:32 PM Leonard Leigh Add [F19 10] Drug abuse [facility])       ED Disposition     ED Disposition Condition Date/Time Comment    Discharge Stable Thu Cory 3, 2021  6:32 PM Elizabeth Valverde discharge to home/self care  Follow-up Information    None         Patient's Medications    No medications on file     No discharge procedures on file      PDMP Review     None          ED Provider  Electronically Signed by           Tracy Corbett DO  06/03/21 6631

## 2021-06-03 NOTE — ED NOTES
Patient beginning to wake up more and request food to eat       Monika Mejia, PETERSON  06/03/21 0084

## 2021-06-04 ENCOUNTER — HOSPITAL ENCOUNTER (EMERGENCY)
Facility: HOSPITAL | Age: 36
Discharge: HOME | End: 2021-06-04
Attending: EMERGENCY MEDICINE | Admitting: EMERGENCY MEDICINE
Payer: COMMERCIAL

## 2021-06-04 ENCOUNTER — APPOINTMENT (EMERGENCY)
Dept: RADIOLOGY | Facility: HOSPITAL | Age: 36
End: 2021-06-04
Payer: COMMERCIAL

## 2021-06-04 VITALS
RESPIRATION RATE: 16 BRPM | OXYGEN SATURATION: 98 % | HEART RATE: 77 BPM | TEMPERATURE: 99 F | DIASTOLIC BLOOD PRESSURE: 88 MMHG | SYSTOLIC BLOOD PRESSURE: 135 MMHG

## 2021-06-04 DIAGNOSIS — M25.562 LEFT KNEE PAIN: Primary | ICD-10-CM

## 2021-06-04 PROCEDURE — 99283 EMERGENCY DEPT VISIT LOW MDM: CPT

## 2021-06-04 PROCEDURE — 99282 EMERGENCY DEPT VISIT SF MDM: CPT | Performed by: EMERGENCY MEDICINE

## 2021-06-04 PROCEDURE — 73564 X-RAY EXAM KNEE 4 OR MORE: CPT

## 2021-06-04 NOTE — ED NOTES
Pt states that yesterday he was at Psychiatric for K2 use, states he was laying in bed and that his left knee "just started to hurt", denies fall or injury      Yazmin Khan RN  06/04/21 1647

## 2021-06-05 NOTE — ED PROVIDER NOTES
Final Diagnosis:  1  Left knee pain        Chief Complaint   Patient presents with    Knee Pain     Pt allowing to discuss his medical history and problems in front of his sister  Pt reporting left knee pain and swelling since yesterday, and is unsure of injuries  Sister is concerned because he was evaluated at [facility]  HPI  Patient presents for evaluation of left knee pain  Patient was recently seen at another hospital for K2 ingestion  There he had no complaints and was observed until he was back to his baseline  Tension then walked from that hospital to the home where he lives  Once he arrived there he complained to his sister about knee pain which proved him into the emergency department  Patient did not have any recollection of trauma to the area  He simply describes his knee pain as being diffusely over the entire knee  No radiations  Worse with ambulation  He denies any fevers or chills  Overall patient is poor historian  Unsure of other drug use  He states that he did receive some Tylenol from his mother prior to arrival for his discomfort     - No language barrier    - History obtained from patient  - There are no limitations to the history obtained  - Previous charting was reviewed    PMH:   has a past medical history of Drug abuse ([facility] [address] ) and Schizoaffective disorder ([facility] [address] )  PSH:   has a past surgical history that includes No past surgeries  ROS:  Review of Systems   Constitutional: Negative for activity change, chills, fatigue and fever  Respiratory: Negative for cough and shortness of breath  Cardiovascular: Negative for chest pain and palpitations  Gastrointestinal: Negative for abdominal distention, abdominal pain, constipation, diarrhea, nausea and vomiting  Genitourinary: Negative for dysuria and hematuria  Musculoskeletal: Positive for arthralgias  Negative for myalgias and neck pain     Neurological: Negative for dizziness, syncope, light-headedness and headaches  All other systems reviewed and are negative  PE:   Vitals:    06/04/21 1745   BP: 135/88   BP Location: Left arm   Pulse: 77   Resp: 16   Temp: 99 °F (37 2 °C)   TempSrc: Oral   SpO2: 98%     Vitals reviewed by me  Physical Exam  Vitals signs reviewed  Constitutional:       General: He is not in acute distress  Appearance: He is well-developed  He is not diaphoretic  HENT:      Head: Normocephalic and atraumatic  Right Ear: External ear normal       Left Ear: External ear normal    Eyes:      General:         Right eye: No discharge  Left eye: No discharge  Conjunctiva/sclera: Conjunctivae normal       Pupils: Pupils are equal, round, and reactive to light  Neck:      Musculoskeletal: Normal range of motion and neck supple  Vascular: No JVD  Trachea: No tracheal deviation  Cardiovascular:      Rate and Rhythm: Normal rate and regular rhythm  Heart sounds: Normal heart sounds  No murmur  No friction rub  No gallop  Pulmonary:      Effort: Pulmonary effort is normal  No respiratory distress  Breath sounds: Normal breath sounds  No wheezing or rales  Abdominal:      General: Bowel sounds are normal  There is no distension  Palpations: Abdomen is soft  There is no mass  Tenderness: There is no abdominal tenderness  There is no guarding  Musculoskeletal: Normal range of motion  General: Swelling and tenderness present  No deformity  Comments: Left knee is diffusely tender with large effusion  No focal tenderness  No radiations  Able to range knee without significant pain to patient  It is not warm or erythematous  Neurological:      Mental Status: He is alert and oriented to person, place, and time  Cranial Nerves: No cranial nerve deficit  Sensory: No sensory deficit  Motor: No abnormal muscle tone        Coordination: Coordination normal    Psychiatric:         Behavior: Behavior normal  Thought Content: Thought content normal          Judgment: Judgment normal           A:  - Nursing note reviewed  -                      XR knee 4+ vw left injury    (Results Pending)     Orders Placed This Encounter   Procedures    Crutches    XR knee 4+ vw left injury    Knee Immobilizer     Labs Reviewed - No data to display      Final Diagnosis:  1  Left knee pain        P:  - patient likely injured his knee while intoxicated earlier in the day  Will put knee in knee immobilizer, provided crutches, and instructed him to follow-up with orthopedic surgery for further evaluation  Return precautions discussed  Medications - No data to display  Time reflects when diagnosis was documented in both MDM as applicable and the Disposition within this note     Time User Action Codes Description Comment    6/4/2021  8:56 PM Leandra Garciastdaniella Add [N39 556] Left knee pain       ED Disposition     ED Disposition Condition Date/Time Comment    Discharge Stable Fri Jun 4, 2021  8:56 PM Aldean Shoe discharge to home/self care              Follow-up Information     Follow up With Specialties Details Why Contact Info Additional [address] [facility] [place] [facility] Schedule an appointment as soon as possible for a visit   [address] [facility]  [place] [address] [facility] [address] [phone] [address] [facility] [place], [address] [facility], [address] [place], [place], [address] [phone]        Discharge Medication List as of 6/4/2021  8:57 PM      CONTINUE these medications which have NOT CHANGED    Details   benztropine (COGENTIN) 1 mg tablet Take 1 tablet (1 mg total) by mouth 2 (two) times a day, Starting Fri 3/12/2021, Until Tue 5/11/2021, Print      divalproex sodium (DEPAKOTE) 250 mg EC tablet Take 1 tablet (250 mg total) by mouth every evening Take with Depakote 500 mg for a total evening Depakote dose of 750 mg, Starting Fri 3/12/2021, Until Tue 5/11/2021, Print      divalproex sodium (DEPAKOTE) 500 mg EC tablet Take 1 tablet (500 mg total) by mouth 2 (two) times a day Take evening dose with Depakote 250 mg for a total Depakote dose of 500 mg daily and 750 mg in the evening, Starting Fri 3/12/2021, Until Tue 5/11/2021, Print      loxapine (LOXITANE) 50 MG capsule Multiple Dosages:Starting Fri 3/12/2021, Last dose on Tue 5/11/2021Take 1 capsule (50 mg total) by mouth 2 (two) times a day AND 2 capsules (100 mg total) daily at bedtime  , Print           No discharge procedures on file  Prior to Admission Medications   Prescriptions Last Dose Informant Patient Reported? Taking?   benztropine (COGENTIN) 1 mg tablet   No No   Sig: Take 1 tablet (1 mg total) by mouth 2 (two) times a day   divalproex sodium (DEPAKOTE) 250 mg EC tablet   No No   Sig: Take 1 tablet (250 mg total) by mouth every evening Take with Depakote 500 mg for a total evening Depakote dose of 750 mg   divalproex sodium (DEPAKOTE) 500 mg EC tablet   No No   Sig: Take 1 tablet (500 mg total) by mouth 2 (two) times a day Take evening dose with Depakote 250 mg for a total Depakote dose of 500 mg daily and 750 mg in the evening   loxapine (LOXITANE) 50 MG capsule   No No   Sig: Take 1 capsule (50 mg total) by mouth 2 (two) times a day AND 2 capsules (100 mg total) daily at bedtime  Facility-Administered Medications: None       Portions of the record may have been created with voice recognition software  Occasional wrong word or "sound a like" substitutions may have occurred due to the inherent limitations of voice recognition software  Read the chart carefully and recognize, using context, where substitutions have occurred      Electronically signed by:  Adele Barrett, PGY 3, MD Barrie Perez MD  06/04/21 2121

## 2021-06-05 NOTE — ED NOTES
Knee immobilizer applied by ED technician Stella Ching  Pt  Teaching on crutches provided at this time        Anastacia Sinclair RN  06/04/21 5923

## 2021-07-02 ENCOUNTER — OFFICE VISIT (OUTPATIENT)
Dept: FAMILY MEDICINE CLINIC | Facility: CLINIC | Age: 36
End: 2021-07-02

## 2021-07-02 VITALS
RESPIRATION RATE: 18 BRPM | TEMPERATURE: 97.2 F | HEIGHT: 65 IN | OXYGEN SATURATION: 99 % | DIASTOLIC BLOOD PRESSURE: 82 MMHG | WEIGHT: 112.6 LBS | BODY MASS INDEX: 18.76 KG/M2 | HEART RATE: 85 BPM | SYSTOLIC BLOOD PRESSURE: 118 MMHG

## 2021-07-02 DIAGNOSIS — S89.92XA INJURY OF LEFT KNEE, INITIAL ENCOUNTER: Primary | ICD-10-CM

## 2021-07-02 DIAGNOSIS — F25.0 SCHIZOAFFECTIVE DISORDER, BIPOLAR TYPE (HCC): ICD-10-CM

## 2021-07-02 DIAGNOSIS — E03.8 SUBCLINICAL HYPOTHYROIDISM: ICD-10-CM

## 2021-07-02 PROCEDURE — 3008F BODY MASS INDEX DOCD: CPT | Performed by: FAMILY MEDICINE

## 2021-07-02 PROCEDURE — 99203 OFFICE O/P NEW LOW 30 MIN: CPT | Performed by: FAMILY MEDICINE

## 2021-07-02 RX ORDER — SENNOSIDES 8.6 MG
650 CAPSULE ORAL EVERY 8 HOURS PRN
Qty: 30 TABLET | Refills: 0 | Status: SHIPPED | OUTPATIENT
Start: 2021-07-02 | End: 2022-03-01

## 2021-07-02 NOTE — PROGRESS NOTES
Assessment/Plan:    Left knee injury   Patient currently is asymptomatic, there is not any concerning findings in the review of system physical exam  No signs of infection or red flag symptoms  mild joint effusion as per x-ray performed on 06/04/2021    -Negative varus and valgus, negative Lachman    Will  Prescribe Tylenol 650 mg p r n  Reassesse and next encounter in 1 month  Subclinical hypothyroidism  TSH 4 7 (H), Normal T3 and T4 in 2/21  Asymptomatic not on medication       -Patient to repeat lab work one week before his next appointment  Diagnoses and all orders for this visit:    Injury of left knee, initial encounter  -     acetaminophen (TYLENOL) 650 mg CR tablet; Take 1 tablet (650 mg total) by mouth every 8 (eight) hours as needed for mild pain    Subclinical hypothyroidism  -     TSH + Free T4; Future  -     CBC and differential; Future  -     Comprehensive metabolic panel; Future    Schizoaffective disorder, bipolar type (HCC)  -     CBC and differential; Future  -     Valproic acid level, free; Future  -     Comprehensive metabolic panel; Future          Subjective:      Patient ID: Sina Emerson is a 30 y o  male  Encounter performed with the help of the patient's mother  This is a pleasant 27-year-old male with past medical history Relevant for schizoaffective disorder, substance abuse, impaired memory, and subclinical hypothyroidism, who presents as new patient established care  His current medications include loxapine, Depakote, and benztropine  He was seen by psychiatry last month (as per his mother)  She states that the patient is compliant with his medication  She reports that the main concern for today's visit is the patient left knee pain after he injured  Usman Steiner on 6/4/2021  Patent's mother states that "he escapes from her house frequently to consume cannabis"    Last month the patient left home at night, and his mother was later notified that the patient was evaluated at the emergency department  He had an unwitnessed fall affecting his knees; the fall was unwitnessed  X-ray was taken at the ED was positive for left knee effusion  The patient denies pain, fever, chills, nausea, vomits, swelling, weakness, abnormal sensation, head trauma,  recent infection, sexual transmitted diseases, and difficulty ambulating  Positive for tobacco, cannabis, negative for alcohol consumption  Currently, he is unemployed and lives with his mother  Knee Pain   The incident occurred more than 1 week ago  Incident location: Unknown location  The injury mechanism was a fall ( unwitness fall )  The pain is present in the left knee  Pain scale: Patient reports that he does not have any pain currently  The patient is experiencing no pain  Pertinent negatives include no inability to bear weight, loss of motion, loss of sensation, muscle weakness, numbness or tingling  Associated symptoms comments:  Patient denies fevers or chills,  He reports no foreign bodies present  Nothing aggravates the symptoms  He has tried nothing for the symptoms  The following portions of the patient's history were reviewed and updated as appropriate: allergies, current medications, past family history, past medical history, past social history, past surgical history and problem list     Review of Systems   Reason unable to perform ROS: Partially unable to perform due to patient's mentation,  and poor cooperation most of the information is obtained by his mother  Constitutional: Negative for chills, fatigue and fever  Respiratory: Negative for shortness of breath  Cardiovascular: Negative for chest pain and leg swelling  Gastrointestinal: Negative for abdominal pain and nausea  Genitourinary: Negative for difficulty urinating and dysuria  Musculoskeletal: Negative for arthralgias  Skin: Negative for color change, pallor, rash and wound     Neurological: Negative for dizziness, tingling, syncope, weakness, light-headedness, numbness and headaches  Hematological: Does not bruise/bleed easily  Psychiatric/Behavioral: Negative for agitation, behavioral problems, self-injury, sleep disturbance and suicidal ideas  Objective:      /82 (BP Location: Right arm, Patient Position: Sitting, Cuff Size: Standard)   Pulse 85   Temp (!) 97 2 °F (36 2 °C) (Temporal)   Resp 18   Ht 5' 4 5" (1 638 m)   Wt 51 1 kg (112 lb 9 6 oz)   SpO2 99%   BMI 19 03 kg/m²          Physical Exam  Constitutional:       General: He is not in acute distress  Appearance: He is well-developed  He is not ill-appearing, toxic-appearing or diaphoretic  HENT:      Head: Normocephalic and atraumatic  Cardiovascular:      Rate and Rhythm: Normal rate and regular rhythm  Pulses:           Dorsalis pedis pulses are 2+ on the right side and 2+ on the left side  Posterior tibial pulses are 2+ on the right side and 2+ on the left side  Heart sounds: Normal heart sounds  No murmur heard  No friction rub  No gallop  Pulmonary:      Effort: Pulmonary effort is normal  No respiratory distress  Breath sounds: Normal breath sounds  Abdominal:      General: Bowel sounds are normal       Palpations: Abdomen is soft  Tenderness: There is no abdominal tenderness  Musculoskeletal:         General: No tenderness or deformity  Normal range of motion  Cervical back: Normal range of motion and neck supple  Right knee: Normal       Left knee: Normal       Right lower leg: Normal  No swelling, deformity, lacerations, tenderness or bony tenderness  No edema  Left lower leg: Normal  No swelling, lacerations, tenderness or bony tenderness  No edema        Right ankle: Normal       Right Achilles Tendon: Normal       Left ankle: Normal       Left Achilles Tendon: Normal         Legs:       Comments: Negative for edema, redness,  Laceration, tenderness,  Paresis, paresthesia, reflex within normal limits, pulses   2+ bilaterally  Strength 5/5  Negative Lachman, varus/valgus test      Feet:      Right foot:      Skin integrity: No ulcer, skin breakdown, erythema, warmth, callus or dry skin  Left foot:      Skin integrity: No ulcer, skin breakdown, erythema, warmth, callus or dry skin  Skin:     General: Skin is warm  Findings: No rash  Neurological:      Mental Status: He is alert and oriented to person, place, and time  Psychiatric:         Attention and Perception: He is attentive  Mood and Affect: Affect is not blunt  Behavior: Behavior is uncooperative  Behavior is not slowed  Cognition and Memory: Cognition is impaired

## 2021-07-02 NOTE — ASSESSMENT & PLAN NOTE
Patient currently is asymptomatic, there is not any concerning findings in the review of system physical exam  No signs of infection or red flag symptoms  mild joint effusion as per x-ray performed on 06/04/2021    -Negative varus and valgus, negative Lachman    Will  Prescribe Tylenol 650 mg p r n  Reassesse and next encounter in 1 month

## 2021-07-03 NOTE — ASSESSMENT & PLAN NOTE
TSH 4 7 (H), Normal T3 and T4 in 2/21  Asymptomatic not on medication       -Patient to repeat lab work one week before his next appointment

## 2021-07-03 NOTE — ASSESSMENT & PLAN NOTE
Patient is in stable,   Negative for delusions no hallucinations or suicidal ideation  Currently treated with Depakote  As per mother is compliant with medication      Continue management as per Psychiatry  Will order valproic acid level

## 2021-07-06 ENCOUNTER — APPOINTMENT (EMERGENCY)
Dept: RADIOLOGY | Facility: HOSPITAL | Age: 36
End: 2021-07-06
Payer: COMMERCIAL

## 2021-07-06 ENCOUNTER — HOSPITAL ENCOUNTER (EMERGENCY)
Facility: HOSPITAL | Age: 36
Discharge: HOME | End: 2021-07-06
Attending: EMERGENCY MEDICINE
Payer: COMMERCIAL

## 2021-07-06 VITALS
OXYGEN SATURATION: 96 % | DIASTOLIC BLOOD PRESSURE: 60 MMHG | TEMPERATURE: 99.1 F | RESPIRATION RATE: 18 BRPM | SYSTOLIC BLOOD PRESSURE: 97 MMHG | HEART RATE: 112 BPM

## 2021-07-06 DIAGNOSIS — F19.10 POLYSUBSTANCE ABUSE (HCC): ICD-10-CM

## 2021-07-06 DIAGNOSIS — M25.562 LEFT KNEE PAIN: Primary | ICD-10-CM

## 2021-07-06 PROCEDURE — 99282 EMERGENCY DEPT VISIT SF MDM: CPT | Performed by: EMERGENCY MEDICINE

## 2021-07-06 PROCEDURE — 99283 EMERGENCY DEPT VISIT LOW MDM: CPT

## 2021-07-07 NOTE — ED PROVIDER NOTES
History  Chief Complaint   Patient presents with    Knee Pain     Pt came to ER to have his left knee evaluated  Pt stated his knee was injured but when asked to describe the injury he said "I don't know " Pt denies pain  31 yo male with a history of schizophrenia and polysubstance abuse presents to the ED for evaluation of a knee problem  The patient is mildly intoxicated and a very poor historian  He says he injured the knee "a while ago" and came to the ED to "get it looked at"  The knee was swollen "but not anymore"  No pain in the knee  No deformity  No difficulty weight bearing or ambulating  He denies numbness and weakness  No skin break  No other specific complaints  Prior to Admission Medications   Prescriptions Last Dose Informant Patient Reported? Taking?   acetaminophen (TYLENOL) 650 mg CR tablet   No No   Sig: Take 1 tablet (650 mg total) by mouth every 8 (eight) hours as needed for mild pain   benztropine (COGENTIN) 1 mg tablet   No No   Sig: Take 1 tablet (1 mg total) by mouth 2 (two) times a day   divalproex sodium (DEPAKOTE) 250 mg EC tablet   No No   Sig: Take 1 tablet (250 mg total) by mouth every evening Take with Depakote 500 mg for a total evening Depakote dose of 750 mg   divalproex sodium (DEPAKOTE) 500 mg EC tablet   No No   Sig: Take 1 tablet (500 mg total) by mouth 2 (two) times a day Take evening dose with Depakote 250 mg for a total Depakote dose of 500 mg daily and 750 mg in the evening   loxapine (LOXITANE) 50 MG capsule   No No   Sig: Take 1 capsule (50 mg total) by mouth 2 (two) times a day AND 2 capsules (100 mg total) daily at bedtime        Facility-Administered Medications: None       Past Medical History:   Diagnosis Date    Drug abuse ([facility] [address] )     Schizoaffective disorder ([facility] )     resolved 05/16/14       Past Surgical History:   Procedure Laterality Date    NO PAST SURGERIES         Family History   Problem Relation Age of Onset    Anxiety disorder Mother    [facility] Depression Mother     Dysphagia Mother     Stroke Mother         cva    Diabetes type II Mother     Other Mother         Hyponatremia    Psychosis Mother      I have reviewed and agree with the history as documented  E-Cigarette/Vaping    E-Cigarette Use Never User      E-Cigarette/Vaping Substances    Nicotine No     THC No     CBD No     Flavoring No     Other No     Unknown No      Social History     Tobacco Use    Smoking status: Current Every Day Smoker    Smokeless tobacco: Never Used   Vaping Use    Vaping Use: Never used   Substance Use Topics    Alcohol use: Yes     Comment: social    Drug use: Yes     Types: Marijuana, Other     Comment: K2       Review of Systems   Constitutional: Negative for chills and fever  HENT: Negative for sore throat  Respiratory: Negative for cough and shortness of breath  Cardiovascular: Negative for chest pain and palpitations  Gastrointestinal: Negative for abdominal pain, diarrhea, nausea and vomiting  Endocrine: Negative for cold intolerance and heat intolerance  Genitourinary: Negative for dysuria and flank pain  Musculoskeletal: Positive for arthralgias  Negative for back pain, gait problem and joint swelling  Skin: Negative for rash  Allergic/Immunologic: Negative for immunocompromised state  Neurological: Negative for weakness, numbness and headaches  Hematological: Negative for adenopathy  Psychiatric/Behavioral: The patient is not nervous/anxious  Physical Exam  Physical Exam  Constitutional:       General: He is not in acute distress  Appearance: He is well-developed  HENT:      Head: Normocephalic and atraumatic  Eyes:      Pupils: Pupils are equal, round, and reactive to light  Cardiovascular:      Rate and Rhythm: Normal rate and regular rhythm  Pulses:           Dorsalis pedis pulses are 2+ on the left side  Posterior tibial pulses are 2+ on the left side     Pulmonary:      Effort: Pulmonary effort is normal  No respiratory distress  Breath sounds: Normal breath sounds  Abdominal:      General: There is no distension  Palpations: Abdomen is soft  Tenderness: There is no abdominal tenderness  Musculoskeletal:         General: Normal range of motion  Cervical back: Normal range of motion and neck supple  Left knee: Normal  No swelling, deformity, effusion, erythema or crepitus  Normal range of motion  No tenderness  Normal alignment  Normal pulse  Skin:     General: Skin is warm and dry  Neurological:      Mental Status: He is alert and oriented to person, place, and time  Cranial Nerves: Cranial nerves are intact  Sensory: Sensation is intact  Coordination: Coordination is intact  Gait: Gait is intact  Deep Tendon Reflexes: Reflexes are normal and symmetric  Vital Signs  ED Triage Vitals [07/06/21 2141]   Temperature Pulse Respirations Blood Pressure SpO2   99 1 °F (37 3 °C) (!) 112 18 97/60 96 %      Temp Source Heart Rate Source Patient Position - Orthostatic VS BP Location FiO2 (%)   Tympanic Monitor Lying Left arm --      Pain Score       --           Vitals:    07/06/21 2141   BP: 97/60   Pulse: (!) 112   Patient Position - Orthostatic VS: Lying         Visual Acuity      ED Medications  Medications - No data to display    Diagnostic Studies  Results Reviewed     None                 No orders to display              Procedures  Procedures         ED Course                                           MDM  Number of Diagnoses or Management Options  Left knee pain  Polysubstance abuse ([facility] [address] )  Diagnosis management comments: The patient is mildly intoxicated but otherwise very well appearing  Left knee exam is unremarkable  He has no pain, swelling, or deformity  He is ambulating around his room without difficulty  Will check XRs of the knee  21:55 The patient refused x-rays  He says he only wanted "someone to look at it"   He is alert, oriented, and ambulating with a steady gait  [facility] referral declined  Will discharge to home with Orthopedics follow up as needed  The patient is agreeable to this plan  Strict return precautions provided  Amount and/or Complexity of Data Reviewed  Tests in the radiology section of CPT®: ordered    Patient Progress  Patient progress: stable      Disposition  Final diagnoses:   Left knee pain   Polysubstance abuse ([address] )     Time reflects when diagnosis was documented in both MDM as applicable and the Disposition within this note     Time User Action Codes Description Comment    7/6/2021  9:55 PM Alyssia Vásquezo Add [P18 583] Left knee pain     7/6/2021  9:55 PM Alyssia Vásquezo Add [F19 10] Polysubstance abuse [facility])       ED Disposition     ED Disposition Condition Date/Time Comment    Discharge Stable Tue Jul 6, 2021  9:55 PM Beth Blair discharge to home/self care              Follow-up Information     Follow up With Specialties Details Why Contact Info Additional [address] [facility] Schedule an appointment as soon as possible for a visit   [address], [address] [facility] [address]  [address], [address], [address] [place], [place], [address] Orthopedic Surgery Schedule an appointment as soon as possible for a visit  If symptoms worsen [address]  [address] [facility] [address]  [address] [place] [address], [address], [address] [place] [address], [address] [phone]          Discharge Medication List as of 7/6/2021  9:56 PM      CONTINUE these medications which have NOT CHANGED    Details   acetaminophen (TYLENOL) 650 mg CR tablet Take 1 tablet (650 mg total) by mouth every 8 (eight) hours as needed for mild pain, Starting Fri 7/2/2021, Normal      benztropine (COGENTIN) 1 mg tablet Take 1 tablet (1 mg total) by mouth 2 (two) times a day, Starting Fri 3/12/2021, Until Fri 7/2/2021, Print      divalproex sodium (DEPAKOTE) 250 mg EC tablet Take 1 tablet (250 mg total) by mouth every evening Take with Depakote 500 mg for a total evening Depakote dose of 750 mg, Starting Fri 3/12/2021, Until Fri 7/2/2021, Print      divalproex sodium (DEPAKOTE) 500 mg EC tablet Take 1 tablet (500 mg total) by mouth 2 (two) times a day Take evening dose with Depakote 250 mg for a total Depakote dose of 500 mg daily and 750 mg in the evening, Starting Fri 3/12/2021, Until Fri 7/2/2021, Print      loxapine (LOXITANE) 50 MG capsule Multiple Dosages:Starting Fri 3/12/2021, Until Tue 5/11/2021 at 2359Take 1 capsule (50 mg total) by mouth 2 (two) times a day AND 2 capsules (100 mg total) daily at bedtime  , Print           No discharge procedures on file      PDMP Review     None          ED Provider  Electronically Signed by           Dmitri Montgomery MD  07/06/21 7543

## 2021-07-12 ENCOUNTER — APPOINTMENT (EMERGENCY)
Dept: CT IMAGING | Facility: HOSPITAL | Age: 36
End: 2021-07-12
Payer: COMMERCIAL

## 2021-07-12 ENCOUNTER — HOSPITAL ENCOUNTER (EMERGENCY)
Facility: HOSPITAL | Age: 36
Discharge: HOME | End: 2021-07-12
Attending: EMERGENCY MEDICINE | Admitting: EMERGENCY MEDICINE
Payer: COMMERCIAL

## 2021-07-12 VITALS
HEART RATE: 107 BPM | RESPIRATION RATE: 19 BRPM | OXYGEN SATURATION: 95 % | DIASTOLIC BLOOD PRESSURE: 64 MMHG | TEMPERATURE: 98.7 F | SYSTOLIC BLOOD PRESSURE: 106 MMHG

## 2021-07-12 DIAGNOSIS — F19.11 HX OF DRUG ABUSE (HCC): ICD-10-CM

## 2021-07-12 DIAGNOSIS — F25.0 SCHIZOAFFECTIVE DISORDER, BIPOLAR TYPE (HCC): Chronic | ICD-10-CM

## 2021-07-12 DIAGNOSIS — R56.9 SEIZURE-LIKE ACTIVITY (HCC): Primary | ICD-10-CM

## 2021-07-12 DIAGNOSIS — R41.89 IMPAIRED COGNITION: ICD-10-CM

## 2021-07-12 LAB
ALBUMIN SERPL BCP-MCNC: 3.5 G/DL (ref 3.5–5)
ALP SERPL-CCNC: 86 U/L (ref 46–116)
ALT SERPL W P-5'-P-CCNC: 23 U/L (ref 12–78)
AMMONIA PLAS-SCNC: 39 UMOL/L (ref 11–35)
AMPHETAMINES SERPL QL SCN: NEGATIVE
ANION GAP SERPL CALCULATED.3IONS-SCNC: 10 MMOL/L (ref 4–13)
APAP SERPL-MCNC: 2.4 UG/ML (ref 10–20)
AST SERPL W P-5'-P-CCNC: 13 U/L (ref 5–45)
ATRIAL RATE: 96 BPM
BARBITURATES UR QL: NEGATIVE
BASOPHILS # BLD AUTO: 0.02 THOUSANDS/ΜL (ref 0–0.1)
BASOPHILS NFR BLD AUTO: 0 % (ref 0–1)
BENZODIAZ UR QL: NEGATIVE
BILIRUB DIRECT SERPL-MCNC: 0.06 MG/DL (ref 0–0.2)
BILIRUB SERPL-MCNC: 0.12 MG/DL (ref 0.2–1)
BUN SERPL-MCNC: 6 MG/DL (ref 5–25)
CALCIUM SERPL-MCNC: 8.9 MG/DL (ref 8.3–10.1)
CHLORIDE SERPL-SCNC: 105 MMOL/L (ref 100–108)
CO2 SERPL-SCNC: 28 MMOL/L (ref 21–32)
COCAINE UR QL: NEGATIVE
CREAT SERPL-MCNC: 0.87 MG/DL (ref 0.6–1.3)
EOSINOPHIL # BLD AUTO: 0.08 THOUSAND/ΜL (ref 0–0.61)
EOSINOPHIL NFR BLD AUTO: 1 % (ref 0–6)
ERYTHROCYTE [DISTWIDTH] IN BLOOD BY AUTOMATED COUNT: 15.9 % (ref 11.6–15.1)
ETHANOL SERPL-MCNC: <3 MG/DL (ref 0–3)
GFR SERPL CREATININE-BSD FRML MDRD: 113 ML/MIN/1.73SQ M
GLUCOSE SERPL-MCNC: 92 MG/DL (ref 65–140)
HCT VFR BLD AUTO: 36 % (ref 36.5–49.3)
HGB BLD-MCNC: 11.8 G/DL (ref 12–17)
IMM GRANULOCYTES # BLD AUTO: 0.01 THOUSAND/UL (ref 0–0.2)
IMM GRANULOCYTES NFR BLD AUTO: 0 % (ref 0–2)
LYMPHOCYTES # BLD AUTO: 2.4 THOUSANDS/ΜL (ref 0.6–4.47)
LYMPHOCYTES NFR BLD AUTO: 37 % (ref 14–44)
MCH RBC QN AUTO: 30.6 PG (ref 26.8–34.3)
MCHC RBC AUTO-ENTMCNC: 32.8 G/DL (ref 31.4–37.4)
MCV RBC AUTO: 93 FL (ref 82–98)
METHADONE UR QL: NEGATIVE
MONOCYTES # BLD AUTO: 0.65 THOUSAND/ΜL (ref 0.17–1.22)
MONOCYTES NFR BLD AUTO: 10 % (ref 4–12)
NEUTROPHILS # BLD AUTO: 3.26 THOUSANDS/ΜL (ref 1.85–7.62)
NEUTS SEG NFR BLD AUTO: 52 % (ref 43–75)
NRBC BLD AUTO-RTO: 0 /100 WBCS
OPIATES UR QL SCN: NEGATIVE
OXYCODONE+OXYMORPHONE UR QL SCN: NEGATIVE
P AXIS: 61 DEGREES
PCP UR QL: NEGATIVE
PLATELET # BLD AUTO: 197 THOUSANDS/UL (ref 149–390)
PMV BLD AUTO: 10 FL (ref 8.9–12.7)
POTASSIUM SERPL-SCNC: 4.4 MMOL/L (ref 3.5–5.3)
PR INTERVAL: 124 MS
PROT SERPL-MCNC: 6.3 G/DL (ref 6.4–8.2)
QRS AXIS: 92 DEGREES
QRSD INTERVAL: 86 MS
QT INTERVAL: 328 MS
QTC INTERVAL: 414 MS
RBC # BLD AUTO: 3.86 MILLION/UL (ref 3.88–5.62)
SALICYLATES SERPL-MCNC: 4.9 MG/DL (ref 3–20)
SODIUM SERPL-SCNC: 143 MMOL/L (ref 136–145)
T WAVE AXIS: 35 DEGREES
THC UR QL: POSITIVE
VALPROATE SERPL-MCNC: 38 UG/ML (ref 50–100)
VENTRICULAR RATE: 96 BPM
WBC # BLD AUTO: 6.42 THOUSAND/UL (ref 4.31–10.16)

## 2021-07-12 PROCEDURE — 36415 COLL VENOUS BLD VENIPUNCTURE: CPT | Performed by: EMERGENCY MEDICINE

## 2021-07-12 PROCEDURE — 82140 ASSAY OF AMMONIA: CPT | Performed by: EMERGENCY MEDICINE

## 2021-07-12 PROCEDURE — 80076 HEPATIC FUNCTION PANEL: CPT | Performed by: EMERGENCY MEDICINE

## 2021-07-12 PROCEDURE — 72125 CT NECK SPINE W/O DYE: CPT

## 2021-07-12 PROCEDURE — 80307 DRUG TEST PRSMV CHEM ANLYZR: CPT | Performed by: EMERGENCY MEDICINE

## 2021-07-12 PROCEDURE — 93010 ELECTROCARDIOGRAM REPORT: CPT

## 2021-07-12 PROCEDURE — 82077 ASSAY SPEC XCP UR&BREATH IA: CPT | Performed by: EMERGENCY MEDICINE

## 2021-07-12 PROCEDURE — 99285 EMERGENCY DEPT VISIT HI MDM: CPT | Performed by: EMERGENCY MEDICINE

## 2021-07-12 PROCEDURE — 96361 HYDRATE IV INFUSION ADD-ON: CPT

## 2021-07-12 PROCEDURE — 80048 BASIC METABOLIC PNL TOTAL CA: CPT | Performed by: EMERGENCY MEDICINE

## 2021-07-12 PROCEDURE — 93005 ELECTROCARDIOGRAM TRACING: CPT

## 2021-07-12 PROCEDURE — 80179 DRUG ASSAY SALICYLATE: CPT | Performed by: EMERGENCY MEDICINE

## 2021-07-12 PROCEDURE — 85025 COMPLETE CBC W/AUTO DIFF WBC: CPT | Performed by: EMERGENCY MEDICINE

## 2021-07-12 PROCEDURE — 99285 EMERGENCY DEPT VISIT HI MDM: CPT

## 2021-07-12 PROCEDURE — 80164 ASSAY DIPROPYLACETIC ACD TOT: CPT | Performed by: EMERGENCY MEDICINE

## 2021-07-12 PROCEDURE — 70450 CT HEAD/BRAIN W/O DYE: CPT

## 2021-07-12 PROCEDURE — 96360 HYDRATION IV INFUSION INIT: CPT

## 2021-07-12 PROCEDURE — 80143 DRUG ASSAY ACETAMINOPHEN: CPT | Performed by: EMERGENCY MEDICINE

## 2021-07-12 RX ADMIN — SODIUM CHLORIDE 1000 ML: 0.9 INJECTION, SOLUTION INTRAVENOUS at 21:06

## 2021-07-13 NOTE — ED ATTENDING ATTESTATION
2021  Erlinda MCDONALD DO, saw and evaluated the patient  I have discussed the patient with the resident/non-physician practitioner and agree with the resident's/non-physician practitioner's findings, Plan of Care, and MDM as documented in the resident's/non-physician practitioner's note, except where noted  All available labs and Radiology studies were reviewed  I was present for key portions of any procedure(s) performed by the resident/non-physician practitioner and I was immediately available to provide assistance  At this point I agree with the current assessment done in the Emergency Department  I have conducted an independent evaluation of this patient a history and physical is as follows:    Virgilio MCDONALD DO, saw and evaluated the patient  All available labs and X-rays were reviewed  I discussed the patient with the resident / non-physician and agree with the resident's / non-physician practitioner's findings and plan as documented in the resident's / non-physician practicitioner's note, except where noted  At this point, I agree with the current assessment done in the ED      NAME: Anel Neely  AGE: 30 y o  SEX: male  : 1985   MRN: [id number]  ENCOUNTER: [id number]    DATE: 2021  TIME: 11:32 PM      History of Present Illness   Anel Neely is a 30 y o  male who presents with Seizure - New Onset (pt was seen walking on the street, fell over and began having a seizure  pt nonverbal but awake for EMS   pre-hospital   pt slow to answer questions on arrival but able to verify name and birthday and states he does not have a hx of seizures  denies drug use today but has hx of such )    has a past medical history of Drug abuse ([facility] [address] ) and Schizoaffective disorder ([facility] [address] )        Past Medical History     Past Medical History:   Diagnosis Date    Drug abuse ([facility] [address] )     Schizoaffective disorder ([facility] [address] )     resolved 14       Past Surgical History     Past Surgical History: Procedure Laterality Date    NO PAST SURGERIES         Social History     Social History     Substance and Sexual Activity   Alcohol Use Yes    Comment: social     Social History     Substance and Sexual Activity   Drug Use Yes    Types: Marijuana, Other    Comment: K2     Social History     Tobacco Use   Smoking Status Current Every Day Smoker   Smokeless Tobacco Never Used       Family History     Family History   Problem Relation Age of Onset    Anxiety disorder Mother     Depression Mother     Dysphagia Mother     Stroke Mother         cva    Diabetes type II Mother     Other Mother         Hyponatremia    Psychosis Mother        Medications Prior to Admission     Prior to Admission medications    Medication Sig Start Date End Date Taking? Authorizing Provider   acetaminophen (TYLENOL) 650 mg CR tablet Take 1 tablet (650 mg total) by mouth every 8 (eight) hours as needed for mild pain 7/2/21   Steven Aguila MD   benztropine (COGENTIN) 1 mg tablet Take 1 tablet (1 mg total) by mouth 2 (two) times a day 3/12/21 7/2/21  Taras Oppenheim, MD   divalproex sodium (DEPAKOTE) 250 mg EC tablet Take 1 tablet (250 mg total) by mouth every evening Take with Depakote 500 mg for a total evening Depakote dose of 750 mg 3/12/21 7/2/21  Taras Oppenheim, MD   divalproex sodium (DEPAKOTE) 500 mg EC tablet Take 1 tablet (500 mg total) by mouth 2 (two) times a day Take evening dose with Depakote 250 mg for a total Depakote dose of 500 mg daily and 750 mg in the evening 3/12/21 7/2/21  Taras Oppenheim, MD   loxapine (LOXITANE) 50 MG capsule Take 1 capsule (50 mg total) by mouth 2 (two) times a day AND 2 capsules (100 mg total) daily at bedtime   3/12/21 7/2/21  Taras Oppenheim, MD       Allergies   No Known Allergies    Objective     Vitals:    07/12/21 2042 07/12/21 2045   BP: 106/64    Pulse: (!) 107    Resp: 19    Temp:  98 7 °F (37 1 °C)   TempSrc:  Oral   SpO2: 95%      There is no height or weight on file to calculate BMI  Intake/Output Summary (Last 24 hours) at 7/12/2021 2332  Last data filed at 7/12/2021 2248  Gross per 24 hour   Intake 1000 ml   Output --   Net 1000 ml     Invasive Devices     None                 Physical Exam  General: awake, alert, no acute distress  Head: normocephalic, atraumatic  Eyes: no scleral icterus  Ears: external ears normal, hearing grossly intact  Nose: external exam grossly normal  Neck: symmetric, No JVD noted, trachea midline  Pulmonary: no respiratory distress, no tachypnea noted  Cardiovascular: appears well perfused, slightly tachycardic but rhythm is regular  Abdomen: no distention noted  Musculoskeletal: no deformities noted, tone normal  Neuro: grossly non-focal, alert and oriented x3, no clonus, cranial nerves intact  Psych:  Patient is calm, cooperative, responds slowly but appropriately  Lab Results:    Labs Reviewed   VALPROIC ACID LEVEL, TOTAL - Abnormal       Result Value Ref Range Status    Valproic Acid, Total 38 (*) 50 - 100 ug/mL Final   AMMONIA - Abnormal    Ammonia 39 (*) 11 - 35 umol/L Final    Comment: Specimen collection should occur prior to Sulfapyridine administration due to the potential for falsely depressed results  HEPATIC FUNCTION PANEL - Abnormal    Total Bilirubin 0 12 (*) 0 20 - 1 00 mg/dL Final    Comment: Use of this assay is not recommended for patients undergoing treatment with eltrombopag due to the potential for falsely elevated results  Bilirubin, Direct 0 06  0 00 - 0 20 mg/dL Final    Alkaline Phosphatase 86  46 - 116 U/L Final    AST 13  5 - 45 U/L Final    Comment: Specimen collection should occur prior to Sulfasalazine administration due to the potential for falsely depressed results  ALT 23  12 - 78 U/L Final    Comment: Specimen collection should occur prior to Sulfasalazine administration due to the potential for falsely depressed results       Total Protein 6 3 (*) 6 4 - 8 2 g/dL Final    Albumin 3 5  3 5 - 5 0 g/dL Final   CBC AND DIFFERENTIAL - Abnormal    WBC 6 42  4 31 - 10 16 Thousand/uL Final    RBC 3 86 (*) 3 88 - 5 62 Million/uL Final    Hemoglobin 11 8 (*) 12 0 - 17 0 g/dL Final    Hematocrit 36 0 (*) 36 5 - 49 3 % Final    MCV 93  82 - 98 fL Final    MCH 30 6  26 8 - 34 3 pg Final    MCHC 32 8  31 4 - 37 4 g/dL Final    RDW 15 9 (*) 11 6 - 15 1 % Final    MPV 10 0  8 9 - 12 7 fL Final    Platelets 915  923 - 390 Thousands/uL Final    nRBC 0  /100 WBCs Final    Neutrophils Relative 52  43 - 75 % Final    Immat GRANS % 0  0 - 2 % Final    Lymphocytes Relative 37  14 - 44 % Final    Monocytes Relative 10  4 - 12 % Final    Eosinophils Relative 1  0 - 6 % Final    Basophils Relative 0  0 - 1 % Final    Neutrophils Absolute 3 26  1 85 - 7 62 Thousands/µL Final    Immature Grans Absolute 0 01  0 00 - 0 20 Thousand/uL Final    Lymphocytes Absolute 2 40  0 60 - 4 47 Thousands/µL Final    Monocytes Absolute 0 65  0 17 - 1 22 Thousand/µL Final    Eosinophils Absolute 0 08  0 00 - 0 61 Thousand/µL Final    Basophils Absolute 0 02  0 00 - 0 10 Thousands/µL Final   ACETAMINOPHEN LEVEL - Abnormal    Acetaminophen Level 2 4 (*) 10 - 20 ug/mL Final    Comment: If concentration is detectable, please discuss with medical  on call  MEDICAL ALCOHOL - Normal    Ethanol Lvl <3  0 - 3 mg/dL Final   SALICYLATE LEVEL - Normal    Salicylate Lvl 4 9  3 - 20 mg/dL Final   BASIC METABOLIC PANEL    Sodium 479  136 - 145 mmol/L Final    Potassium 4 4  3 5 - 5 3 mmol/L Final    Chloride 105  100 - 108 mmol/L Final    CO2 28  21 - 32 mmol/L Final    ANION GAP 10  4 - 13 mmol/L Final    BUN 6  5 - 25 mg/dL Final    Creatinine 0 87  0 60 - 1 30 mg/dL Final    Comment: Standardized to IDMS reference method    Glucose 92  65 - 140 mg/dL Final    Comment: If the patient is fasting, the ADA then defines impaired fasting glucose as > 100 mg/dL and diabetes as > or equal to 123 mg/dL    Specimen collection should occur prior to Sulfasalazine administration due to the potential for falsely depressed results  Specimen collection should occur prior to Sulfapyridine administration due to the potential for falsely elevated results  Calcium 8 9  8 3 - 10 1 mg/dL Final    eGFR 113  ml/min/1 73sq m Final    Narrative:     [place] guidelines for Chronic Kidney Disease (CKD):     Stage 1 with normal or high GFR (GFR > 90 mL/min/1 73 square meters)    Stage 2 Mild CKD (GFR = 60-89 mL/min/1 73 square meters)    Stage 3A Moderate CKD (GFR = 45-59 mL/min/1 73 square meters)    Stage 3B Moderate CKD (GFR = 30-44 mL/min/1 73 square meters)    Stage 4 Severe CKD (GFR = 15-29 mL/min/1 73 square meters)    Stage 5 End Stage CKD (GFR <15 mL/min/1 73 square meters)  Note: GFR calculation is accurate only with a steady state creatinine   COMA PANEL    Narrative: The following orders were created for panel order Coma panel  Procedure                               Abnormality         Status                     ---------                               -----------         ------                     [id number]]                      Normal              Final result               Salicylate OAGIW[[id number]]             Normal              Final result               Acetaminophen level-If c  Adriana Smith [[id number]]  Abnormal            Final result                 Please view results for these tests on the individual orders  Imaging:   CT spine cervical without contrast   ED Interpretation   No acute osseous abnormality as read by me      Final Result      No cervical spine fracture or traumatic malalignment  Workstation performed: [id number]         CT head without contrast   ED Interpretation   No acute intracranial abnormality as read by me      Final Result      No acute intracranial abnormality                    Workstation performed: [id number]               Medications given in Emergency Department     Medication Administration - last 24 hours from 07/11/2021 2332 to 07/12/2021 2332       Date/Time Order Dose Route Action Action by     07/12/2021 2248 sodium chloride 0 9 % bolus 1,000 mL 0 mL Intravenous Stopped Matt Matthew RN     07/12/2021 2106 sodium chloride 0 9 % bolus 1,000 mL 1,000 mL Intravenous New Bag Matt Matthew RN          Assessment and Plan  Seizure-like activity    Will obtain labs for possible toxicology cause  Patient is on Depakote so will obtain a level  Patient does have a history of drug abuse but denies currently  Will obtain a CT scan of his head and neck  Will treat any other seizure-like activity with Ativan  Will keep him on seizure precautions until workup complete  Workup here is negative  Imaging is negative  Will try to obtain a drug screen and refer to neurology for possible first-time seizure  Patient has had symptoms like this the with drug use in the past so I am hesitant on starting him on any medications at this time  Patient is on Depakote but for psychiatric reasons  This is slightly subtherapeutic at this time  Will have him follow-up with his PCP and psychiatrist for this  Will hold his driving privileges and somatoform to DMV  Pt refused to give a urine  D/c home  Active Problems:    * No active hospital problems  *      Final Diagnosis:  1  Seizure-like activity (HCC)    2  Schizoaffective disorder, bipolar type ([facility] [address] )    3  Hx of drug abuse ([facility] )    4   Impaired cognition        ED Course         Critical Care Time  Procedures

## 2021-07-13 NOTE — DISCHARGE INSTRUCTIONS
Patient Instructions: Today you were seen in the emergency department for possible seizure like activity  We reviewed your CT and your labs and determined that you would be able to be discharged  You should follow up with neurology  Please return to the emergency department if your symptoms get worse, you develop a fever, or you have any other symptoms we discussed today prior to discharge  Nice to meet you! Best of luck with everything!

## 2021-07-16 ENCOUNTER — HOSPITAL ENCOUNTER (EMERGENCY)
Facility: HOSPITAL | Age: 36
Discharge: HOME | End: 2021-07-17
Attending: EMERGENCY MEDICINE
Payer: COMMERCIAL

## 2021-07-16 DIAGNOSIS — F19.10 SUBSTANCE ABUSE (HCC): Primary | ICD-10-CM

## 2021-07-16 LAB — GLUCOSE SERPL-MCNC: 92 MG/DL (ref 65–140)

## 2021-07-16 PROCEDURE — 82948 REAGENT STRIP/BLOOD GLUCOSE: CPT

## 2021-07-16 PROCEDURE — 99282 EMERGENCY DEPT VISIT SF MDM: CPT | Performed by: PHYSICIAN ASSISTANT

## 2021-07-16 PROCEDURE — 99283 EMERGENCY DEPT VISIT LOW MDM: CPT

## 2021-07-17 VITALS
RESPIRATION RATE: 18 BRPM | SYSTOLIC BLOOD PRESSURE: 103 MMHG | HEART RATE: 76 BPM | TEMPERATURE: 97.6 F | DIASTOLIC BLOOD PRESSURE: 64 MMHG | OXYGEN SATURATION: 99 %

## 2021-07-17 NOTE — ED NOTES
Patient's mother called to speak with RN and explain about patients schizophrenic dx  RN spoke with mother about patient and she states that he was acting normal today and did does take his prescriptions and to please call with any updates  Patient's mother will NOT be available to transport patient home when discharged        Ioana Jackson RN  07/16/21 2027

## 2021-07-17 NOTE — ED NOTES
Patient woke for vitals and offered food  Patient given soda and sandwich  Patient has no complaints and reports that he doesn't recall what happened tonight  But does denies SI/HI/AD/VH  Patient polite and cooperative at this time        Shakeel Salazar, PETERSON  07/16/21 6550

## 2021-07-17 NOTE — ED PROVIDER NOTES
History  Chief Complaint   Patient presents with    Drug Problem     Per EMS patient was smoking K2 today and found unresponsive, bystanders threw water on patient  Patient is awake but not answering questions reliably at this time  patient is tired but has no other complaints  71-year-old male with history of substance abuse and seizure-like activity presents via EMS mass after being found unresponsive  Per EMS patient had drug paraphernalia around him consistent with K2 use with history of similar  At this time patient is protecting own airway and is refusing to answer questions  Satting well on room air  Not able to participate in HPI or ROS      History provided by:  Patient   used: No        Prior to Admission Medications   Prescriptions Last Dose Informant Patient Reported? Taking?   acetaminophen (TYLENOL) 650 mg CR tablet   No No   Sig: Take 1 tablet (650 mg total) by mouth every 8 (eight) hours as needed for mild pain   benztropine (COGENTIN) 1 mg tablet   No No   Sig: Take 1 tablet (1 mg total) by mouth 2 (two) times a day   divalproex sodium (DEPAKOTE) 250 mg EC tablet   No No   Sig: Take 1 tablet (250 mg total) by mouth every evening Take with Depakote 500 mg for a total evening Depakote dose of 750 mg   divalproex sodium (DEPAKOTE) 500 mg EC tablet   No No   Sig: Take 1 tablet (500 mg total) by mouth 2 (two) times a day Take evening dose with Depakote 250 mg for a total Depakote dose of 500 mg daily and 750 mg in the evening   loxapine (LOXITANE) 50 MG capsule   No No   Sig: Take 1 capsule (50 mg total) by mouth 2 (two) times a day AND 2 capsules (100 mg total) daily at bedtime        Facility-Administered Medications: None       Past Medical History:   Diagnosis Date    [facility] ([facility] [address] )     Schizoaffective disorder ([facility] [address] )     resolved 05/16/14       Past Surgical History:   Procedure Laterality Date    NO PAST SURGERIES         Family History   Problem Relation Age of Onset    Anxiety disorder Mother     Depression Mother     Dysphagia Mother     Stroke Mother         cva    Diabetes type II Mother     Other Mother         Hyponatremia    Psychosis Mother      I have reviewed and agree with the history as documented  E-Cigarette/Vaping    E-Cigarette Use Never User      E-Cigarette/Vaping Substances    Nicotine No     THC No     CBD No     Flavoring No     Other No     Unknown No      Social History     Tobacco Use    Smoking status: Current Every Day Smoker    Smokeless tobacco: Never Used   Vaping Use    Vaping Use: Never used   Substance Use Topics    Alcohol use: Yes     Comment: social    Drug use: Yes     Types: Marijuana, Other     Comment: K2       Review of Systems   Unable to perform ROS: Other (Pt refuses )       Physical Exam  Physical Exam  Constitutional:       General: He is not in acute distress  Appearance: He is well-developed  He is not diaphoretic  Comments: Protecting own airway and satting well on room air  Eyes:      Pupils: Pupils are equal, round, and reactive to light  Cardiovascular:      Rate and Rhythm: Normal rate and regular rhythm  Pulmonary:      Effort: Pulmonary effort is normal  No respiratory distress  Breath sounds: Normal breath sounds  Abdominal:      General: Bowel sounds are normal  There is no distension  Palpations: Abdomen is soft  Musculoskeletal:         General: Normal range of motion  Cervical back: Normal range of motion and neck supple  Skin:     General: Skin is warm and dry  Neurological:      Mental Status: He is alert and oriented to person, place, and time           Vital Signs  ED Triage Vitals   Temperature Pulse Respirations Blood Pressure SpO2   07/16/21 2002 07/16/21 2002 07/16/21 2002 07/16/21 2002 07/16/21 2002   97 6 °F (36 4 °C) 102 20 111/64 96 %      Temp Source Heart Rate Source Patient Position - Orthostatic VS BP Location FiO2 (%)   07/16/21 1959 07/16/21 2002 07/16/21 2002 07/16/21 2002 --   Oral Monitor Lying Left arm       Pain Score       07/16/21 2002       No Pain           Vitals:    07/16/21 2002 07/16/21 2225   BP: 111/64 100/64   Pulse: 102 80   Patient Position - Orthostatic VS: Lying          Visual Acuity      ED Medications  Medications - No data to display    Diagnostic Studies  Results Reviewed     Procedure Component Value Units Date/Time    Fingerstick Glucose (POCT) [[id number]]  (Normal) Collected: 07/16/21 2007    Lab Status: Final result Updated: 07/16/21 2008     POC Glucose 92 mg/dl                  No orders to display              Procedures  Procedures         ED Course  ED Course as of Jul 17 0218   Sat Jul 17, 2021 0216 Patient awake alert and oriented, pleasant, steady gait, requesting soda and to be discharged                                SBIRT 20yo+      Most Recent Value   SBIRT (25 yo +)   In order to provide better care to our patients, we are screening all of our patients for alcohol and drug use  Would it be okay to ask you these screening questions? No Filed at: 07/16/2021 2008                    MDM  Number of Diagnoses or Management Options  Substance abuse [facility]): new and does not require workup  Diagnosis management comments: Patient presented after suspected K2 overdose refusing to answer questions or participate in ROS  Satting well on room air and protecting own airway  After 6 hours of observation patient was alert and oriented, with steady gait, pleasant and cooperative  Requesting to be discharged home  Hemodynamically stable  Stable for discharge home         Amount and/or Complexity of Data Reviewed  Clinical lab tests: ordered and reviewed    Risk of Complications, Morbidity, and/or Mortality  Presenting problems: moderate  Diagnostic procedures: moderate  Management options: moderate    Patient Progress  Patient progress: stable      Disposition  Final diagnoses:   Substance abuse ([facility] [address] )     Time reflects when diagnosis was documented in both MDM as applicable and the Disposition within this note     Time User Action Codes Description Comment    7/17/2021  2:17 AM Joceline Foster Add [F19 10] Substance abuse [facility])       ED Disposition     ED Disposition Condition Date/Time Comment    Discharge Stable Sat Jul 17, 2021  2:17 AM Octaviano Rossi discharge to home/self care  Follow-up Information     Follow up With Specialties Details Why Contact Info Additional [address] Heart Emergency Department Emergency Medicine Go to  If symptoms worsen [address] [facility] [address]  [facility] Heart Emergency Department          Patient's Medications   Discharge Prescriptions    No medications on file     No discharge procedures on file      PDMP Review     None          ED Provider  Electronically Signed by           David Gan PA-C  07/17/21 5618

## 2021-07-17 NOTE — ED NOTES
Patient was seen by Anastacia Landeros  Is up and about in the room, asking for food - same given  Patient is awake and alert and would like to leave  No apparent discomfort or distress noted       Salty Branham, RN  07/17/21 6713

## 2021-07-27 ENCOUNTER — VBI (OUTPATIENT)
Dept: ADMINISTRATIVE | Facility: OTHER | Age: 36
End: 2021-07-27

## 2021-07-29 ENCOUNTER — HOSPITAL ENCOUNTER (EMERGENCY)
Facility: HOSPITAL | Age: 36
Discharge: HOME | End: 2021-07-29
Attending: EMERGENCY MEDICINE
Payer: COMMERCIAL

## 2021-07-29 VITALS
HEART RATE: 72 BPM | DIASTOLIC BLOOD PRESSURE: 54 MMHG | SYSTOLIC BLOOD PRESSURE: 112 MMHG | RESPIRATION RATE: 12 BRPM | TEMPERATURE: 98 F | OXYGEN SATURATION: 92 %

## 2021-07-29 DIAGNOSIS — F19.10 SUBSTANCE ABUSE (HCC): Primary | ICD-10-CM

## 2021-07-29 PROCEDURE — 99281 EMR DPT VST MAYX REQ PHY/QHP: CPT | Performed by: EMERGENCY MEDICINE

## 2021-07-29 PROCEDURE — 99284 EMERGENCY DEPT VISIT MOD MDM: CPT

## 2021-07-29 NOTE — ED PROVIDER NOTES
Pt Name: José Cook  MRN: [id number]  [place] 1985  Age/Sex: 30 y o  male  Date of evaluation: 7/29/2021  PCP: Nicko Cheney MD    [address] [place] [address]    Chief Complaint   Patient presents with    Overdose - Accidental     Pt came by ems for overdose unsure what drug      HPI    Raheem Montaño presents to the Emergency Department after he ran from the police  He denies using drugs  He denies alcohol  He has no complaints  Patient reports that he thought that he was in trouble so he ran  He has otherwise been feeling well  No trauma  No recent illness  He has been compliant with his psych meds  No SI or HI  HPI      Past Medical and Surgical History    Past Medical History:   Diagnosis Date    Drug abuse ([facility] [address] )     Schizoaffective disorder ([facility] [address] )     resolved 05/16/14       Past Surgical History:   Procedure Laterality Date    NO PAST SURGERIES         Family History   Problem Relation Age of Onset    Anxiety disorder Mother     Depression Mother     Dysphagia Mother     Stroke Mother         cva    Diabetes type II Mother     Other Mother         Hyponatremia    Psychosis Mother        Social History     Tobacco Use    Smoking status: Current Every Day Smoker    Smokeless tobacco: Never Used   Vaping Use    Vaping Use: Never used   Substance Use Topics    Alcohol use: Yes     Comment: social    Drug use: Yes     Types: Marijuana, Other     Comment: K2              Allergies    No Known Allergies    Home Medications    Prior to Admission medications    Medication Sig Start Date End Date Taking?  Authorizing Provider   acetaminophen (TYLENOL) 650 mg CR tablet Take 1 tablet (650 mg total) by mouth every 8 (eight) hours as needed for mild pain 7/2/21   Nicko Cheney MD   benztropine (COGENTIN) 1 mg tablet Take 1 tablet (1 mg total) by mouth 2 (two) times a day 3/12/21 7/12/21  Enrike Jenkins MD   divalproex sodium (DEPAKOTE) 250 mg EC tablet Take 1 tablet (250 mg total) by mouth every evening Take with Depakote 500 mg for a total evening Depakote dose of 750 mg 3/12/21 7/12/21  Maci Woods MD   divalproex sodium (DEPAKOTE) 500 mg EC tablet Take 1 tablet (500 mg total) by mouth 2 (two) times a day Take evening dose with Depakote 250 mg for a total Depakote dose of 500 mg daily and 750 mg in the evening 3/12/21 7/12/21  Maci Woods MD   loxapine (LOXITANE) 50 MG capsule Take 1 capsule (50 mg total) by mouth 2 (two) times a day AND 2 capsules (100 mg total) daily at bedtime  3/12/21 7/12/21  Maci Woods MD           Review of Systems    Review of Systems   Constitutional: Negative for chills and fever  HENT: Negative for ear pain and sore throat  Eyes: Negative for pain and visual disturbance  Respiratory: Negative for cough and shortness of breath  Cardiovascular: Negative for chest pain and palpitations  Gastrointestinal: Negative for abdominal pain and vomiting  Genitourinary: Negative for dysuria and hematuria  Musculoskeletal: Negative for arthralgias and back pain  Skin: Negative for color change and rash  Neurological: Negative for seizures and syncope  All other systems reviewed and are negative  Physical Exam      ED Triage Vitals   Temperature Pulse Respirations Blood Pressure SpO2   07/29/21 1307 07/29/21 1132 07/29/21 1132 07/29/21 1132 07/29/21 1132   98 °F (36 7 °C) (!) 109 12 112/54 99 %      Temp Source Heart Rate Source Patient Position - Orthostatic VS BP Location FiO2 (%)   07/29/21 1307 07/29/21 1132 07/29/21 1132 07/29/21 1132 --   Oral Monitor Sitting Right arm       Pain Score       --                      Physical Exam  Vitals and nursing note reviewed  Constitutional:       General: He is not in acute distress  Appearance: He is well-developed  He is not diaphoretic  HENT:      Head: Normocephalic and atraumatic        Nose: Nose normal    Eyes:      Conjunctiva/sclera: Conjunctivae normal       Pupils: Pupils are equal, round, and reactive to light  Cardiovascular:      Rate and Rhythm: Normal rate and regular rhythm  Heart sounds: Normal heart sounds  No murmur heard  No friction rub  No gallop  Pulmonary:      Effort: Pulmonary effort is normal  No respiratory distress  Breath sounds: Normal breath sounds  No wheezing or rales  Abdominal:      General: Bowel sounds are normal       Palpations: Abdomen is soft  Tenderness: There is no abdominal tenderness  There is no guarding or rebound  Musculoskeletal:         General: Normal range of motion  Cervical back: Normal range of motion and neck supple  Skin:     General: Skin is warm and dry  Neurological:      Mental Status: He is alert and oriented to person, place, and time  Psychiatric:         Behavior: Behavior normal            Assessment and Plan    Oscar Mitchell is a 30 y o  male who presents with reported overdose  Physical examination unremarkable  Patient is slightly sleepy but arousable to voice and oriented  MDM    Diagnostic Results      Labs: All labs reviewed and utilized in the medical decision making process    Radiology:    No orders to display       All radiology studies independently viewed by me and interpreted by the radiologist     Procedure    Procedures      ED Course of Care and Re-Assessments    Patient was observed in the ER and then asked to leave when he awoke  He ambulated and tolerated po      Medications - No data to display        FINAL IMPRESSION    Final diagnoses:   Substance abuse [facility])         DISPOSITION/PLAN    Time reflects when diagnosis was documented in both MDM as applicable and the Disposition within this note     Time User Action Codes Description Comment    7/29/2021 12:57 PM Nestor Share Add [F19 10] Substance abuse [facility])       ED Disposition     ED Disposition Condition Date/Time Comment    Discharge Stable u Jul 29, 2021 12:57 PM Oscar Mitchell discharge to home/self care  Follow-up Information     Follow up With Specialties Details Why Contact Info Additional [address]   [address], [address] [address]  [address], [address], [address] [facility] [place], [place], [place], [address]            PATIENT REFERRED TO:    [facility]  [address], [address] [address] [phone]          DISCHARGE MEDICATIONS:    Discharge Medication List as of 7/29/2021 12:59 PM      CONTINUE these medications which have NOT CHANGED    Details   acetaminophen (TYLENOL) 650 mg CR tablet Take 1 tablet (650 mg total) by mouth every 8 (eight) hours as needed for mild pain, Starting Fri 7/2/2021, Normal      benztropine (COGENTIN) 1 mg tablet Take 1 tablet (1 mg total) by mouth 2 (two) times a day, Starting Fri 3/12/2021, Until Mon 7/12/2021, Print      divalproex sodium (DEPAKOTE) 250 mg EC tablet Take 1 tablet (250 mg total) by mouth every evening Take with Depakote 500 mg for a total evening Depakote dose of 750 mg, Starting Fri 3/12/2021, Until Mon 7/12/2021, Print      divalproex sodium (DEPAKOTE) 500 mg EC tablet Take 1 tablet (500 mg total) by mouth 2 (two) times a day Take evening dose with Depakote 250 mg for a total Depakote dose of 500 mg daily and 750 mg in the evening, Starting Fri 3/12/2021, Until Mon 7/12/2021, Print      loxapine (LOXITANE) 50 MG capsule Multiple Dosages:Starting Fri 3/12/2021, Until Tue 5/11/2021 at 2359Take 1 capsule (50 mg total) by mouth 2 (two) times a day AND 2 capsules (100 mg total) daily at bedtime  , Print             No discharge procedures on file           Burke Engle, [address] [place] [address], DO  07/29/21 9173

## 2021-08-03 ENCOUNTER — HOSPITAL ENCOUNTER (EMERGENCY)
Facility: HOSPITAL | Age: 36
Discharge: HOME | End: 2021-08-03
Attending: EMERGENCY MEDICINE | Admitting: EMERGENCY MEDICINE
Payer: COMMERCIAL

## 2021-08-03 VITALS
TEMPERATURE: 98.6 F | SYSTOLIC BLOOD PRESSURE: 114 MMHG | HEART RATE: 106 BPM | OXYGEN SATURATION: 97 % | DIASTOLIC BLOOD PRESSURE: 66 MMHG | RESPIRATION RATE: 18 BRPM | WEIGHT: 117 LBS

## 2021-08-03 DIAGNOSIS — T50.904A DRUG OVERDOSE, UNDETERMINED INTENT, INITIAL ENCOUNTER: Primary | ICD-10-CM

## 2021-08-03 PROCEDURE — 99284 EMERGENCY DEPT VISIT MOD MDM: CPT

## 2021-08-03 PROCEDURE — 99282 EMERGENCY DEPT VISIT SF MDM: CPT | Performed by: EMERGENCY MEDICINE

## 2021-08-03 NOTE — ED NOTES
Patient unable to answer questions at this time d/t mental [facility]  08/03/21 4318

## 2021-08-03 NOTE — ED NOTES
Pt woke up and asked for food  Does not recall what happened to him today, unable to answer most of the questions, appears to be slightly confused  Will continue to monitor         Dennis Lazo RN  08/03/21 5927

## 2021-08-03 NOTE — ED PROVIDER NOTES
History  Chief Complaint   Patient presents with    Overdose - Accidental     pt smoked K2, found on ground  unable to answer questions at this time        History provided by:  Patient  Overdose - Accidental  Ingested substance:  Illicit drugs  Suspected agents: K2   Incident location:  Unable to specify (street )  Context: recreational    Associated symptoms: no abdominal pain, no chest pain, no cough, no diarrhea, no headaches, no nausea and no shortness of breath        None       No past medical history on file  No past surgical history on file  No family history on file  I have reviewed and agree with the history as documented  No existing history information found  No existing history information found  Social History     Tobacco Use    Smoking status: Not on file   Substance Use Topics    Alcohol use: Not on file    Drug use: Not on file       Review of Systems   Constitutional: Negative for chills and fever  HENT: Negative for rhinorrhea, sore throat and trouble swallowing  Eyes: Negative for pain  Respiratory: Negative for cough, shortness of breath, wheezing and stridor  Cardiovascular: Negative for chest pain and leg swelling  Gastrointestinal: Negative for abdominal pain, diarrhea and nausea  Endocrine: Negative for polyuria  Genitourinary: Negative for dysuria, flank pain and urgency  Musculoskeletal: Negative for joint swelling, myalgias and neck stiffness  Skin: Negative for rash  Allergic/Immunologic: Negative for immunocompromised state  Neurological: Negative for dizziness, syncope, weakness, numbness and headaches  Psychiatric/Behavioral: Negative for confusion and suicidal ideas  All other systems reviewed and are negative  Physical Exam  Physical Exam  Vitals and nursing note reviewed  Constitutional:       Appearance: He is well-developed  HENT:      Head: Normocephalic and atraumatic     Eyes:      Pupils: Pupils are equal, round, and reactive to light  Cardiovascular:      Rate and Rhythm: Normal rate and regular rhythm  Heart sounds: Normal heart sounds  No murmur heard  No friction rub  Pulmonary:      Effort: Pulmonary effort is normal  No respiratory distress  Breath sounds: No wheezing or rales  Abdominal:      General: Bowel sounds are normal       Palpations: Abdomen is soft  There is no mass  Tenderness: There is no abdominal tenderness  There is no guarding  Musculoskeletal:      Cervical back: Normal range of motion and neck supple  Skin:     General: Skin is warm  Findings: No rash  Neurological:      Mental Status: He is alert  He is disoriented  Comments: Patient appears intoxicated            Vital Signs  ED Triage Vitals [08/03/21 1144]   Temperature Pulse Respirations Blood Pressure SpO2   98 6 °F (37 °C) (!) 108 16 109/60 97 %      Temp Source Heart Rate Source Patient Position - Orthostatic VS BP Location FiO2 (%)   Tympanic Monitor Lying Left arm --      Pain Score       --           Vitals:    08/03/21 1144   BP: 109/60   Pulse: (!) 108   Patient Position - Orthostatic VS: Lying         Visual Acuity      ED Medications  Medications - No data to display    Diagnostic Studies  Results Reviewed     None                 No orders to display              Procedures  Procedures         ED Course                                           MDM      Disposition  Final diagnoses:   None     ED Disposition     None      Follow-up Information    None         Patient's Medications    No medications on file     No discharge procedures on file      PDMP Review     None          ED Provider  Electronically Signed by           Ying Forman DO  08/03/21 9669

## 2021-08-03 NOTE — ED NOTES
Patient ambulated with a steady gate through department        [place] [facility]  08/03/21 1028

## 2021-08-06 PROBLEM — Z00.00 MEDICARE ANNUAL WELLNESS VISIT, SUBSEQUENT: Status: ACTIVE | Noted: 2021-08-06

## 2021-08-20 ENCOUNTER — HOSPITAL ENCOUNTER (EMERGENCY)
Facility: HOSPITAL | Age: 36
Discharge: HOME | End: 2021-08-20
Attending: EMERGENCY MEDICINE | Admitting: EMERGENCY MEDICINE
Payer: COMMERCIAL

## 2021-08-20 VITALS
DIASTOLIC BLOOD PRESSURE: 83 MMHG | HEART RATE: 78 BPM | WEIGHT: 116 LBS | BODY MASS INDEX: 19.6 KG/M2 | TEMPERATURE: 97.7 F | OXYGEN SATURATION: 100 % | RESPIRATION RATE: 20 BRPM | SYSTOLIC BLOOD PRESSURE: 128 MMHG

## 2021-08-20 DIAGNOSIS — F19.10 SUBSTANCE ABUSE (HCC): Primary | ICD-10-CM

## 2021-08-20 PROCEDURE — 99282 EMERGENCY DEPT VISIT SF MDM: CPT | Performed by: EMERGENCY MEDICINE

## 2021-08-20 PROCEDURE — 99284 EMERGENCY DEPT VISIT MOD MDM: CPT

## 2021-08-20 NOTE — ED PROVIDER NOTES
History  Chief Complaint   Patient presents with    Overdose - Accidental     pt found crawling on [employer]  possible k2 no narcan given  pt sleeping but arousable  52-year-old male brought in by EMS after being found on the floor of the [address] behaving strangely  Review of medical records show his history of some substance abuse  Admits to K2 use today  No meds given prior to arrival           Prior to Admission Medications   Prescriptions Last Dose Informant Patient Reported?  Taking?   acetaminophen (TYLENOL) 650 mg CR tablet Not Taking at Unknown time  No No   Sig: Take 1 tablet (650 mg total) by mouth every 8 (eight) hours as needed for mild pain   Patient not taking: Reported on 8/20/2021   benztropine (COGENTIN) 1 mg tablet   No No   Sig: Take 1 tablet (1 mg total) by mouth 2 (two) times a day   benztropine (COGENTIN) 2 mg tablet Not Taking at Unknown time  Yes No   Sig: Take 2 mg by mouth every morning   Patient not taking: Reported on 8/20/2021   buPROPion (WELLBUTRIN SR) 150 mg 12 hr tablet Not Taking at Unknown time  Yes No   Sig: Take 150 mg by mouth every morning   Patient not taking: Reported on 8/20/2021   cloNIDine (CATAPRES) 0 2 mg tablet Not Taking at Unknown time  Yes No   Sig: Take 0 2 mg by mouth daily at bedtime   Patient not taking: Reported on 8/20/2021   divalproex sodium (DEPAKOTE ER) 500 mg 24 hr tablet Not Taking at Unknown time  Yes No   Sig: Take 500 mg by mouth 2 (two) times a day   Patient not taking: Reported on 8/20/2021   divalproex sodium (DEPAKOTE) 250 mg EC tablet   No No   Sig: Take 1 tablet (250 mg total) by mouth every evening Take with Depakote 500 mg for a total evening Depakote dose of 750 mg   divalproex sodium (DEPAKOTE) 500 mg EC tablet   No No   Sig: Take 1 tablet (500 mg total) by mouth 2 (two) times a day Take evening dose with Depakote 250 mg for a total Depakote dose of 500 mg daily and 750 mg in the evening   loxapine (LOXITANE) 50 MG capsule   No No   Sig: Take 1 capsule (50 mg total) by mouth 2 (two) times a day AND 2 capsules (100 mg total) daily at bedtime  Facility-Administered Medications: None       Past Medical History:   Diagnosis Date    [facility] )     Schizoaffective disorder ([facility] )     resolved 05/16/14       Past Surgical History:   Procedure Laterality Date    NO PAST SURGERIES         Family History   Problem Relation Age of Onset    Anxiety disorder Mother     Depression Mother     Dysphagia Mother     Stroke Mother         cva    Diabetes type II Mother     Other Mother         Hyponatremia    Psychosis Mother      I have reviewed and agree with the history as documented  E-Cigarette/Vaping    E-Cigarette Use Never User      E-Cigarette/Vaping Substances    Nicotine No     THC No     CBD No     Flavoring No     Other No     Unknown No      Social History     Tobacco Use    Smoking status: Current Every Day Smoker    Smokeless tobacco: Never Used   Vaping Use    Vaping Use: Never used   Substance Use Topics    Alcohol use: Yes     Comment: social    Drug use: Yes     Types: Marijuana, Other     Comment: K2       Review of Systems   Constitutional: Negative  Negative for chills and fever  HENT: Negative  Negative for rhinorrhea, sore throat, trouble swallowing and voice change  Eyes: Negative  Negative for pain and visual disturbance  Respiratory: Negative  Negative for cough, shortness of breath and wheezing  Cardiovascular: Negative  Negative for chest pain and palpitations  Gastrointestinal: Negative for abdominal pain, diarrhea, nausea and vomiting  Genitourinary: Negative  Negative for dysuria and frequency  Musculoskeletal: Negative  Negative for neck pain and neck stiffness  Skin: Negative  Negative for rash  Neurological: Negative  Negative for dizziness, speech difficulty, weakness, light-headedness and numbness  Physical Exam  Physical Exam  Vitals and nursing note reviewed  Constitutional:       General: He is not in acute distress  Appearance: He is well-developed  HENT:      Head: Normocephalic and atraumatic  Eyes:      Conjunctiva/sclera: Conjunctivae normal       Pupils: Pupils are equal, round, and reactive to light  Neck:      Trachea: No tracheal deviation  Cardiovascular:      Rate and Rhythm: Normal rate and regular rhythm  Pulmonary:      Effort: Pulmonary effort is normal  No respiratory distress  Breath sounds: Normal breath sounds  No wheezing or rales  Abdominal:      General: Bowel sounds are normal  There is no distension  Palpations: Abdomen is soft  Tenderness: There is no abdominal tenderness  There is no guarding or rebound  Musculoskeletal:         General: No tenderness or deformity  Normal range of motion  Cervical back: Normal range of motion and neck supple  Skin:     General: Skin is warm and dry  Capillary Refill: Capillary refill takes less than 2 seconds  Findings: No rash  Neurological:      Mental Status: He is alert and oriented to person, place, and time     Psychiatric:         Behavior: Behavior normal          Vital Signs  ED Triage Vitals [08/20/21 0902]   Temperature Pulse Respirations Blood Pressure SpO2   97 7 °F (36 5 °C) (!) 106 14 116/70 95 %      Temp Source Heart Rate Source Patient Position - Orthostatic VS BP Location FiO2 (%)   Tympanic Monitor Sitting Right arm --      Pain Score       No Pain           Vitals:    08/20/21 0902 08/20/21 1109   BP: 116/70 128/83   Pulse: (!) 106 78   Patient Position - Orthostatic VS: Sitting          Visual Acuity      ED Medications  Medications - No data to display    Diagnostic Studies  Results Reviewed     None                 No orders to display              Procedures  Procedures         ED Course                                           MDM  Number of Diagnoses or Management Options  [facility]  Diagnosis management comments: Patient at baseline, tolerated meal in the ED  Declining evaluation for detox  No suicidality no homicidality  Patient to be discharged for outpatient follow-up  Disposition  Final diagnoses:   Substance abuse ([address] )     Time reflects when diagnosis was documented in both MDM as applicable and the Disposition within this note     Time User Action Codes Description Comment    8/20/2021 10:40 AM Skye Melchor Add [F19 10] Substance abuse [facility])       ED Disposition     ED Disposition Condition Date/Time Comment    Discharge Stable Fri Aug 20, 2021 10:40 AM Justine Form discharge to home/self care              Follow-up Information     Follow up With Specialties Details Why Contact Info Additional [address] In 1 week  [address], [id number] [place] [address], [address], [address], [place], [address]          Discharge Medication List as of 8/20/2021 10:42 AM      CONTINUE these medications which have NOT CHANGED    Details   acetaminophen (TYLENOL) 650 mg CR tablet Take 1 tablet (650 mg total) by mouth every 8 (eight) hours as needed for mild pain, Starting Fri 7/2/2021, Normal      benztropine (COGENTIN) 2 mg tablet Take 2 mg by mouth every morning, Starting Tue 6/22/2021, Historical Med      buPROPion (WELLBUTRIN SR) 150 mg 12 hr tablet Take 150 mg by mouth every morning, Starting Sun 6/20/2021, Historical Med      cloNIDine (CATAPRES) 0 2 mg tablet Take 0 2 mg by mouth daily at bedtime, Starting Tue 6/22/2021, Historical Med      divalproex sodium (DEPAKOTE ER) 500 mg 24 hr tablet Take 500 mg by mouth 2 (two) times a day, Starting Thu 5/6/2021, Historical Med      divalproex sodium (DEPAKOTE) 250 mg EC tablet Take 1 tablet (250 mg total) by mouth every evening Take with Depakote 500 mg for a total evening Depakote dose of 750 mg, Starting Fri 3/12/2021, Until Mon 7/12/2021, Print      divalproex sodium (DEPAKOTE) 500 mg EC tablet Take 1 tablet (500 mg total) by mouth 2 (two) times a day Take evening dose with Depakote 250 mg for a total Depakote dose of 500 mg daily and 750 mg in the evening, Starting Fri 3/12/2021, Until Mon 7/12/2021, Print      loxapine (LOXITANE) 50 MG capsule Multiple Dosages:Starting Fri 3/12/2021, Until Tue 5/11/2021 at 2359Take 1 capsule (50 mg total) by mouth 2 (two) times a day AND 2 capsules (100 mg total) daily at bedtime  , Print           No discharge procedures on file      PDMP Review     None          ED Provider  Electronically Signed by           Missy Desir DO  08/20/21 1200

## 2021-08-20 NOTE — ED NOTES
Pt given water and sandwich  Alert and oriented no distress noted will continue to monitor        Van Altman RN  08/20/21 1583

## 2021-10-13 ENCOUNTER — HOSPITAL ENCOUNTER (EMERGENCY)
Facility: HOSPITAL | Age: 36
Discharge: HOME | End: 2021-10-13
Attending: EMERGENCY MEDICINE
Payer: COMMERCIAL

## 2021-10-13 VITALS
OXYGEN SATURATION: 98 % | RESPIRATION RATE: 16 BRPM | HEART RATE: 100 BPM | TEMPERATURE: 98.1 F | DIASTOLIC BLOOD PRESSURE: 74 MMHG | SYSTOLIC BLOOD PRESSURE: 105 MMHG

## 2021-10-13 DIAGNOSIS — F19.10 DRUG ABUSE (HCC): Primary | ICD-10-CM

## 2021-10-13 PROCEDURE — 99284 EMERGENCY DEPT VISIT MOD MDM: CPT

## 2021-10-13 PROCEDURE — 99282 EMERGENCY DEPT VISIT SF MDM: CPT | Performed by: EMERGENCY MEDICINE

## 2021-11-04 ENCOUNTER — LAB (OUTPATIENT)
Dept: LAB | Facility: HOSPITAL | Age: 36
End: 2021-11-04
Payer: COMMERCIAL

## 2021-11-04 DIAGNOSIS — E55.9 VITAMIN D DEFICIENCY: ICD-10-CM

## 2021-11-04 DIAGNOSIS — Z79.899 ENCOUNTER FOR LONG-TERM (CURRENT) DRUG USE: ICD-10-CM

## 2021-11-04 DIAGNOSIS — Z13.9 SCREENING FOR UNSPECIFIED CONDITION: ICD-10-CM

## 2021-11-04 LAB
25(OH)D3 SERPL-MCNC: 24.6 NG/ML (ref 30–100)
ALBUMIN SERPL BCP-MCNC: 3.9 G/DL (ref 3.5–5)
ALP SERPL-CCNC: 91 U/L (ref 46–116)
ALT SERPL W P-5'-P-CCNC: 22 U/L (ref 12–78)
AMMONIA PLAS-SCNC: 25 UMOL/L (ref 11–35)
ANION GAP SERPL CALCULATED.3IONS-SCNC: 4 MMOL/L (ref 4–13)
AST SERPL W P-5'-P-CCNC: 12 U/L (ref 5–45)
BASOPHILS # BLD AUTO: 0.02 THOUSANDS/ΜL (ref 0–0.1)
BASOPHILS NFR BLD AUTO: 0 % (ref 0–1)
BILIRUB SERPL-MCNC: 0.16 MG/DL (ref 0.2–1)
BUN SERPL-MCNC: 8 MG/DL (ref 5–25)
CALCIUM SERPL-MCNC: 9.3 MG/DL (ref 8.3–10.1)
CHLORIDE SERPL-SCNC: 101 MMOL/L (ref 100–108)
CHOLEST SERPL-MCNC: 180 MG/DL (ref 50–200)
CO2 SERPL-SCNC: 32 MMOL/L (ref 21–32)
CREAT SERPL-MCNC: 0.69 MG/DL (ref 0.6–1.3)
EOSINOPHIL # BLD AUTO: 0.2 THOUSAND/ΜL (ref 0–0.61)
EOSINOPHIL NFR BLD AUTO: 4 % (ref 0–6)
ERYTHROCYTE [DISTWIDTH] IN BLOOD BY AUTOMATED COUNT: 14.7 % (ref 11.6–15.1)
EST. AVERAGE GLUCOSE BLD GHB EST-MCNC: 111 MG/DL
GFR SERPL CREATININE-BSD FRML MDRD: 124 ML/MIN/1.73SQ M
GLUCOSE P FAST SERPL-MCNC: 83 MG/DL (ref 65–99)
HBA1C MFR BLD: 5.5 %
HCT VFR BLD AUTO: 39 % (ref 36.5–49.3)
HDLC SERPL-MCNC: 65 MG/DL
HGB BLD-MCNC: 13 G/DL (ref 12–17)
IMM GRANULOCYTES # BLD AUTO: 0.01 THOUSAND/UL (ref 0–0.2)
IMM GRANULOCYTES NFR BLD AUTO: 0 % (ref 0–2)
LDLC SERPL CALC-MCNC: 99 MG/DL (ref 0–100)
LYMPHOCYTES # BLD AUTO: 2.24 THOUSANDS/ΜL (ref 0.6–4.47)
LYMPHOCYTES NFR BLD AUTO: 39 % (ref 14–44)
MCH RBC QN AUTO: 30 PG (ref 26.8–34.3)
MCHC RBC AUTO-ENTMCNC: 33.3 G/DL (ref 31.4–37.4)
MCV RBC AUTO: 90 FL (ref 82–98)
MONOCYTES # BLD AUTO: 0.8 THOUSAND/ΜL (ref 0.17–1.22)
MONOCYTES NFR BLD AUTO: 14 % (ref 4–12)
NEUTROPHILS # BLD AUTO: 2.52 THOUSANDS/ΜL (ref 1.85–7.62)
NEUTS SEG NFR BLD AUTO: 43 % (ref 43–75)
NONHDLC SERPL-MCNC: 115 MG/DL
NRBC BLD AUTO-RTO: 0 /100 WBCS
PLATELET # BLD AUTO: 245 THOUSANDS/UL (ref 149–390)
PMV BLD AUTO: 8.9 FL (ref 8.9–12.7)
POTASSIUM SERPL-SCNC: 4.6 MMOL/L (ref 3.5–5.3)
PROT SERPL-MCNC: 6.9 G/DL (ref 6.4–8.2)
RBC # BLD AUTO: 4.34 MILLION/UL (ref 3.88–5.62)
SODIUM SERPL-SCNC: 137 MMOL/L (ref 136–145)
TRIGL SERPL-MCNC: 82 MG/DL
TSH SERPL DL<=0.05 MIU/L-ACNC: 2.15 UIU/ML (ref 0.36–3.74)
VALPROATE SERPL-MCNC: 75.8 UG/ML (ref 50–100)
WBC # BLD AUTO: 5.79 THOUSAND/UL (ref 4.31–10.16)

## 2021-11-04 PROCEDURE — 80053 COMPREHEN METABOLIC PANEL: CPT

## 2021-11-04 PROCEDURE — 80164 ASSAY DIPROPYLACETIC ACD TOT: CPT

## 2021-11-04 PROCEDURE — 82306 VITAMIN D 25 HYDROXY: CPT

## 2021-11-04 PROCEDURE — 80061 LIPID PANEL: CPT

## 2021-11-04 PROCEDURE — 84443 ASSAY THYROID STIM HORMONE: CPT

## 2021-11-04 PROCEDURE — 85025 COMPLETE CBC W/AUTO DIFF WBC: CPT

## 2021-11-04 PROCEDURE — 36415 COLL VENOUS BLD VENIPUNCTURE: CPT

## 2021-11-04 PROCEDURE — 83036 HEMOGLOBIN GLYCOSYLATED A1C: CPT

## 2021-11-04 PROCEDURE — 82140 ASSAY OF AMMONIA: CPT

## 2021-11-09 ENCOUNTER — HOSPITAL ENCOUNTER (EMERGENCY)
Facility: HOSPITAL | Age: 36
Discharge: HOME | End: 2021-11-09
Attending: EMERGENCY MEDICINE
Payer: COMMERCIAL

## 2021-11-09 VITALS
OXYGEN SATURATION: 99 % | TEMPERATURE: 98.2 F | HEART RATE: 98 BPM | DIASTOLIC BLOOD PRESSURE: 52 MMHG | WEIGHT: 121.03 LBS | BODY MASS INDEX: 20.45 KG/M2 | RESPIRATION RATE: 18 BRPM | SYSTOLIC BLOOD PRESSURE: 93 MMHG

## 2021-11-09 DIAGNOSIS — F19.10 DRUG ABUSE (HCC): Primary | ICD-10-CM

## 2021-11-09 LAB
GLUCOSE SERPL-MCNC: 54 MG/DL (ref 65–140)
GLUCOSE SERPL-MCNC: 95 MG/DL (ref 65–140)

## 2021-11-09 PROCEDURE — 93005 ELECTROCARDIOGRAM TRACING: CPT

## 2021-11-09 PROCEDURE — 82948 REAGENT STRIP/BLOOD GLUCOSE: CPT

## 2021-11-09 PROCEDURE — 99282 EMERGENCY DEPT VISIT SF MDM: CPT | Performed by: EMERGENCY MEDICINE

## 2021-11-09 PROCEDURE — 99284 EMERGENCY DEPT VISIT MOD MDM: CPT

## 2021-11-10 LAB
ATRIAL RATE: 97 BPM
P AXIS: 63 DEGREES
PR INTERVAL: 128 MS
QRS AXIS: 88 DEGREES
QRSD INTERVAL: 88 MS
QT INTERVAL: 350 MS
QTC INTERVAL: 444 MS
T WAVE AXIS: 69 DEGREES
VENTRICULAR RATE: 97 BPM

## 2021-11-10 PROCEDURE — 93010 ELECTROCARDIOGRAM REPORT: CPT | Performed by: INTERNAL MEDICINE

## 2022-02-10 ENCOUNTER — HOSPITAL ENCOUNTER (EMERGENCY)
Facility: HOSPITAL | Age: 37
Discharge: HOME | End: 2022-02-10
Attending: EMERGENCY MEDICINE
Payer: MEDICARE

## 2022-02-10 VITALS
DIASTOLIC BLOOD PRESSURE: 79 MMHG | TEMPERATURE: 98.1 F | OXYGEN SATURATION: 98 % | HEART RATE: 119 BPM | RESPIRATION RATE: 20 BRPM | SYSTOLIC BLOOD PRESSURE: 131 MMHG

## 2022-02-10 DIAGNOSIS — F19.10 SUBSTANCE ABUSE (HCC): Primary | ICD-10-CM

## 2022-02-10 PROCEDURE — 99284 EMERGENCY DEPT VISIT MOD MDM: CPT

## 2022-02-10 PROCEDURE — 99282 EMERGENCY DEPT VISIT SF MDM: CPT | Performed by: EMERGENCY MEDICINE

## 2022-02-10 NOTE — ED PROVIDER NOTES
History  Chief Complaint   Patient presents with    Overdose - Accidental     K2 overdose     Patient is a 28-year-old male brought in by [facility] and [facility] after being found unresponsive [place] local sidewalk  Patient was given 4 mg IN narcan by [employer] with resolution of symptoms  Patient admits to smoking k2 earlier  Per review of merged chart in [facility], patient has extensive history of substance abuse, K2  Denies any complaints at this time  None       History reviewed  No pertinent past medical history  History reviewed  No pertinent surgical history  History reviewed  No pertinent family history  I have reviewed and agree with the history as documented  E-Cigarette/Vaping     E-Cigarette/Vaping Substances     Social History     Tobacco Use    Smoking status: Current Every Day Smoker    Smokeless tobacco: Never Used   Substance Use Topics    Alcohol use: Yes    Drug use: Yes     Comment: K2       Review of Systems   Constitutional: Negative  HENT: Negative  Eyes: Negative  Respiratory: Negative  Cardiovascular: Negative  Gastrointestinal: Negative  Endocrine: Negative  Genitourinary: Negative  Musculoskeletal: Negative  Skin: Negative  Allergic/Immunologic: Negative  Neurological: Negative  Hematological: Negative  Psychiatric/Behavioral: Negative  All other systems reviewed and are negative  Physical Exam  Physical Exam  Vitals and nursing note reviewed  Constitutional:       Appearance: He is normal weight  Comments: Disheveled  HENT:      Head: Normocephalic and atraumatic  Right Ear: Tympanic membrane, ear canal and external ear normal       Left Ear: Tympanic membrane, ear canal and external ear normal       Nose: Nose normal       Mouth/Throat:      Mouth: Mucous membranes are moist       Pharynx: Oropharynx is clear  Eyes:      Extraocular Movements: Extraocular movements intact        Conjunctiva/sclera: Conjunctivae normal  Pupils: Pupils are equal, round, and reactive to light  Cardiovascular:      Rate and Rhythm: Normal rate and regular rhythm  Pulses: Normal pulses  Heart sounds: Normal heart sounds  Pulmonary:      Effort: Pulmonary effort is normal       Breath sounds: Normal breath sounds  Abdominal:      General: Bowel sounds are normal       Palpations: Abdomen is soft  Musculoskeletal:         General: No swelling  Normal range of motion  Cervical back: Normal range of motion and neck supple  Right lower leg: No edema  Left lower leg: No edema  Skin:     General: Skin is warm  Capillary Refill: Capillary refill takes less than 2 seconds  Neurological:      General: No focal deficit present  Mental Status: He is alert and oriented to person, place, and time  Comments: Slow to respond by appropriate  Vital Signs  ED Triage Vitals [02/10/22 1830]   Temperature Pulse Respirations Blood Pressure SpO2   98 1 °F (36 7 °C) (!) 119 20 131/79 98 %      Temp Source Heart Rate Source Patient Position - Orthostatic VS BP Location FiO2 (%)   Oral Monitor Lying Left arm --      Pain Score       No Pain           Vitals:    02/10/22 1830   BP: 131/79   Pulse: (!) 119   Patient Position - Orthostatic VS: Lying         Visual Acuity      ED Medications  Medications - No data to display    Diagnostic Studies  Results Reviewed     None                 No orders to display              Procedures  Procedures         ED Course  ED Course as of 02/10/22 1908   u Feb 10, 2022   1907 Pt awake, alert and oriented  Advised to stop smoking K2  Will dc  SBIRT 20yo+      Most Recent Value   SBIRT (24 yo +)    In order to provide better care to our patients, we are screening all of our patients for alcohol and drug use  Would it be okay to ask you these screening questions?  No Filed at: 02/10/2022 1837                    MDM  Number of Diagnoses or Management Options     Amount and/or Complexity of Data Reviewed  Obtain history from someone other than the patient: yes  Review and summarize past medical records: yes        Disposition  Final diagnoses:   None     ED Disposition     None      Follow-up Information    None         Patient's Medications    No medications on file       No discharge procedures on file      PDMP Review     None          ED Provider  Electronically Signed by           Aruna Youssef MD  02/10/22 1087

## 2022-03-01 ENCOUNTER — HOSPITAL ENCOUNTER (EMERGENCY)
Facility: HOSPITAL | Age: 37
Discharge: HOME | End: 2022-03-01
Attending: EMERGENCY MEDICINE | Admitting: EMERGENCY MEDICINE
Payer: MEDICARE

## 2022-03-01 VITALS
WEIGHT: 120 LBS | SYSTOLIC BLOOD PRESSURE: 121 MMHG | BODY MASS INDEX: 20.28 KG/M2 | HEART RATE: 126 BPM | DIASTOLIC BLOOD PRESSURE: 88 MMHG | RESPIRATION RATE: 16 BRPM | OXYGEN SATURATION: 97 % | TEMPERATURE: 98.2 F

## 2022-03-01 DIAGNOSIS — R40.4 TRANSIENT ALTERATION OF AWARENESS: ICD-10-CM

## 2022-03-01 DIAGNOSIS — F19.10 SUBSTANCE ABUSE (HCC): Primary | ICD-10-CM

## 2022-03-01 LAB
ATRIAL RATE: 107 BPM
P AXIS: 57 DEGREES
PR INTERVAL: 128 MS
QRS AXIS: 97 DEGREES
QRSD INTERVAL: 90 MS
QT INTERVAL: 334 MS
QTC INTERVAL: 445 MS
T WAVE AXIS: 46 DEGREES
VENTRICULAR RATE: 107 BPM

## 2022-03-01 PROCEDURE — 93005 ELECTROCARDIOGRAM TRACING: CPT

## 2022-03-01 PROCEDURE — 93010 ELECTROCARDIOGRAM REPORT: CPT | Performed by: INTERNAL MEDICINE

## 2022-03-01 PROCEDURE — 99283 EMERGENCY DEPT VISIT LOW MDM: CPT

## 2022-03-01 PROCEDURE — 99284 EMERGENCY DEPT VISIT MOD MDM: CPT | Performed by: EMERGENCY MEDICINE

## 2022-03-01 NOTE — ED PROVIDER NOTES
History  Chief Complaint   Patient presents with    Recreational Drug Use     Pt brought in for AMS after using K2     30 YO male presents with altered mental status  EMS helps with Hx, states friend called EMS as pt was acting strangely, rolling around on the ground  Pt did smoke K-2 today, possibly excessively  Pt arrives to the ED slow to respond but appropriate  Oriented to person, place and time  He admits to substance abuse, denies SI/HI, no hallucinations  Pt denies CP/SOB/F/C/N/V/D/C, no dysuria, burning on urination or blood in urine  History provided by:  Patient   used: No    Overdose - Accidental  Ingested substance:  Illicit drugs  Suspected agents: K-2  Witnesses present: yes    Witnessed by:  Friend  Context: recreational    Associated symptoms: altered mental status    Associated symptoms: no abdominal pain, no agitation, no chest pain, no nausea, no shortness of breath and no vomiting        Prior to Admission Medications   Prescriptions Last Dose Informant Patient Reported?  Taking?   benztropine (COGENTIN) 1 mg tablet   No No   Sig: Take 1 tablet (1 mg total) by mouth 2 (two) times a day   Patient not taking: Reported on 10/13/2021   benztropine (COGENTIN) 2 mg tablet   Yes No   Sig: Take 2 mg by mouth every morning   Patient not taking: Reported on 8/20/2021   buPROPion (WELLBUTRIN SR) 150 mg 12 hr tablet   Yes No   Sig: Take 150 mg by mouth every morning   Patient not taking: Reported on 8/20/2021   cloNIDine (CATAPRES) 0 2 mg tablet   Yes No   Sig: Take 0 2 mg by mouth daily at bedtime   Patient not taking: Reported on 8/20/2021   divalproex sodium (DEPAKOTE ER) 500 mg 24 hr tablet   Yes No   Sig: Take 500 mg by mouth 2 (two) times a day   Patient not taking: Reported on 8/20/2021   divalproex sodium (DEPAKOTE) 250 mg EC tablet   No No   Sig: Take 1 tablet (250 mg total) by mouth every evening Take with Depakote 500 mg for a total evening Depakote dose of 750 mg Patient not taking: Reported on 10/13/2021   divalproex sodium (DEPAKOTE) 500 mg EC tablet   No No   Sig: Take 1 tablet (500 mg total) by mouth 2 (two) times a day Take evening dose with Depakote 250 mg for a total Depakote dose of 500 mg daily and 750 mg in the evening   Patient not taking: Reported on 10/13/2021   loxapine (LOXITANE) 50 MG capsule   No No   Sig: Take 1 capsule (50 mg total) by mouth 2 (two) times a day AND 2 capsules (100 mg total) daily at bedtime  Patient not taking: Reported on 10/13/2021      Facility-Administered Medications: None       Past Medical History:   Diagnosis Date    [facility]     [facility]     resolved 05/16/14       Past Surgical History:   Procedure Laterality Date    NO PAST SURGERIES         Family History   Problem Relation Age of Onset    Anxiety disorder Mother     Depression Mother     Dysphagia Mother     Stroke Mother         cva    Diabetes type II Mother     Other Mother         Hyponatremia    Psychosis Mother      I have reviewed and agree with the history as documented  E-Cigarette/Vaping    E-Cigarette Use Never User      E-Cigarette/Vaping Substances    Nicotine No     THC No     CBD No     Flavoring No     Other No     Unknown No      Social History     Tobacco Use    Smoking status: Current Every Day Smoker     Packs/day: 0 25     Types: Cigarettes    Smokeless tobacco: Never Used   Vaping Use    Vaping Use: Never used   Substance Use Topics    Alcohol use: Yes     Comment: social    Drug use: Yes     Types: Marijuana, Other     Comment: K2       Review of Systems   Constitutional: Negative for fever  HENT: Negative for dental problem  Eyes: Negative for visual disturbance  Respiratory: Negative for shortness of breath  Cardiovascular: Negative for chest pain  Gastrointestinal: Negative for abdominal pain, nausea and vomiting  Genitourinary: Negative for dysuria and frequency  Musculoskeletal: Negative for neck pain and neck stiffness  Skin: Negative for rash  Neurological: Negative for dizziness, weakness and light-headedness  Psychiatric/Behavioral: Negative for agitation, behavioral problems and confusion  All other systems reviewed and are negative  Physical Exam  Physical Exam  Vitals and nursing note reviewed  Constitutional:       Appearance: He is well-developed  HENT:      Head: Normocephalic and atraumatic  Eyes:      Extraocular Movements: Extraocular movements intact  Cardiovascular:      Rate and Rhythm: Normal rate  Pulmonary:      Effort: Pulmonary effort is normal    Abdominal:      General: There is no distension  Musculoskeletal:         General: Normal range of motion  Cervical back: Normal range of motion  Skin:     Findings: No rash  Neurological:      Mental Status: He is alert and oriented to person, place, and time  Comments: Slow to respond     Psychiatric:         Behavior: Behavior normal          Vital Signs  ED Triage Vitals [03/01/22 1457]   Temperature Pulse Respirations Blood Pressure SpO2   98 2 °F (36 8 °C) (!) 126 16 121/88 97 %      Temp Source Heart Rate Source Patient Position - Orthostatic VS BP Location FiO2 (%)   Oral Monitor -- -- --      Pain Score       --           Vitals:    03/01/22 1457   BP: 121/88   Pulse: (!) 126         Visual Acuity  Visual Acuity      Most Recent Value   L Pupil Size (mm) 3   R Pupil Size (mm) 3          ED Medications  Medications - No data to display    Diagnostic Studies  Results Reviewed     None                 No orders to display              Procedures  ECG 12 Lead Documentation Only    Date/Time: 3/1/2022 3:22 PM  Performed by: Pinky Patel MD  Authorized by: Pinky Patel MD     ECG reviewed by me, the ED Provider: yes    Patient location:  ED  Previous ECG:     Previous ECG:  Compared to current    Comparison ECG info:  11/09/2021    Similarity:  No change  Interpretation:     Interpretation: non-specific    Rate:     ECG rate:  107    ECG rate assessment: normal    Rhythm:     Rhythm: sinus rhythm and sinus tachycardia    QRS:     QRS axis:  Right    QRS intervals:  Normal  Conduction:     Conduction: normal    ST segments:     ST segments:  Normal  T waves:     T waves: normal               ED Course                                             MDM  Number of Diagnoses or Management Options  Substance abuse [facility]): new and requires workup  Diagnosis management comments: 1  Drug abuse - Pt is slow to respond but oriented, offers no specific complaints, has stated he does not wish to have labs drawn  Will monitor, obtain ECG  Pt denies SI/HI, he has ability to leave if he wishes to  Amount and/or Complexity of Data Reviewed  Review and summarize past medical records: yes  Independent visualization of images, tracings, or specimens: yes    Patient Progress  Patient progress: stable      Disposition  Final diagnoses:   Substance abuse ([address] )     Time reflects when diagnosis was documented in both MDM as applicable and the Disposition within this note     Time User Action Codes Description Comment    3/1/2022  4:29 PM Shanon Case [F19 10] Substance abuse [facility])       ED Disposition     ED Disposition Condition Date/Time Comment    Discharge Stable Tue Mar 1, 2022  4:29 PM Judith Hollie discharge to home/self care              Follow-up Information    None         Discharge Medication List as of 3/1/2022  4:30 PM      CONTINUE these medications which have NOT CHANGED    Details   benztropine (COGENTIN) 2 mg tablet Take 2 mg by mouth every morning, Starting Tue 6/22/2021, Historical Med      buPROPion (WELLBUTRIN SR) 150 mg 12 hr tablet Take 150 mg by mouth every morning, Starting Sun 6/20/2021, Historical Med      cloNIDine (CATAPRES) 0 2 mg tablet Take 0 2 mg by mouth daily at bedtime, Starting Tue 6/22/2021, Historical Med      divalproex sodium (DEPAKOTE ER) 500 mg 24 hr tablet Take 500 mg by mouth 2 (two) times a day, Starting Thu 5/6/2021, Historical Med      divalproex sodium (DEPAKOTE) 250 mg EC tablet Take 1 tablet (250 mg total) by mouth every evening Take with Depakote 500 mg for a total evening Depakote dose of 750 mg, Starting Fri 3/12/2021, Until Mon 7/12/2021, Print      divalproex sodium (DEPAKOTE) 500 mg EC tablet Take 1 tablet (500 mg total) by mouth 2 (two) times a day Take evening dose with Depakote 250 mg for a total Depakote dose of 500 mg daily and 750 mg in the evening, Starting Fri 3/12/2021, Until Mon 7/12/2021, Print      loxapine (LOXITANE) 50 MG capsule Multiple Dosages:Starting Fri 3/12/2021, Until Tue 5/11/2021 at 2359Take 1 capsule (50 mg total) by mouth 2 (two) times a day AND 2 capsules (100 mg total) daily at bedtime  , Print      acetaminophen (TYLENOL) 650 mg CR tablet Take 1 tablet (650 mg total) by mouth every 8 (eight) hours as needed for mild pain, Starting Fri 7/2/2021, Normal             No discharge procedures on file      PDMP Review     None          ED Provider  Electronically Signed by           Sweetie Carrera MD  03/01/22 7241

## 2022-03-15 ENCOUNTER — HOSPITAL ENCOUNTER (EMERGENCY)
Facility: HOSPITAL | Age: 37
Discharge: HOME | End: 2022-03-15
Attending: EMERGENCY MEDICINE | Admitting: EMERGENCY MEDICINE
Payer: MEDICARE

## 2022-03-15 VITALS
SYSTOLIC BLOOD PRESSURE: 116 MMHG | OXYGEN SATURATION: 98 % | RESPIRATION RATE: 16 BRPM | HEART RATE: 95 BPM | TEMPERATURE: 98.3 F | DIASTOLIC BLOOD PRESSURE: 69 MMHG | WEIGHT: 131.39 LBS

## 2022-03-15 DIAGNOSIS — F19.90 RECREATIONAL DRUG USE: Primary | ICD-10-CM

## 2022-03-15 DIAGNOSIS — R45.1 AGITATION: ICD-10-CM

## 2022-03-15 LAB
ANION GAP SERPL CALCULATED.3IONS-SCNC: 17 MMOL/L (ref 4–13)
APAP SERPL-MCNC: <2 UG/ML (ref 10–20)
BASOPHILS # BLD AUTO: 0.03 THOUSANDS/ΜL (ref 0–0.1)
BASOPHILS NFR BLD AUTO: 1 % (ref 0–1)
BUN SERPL-MCNC: 5 MG/DL (ref 5–25)
CALCIUM SERPL-MCNC: 8.6 MG/DL (ref 8.3–10.1)
CHLORIDE SERPL-SCNC: 99 MMOL/L (ref 100–108)
CK SERPL-CCNC: 146 U/L (ref 39–308)
CO2 SERPL-SCNC: 22 MMOL/L (ref 21–32)
CREAT SERPL-MCNC: 1.1 MG/DL (ref 0.6–1.3)
EOSINOPHIL # BLD AUTO: 0.09 THOUSAND/ΜL (ref 0–0.61)
EOSINOPHIL NFR BLD AUTO: 2 % (ref 0–6)
ERYTHROCYTE [DISTWIDTH] IN BLOOD BY AUTOMATED COUNT: 14.1 % (ref 11.6–15.1)
ETHANOL SERPL-MCNC: 3 MG/DL (ref 0–3)
GFR SERPL CREATININE-BSD FRML MDRD: 38 ML/MIN/1.73SQ M
GLUCOSE SERPL-MCNC: 211 MG/DL (ref 65–140)
HCT VFR BLD AUTO: 35.7 % (ref 36.5–49.3)
HGB BLD-MCNC: 12.3 G/DL (ref 12–17)
IMM GRANULOCYTES # BLD AUTO: 0.02 THOUSAND/UL (ref 0–0.2)
IMM GRANULOCYTES NFR BLD AUTO: 0 % (ref 0–2)
LYMPHOCYTES # BLD AUTO: 2.61 THOUSANDS/ΜL (ref 0.6–4.47)
LYMPHOCYTES NFR BLD AUTO: 44 % (ref 14–44)
MCH RBC QN AUTO: 30.8 PG (ref 26.8–34.3)
MCHC RBC AUTO-ENTMCNC: 34.5 G/DL (ref 31.4–37.4)
MCV RBC AUTO: 90 FL (ref 82–98)
MONOCYTES # BLD AUTO: 0.77 THOUSAND/ΜL (ref 0.17–1.22)
MONOCYTES NFR BLD AUTO: 13 % (ref 4–12)
NEUTROPHILS # BLD AUTO: 2.36 THOUSANDS/ΜL (ref 1.85–7.62)
NEUTS SEG NFR BLD AUTO: 40 % (ref 43–75)
NRBC BLD AUTO-RTO: 0 /100 WBCS
PLATELET # BLD AUTO: 233 THOUSANDS/UL (ref 149–390)
PMV BLD AUTO: 8.8 FL (ref 8.9–12.7)
POTASSIUM SERPL-SCNC: 4 MMOL/L (ref 3.5–5.3)
RBC # BLD AUTO: 3.99 MILLION/UL (ref 3.88–5.62)
SALICYLATES SERPL-MCNC: 3.8 MG/DL (ref 3–20)
SODIUM SERPL-SCNC: 138 MMOL/L (ref 136–145)
WBC # BLD AUTO: 5.88 THOUSAND/UL (ref 4.31–10.16)

## 2022-03-15 PROCEDURE — 93005 ELECTROCARDIOGRAM TRACING: CPT

## 2022-03-15 PROCEDURE — 99284 EMERGENCY DEPT VISIT MOD MDM: CPT | Performed by: EMERGENCY MEDICINE

## 2022-03-15 PROCEDURE — 99284 EMERGENCY DEPT VISIT MOD MDM: CPT

## 2022-03-15 PROCEDURE — 82077 ASSAY SPEC XCP UR&BREATH IA: CPT | Performed by: EMERGENCY MEDICINE

## 2022-03-15 PROCEDURE — 80048 BASIC METABOLIC PNL TOTAL CA: CPT | Performed by: EMERGENCY MEDICINE

## 2022-03-15 PROCEDURE — 85025 COMPLETE CBC W/AUTO DIFF WBC: CPT | Performed by: EMERGENCY MEDICINE

## 2022-03-15 PROCEDURE — 80179 DRUG ASSAY SALICYLATE: CPT | Performed by: EMERGENCY MEDICINE

## 2022-03-15 PROCEDURE — 96360 HYDRATION IV INFUSION INIT: CPT

## 2022-03-15 PROCEDURE — 36415 COLL VENOUS BLD VENIPUNCTURE: CPT | Performed by: EMERGENCY MEDICINE

## 2022-03-15 PROCEDURE — 80143 DRUG ASSAY ACETAMINOPHEN: CPT | Performed by: EMERGENCY MEDICINE

## 2022-03-15 PROCEDURE — 96361 HYDRATE IV INFUSION ADD-ON: CPT

## 2022-03-15 PROCEDURE — 82550 ASSAY OF CK (CPK): CPT | Performed by: EMERGENCY MEDICINE

## 2022-03-15 RX ORDER — MIDAZOLAM HYDROCHLORIDE 1 MG/ML
1 INJECTION INTRAMUSCULAR; INTRAVENOUS ONCE
Status: COMPLETED | OUTPATIENT
Start: 2022-03-15 | End: 2022-03-15

## 2022-03-15 RX ADMIN — SODIUM CHLORIDE 1000 ML: 0.9 INJECTION, SOLUTION INTRAVENOUS at 20:14

## 2022-03-15 NOTE — ED PROVIDER NOTES
History  Chief Complaint   Patient presents with    Overdose - Accidental     per EMS, pt found rolling around on side of road  Pt combative for EMS and police  Per EMS possible K2 overdose  pt not answer questions in triage  2mg IM versed, 2mg IV versed given pta  Pt is an unknown male who will not offer his personal information besides his first name Hailey Wayne  Pt was found down on the ground, rolling on the side of the road and EMS was called  He was agitated, aggressive and was placed in hand cuffs  He was given 2 mg of Versed IM and then 2 mg IV  He is now drowsy and calm  He is AAO x 4 but will not answer other questions  Possible K2 ingestion  History provided by:  Patient   used: No    Overdose - Accidental  Ingested substance:  Illicit drugs  Illicit drug type:  Unable to specify (K2)  Ingestion amount:  Unknown  Witnesses present: no    Called poison control: no    Incident location:  Unable to specify  Context: recreational    Risk factors: no hx of self injury and no hx of sucide attempts        None       No past medical history on file  No past surgical history on file  No family history on file  I have reviewed and agree with the history as documented  No existing history information found  No existing history information found  Social History     Tobacco Use    Smoking status: Not on file    Smokeless tobacco: Not on file   Substance Use Topics    Alcohol use: Not on file    Drug use: Not on file       Review of Systems   Constitutional: Negative  HENT: Negative  Respiratory: Negative  Cardiovascular: Negative  Gastrointestinal: Negative  Genitourinary: Negative  Musculoskeletal: Negative  Neurological: Negative  All other systems reviewed and are negative  Physical Exam  Physical Exam  Constitutional:       General: He is not in acute distress  Appearance: He is well-developed  He is not diaphoretic        Comments: Pt in soft restraints and a spit mask  Dried blood on lips due to biting his lips  HENT:      Head: Normocephalic and atraumatic  Right Ear: External ear normal       Left Ear: External ear normal       Nose: Nose normal       Mouth/Throat:      Mouth: Mucous membranes are moist       Pharynx: Oropharynx is clear  No oropharyngeal exudate or posterior oropharyngeal erythema  Eyes:      General: No scleral icterus  Right eye: No discharge  Left eye: No discharge  Extraocular Movements: Extraocular movements intact  Conjunctiva/sclera: Conjunctivae normal    Cardiovascular:      Rate and Rhythm: Regular rhythm  Tachycardia present  Heart sounds: Normal heart sounds  Pulmonary:      Effort: Pulmonary effort is normal       Breath sounds: Normal breath sounds  Abdominal:      General: Abdomen is flat  Bowel sounds are normal  There is no distension  Palpations: Abdomen is soft  Tenderness: There is no abdominal tenderness  Musculoskeletal:         General: Normal range of motion  Cervical back: Normal range of motion and neck supple  Skin:     General: Skin is warm and dry  Neurological:      General: No focal deficit present  Mental Status: He is alert and oriented to person, place, and time  Mental status is at baseline  Psychiatric:         Mood and Affect: Mood normal          Speech: Speech is delayed  Behavior: Behavior normal  Behavior is cooperative           Vital Signs  ED Triage Vitals [03/15/22 1910]   Temperature Pulse Respirations Blood Pressure SpO2   98 3 °F (36 8 °C) (!) 129 22 97/55 93 %      Temp Source Heart Rate Source Patient Position - Orthostatic VS BP Location FiO2 (%)   Oral Monitor Lying Right arm --      Pain Score       --           Vitals:    03/15/22 1910 03/15/22 2016 03/15/22 2134   BP: 97/55 96/65 116/69   Pulse: (!) 129 101 95   Patient Position - Orthostatic VS: Lying Lying Lying         Visual Acuity      ED Medications  Medications   midazolam (FOR EMS ONLY) (VERSED) 2 mg/2 mL injection 2 mg (0 mg Does not apply Given to EMS 3/15/22 1911)   sodium chloride 0 9 % bolus 1,000 mL (0 mL Intravenous Stopped 3/15/22 2201)       Diagnostic Studies  Results Reviewed     Procedure Component Value Units Date/Time    Salicylate level [[id number]]  (Normal) Collected: 03/15/22 1920    Lab Status: Final result Specimen: Blood from Arm, Left Updated: 21/22/60 4904     Salicylate Lvl 3 8 mg/dL     Acetaminophen level-If concentration is detectable, please discuss with medical  on call   [[id number]]  (Abnormal) Collected: 03/15/22 1920    Lab Status: Final result Specimen: Blood from Arm, Left Updated: 03/15/22 2001     Acetaminophen Level <2 ug/mL     Basic metabolic panel [[id number]]  (Abnormal) Collected: 03/15/22 1920    Lab Status: Final result Specimen: Blood from Arm, Left Updated: 03/15/22 2001     Sodium 138 mmol/L      Potassium 4 0 mmol/L      Chloride 99 mmol/L      CO2 22 mmol/L      ANION GAP 17 mmol/L      BUN 5 mg/dL      Creatinine 1 10 mg/dL      Glucose 211 mg/dL      Calcium 8 6 mg/dL      eGFR 38 ml/min/1 73sq m     Narrative:      [place] guidelines for Chronic Kidney Disease (CKD):     Stage 1 with normal or high GFR (GFR > 90 mL/min/1 73 square meters)    Stage 2 Mild CKD (GFR = 60-89 mL/min/1 73 square meters)    Stage 3A Moderate CKD (GFR = 45-59 mL/min/1 73 square meters)    Stage 3B Moderate CKD (GFR = 30-44 mL/min/1 73 square meters)    Stage 4 Severe CKD (GFR = 15-29 mL/min/1 73 square meters)    Stage 5 End Stage CKD (GFR <15 mL/min/1 73 square meters)  Note: GFR calculation is accurate only with a steady state creatinine    CK (with reflex to MB) [[id number]]  (Normal) Collected: 03/15/22 1920    Lab Status: Final result Specimen: Blood from Arm, Left Updated: 03/15/22 2001     Total  U/L     Ethanol [[id number]]  (Normal) Collected: 03/15/22 1920    Lab Status: Final result Specimen: Blood from Arm, Left Updated: 03/15/22 1938     Ethanol Lvl 3 mg/dL     CBC and differential [[id number]]  (Abnormal) Collected: 03/15/22 1920    Lab Status: Final result Specimen: Blood from Arm, Left Updated: 03/15/22 1927     WBC 5 88 Thousand/uL      RBC 3 99 Million/uL      Hemoglobin 12 3 g/dL      Hematocrit 35 7 %      MCV 90 fL      MCH 30 8 pg      MCHC 34 5 g/dL      RDW 14 1 %      MPV 8 8 fL      Platelets 066 Thousands/uL      nRBC 0 /100 WBCs      Neutrophils Relative 40 %      Immat GRANS % 0 %      Lymphocytes Relative 44 %      Monocytes Relative 13 %      Eosinophils Relative 2 %      Basophils Relative 1 %      Neutrophils Absolute 2 36 Thousands/µL      Immature Grans Absolute 0 02 Thousand/uL      Lymphocytes Absolute 2 61 Thousands/µL      Monocytes Absolute 0 77 Thousand/µL      Eosinophils Absolute 0 09 Thousand/µL      Basophils Absolute 0 03 Thousands/µL                  No orders to display              Procedures  ECG 12 Lead Documentation Only    Date/Time: 3/15/2022 7:18 PM  Performed by: Monster Wilhelm PA-C  Authorized by: Monster Wilhelm PA-C     ECG reviewed by me, the ED Provider: yes    Patient location:  ED  Previous ECG:     Previous ECG:  Unavailable    Comparison to cardiac monitor: No    Interpretation:     Interpretation: abnormal    Rate:     ECG rate:  125    ECG rate assessment: tachycardic    Rhythm:     Rhythm: sinus tachycardia    Ectopy:     Ectopy: none    QRS:     QRS axis:  Normal    QRS intervals:  Normal  Conduction:     Conduction: normal    ST segments:     ST segments:  Normal  T waves:     T waves: normal               ED Course  ED Course as of 03/16/22 0443   Tue Mar 15, 2022   1945 Pt presenting with what appeared to be K2 overdose  He was given 4 mg total of Versed by EMS  He is now calm and cooperative but drowsy  He is AAO x4 for me but not willing to give history of what occurred before arriving   Will check labs and give fluids  2013 AG and low GFR but nothing to compare  Otherwise, labs are unremarkable  Will continue to hydrate and observe  MDM  Number of Diagnoses or Management Options  Agitation: new and requires workup  Recreational drug use: new and requires workup     Amount and/or Complexity of Data Reviewed  Clinical lab tests: ordered and reviewed  Tests in the medicine section of CPT®: ordered and reviewed  Decide to obtain previous medical records or to obtain history from someone other than the patient: yes  Obtain history from someone other than the patient: yes  Review and summarize past medical records: yes  Independent visualization of images, tracings, or specimens: yes    Risk of Complications, Morbidity, and/or Mortality  Presenting problems: high  Management options: high    Patient Progress  Patient progress: resolved      Disposition  Final diagnoses:   Recreational drug use   Agitation     Time reflects when diagnosis was documented in both MDM as applicable and the Disposition within this note     Time User Action Codes Description Comment    3/15/2022  9:56 PM Fabio Case [F19 90] Recreational drug use     3/15/2022  9:56 PM Tip Lemon [R45 1] Agitation       ED Disposition     ED Disposition Condition Date/Time Comment    Discharge Good Tue Mar 15, 2022  9:56 PM Erin Broaden discharge to home/self care  Follow-up Information     Follow up With Specialties Details Why Contact Info Additional [facility] Schedule an appointment as soon as possible for a visit today  [address], [address] [address]  [address], [address], [address] [place], [place], [address]          There are no discharge medications for this patient  No discharge procedures on file      PDMP Review     None          ED Provider  Electronically Signed by           Shara Moses PA-C  03/16/22 7283

## 2022-03-16 LAB
ATRIAL RATE: 132 BPM
P AXIS: 58 DEGREES
PR INTERVAL: 122 MS
QRS AXIS: 93 DEGREES
QRSD INTERVAL: 84 MS
QT INTERVAL: 332 MS
QTC INTERVAL: 479 MS
T WAVE AXIS: 67 DEGREES
VENTRICULAR RATE: 125 BPM

## 2022-03-16 PROCEDURE — 93010 ELECTROCARDIOGRAM REPORT: CPT | Performed by: INTERNAL MEDICINE

## 2022-03-16 NOTE — DISCHARGE INSTRUCTIONS
Polysubstance Use Disorder   WHAT YOU NEED TO KNOW:   Polysubstance use disorder (PUD) is a medical condition that develops from long-term use or misuse of 2 or more substances  You are not able to stop even though it causes physical or social problems  PUD includes use of a drug such as cocaine or misuse of alcohol, tobacco, or a prescription medicine such as opioids  This disorder is also called polysubstance abuse  DISCHARGE INSTRUCTIONS:   Call your local emergency number (911 in the [address] [facility]) or have someone call if:   · You have chest pain and your heart is beating faster than usual     · You have a seizure  · You feel you might harm yourself or others  · You have new shortness of breath  Return to the emergency department if:   · You are dizzy and lightheaded  Call your doctor or therapist if:   · You know or think you are pregnant  · You have questions or concerns about your condition or care  Medicines:   · Withdrawal medicines  may be given according to the substances  Withdrawal can cause serious, life-threatening side effects  Certain medicines can help decrease your withdrawal symptoms and your desire for the substance  Ask for more information about the withdrawal medicines you may need  · Mood stabilizers  may be given to help prevent or treat depression or anxiety caused by substance use and withdrawal     · Take your medicine as directed  Contact your healthcare provider if you think your medicine is not helping or if you have side effects  Tell him or her if you are allergic to any medicine  Keep a list of the medicines, vitamins, and herbs you take  Include the amounts, and when and why you take them  Bring the list or the pill bottles to follow-up visits  Carry your medicine list with you in case of an emergency  Therapy  may be offered in a hospital, outpatient facility, or drug rehabilitation center   Healthcare providers can help you make decisions about treatment programs  The goal is to help you decrease or stop taking the substances  · Cognitive behavioral therapy (CBT)  can help you manage depression and anxiety caused by PUD  CBT can be done with you and a talk therapist or in a group with others  · Motivational enhancement therapy  can help you set and reach healthy, positive goals  · Twelve-step facilitation (TSF)  is a short, structured approach to reach early recovery  It is done one-to-one with a therapist in 12 to 15 sessions  Safety guidelines:   · Do not combine medicines, drugs, or alcohol  The combination can cause an overdose, or cause you to stop breathing  · Learn about the signs of an overdose so you know how to respond  Signs depend on the substances  Your heartbeat or breathing may be faster or slower than usual  You may have heavy sweating, vomiting, trouble sleeping, or sleeping too much  Your skin may be pale or clammy  You may have slurred or slow speech  Tell others about signs of an overdose so they will know what to do if needed  Talk to your healthcare provider about naloxone  You may be able to keep naloxone at home in case of an overdose  Your family and friends can also be trained on how to give it to you if needed  Get immediate help or call your local emergency number (911 in the [address] [place] [address],[address]) for signs of an overdose  · Take prescribed medicines exactly as directed  Do not take more than the recommended amount  Do not take it more often than recommended  If you use a pain patch, be sure to remove the old patch before you place a new one  Make sure the patch is not exposed to sunlight  Sunlight speeds up the opioid release from the patch  · Keep substances out of the reach of children  Store substances in a locked cabinet or in a location that children cannot get to  Follow up with your doctor or therapist as directed:  Write down your questions so you remember to ask them during your visits    For support and more information:   · Alcoholics Anonymous  Web Address: http://www guallpa info/    · Substance Abuse and [address]  Web Address: https://Neurelis/    © 244[facility] 2022 Information is for End User's use only and may not be sold, redistributed or otherwise used for commercial purposes  All illustrations and images included in CareNotes® are the copyrighted property of [employer]  or [facility] Kamille Stevenson   The above information is an  only  It is not intended as medical advice for individual conditions or treatments  Talk to your doctor, nurse or pharmacist before following any medical regimen to see if it is safe and effective for you

## 2022-03-31 ENCOUNTER — APPOINTMENT (OUTPATIENT)
Dept: LAB | Facility: HOSPITAL | Age: 37
End: 2022-03-31
Payer: MEDICARE

## 2022-03-31 DIAGNOSIS — Z79.899 ENCOUNTER FOR LONG-TERM (CURRENT) USE OF OTHER MEDICATIONS: ICD-10-CM

## 2022-03-31 DIAGNOSIS — E78.5 DYSLIPIDEMIA: ICD-10-CM

## 2022-03-31 DIAGNOSIS — E55.9 VITAMIN D DEFICIENCY DISEASE: ICD-10-CM

## 2022-03-31 LAB
ALBUMIN SERPL BCP-MCNC: 3.7 G/DL (ref 3.5–5)
ALP SERPL-CCNC: 88 U/L (ref 46–116)
ALT SERPL W P-5'-P-CCNC: 18 U/L (ref 12–78)
AMMONIA PLAS-SCNC: 25 UMOL/L (ref 11–35)
ANION GAP SERPL CALCULATED.3IONS-SCNC: 9 MMOL/L (ref 4–13)
AST SERPL W P-5'-P-CCNC: 11 U/L (ref 5–45)
BASOPHILS # BLD AUTO: 0.04 THOUSANDS/ΜL (ref 0–0.1)
BASOPHILS NFR BLD AUTO: 1 % (ref 0–1)
BILIRUB SERPL-MCNC: 0.27 MG/DL (ref 0.2–1)
BUN SERPL-MCNC: 7 MG/DL (ref 5–25)
CALCIUM SERPL-MCNC: 8.9 MG/DL (ref 8.3–10.1)
CHLORIDE SERPL-SCNC: 100 MMOL/L (ref 100–108)
CHOLEST SERPL-MCNC: 169 MG/DL
CO2 SERPL-SCNC: 30 MMOL/L (ref 21–32)
CREAT SERPL-MCNC: 0.77 MG/DL (ref 0.6–1.3)
EOSINOPHIL # BLD AUTO: 0.17 THOUSAND/ΜL (ref 0–0.61)
EOSINOPHIL NFR BLD AUTO: 3 % (ref 0–6)
ERYTHROCYTE [DISTWIDTH] IN BLOOD BY AUTOMATED COUNT: 13.8 % (ref 11.6–15.1)
EST. AVERAGE GLUCOSE BLD GHB EST-MCNC: 114 MG/DL
FOLATE SERPL-MCNC: 6.9 NG/ML (ref 3.1–17.5)
GFR SERPL CREATININE-BSD FRML MDRD: 117 ML/MIN/1.73SQ M
GLUCOSE P FAST SERPL-MCNC: 93 MG/DL (ref 65–99)
HBA1C MFR BLD: 5.6 %
HCT VFR BLD AUTO: 38 % (ref 36.5–49.3)
HDLC SERPL-MCNC: 54 MG/DL
HGB BLD-MCNC: 13.4 G/DL (ref 12–17)
IMM GRANULOCYTES # BLD AUTO: 0.01 THOUSAND/UL (ref 0–0.2)
IMM GRANULOCYTES NFR BLD AUTO: 0 % (ref 0–2)
LDLC SERPL CALC-MCNC: 82 MG/DL (ref 0–100)
LYMPHOCYTES # BLD AUTO: 2.36 THOUSANDS/ΜL (ref 0.6–4.47)
LYMPHOCYTES NFR BLD AUTO: 39 % (ref 14–44)
MCH RBC QN AUTO: 29.8 PG (ref 26.8–34.3)
MCHC RBC AUTO-ENTMCNC: 35.3 G/DL (ref 31.4–37.4)
MCV RBC AUTO: 85 FL (ref 82–98)
MONOCYTES # BLD AUTO: 0.9 THOUSAND/ΜL (ref 0.17–1.22)
MONOCYTES NFR BLD AUTO: 15 % (ref 4–12)
NEUTROPHILS # BLD AUTO: 2.61 THOUSANDS/ΜL (ref 1.85–7.62)
NEUTS SEG NFR BLD AUTO: 42 % (ref 43–75)
NONHDLC SERPL-MCNC: 115 MG/DL
NRBC BLD AUTO-RTO: 0 /100 WBCS
PLATELET # BLD AUTO: 278 THOUSANDS/UL (ref 149–390)
PMV BLD AUTO: 8.5 FL (ref 8.9–12.7)
POTASSIUM SERPL-SCNC: 4.2 MMOL/L (ref 3.5–5.3)
PROT SERPL-MCNC: 6.7 G/DL (ref 6.4–8.2)
RBC # BLD AUTO: 4.49 MILLION/UL (ref 3.88–5.62)
SODIUM SERPL-SCNC: 139 MMOL/L (ref 136–145)
TRIGL SERPL-MCNC: 167 MG/DL
TSH SERPL DL<=0.05 MIU/L-ACNC: 3.12 UIU/ML (ref 0.36–3.74)
VALPROATE SERPL-MCNC: 55 UG/ML (ref 50–100)
VIT B12 SERPL-MCNC: 332 PG/ML (ref 100–900)
WBC # BLD AUTO: 6.09 THOUSAND/UL (ref 4.31–10.16)

## 2022-03-31 PROCEDURE — 84443 ASSAY THYROID STIM HORMONE: CPT

## 2022-03-31 PROCEDURE — 36415 COLL VENOUS BLD VENIPUNCTURE: CPT

## 2022-03-31 PROCEDURE — 85025 COMPLETE CBC W/AUTO DIFF WBC: CPT

## 2022-03-31 PROCEDURE — 80053 COMPREHEN METABOLIC PANEL: CPT

## 2022-03-31 PROCEDURE — 82746 ASSAY OF FOLIC ACID SERUM: CPT

## 2022-03-31 PROCEDURE — 82140 ASSAY OF AMMONIA: CPT

## 2022-03-31 PROCEDURE — 83036 HEMOGLOBIN GLYCOSYLATED A1C: CPT

## 2022-03-31 PROCEDURE — 80061 LIPID PANEL: CPT

## 2022-03-31 PROCEDURE — 80164 ASSAY DIPROPYLACETIC ACD TOT: CPT

## 2022-03-31 PROCEDURE — 82607 VITAMIN B-12: CPT

## 2022-03-31 PROCEDURE — 82652 VIT D 1 25-DIHYDROXY: CPT

## 2022-04-02 LAB — 1,25(OH)2D3 SERPL-MCNC: 44 PG/ML (ref 19.9–79.3)

## 2022-10-12 PROBLEM — Z00.00 MEDICARE ANNUAL WELLNESS VISIT, SUBSEQUENT: Status: RESOLVED | Noted: 2021-08-06 | Resolved: 2022-10-12

## 2023-02-04 ENCOUNTER — HOSPITAL ENCOUNTER (EMERGENCY)
Facility: HOSPITAL | Age: 38
Discharge: HOME | End: 2023-02-04
Attending: EMERGENCY MEDICINE

## 2023-02-04 VITALS
WEIGHT: 142.42 LBS | HEART RATE: 128 BPM | OXYGEN SATURATION: 98 % | TEMPERATURE: 97.6 F | BODY MASS INDEX: 24.07 KG/M2 | RESPIRATION RATE: 18 BRPM | DIASTOLIC BLOOD PRESSURE: 75 MMHG | SYSTOLIC BLOOD PRESSURE: 138 MMHG

## 2023-02-04 DIAGNOSIS — F19.10 SUBSTANCE ABUSE (HCC): Primary | ICD-10-CM

## 2023-02-04 LAB
ALBUMIN SERPL BCP-MCNC: 4.1 G/DL (ref 3.5–5)
ALP SERPL-CCNC: 66 U/L (ref 43–122)
ALT SERPL W P-5'-P-CCNC: 29 U/L
ANION GAP SERPL CALCULATED.3IONS-SCNC: 4 MMOL/L (ref 5–14)
AST SERPL W P-5'-P-CCNC: 43 U/L (ref 17–59)
BASOPHILS # BLD AUTO: 0.01 THOUSANDS/ÂΜL (ref 0–0.1)
BASOPHILS NFR BLD AUTO: 0 % (ref 0–1)
BILIRUB SERPL-MCNC: 0.42 MG/DL (ref 0.2–1)
BUN SERPL-MCNC: 8 MG/DL (ref 5–25)
CALCIUM SERPL-MCNC: 9.2 MG/DL (ref 8.4–10.2)
CHLORIDE SERPL-SCNC: 102 MMOL/L (ref 96–108)
CO2 SERPL-SCNC: 31 MMOL/L (ref 21–32)
CREAT SERPL-MCNC: 0.79 MG/DL (ref 0.7–1.5)
EOSINOPHIL # BLD AUTO: 0.08 THOUSAND/ÂΜL (ref 0–0.61)
EOSINOPHIL NFR BLD AUTO: 2 % (ref 0–6)
ERYTHROCYTE [DISTWIDTH] IN BLOOD BY AUTOMATED COUNT: 14.5 % (ref 11.6–15.1)
GFR SERPL CREATININE-BSD FRML MDRD: 115 ML/MIN/1.73SQ M
GLUCOSE SERPL-MCNC: 93 MG/DL (ref 70–99)
HCT VFR BLD AUTO: 40.6 % (ref 36.5–49.3)
HGB BLD-MCNC: 13.5 G/DL (ref 12–17)
IMM GRANULOCYTES # BLD AUTO: 0.01 THOUSAND/UL (ref 0–0.2)
IMM GRANULOCYTES NFR BLD AUTO: 0 % (ref 0–2)
LYMPHOCYTES # BLD AUTO: 2.06 THOUSANDS/ÂΜL (ref 0.6–4.47)
LYMPHOCYTES NFR BLD AUTO: 43 % (ref 14–44)
MAGNESIUM SERPL-MCNC: 2 MG/DL (ref 1.6–2.3)
MCH RBC QN AUTO: 31.8 PG (ref 26.8–34.3)
MCHC RBC AUTO-ENTMCNC: 33.3 G/DL (ref 31.4–37.4)
MCV RBC AUTO: 96 FL (ref 82–98)
MONOCYTES # BLD AUTO: 0.7 THOUSAND/ÂΜL (ref 0.17–1.22)
MONOCYTES NFR BLD AUTO: 15 % (ref 4–12)
NEUTROPHILS # BLD AUTO: 1.86 THOUSANDS/ÂΜL (ref 1.85–7.62)
NEUTS SEG NFR BLD AUTO: 40 % (ref 43–75)
NRBC BLD AUTO-RTO: 0 /100 WBCS
PLATELET # BLD AUTO: 204 THOUSANDS/UL (ref 149–390)
PMV BLD AUTO: 8.9 FL (ref 8.9–12.7)
POTASSIUM SERPL-SCNC: 4.2 MMOL/L (ref 3.5–5.3)
PROT SERPL-MCNC: 6.8 G/DL (ref 6.4–8.4)
RBC # BLD AUTO: 4.24 MILLION/UL (ref 3.88–5.62)
SODIUM SERPL-SCNC: 137 MMOL/L (ref 135–147)
WBC # BLD AUTO: 4.72 THOUSAND/UL (ref 4.31–10.16)

## 2023-02-04 NOTE — Clinical Note
Heriberto Mercado was seen and treated in our emergency department on 2/4/2023  No restrictions            Diagnosis:     Ricardobertha Rubin    He may return on this date: 02/06/2023         If you have any questions or concerns, please don't hesitate to call        Erlin Marcial PA-C    ______________________________           _______________          _______________  Hospital Representative                              Date                                Time

## 2023-02-05 NOTE — ED PROVIDER NOTES
History  Chief Complaint   Patient presents with   • Recreational Drug Use     Patient arrives via EMS after being found unresponsive by bystanders on street  Patient was arousable upon EMS arrival and did not need narcan; patient was awake and pupils were large  Patient arrives scant in conversation and reports only smoking weed but appears dazed  Patient is a 67-year-old male who comes in by way EMS after being found unresponsive  Did not receive Narcan  Pupils were large per EMS  Patient initially refused to talk to me, but did mention to attending that he smoked weed  Patient is maintaining airway, appears to be in no acute distress, but appears to be under the influence of some kind of illicit substance    Does have a history of utilizing K2, including an episode last year, where he presented similar to this      History provided by:  Patient   used: No    Medical Problem  Severity:  Moderate  Chronicity:  Recurrent      Prior to Admission Medications   Prescriptions Last Dose Informant Patient Reported? Taking?   benztropine (COGENTIN) 2 mg tablet   Yes No   Sig: Take 2 mg by mouth every morning   Patient not taking: Reported on 8/20/2021   buPROPion (WELLBUTRIN SR) 150 mg 12 hr tablet   Yes No   Sig: Take 150 mg by mouth every morning   Patient not taking: Reported on 8/20/2021   cloNIDine (CATAPRES) 0 2 mg tablet   Yes No   Sig: Take 0 2 mg by mouth daily at bedtime   Patient not taking: Reported on 8/20/2021   divalproex sodium (DEPAKOTE ER) 500 mg 24 hr tablet   Yes No   Sig: Take 500 mg by mouth 2 (two) times a day   Patient not taking: Reported on 8/20/2021   loxapine (LOXITANE) 50 MG capsule   No No   Sig: Take 1 capsule (50 mg total) by mouth 2 (two) times a day AND 2 capsules (100 mg total) daily at bedtime     Patient not taking: Reported on 10/13/2021      Facility-Administered Medications: None       Past Medical History:   Diagnosis Date   • Drug abuse ([facility] [address] )    • Schizoaffective disorder ([facility] [address] )     resolved 05/16/14       Past Surgical History:   Procedure Laterality Date   • NO PAST SURGERIES         Family History   Problem Relation Age of Onset   • Anxiety disorder Mother    • Depression Mother    • Dysphagia Mother    • Stroke Mother         cva   • Diabetes type II Mother    • Other Mother         Hyponatremia   • Psychosis Mother      I have reviewed and agree with the history as documented  E-Cigarette/Vaping   • E-Cigarette Use Never User      E-Cigarette/Vaping Substances   • Nicotine No    • THC No    • CBD No    • Flavoring No    • Other No    • Unknown No      Social History     Tobacco Use   • Smoking status: Every Day     Packs/day: 0 25     Types: Cigarettes   • Smokeless tobacco: Never   Vaping Use   • Vaping Use: Never used   Substance Use Topics   • Alcohol use: Yes     Comment: social   • Drug use: Yes     Types: Marijuana, Other     Comment: K2       Review of Systems   Unable to perform ROS: Mental status change (Under the influence of some sort of substance)       Physical Exam  Physical Exam  Vitals reviewed  Constitutional:       Appearance: Normal appearance  He is normal weight  HENT:      Head: Normocephalic and atraumatic  Right Ear: External ear normal       Left Ear: External ear normal       Nose: Nose normal    Eyes:      Conjunctiva/sclera: Conjunctivae normal    Cardiovascular:      Rate and Rhythm: Tachycardia present  Pulmonary:      Effort: Pulmonary effort is normal    Musculoskeletal:         General: Normal range of motion  Cervical back: Normal range of motion  Skin:     General: Skin is warm and dry  Neurological:      Mental Status: He is alert           Vital Signs  ED Triage Vitals [02/04/23 2019]   Temperature Pulse Respirations Blood Pressure SpO2   97 6 °F (36 4 °C) (!) 128 18 138/75 98 %      Temp Source Heart Rate Source Patient Position - Orthostatic VS BP Location FiO2 (%)   Oral Monitor Lying Left arm --      Pain Score       --           Vitals:    02/04/23 2019   BP: 138/75   Pulse: (!) 128   Patient Position - Orthostatic VS: Lying         Visual Acuity      ED Medications  Medications - No data to display    Diagnostic Studies  Results Reviewed     Procedure Component Value Units Date/Time    Comprehensive metabolic panel [[id number]]  (Abnormal) Collected: 02/04/23 2040    Lab Status: Final result Specimen: Blood from Arm, Left Updated: 02/04/23 2058     Sodium 137 mmol/L      Potassium 4 2 mmol/L      Chloride 102 mmol/L      CO2 31 mmol/L      ANION GAP 4 mmol/L      BUN 8 mg/dL      Creatinine 0 79 mg/dL      Glucose 93 mg/dL      Calcium 9 2 mg/dL      AST 43 U/L      ALT 29 U/L      Alkaline Phosphatase 66 U/L      Total Protein 6 8 g/dL      Albumin 4 1 g/dL      Total Bilirubin 0 42 mg/dL      eGFR 115 ml/min/1 73sq m     Narrative:      [place] guidelines for Chronic Kidney Disease (CKD):   •  Stage 1 with normal or high GFR (GFR > 90 mL/min/1 73 square meters)  •  Stage 2 Mild CKD (GFR = 60-89 mL/min/1 73 square meters)  •  Stage 3A Moderate CKD (GFR = 45-59 mL/min/1 73 square meters)  •  Stage 3B Moderate CKD (GFR = 30-44 mL/min/1 73 square meters)  •  Stage 4 Severe CKD (GFR = 15-29 mL/min/1 73 square meters)  •  Stage 5 End Stage CKD (GFR <15 mL/min/1 73 square meters)  Note: GFR calculation is accurate only with a steady state creatinine    Magnesium [[id number]]  (Normal) Collected: 02/04/23 2040    Lab Status: Final result Specimen: Blood from Arm, Left Updated: 02/04/23 2058     Magnesium 2 0 mg/dL     CBC and differential [[id number]]  (Abnormal) Collected: 02/04/23 2040    Lab Status: Final result Specimen: Blood from Arm, Left Updated: 02/04/23 2048     WBC 4 72 Thousand/uL      RBC 4 24 Million/uL      Hemoglobin 13 5 g/dL      Hematocrit 40 6 %      MCV 96 fL      MCH 31 8 pg      MCHC 33 3 g/dL      RDW 14 5 %      MPV 8 9 fL      Platelets 410 Thousands/uL nRBC 0 /100 WBCs      Neutrophils Relative 40 %      Immat GRANS % 0 %      Lymphocytes Relative 43 %      Monocytes Relative 15 %      Eosinophils Relative 2 %      Basophils Relative 0 %      Neutrophils Absolute 1 86 Thousands/µL      Immature Grans Absolute 0 01 Thousand/uL      Lymphocytes Absolute 2 06 Thousands/µL      Monocytes Absolute 0 70 Thousand/µL      Eosinophils Absolute 0 08 Thousand/µL      Basophils Absolute 0 01 Thousands/µL     Rapid drug screen, urine [[id number]]     Lab Status: No result Specimen: Urine                  No orders to display              Procedures  Procedures         ED Course  ED Course as of 02/04/23 2128   Sat Feb 04, 2023 2101 Blood work is reassuring  Will watch and evaluation 1 hour post arrival   2115 Patient is much more alert at this time  States that he is ready to go home  Requesting a turkey sandwich and a lyft                                             Medical Decision Making  Presented after allegedly smoking some marijuana, which caused him to become altered  Patient has a history of utilizing K2, and presented to Emergency Department with similar episodes  Patient is in no acute distress this time, after watching an hour, did return to normal mental status, and did state that he was feeling better, and would like to leave  Patient ambulated out of the emergency department in no acute distress  Labwork reassuring no additional pathology    Substance abuse [facility]): complicated acute illness or injury  Amount and/or Complexity of Data Reviewed  Labs: ordered            Disposition  Final diagnoses:   Substance abuse ([address] )     Time reflects when diagnosis was documented in both MDM as applicable and the Disposition within this note     Time User Action Codes Description Comment    2/4/2023  9:17 PM Beverley Moment Add [F19 10] Substance abuse [facility])       ED Disposition     ED Disposition   Discharge    Condition   Stable    Date/Time   Sat Feb 4, 2023  9:17 PM    Comment   Nazario Fraga discharge to home/self care  Follow-up Information     Follow up With Specialties Details Why [address] [facility] Emergency Department Emergency Medicine  As needed, If symptoms worsen [address] [address]  [facility] Emergency Department          Discharge Medication List as of 2/4/2023  9:18 PM      CONTINUE these medications which have NOT CHANGED    Details   benztropine (COGENTIN) 2 mg tablet Take 2 mg by mouth every morning, Starting Tue 6/22/2021, Historical Med      buPROPion (WELLBUTRIN SR) 150 mg 12 hr tablet Take 150 mg by mouth every morning, Starting Sun 6/20/2021, Historical Med      cloNIDine (CATAPRES) 0 2 mg tablet Take 0 2 mg by mouth daily at bedtime, Starting Tue 6/22/2021, Historical Med      divalproex sodium (DEPAKOTE ER) 500 mg 24 hr tablet Take 500 mg by mouth 2 (two) times a day, Starting Thu 5/6/2021, Historical Med      loxapine (LOXITANE) 50 MG capsule Multiple Dosages:Starting Fri 3/12/2021, Until Tue 5/11/2021 at 2359Take 1 capsule (50 mg total) by mouth 2 (two) times a day AND 2 capsules (100 mg total) daily at bedtime  , Print             No discharge procedures on file      PDMP Review     None          ED Provider  Electronically Signed by           Kimberlyn Rader PA-C  02/04/23 7920

## 2023-02-10 ENCOUNTER — HOSPITAL ENCOUNTER (EMERGENCY)
Facility: HOSPITAL | Age: 38
Discharge: HOME | End: 2023-02-11
Attending: EMERGENCY MEDICINE

## 2023-02-10 ENCOUNTER — APPOINTMENT (EMERGENCY)
Dept: CT IMAGING | Facility: HOSPITAL | Age: 38
End: 2023-02-10

## 2023-02-10 DIAGNOSIS — F19.10 SUBSTANCE ABUSE (HCC): Primary | ICD-10-CM

## 2023-02-10 LAB
ALBUMIN SERPL BCP-MCNC: 4.5 G/DL (ref 3.5–5)
ALP SERPL-CCNC: 80 U/L (ref 43–122)
ALT SERPL W P-5'-P-CCNC: 26 U/L
ANION GAP SERPL CALCULATED.3IONS-SCNC: 11 MMOL/L (ref 5–14)
APAP SERPL-MCNC: <10 UG/ML (ref 10–20)
AST SERPL W P-5'-P-CCNC: 32 U/L (ref 17–59)
BASOPHILS # BLD AUTO: 0.02 THOUSANDS/ÂΜL (ref 0–0.1)
BASOPHILS NFR BLD AUTO: 0 % (ref 0–1)
BILIRUB SERPL-MCNC: 0.53 MG/DL (ref 0.2–1)
BUN SERPL-MCNC: 8 MG/DL (ref 5–25)
CALCIUM SERPL-MCNC: 9.6 MG/DL (ref 8.4–10.2)
CHLORIDE SERPL-SCNC: 96 MMOL/L (ref 96–108)
CO2 SERPL-SCNC: 25 MMOL/L (ref 21–32)
CREAT SERPL-MCNC: 0.78 MG/DL (ref 0.7–1.5)
EOSINOPHIL # BLD AUTO: 0.04 THOUSAND/ÂΜL (ref 0–0.61)
EOSINOPHIL NFR BLD AUTO: 1 % (ref 0–6)
ERYTHROCYTE [DISTWIDTH] IN BLOOD BY AUTOMATED COUNT: 13.9 % (ref 11.6–15.1)
ETHANOL SERPL-MCNC: <10 MG/DL (ref 0–10)
GFR SERPL CREATININE-BSD FRML MDRD: 116 ML/MIN/1.73SQ M
GLUCOSE SERPL-MCNC: 143 MG/DL (ref 70–99)
GLUCOSE SERPL-MCNC: 147 MG/DL (ref 65–140)
HCT VFR BLD AUTO: 38.5 % (ref 36.5–49.3)
HGB BLD-MCNC: 12.8 G/DL (ref 12–17)
IMM GRANULOCYTES # BLD AUTO: 0.01 THOUSAND/UL (ref 0–0.2)
IMM GRANULOCYTES NFR BLD AUTO: 0 % (ref 0–2)
LYMPHOCYTES # BLD AUTO: 3.58 THOUSANDS/ÂΜL (ref 0.6–4.47)
LYMPHOCYTES NFR BLD AUTO: 44 % (ref 14–44)
MCH RBC QN AUTO: 31.6 PG (ref 26.8–34.3)
MCHC RBC AUTO-ENTMCNC: 33.2 G/DL (ref 31.4–37.4)
MCV RBC AUTO: 95 FL (ref 82–98)
MONOCYTES # BLD AUTO: 0.97 THOUSAND/ÂΜL (ref 0.17–1.22)
MONOCYTES NFR BLD AUTO: 12 % (ref 4–12)
NEUTROPHILS # BLD AUTO: 3.57 THOUSANDS/ÂΜL (ref 1.85–7.62)
NEUTS SEG NFR BLD AUTO: 43 % (ref 43–75)
NRBC BLD AUTO-RTO: 0 /100 WBCS
PLATELET # BLD AUTO: 245 THOUSANDS/UL (ref 149–390)
PMV BLD AUTO: 9 FL (ref 8.9–12.7)
POTASSIUM SERPL-SCNC: 4.8 MMOL/L (ref 3.5–5.3)
PROT SERPL-MCNC: 7.2 G/DL (ref 6.4–8.4)
RBC # BLD AUTO: 4.05 MILLION/UL (ref 3.88–5.62)
SALICYLATES SERPL-MCNC: <1 MG/DL (ref 10–30)
SODIUM SERPL-SCNC: 132 MMOL/L (ref 135–147)
WBC # BLD AUTO: 8.19 THOUSAND/UL (ref 4.31–10.16)

## 2023-02-10 RX ADMIN — SODIUM CHLORIDE 1000 ML: 0.9 INJECTION, SOLUTION INTRAVENOUS at 22:44

## 2023-02-11 VITALS
DIASTOLIC BLOOD PRESSURE: 78 MMHG | WEIGHT: 157.63 LBS | OXYGEN SATURATION: 99 % | RESPIRATION RATE: 16 BRPM | TEMPERATURE: 97.2 F | HEART RATE: 71 BPM | SYSTOLIC BLOOD PRESSURE: 124 MMHG

## 2023-02-13 LAB
ATRIAL RATE: 116 BPM
P AXIS: 62 DEGREES
PR INTERVAL: 128 MS
QRS AXIS: 83 DEGREES
QRSD INTERVAL: 86 MS
QT INTERVAL: 346 MS
QTC INTERVAL: 480 MS
T WAVE AXIS: 60 DEGREES
VENTRICULAR RATE: 116 BPM

## 2023-02-21 ENCOUNTER — APPOINTMENT (OUTPATIENT)
Dept: LAB | Facility: HOSPITAL | Age: 38
End: 2023-02-21

## 2023-02-21 DIAGNOSIS — Z79.899 ENCOUNTER FOR LONG-TERM (CURRENT) USE OF OTHER MEDICATIONS: ICD-10-CM

## 2023-02-21 DIAGNOSIS — E78.5 DYSLIPIDEMIA: ICD-10-CM

## 2023-02-21 DIAGNOSIS — Z13.9 SCREENING FOR UNSPECIFIED CONDITION: ICD-10-CM

## 2023-02-21 DIAGNOSIS — E55.9 VITAMIN D DEFICIENCY: ICD-10-CM

## 2023-02-21 LAB
25(OH)D3 SERPL-MCNC: 12.3 NG/ML (ref 30–100)
ALBUMIN SERPL BCP-MCNC: 3.8 G/DL (ref 3.5–5)
ALP SERPL-CCNC: 78 U/L (ref 46–116)
ALT SERPL W P-5'-P-CCNC: 19 U/L (ref 12–78)
AMMONIA PLAS-SCNC: 33 UMOL/L (ref 18–72)
ANION GAP SERPL CALCULATED.3IONS-SCNC: 8 MMOL/L (ref 4–13)
AST SERPL W P-5'-P-CCNC: 12 U/L (ref 5–45)
BASOPHILS # BLD AUTO: 0.02 THOUSANDS/ÂΜL (ref 0–0.1)
BASOPHILS NFR BLD AUTO: 0 % (ref 0–1)
BILIRUB SERPL-MCNC: 0.31 MG/DL (ref 0.2–1)
BUN SERPL-MCNC: 13 MG/DL (ref 5–25)
CALCIUM SERPL-MCNC: 9.1 MG/DL (ref 8.3–10.1)
CHLORIDE SERPL-SCNC: 102 MMOL/L (ref 96–108)
CHOLEST SERPL-MCNC: 177 MG/DL
CO2 SERPL-SCNC: 29 MMOL/L (ref 21–32)
CREAT SERPL-MCNC: 0.72 MG/DL (ref 0.6–1.3)
EOSINOPHIL # BLD AUTO: 0.13 THOUSAND/ÂΜL (ref 0–0.61)
EOSINOPHIL NFR BLD AUTO: 2 % (ref 0–6)
ERYTHROCYTE [DISTWIDTH] IN BLOOD BY AUTOMATED COUNT: 14.6 % (ref 11.6–15.1)
FOLATE SERPL-MCNC: 7.7 NG/ML (ref 3.1–17.5)
GFR SERPL CREATININE-BSD FRML MDRD: 120 ML/MIN/1.73SQ M
GLUCOSE P FAST SERPL-MCNC: 93 MG/DL (ref 65–99)
HCT VFR BLD AUTO: 39.2 % (ref 36.5–49.3)
HDLC SERPL-MCNC: 71 MG/DL
HGB BLD-MCNC: 12.8 G/DL (ref 12–17)
IMM GRANULOCYTES # BLD AUTO: 0.01 THOUSAND/UL (ref 0–0.2)
IMM GRANULOCYTES NFR BLD AUTO: 0 % (ref 0–2)
LDLC SERPL CALC-MCNC: 93 MG/DL (ref 0–100)
LYMPHOCYTES # BLD AUTO: 3.38 THOUSANDS/ÂΜL (ref 0.6–4.47)
LYMPHOCYTES NFR BLD AUTO: 55 % (ref 14–44)
MCH RBC QN AUTO: 31.4 PG (ref 26.8–34.3)
MCHC RBC AUTO-ENTMCNC: 32.7 G/DL (ref 31.4–37.4)
MCV RBC AUTO: 96 FL (ref 82–98)
MONOCYTES # BLD AUTO: 0.67 THOUSAND/ÂΜL (ref 0.17–1.22)
MONOCYTES NFR BLD AUTO: 11 % (ref 4–12)
NEUTROPHILS # BLD AUTO: 1.93 THOUSANDS/ÂΜL (ref 1.85–7.62)
NEUTS SEG NFR BLD AUTO: 32 % (ref 43–75)
NONHDLC SERPL-MCNC: 106 MG/DL
NRBC BLD AUTO-RTO: 0 /100 WBCS
PLATELET # BLD AUTO: 261 THOUSANDS/UL (ref 149–390)
PMV BLD AUTO: 9.1 FL (ref 8.9–12.7)
POTASSIUM SERPL-SCNC: 4.2 MMOL/L (ref 3.5–5.3)
PROT SERPL-MCNC: 6.6 G/DL (ref 6.4–8.4)
RBC # BLD AUTO: 4.07 MILLION/UL (ref 3.88–5.62)
SODIUM SERPL-SCNC: 139 MMOL/L (ref 135–147)
TRIGL SERPL-MCNC: 63 MG/DL
TSH SERPL DL<=0.05 MIU/L-ACNC: 3.56 UIU/ML (ref 0.45–4.5)
VALPROATE SERPL-MCNC: 108.1 UG/ML (ref 50–100)
VIT B12 SERPL-MCNC: 339 PG/ML (ref 100–900)
WBC # BLD AUTO: 6.14 THOUSAND/UL (ref 4.31–10.16)

## 2023-02-22 LAB
EST. AVERAGE GLUCOSE BLD GHB EST-MCNC: 100 MG/DL
HBA1C MFR BLD: 5.1 %

## 2023-04-06 ENCOUNTER — APPOINTMENT (EMERGENCY)
Dept: CT IMAGING | Facility: HOSPITAL | Age: 38
End: 2023-04-06

## 2023-04-06 ENCOUNTER — HOSPITAL ENCOUNTER (EMERGENCY)
Facility: HOSPITAL | Age: 38
Discharge: HOME | End: 2023-04-06
Attending: STUDENT IN AN ORGANIZED HEALTH CARE EDUCATION/TRAINING PROGRAM

## 2023-04-06 VITALS
DIASTOLIC BLOOD PRESSURE: 67 MMHG | HEART RATE: 84 BPM | TEMPERATURE: 97.9 F | OXYGEN SATURATION: 98 % | RESPIRATION RATE: 18 BRPM | SYSTOLIC BLOOD PRESSURE: 107 MMHG

## 2023-04-06 DIAGNOSIS — R50.9 FEVER: ICD-10-CM

## 2023-04-06 DIAGNOSIS — R41.82 ALTERED MENTAL STATUS, UNSPECIFIED ALTERED MENTAL STATUS TYPE: Primary | ICD-10-CM

## 2023-04-06 LAB
ALBUMIN SERPL BCP-MCNC: 4.1 G/DL (ref 3.5–5)
ALP SERPL-CCNC: 72 U/L (ref 34–104)
ALT SERPL W P-5'-P-CCNC: 10 U/L (ref 7–52)
AMPHETAMINES SERPL QL SCN: NEGATIVE
ANION GAP SERPL CALCULATED.3IONS-SCNC: 8 MMOL/L (ref 4–13)
APAP SERPL-MCNC: <10 UG/ML (ref 10–20)
AST SERPL W P-5'-P-CCNC: 18 U/L (ref 13–39)
BARBITURATES UR QL: NEGATIVE
BASOPHILS # BLD AUTO: 0.01 THOUSANDS/ÂΜL (ref 0–0.1)
BASOPHILS NFR BLD AUTO: 0 % (ref 0–1)
BENZODIAZ UR QL: NEGATIVE
BILIRUB SERPL-MCNC: 0.29 MG/DL (ref 0.2–1)
BILIRUB UR QL STRIP: NEGATIVE
BUN SERPL-MCNC: 11 MG/DL (ref 5–25)
CALCIUM SERPL-MCNC: 9 MG/DL (ref 8.4–10.2)
CHLORIDE SERPL-SCNC: 99 MMOL/L (ref 96–108)
CLARITY UR: CLEAR
CO2 SERPL-SCNC: 27 MMOL/L (ref 21–32)
COCAINE UR QL: NEGATIVE
COLOR UR: YELLOW
CREAT SERPL-MCNC: 0.83 MG/DL (ref 0.6–1.3)
EOSINOPHIL # BLD AUTO: 0.11 THOUSAND/ÂΜL (ref 0–0.61)
EOSINOPHIL NFR BLD AUTO: 2 % (ref 0–6)
ERYTHROCYTE [DISTWIDTH] IN BLOOD BY AUTOMATED COUNT: 13 % (ref 11.6–15.1)
ETHANOL SERPL-MCNC: <10 MG/DL
FLUAV RNA RESP QL NAA+PROBE: NEGATIVE
FLUBV RNA RESP QL NAA+PROBE: NEGATIVE
GFR SERPL CREATININE-BSD FRML MDRD: 113 ML/MIN/1.73SQ M
GLUCOSE SERPL-MCNC: 66 MG/DL (ref 65–140)
GLUCOSE UR STRIP-MCNC: NEGATIVE MG/DL
HCT VFR BLD AUTO: 34.8 % (ref 36.5–49.3)
HGB BLD-MCNC: 12.1 G/DL (ref 12–17)
HGB UR QL STRIP.AUTO: NEGATIVE
IMM GRANULOCYTES # BLD AUTO: 0.01 THOUSAND/UL (ref 0–0.2)
IMM GRANULOCYTES NFR BLD AUTO: 0 % (ref 0–2)
KETONES UR STRIP-MCNC: ABNORMAL MG/DL
LACTATE SERPL-SCNC: 0.8 MMOL/L (ref 0.5–2)
LEUKOCYTE ESTERASE UR QL STRIP: NEGATIVE
LYMPHOCYTES # BLD AUTO: 1.85 THOUSANDS/ÂΜL (ref 0.6–4.47)
LYMPHOCYTES NFR BLD AUTO: 27 % (ref 14–44)
MAGNESIUM SERPL-MCNC: 1.9 MG/DL (ref 1.9–2.7)
MCH RBC QN AUTO: 31.8 PG (ref 26.8–34.3)
MCHC RBC AUTO-ENTMCNC: 34.8 G/DL (ref 31.4–37.4)
MCV RBC AUTO: 91 FL (ref 82–98)
METHADONE UR QL: NEGATIVE
MONOCYTES # BLD AUTO: 1.06 THOUSAND/ÂΜL (ref 0.17–1.22)
MONOCYTES NFR BLD AUTO: 15 % (ref 4–12)
NEUTROPHILS # BLD AUTO: 3.87 THOUSANDS/ÂΜL (ref 1.85–7.62)
NEUTS SEG NFR BLD AUTO: 56 % (ref 43–75)
NITRITE UR QL STRIP: NEGATIVE
NRBC BLD AUTO-RTO: 0 /100 WBCS
OPIATES UR QL SCN: NEGATIVE
OXYCODONE+OXYMORPHONE UR QL SCN: NEGATIVE
PCP UR QL: NEGATIVE
PH UR STRIP.AUTO: 8 [PH]
PLATELET # BLD AUTO: 210 THOUSANDS/UL (ref 149–390)
PMV BLD AUTO: 8.9 FL (ref 8.9–12.7)
POTASSIUM SERPL-SCNC: 4.2 MMOL/L (ref 3.5–5.3)
PROT SERPL-MCNC: 5.9 G/DL (ref 6.4–8.4)
PROT UR STRIP-MCNC: NEGATIVE MG/DL
RBC # BLD AUTO: 3.81 MILLION/UL (ref 3.88–5.62)
RSV RNA RESP QL NAA+PROBE: NEGATIVE
S PYO DNA THROAT QL NAA+PROBE: NOT DETECTED
SALICYLATES SERPL-MCNC: <5 MG/DL (ref 3–20)
SARS-COV-2 RNA RESP QL NAA+PROBE: NEGATIVE
SODIUM SERPL-SCNC: 134 MMOL/L (ref 135–147)
SP GR UR STRIP.AUTO: 1.01 (ref 1–1.04)
THC UR QL: NEGATIVE
UROBILINOGEN UA: NEGATIVE MG/DL
WBC # BLD AUTO: 6.91 THOUSAND/UL (ref 4.31–10.16)

## 2023-04-06 RX ORDER — ACETAMINOPHEN 325 MG/1
650 TABLET ORAL ONCE
Status: COMPLETED | OUTPATIENT
Start: 2023-04-06 | End: 2023-04-06

## 2023-04-06 RX ADMIN — SODIUM CHLORIDE, SODIUM LACTATE, POTASSIUM CHLORIDE, AND CALCIUM CHLORIDE 1000 ML: .6; .31; .03; .02 INJECTION, SOLUTION INTRAVENOUS at 13:25

## 2023-04-06 RX ADMIN — ACETAMINOPHEN 650 MG: 325 TABLET ORAL at 13:25

## 2023-04-06 NOTE — ED PROVIDER NOTES
History  Chief Complaint   Patient presents with   • Altered Mental Status     Found unresponsive by EMS, lethargic/somnolent upon arrival to ER  Pt is confused and disoriented     56-year-old male presenting for evaluation of altered mental status  Per EMS, patient was found at a local business unresponsive  He was arousable, but confused  Per EMS had a fever of 102 °F   They report that the patient is well-known to them with a history of substance abuse  On exam, the patient denies any use of drugs, medication, alcohol today  Prior to Admission Medications   Prescriptions Last Dose Informant Patient Reported? Taking?   benztropine (COGENTIN) 2 mg tablet   Yes No   Sig: Take 2 mg by mouth every morning   Patient not taking: Reported on 8/20/2021   buPROPion (WELLBUTRIN SR) 150 mg 12 hr tablet   Yes No   Sig: Take 150 mg by mouth every morning   Patient not taking: Reported on 8/20/2021   cloNIDine (CATAPRES) 0 2 mg tablet   Yes No   Sig: Take 0 2 mg by mouth daily at bedtime   Patient not taking: Reported on 8/20/2021   divalproex sodium (DEPAKOTE ER) 500 mg 24 hr tablet   Yes No   Sig: Take 500 mg by mouth 2 (two) times a day   Patient not taking: Reported on 8/20/2021   loxapine (LOXITANE) 50 MG capsule   No No   Sig: Take 1 capsule (50 mg total) by mouth 2 (two) times a day AND 2 capsules (100 mg total) daily at bedtime     Patient not taking: Reported on 10/13/2021      Facility-Administered Medications: None       Past Medical History:   Diagnosis Date   • Drug abuse ([facility] [address] )    • Schizoaffective disorder ([facility] [address] )     resolved 05/16/14       Past Surgical History:   Procedure Laterality Date   • NO PAST SURGERIES         Family History   Problem Relation Age of Onset   • Anxiety disorder Mother    • Depression Mother    • Dysphagia Mother    • Stroke Mother         cva   • Diabetes type II Mother    • Other Mother         Hyponatremia   • Psychosis Mother      I have reviewed and agree with the history as documented  E-Cigarette/Vaping   • E-Cigarette Use Never User      E-Cigarette/Vaping Substances   • Nicotine No    • THC No    • CBD No    • Flavoring No    • Other No    • Unknown No      Social History     Tobacco Use   • Smoking status: Unknown   • Smokeless tobacco: Never   Vaping Use   • Vaping Use: Never used   Substance Use Topics   • Alcohol use: Yes     Comment: social   • Drug use: Yes     Types: Marijuana, Other     Comment: K2       Review of Systems   Constitutional: Positive for chills and fever  Respiratory: Negative for cough and shortness of breath  Gastrointestinal: Negative for nausea and vomiting  Neurological: Positive for headaches  Physical Exam  Physical Exam  Vitals and nursing note reviewed  Constitutional:       General: He is sleeping  He is not in acute distress  Appearance: He is not ill-appearing  HENT:      Head: Normocephalic and atraumatic  Mouth/Throat:      Mouth: Mucous membranes are moist       Pharynx: Oropharynx is clear  Eyes:      Conjunctiva/sclera: Conjunctivae normal       Comments: Pupils 6mm, reactive bilaterally   Cardiovascular:      Rate and Rhythm: Regular rhythm  Tachycardia present  Pulmonary:      Effort: Pulmonary effort is normal  No respiratory distress  Breath sounds: Normal breath sounds  No wheezing or rhonchi  Abdominal:      General: There is no distension  Palpations: Abdomen is soft  Tenderness: There is no abdominal tenderness  Musculoskeletal:         General: No deformity or signs of injury  Cervical back: Normal range of motion and neck supple  No rigidity or tenderness  Skin:     General: Skin is warm and dry  Neurological:      Mental Status: He is easily aroused           Vital Signs  ED Triage Vitals   Temperature Pulse Respirations Blood Pressure SpO2   04/06/23 1247 04/06/23 1247 04/06/23 1247 04/06/23 1247 04/06/23 1247   (!) 100 6 °F (38 1 °C) (!) 120 16 111/70 96 %      Temp Source Heart Rate Source Patient Position - Orthostatic VS BP Location FiO2 (%)   04/06/23 1247 04/06/23 1247 04/06/23 1247 04/06/23 1247 --   Tympanic Monitor Lying Left arm       Pain Score       04/06/23 1325       Med Not Given for Pain - for MAR use only           Vitals:    04/06/23 1400 04/06/23 1500 04/06/23 1635 04/06/23 1643   BP: 102/61 116/70 110/70 107/67   Pulse: 82 98 95 84   Patient Position - Orthostatic VS: Lying Lying           Visual Acuity      ED Medications  Medications   lactated ringers bolus 1,000 mL (0 mL Intravenous Stopped 4/6/23 1636)   acetaminophen (TYLENOL) tablet 650 mg (650 mg Oral Given 4/6/23 1325)       Diagnostic Studies  Results Reviewed     Procedure Component Value Units Date/Time    Blood culture #1 [[id number]] Collected: 04/06/23 1322    Lab Status: Preliminary result Specimen: Blood from Arm, Right Updated: 04/06/23 1801     Blood Culture Received in Microbiology Lab  Culture in Progress  Blood culture #2 [[id number]] Collected: 04/06/23 1322    Lab Status: Preliminary result Specimen: Blood from Hand, Right Updated: 04/06/23 1801     Blood Culture Received in Microbiology Lab  Culture in Progress      UA w Reflex to Microscopic w Reflex to Culture [[id number]]  (Abnormal) Collected: 04/06/23 1643    Lab Status: Final result Specimen: Urine, Clean Catch Updated: 04/06/23 1708     Color, UA Yellow     Clarity, UA Clear     Specific Gravity, UA 1 010     pH, UA 8 0     Leukocytes, UA Negative     Nitrite, UA Negative     Protein, UA Negative mg/dl      Glucose, UA Negative mg/dl      Ketones, UA 5 (Trace) mg/dl      Bilirubin, UA Negative     Occult Blood, UA Negative     UROBILINOGEN UA Negative mg/dL     Rapid drug screen, urine [[id number]]  (Normal) Collected: 04/06/23 1644    Lab Status: Final result Specimen: Urine, Clean Catch Updated: 04/06/23 1707     Amph/Meth UR Negative     Barbiturate Ur Negative     Benzodiazepine Urine Negative     Cocaine Urine Negative Methadone Urine Negative     Opiate Urine Negative     PCP Ur Negative     THC Urine Negative     Oxycodone Urine Negative    Narrative:      FOR MEDICAL PURPOSES ONLY  IF CONFIRMATION NEEDED PLEASE CONTACT THE LAB WITHIN 5 DAYS  Drug Screen Cutoff Levels:  AMPHETAMINE/METHAMPHETAMINES  1000 ng/mL  BARBITURATES     200 ng/mL  BENZODIAZEPINES     200 ng/mL  COCAINE      300 ng/mL  METHADONE      300 ng/mL  OPIATES      300 ng/mL  PHENCYCLIDINE     25 ng/mL  THC       50 ng/mL  OXYCODONE      100 ng/mL    FLU/RSV/COVID - if FLU/RSV clinically relevant [[id number]]  (Normal) Collected: 04/06/23 1322    Lab Status: Final result Specimen: Nares from Nose Updated: 04/06/23 1501     SARS-CoV-2 Negative     INFLUENZA A PCR Negative     INFLUENZA B PCR Negative     RSV PCR Negative    Narrative:      FOR PEDIATRIC PATIENTS - copy/paste COVID Guidelines URL to browser: [email or web address]    SARS-CoV-2 assay is a Nucleic Acid Amplification assay intended for the  qualitative detection of nucleic acid from SARS-CoV-2 in nasopharyngeal  swabs  Results are for the presumptive identification of SARS-CoV-2 RNA  Positive results are indicative of infection with SARS-CoV-2, the virus  causing COVID-19, but do not rule out bacterial infection or co-infection  with other viruses  Laboratories within the [place] and its  territories are required to report all positive results to the appropriate  public health authorities  Negative results do not preclude SARS-CoV-2  infection and should not be used as the sole basis for treatment or other  patient management decisions  Negative results must be combined with  clinical observations, patient history, and epidemiological information  This test has not been FDA cleared or approved  This test has been authorized by FDA under an Emergency Use Authorization  (EUA)   This test is only authorized for the duration of time the  declaration that circumstances exist justifying the authorization of the  emergency use of an in vitro diagnostic tests for detection of SARS-CoV-2  virus and/or diagnosis of COVID-19 infection under section 564(b)(1) of  the Act, 21 U  S C  991CYO-7(W)(4), unless the authorization is terminated  or revoked sooner  The test has been validated but independent review by FDA  and CLIA is pending  Test performed using [employer] Gene[employer]pert: This RT-PCR assay targets N2,  a region unique to SARS-CoV-2  A conserved region in the E-gene was chosen  for pan-Sarbecovirus detection which includes SARS-CoV-2  According to CMS-2020-01-R, this platform meets the definition of high-throughput technology  Strep A PCR [[id number]]  (Normal) Collected: 04/06/23 1322    Lab Status: Final result Specimen: Throat Updated: 04/06/23 1501     STREP A PCR Not Detected    Magnesium [[id number]]  (Normal) Collected: 04/06/23 1322    Lab Status: Final result Specimen: Blood from Arm, Right Updated: 04/06/23 1438     Magnesium 1 9 mg/dL     Acetaminophen level-If concentration is detectable, please discuss with medical  on call  [[id number]]  (Abnormal) Collected: 04/06/23 1322    Lab Status: Final result Specimen: Blood from Arm, Right Updated: 04/06/23 1436     Acetaminophen Level <35 ug/mL     Salicylate level [[id number]]  (Normal) Collected: 04/06/23 1322    Lab Status: Final result Specimen: Blood from Arm, Right Updated: 91/83/74 5431     Salicylate Lvl <5 mg/dL     Ethanol [[id number]]  (Normal) Collected: 04/06/23 1322    Lab Status: Final result Specimen: Blood from Arm, Right Updated: 04/06/23 1436     Ethanol Lvl <10 mg/dL     Lactic acid [[id number]]  (Normal) Collected: 04/06/23 1322    Lab Status: Final result Specimen: Blood from Arm, Right Updated: 04/06/23 1435     LACTIC ACID 0 8 mmol/L     Narrative:      Result may be elevated if tourniquet was used during collection      Comprehensive metabolic panel [[id number]]  (Abnormal) Collected: 04/06/23 1322    Lab Status: Final result Specimen: Blood from Arm, Right Updated: 04/06/23 1434     Sodium 134 mmol/L      Potassium 4 2 mmol/L      Chloride 99 mmol/L      CO2 27 mmol/L      ANION GAP 8 mmol/L      BUN 11 mg/dL      Creatinine 0 83 mg/dL      Glucose 66 mg/dL      Calcium 9 0 mg/dL      AST 18 U/L      ALT 10 U/L      Alkaline Phosphatase 72 U/L      Total Protein 5 9 g/dL      Albumin 4 1 g/dL      Total Bilirubin 0 29 mg/dL      eGFR 113 ml/min/1 73sq m     Narrative:      National Kidney Disease Foundation guidelines for Chronic Kidney Disease (CKD):   •  Stage 1 with normal or high GFR (GFR > 90 mL/min/1 73 square meters)  •  Stage 2 Mild CKD (GFR = 60-89 mL/min/1 73 square meters)  •  Stage 3A Moderate CKD (GFR = 45-59 mL/min/1 73 square meters)  •  Stage 3B Moderate CKD (GFR = 30-44 mL/min/1 73 square meters)  •  Stage 4 Severe CKD (GFR = 15-29 mL/min/1 73 square meters)  •  Stage 5 End Stage CKD (GFR <15 mL/min/1 73 square meters)  Note: GFR calculation is accurate only with a steady state creatinine    CBC and differential [[id number]]  (Abnormal) Collected: 04/06/23 1322    Lab Status: Final result Specimen: Blood from Arm, Right Updated: 04/06/23 1336     WBC 6 91 Thousand/uL      RBC 3 81 Million/uL      Hemoglobin 12 1 g/dL      Hematocrit 34 8 %      MCV 91 fL      MCH 31 8 pg      MCHC 34 8 g/dL      RDW 13 0 %      MPV 8 9 fL      Platelets 262 Thousands/uL      nRBC 0 /100 WBCs      Neutrophils Relative 56 %      Immat GRANS % 0 %      Lymphocytes Relative 27 %      Monocytes Relative 15 %      Eosinophils Relative 2 %      Basophils Relative 0 %      Neutrophils Absolute 3 87 Thousands/µL      Immature Grans Absolute 0 01 Thousand/uL      Lymphocytes Absolute 1 85 Thousands/µL      Monocytes Absolute 1 06 Thousand/µL      Eosinophils Absolute 0 11 Thousand/µL      Basophils Absolute 0 01 Thousands/µL                  CT head without contrast Final Result by Dom Mayfield MD (04/06 4048)      No acute intracranial abnormality  Workstation performed: [id number]                    Procedures  Procedures         ED Course  ED Course as of 04/06/23 2004   Thu Apr 06, 2023   1337 WBC: 6 91  No leukocytosis, left shift, or anemia  1426 Patient reevaluated, sleeping but waking up and answering questions  Awaiting remainder of labs  1447 Comprehensive metabolic panel(!)  No significant electrolyte derangements  ,  Panel negative  Lactic acid negative  Patient has been awake and asking for food  1642 Patient significantly more alert, able to answer questions  He is sitting up and ambulatory  He continues to deny any substance use  Has been able to provide a urine sample now, will await these results prior to determining disposition  1817 Patient remains awake and alert, sitting up in bed  He has eaten a meal in the emergency department without issues  He is requesting discharge  He states that he is going to walk home  He is able to provide me with the address  He still denies any substance abuse, but does admit to smoking cigarettes that he rolls himself and is not sure where he gets the materials to do this  SBIRT 22yo+    Flowsheet Row Most Recent Value   SBIRT (23 yo +)    In order to provide better care to our patients, we are screening all of our patients for alcohol and drug use  Would it be okay to ask you these screening questions? No Filed at: 04/06/2023 1511                    Medical Decision Making  Patient presenting with altered mental status  On arrival, patient is febrile and tachycardic and appears somewhat altered  He is somnolent but arousable  Answering questions but confused on some answers  Concern for substance abuse/overdose versus metabolic encephalopathy versus toxic encephalopathy    Given lack of significant history, would also be concerned for potential head injury, although suspect this is less likely given lack of external injuries and his fever  Would consider toxidrome such as anticholinergic syndrome as well  Will give IV fluids, get labs, EKG, and CT head  If work-up unremarkable and patient's condition not improving at all, would also consider lumbar puncture for additional work-up, but will need CT first     Amount and/or Complexity of Data Reviewed  Labs: ordered  Decision-making details documented in ED Course  Radiology: ordered  Risk  OTC drugs  Disposition  Final diagnoses: Altered mental status, unspecified altered mental status type   Fever     Time reflects when diagnosis was documented in both MDM as applicable and the Disposition within this note     Time User Action Codes Description Comment    4/6/2023  6:16 PM Khurram Noyola, [address] [R41 82] Altered mental status, unspecified altered mental status type     4/6/2023  6:16 PM Morrison Lamin Add [R50 9] Fever       ED Disposition     ED Disposition   Discharge    Condition   Stable    Date/Time   Thu Apr 6, 2023  6:16 PM    Comment   Estuardo Thompson discharge to home/self care                 Follow-up Information     Follow up With Specialties Details Why Contact Info Additional [address] [facility] Emergency Department Emergency Medicine  As needed, If symptoms worsen [address] [id number] [address] [facility] Emergency Department          Discharge Medication List as of 4/6/2023  6:17 PM      CONTINUE these medications which have NOT CHANGED    Details   benztropine (COGENTIN) 2 mg tablet Take 2 mg by mouth every morning, Starting Tue 6/22/2021, Historical Med      buPROPion (WELLBUTRIN SR) 150 mg 12 hr tablet Take 150 mg by mouth every morning, Starting Sun 6/20/2021, Historical Med      cloNIDine (CATAPRES) 0 2 mg tablet Take 0 2 mg by mouth daily at bedtime, Starting Tue 6/22/2021, Historical Med divalproex sodium (DEPAKOTE ER) 500 mg 24 hr tablet Take 500 mg by mouth 2 (two) times a day, Starting Thu 5/6/2021, Historical Med      loxapine (LOXITANE) 50 MG capsule Multiple Dosages:Starting Fri 3/12/2021, Until Tue 5/11/2021 at 2359Take 1 capsule (50 mg total) by mouth 2 (two) times a day AND 2 capsules (100 mg total) daily at bedtime  , Print             No discharge procedures on file      PDMP Review     None          ED Provider  Electronically Signed by           Emma Gómez MD  04/06/23 2004

## 2023-04-06 NOTE — DISCHARGE INSTRUCTIONS
You were seen today for altered mental status  You had a fever and received medications to help with your symptoms  You had lab work and a CT scan of your head that did not show any cause of your symptoms  Make sure that you are eating regular meals and drinking plenty of fluids at home  Return to the ER if you develop any more fevers, confusion, difficulty walking, or if your symptoms worsen

## 2023-04-06 NOTE — ED NOTES
Pt given urine specimen cup to collect sample  Pt verbalized understanding        George Hicks RN  04/06/23 5737

## 2023-04-09 LAB
BACTERIA BLD CULT: NORMAL
BACTERIA BLD CULT: NORMAL

## 2023-05-08 ENCOUNTER — HOSPITAL ENCOUNTER (EMERGENCY)
Facility: HOSPITAL | Age: 38
Discharge: HOME | End: 2023-05-08
Attending: EMERGENCY MEDICINE

## 2023-05-08 VITALS
DIASTOLIC BLOOD PRESSURE: 60 MMHG | SYSTOLIC BLOOD PRESSURE: 101 MMHG | BODY MASS INDEX: 22.11 KG/M2 | WEIGHT: 130.8 LBS | RESPIRATION RATE: 16 BRPM | OXYGEN SATURATION: 98 % | TEMPERATURE: 100.1 F | HEART RATE: 76 BPM

## 2023-05-08 DIAGNOSIS — T50.904A OVERDOSE OF UNDETERMINED INTENT, INITIAL ENCOUNTER: Primary | ICD-10-CM

## 2023-05-08 RX ORDER — ONDANSETRON 2 MG/ML
4 INJECTION INTRAMUSCULAR; INTRAVENOUS ONCE
Status: COMPLETED | OUTPATIENT
Start: 2023-05-08 | End: 2023-05-08

## 2023-05-08 RX ORDER — NALOXONE HYDROCHLORIDE 1 MG/ML
1 INJECTION PARENTERAL ONCE
Status: COMPLETED | OUTPATIENT
Start: 2023-05-08 | End: 2023-05-08

## 2023-05-08 RX ADMIN — SODIUM CHLORIDE 1000 ML: 0.9 INJECTION, SOLUTION INTRAVENOUS at 16:25

## 2023-05-08 RX ADMIN — ONDANSETRON 4 MG: 2 INJECTION INTRAMUSCULAR; INTRAVENOUS at 16:24

## 2023-05-08 NOTE — ED PROVIDER NOTES
History  Chief Complaint   Patient presents with   • Overdose - Accidental     Patient is a 30-year-old male coming in for evaluation of overdose  [place] alley  Given 4 mg of Narcan by [employer], and then given 2 more milligrams of Narcan by EMS, however they state that he immediately spit it out, unlikely to have gotten much  Patient is in no acute distress at this time  No respiratory depression  Is awake and oriented, but refuses to answer questions    There is vomit on patient's chin and abdomen      Overdose - Accidental  Ingested substance:  Illicit drugs  Associated symptoms: no abdominal pain, no headaches, no nausea, no shortness of breath, no unresponsiveness and no vomiting        Prior to Admission Medications   Prescriptions Last Dose Informant Patient Reported? Taking?   benztropine (COGENTIN) 2 mg tablet   Yes No   Sig: Take 2 mg by mouth every morning   Patient not taking: Reported on 8/20/2021   buPROPion (WELLBUTRIN SR) 150 mg 12 hr tablet   Yes No   Sig: Take 150 mg by mouth every morning   Patient not taking: Reported on 8/20/2021   cloNIDine (CATAPRES) 0 2 mg tablet   Yes No   Sig: Take 0 2 mg by mouth daily at bedtime   Patient not taking: Reported on 8/20/2021   divalproex sodium (DEPAKOTE ER) 500 mg 24 hr tablet   Yes No   Sig: Take 500 mg by mouth 2 (two) times a day   Patient not taking: Reported on 8/20/2021   loxapine (LOXITANE) 50 MG capsule   No No   Sig: Take 1 capsule (50 mg total) by mouth 2 (two) times a day AND 2 capsules (100 mg total) daily at bedtime     Patient not taking: Reported on 10/13/2021      Facility-Administered Medications: None       Past Medical History:   Diagnosis Date   • Drug abuse ([facility] [address] )    • Schizoaffective disorder ([facility] [address] )     resolved 05/16/14       Past Surgical History:   Procedure Laterality Date   • NO PAST SURGERIES         Family History   Problem Relation Age of Onset   • Anxiety disorder Mother    • Depression Mother    • Dysphagia Mother    • Stroke Mother         cva   • Diabetes type II Mother    • Other Mother         Hyponatremia   • Psychosis Mother      I have reviewed and agree with the history as documented  E-Cigarette/Vaping   • E-Cigarette Use Never User      E-Cigarette/Vaping Substances   • Nicotine No    • THC No    • CBD No    • Flavoring No    • Other No    • Unknown No      Social History     Tobacco Use   • Smoking status: Unknown   • Smokeless tobacco: Never   Vaping Use   • Vaping Use: Never used   Substance Use Topics   • Alcohol use: Yes     Comment: social   • Drug use: Yes     Types: Marijuana, Other     Comment: K2       Review of Systems   Constitutional: Negative for fatigue and fever  Respiratory: Negative for shortness of breath  Gastrointestinal: Negative for abdominal pain, nausea and vomiting  Neurological: Negative for headaches  Physical Exam  Physical Exam  Vitals reviewed  Constitutional:       Appearance: Normal appearance  He is normal weight  HENT:      Head: Normocephalic and atraumatic  Right Ear: External ear normal       Left Ear: External ear normal       Nose: Nose normal    Eyes:      Conjunctiva/sclera: Conjunctivae normal    Cardiovascular:      Rate and Rhythm: Normal rate  Pulmonary:      Effort: Pulmonary effort is normal    Musculoskeletal:         General: Normal range of motion  Cervical back: Normal range of motion  Skin:     General: Skin is warm and dry  Neurological:      Mental Status: He is alert           Vital Signs  ED Triage Vitals   Temperature Pulse Respirations Blood Pressure SpO2   05/08/23 1616 05/08/23 1616 05/08/23 1616 05/08/23 1616 05/08/23 1616   100 1 °F (37 8 °C) (!) 110 20 96/57 94 %      Temp Source Heart Rate Source Patient Position - Orthostatic VS BP Location FiO2 (%)   05/08/23 1616 05/08/23 1616 05/08/23 1616 05/08/23 1616 --   Tympanic Monitor Lying Left arm       Pain Score       05/08/23 1740       No Pain           Vitals:    05/08/23 1616 05/08/23 1740 05/08/23 1829   BP: 96/57 (!) 90/49 101/60   Pulse: (!) 110 76    Patient Position - Orthostatic VS: Lying Lying          Visual Acuity      ED Medications  Medications   naloxone (FOR EMS ONLY) (NARCAN) 2 MG/2ML injection 2 mg (0 mg Does not apply Given to EMS 5/8/23 1619)   sodium chloride 0 9 % bolus 1,000 mL (0 mL Intravenous Stopped 5/8/23 1740)   ondansetron (ZOFRAN) injection 4 mg (4 mg Intravenous Given 5/8/23 1624)   naloxone nasal- Given to patient by provider at discharge  (NARCAN) 4 mg/0 1 mL nasal spray 4 mg (4 mg Does not apply Given by Other 5/8/23 1808)       Diagnostic Studies  Results Reviewed     None                 No orders to display              Procedures  Procedures         ED Course                                             Medical Decision Making  Patient is a 50-year-old male who comes in after a opiate overdose  Patient is in no acute distress at this time  Patient declined detox or rehab and was discharged home    Risk  Prescription drug management  Disposition  Final diagnoses:   Overdose of undetermined intent, initial encounter     Time reflects when diagnosis was documented in both MDM as applicable and the Disposition within this note     Time User Action Codes Description Comment    5/8/2023  5:58 PM Harry Lewis Add [T50 904A] Overdose of undetermined intent, initial encounter       ED Disposition     ED Disposition   Discharge    Condition   Stable    Date/Time   Mon May 8, 2023  5:58 PM    Comment   Shahriar Werner discharge to home/self care                 Follow-up Information     Follow up With Specialties Details Why Contact Info Additional [address] [facility] Emergency Department Emergency Medicine  As needed, If symptoms worsen [address] [phone] [address] [facility] Emergency Department          Discharge Medication List as of 5/8/2023  5:59 PM CONTINUE these medications which have NOT CHANGED    Details   benztropine (COGENTIN) 2 mg tablet Take 2 mg by mouth every morning, Starting Tue 6/22/2021, Historical Med      buPROPion (WELLBUTRIN SR) 150 mg 12 hr tablet Take 150 mg by mouth every morning, Starting Sun 6/20/2021, Historical Med      cloNIDine (CATAPRES) 0 2 mg tablet Take 0 2 mg by mouth daily at bedtime, Starting Tue 6/22/2021, Historical Med      divalproex sodium (DEPAKOTE ER) 500 mg 24 hr tablet Take 500 mg by mouth 2 (two) times a day, Starting u 5/6/2021, Historical Med      loxapine (LOXITANE) 50 MG capsule Multiple Dosages:Starting Fri 3/12/2021, Until Tue 5/11/2021 at 2359Take 1 capsule (50 mg total) by mouth 2 (two) times a day AND 2 capsules (100 mg total) daily at bedtime  , Print             No discharge procedures on file      PDMP Review     None          ED Provider  Electronically Signed by           Margie Fishman PA-C  05/08/23 7106

## 2023-05-08 NOTE — Clinical Note
Erika Patel was seen and treated in our emergency department on 5/8/2023  No restrictions            Diagnosis:     Emogene Yusef    He may return on this date: 05/09/2023         If you have any questions or concerns, please don't hesitate to call        Kathleen Casanova PA-C    ______________________________           _______________          _______________  Hospital Representative                              Date                                Time

## 2023-05-18 ENCOUNTER — APPOINTMENT (EMERGENCY)
Dept: CT IMAGING | Facility: HOSPITAL | Age: 38
End: 2023-05-18

## 2023-05-18 ENCOUNTER — HOSPITAL ENCOUNTER (EMERGENCY)
Facility: HOSPITAL | Age: 38
Discharge: HOME | End: 2023-05-18
Attending: EMERGENCY MEDICINE

## 2023-05-18 VITALS
RESPIRATION RATE: 18 BRPM | WEIGHT: 102.7 LBS | SYSTOLIC BLOOD PRESSURE: 131 MMHG | OXYGEN SATURATION: 99 % | BODY MASS INDEX: 17.36 KG/M2 | HEART RATE: 55 BPM | TEMPERATURE: 98 F | DIASTOLIC BLOOD PRESSURE: 93 MMHG

## 2023-05-18 DIAGNOSIS — F19.10 SUBSTANCE ABUSE (HCC): Primary | ICD-10-CM

## 2023-05-18 NOTE — Clinical Note
Navimelchor Sommers was seen and treated in our emergency department on 5/18/2023  No restrictions            Diagnosis:     Yamilet Navarrete    He may return on this date: 05/19/2023         If you have any questions or concerns, please don't hesitate to call        Chino Hernandez PA-C    ______________________________           _______________          _______________  Hospital Representative                              Date                                Time

## 2023-05-18 NOTE — ED NOTES
Pt's lighter & cigarette case in a valuable bag ([id number]) inside pt's rounder       Kate Mckenzie  05/18/23 1828

## 2023-05-19 NOTE — ED PROVIDER NOTES
History  Chief Complaint   Patient presents with   • Altered Mental Status     Pt was found wandering around [facility] by police  Per police report, police suspected pt was under influence of drugs  Pt denies drug use but appears to have difficulty answering questions  Pt was able to give name and date of birth but was slow to answer any other questions  Patient is a 56-year-old male who comes in for evaluation of altered mental status  Patient was found by the [facility] center wandering  Patient is acting bizarrely, intermittently answering questions  Patient denies any pain at this time, is in no acute distress  No sign of trauma on him  Patient has presented similarly in the past      Altered Mental Status      Prior to Admission Medications   Prescriptions Last Dose Informant Patient Reported? Taking?   benztropine (COGENTIN) 2 mg tablet   Yes No   Sig: Take 2 mg by mouth every morning   Patient not taking: Reported on 8/20/2021   buPROPion (WELLBUTRIN SR) 150 mg 12 hr tablet   Yes No   Sig: Take 150 mg by mouth every morning   Patient not taking: Reported on 8/20/2021   cloNIDine (CATAPRES) 0 2 mg tablet   Yes No   Sig: Take 0 2 mg by mouth daily at bedtime   Patient not taking: Reported on 8/20/2021   divalproex sodium (DEPAKOTE ER) 500 mg 24 hr tablet   Yes No   Sig: Take 500 mg by mouth 2 (two) times a day   Patient not taking: Reported on 8/20/2021   loxapine (LOXITANE) 50 MG capsule   No No   Sig: Take 1 capsule (50 mg total) by mouth 2 (two) times a day AND 2 capsules (100 mg total) daily at bedtime     Patient not taking: Reported on 10/13/2021      Facility-Administered Medications: None       Past Medical History:   Diagnosis Date   • Drug abuse ([facility] [address] )    • Schizoaffective disorder ([facility] [address] )     resolved 05/16/14       Past Surgical History:   Procedure Laterality Date   • NO PAST SURGERIES         Family History   Problem Relation Age of Onset   • Anxiety disorder Mother    • Depression Mother    • Dysphagia Mother    • Stroke Mother         cva   • Diabetes type II Mother    • Other Mother         Hyponatremia   • Psychosis Mother      I have reviewed and agree with the history as documented  E-Cigarette/Vaping   • E-Cigarette Use Never User      E-Cigarette/Vaping Substances   • Nicotine No    • THC No    • CBD No    • Flavoring No    • Other No    • Unknown No      Social History     Tobacco Use   • Smoking status: Unknown   • Smokeless tobacco: Never   Vaping Use   • Vaping Use: Never used   Substance Use Topics   • Alcohol use: Yes     Comment: social   • Drug use: Yes     Types: Marijuana, Other     Comment: K2       Review of Systems   Unable to perform ROS: Mental status change (AMS, liklly drug related)       Physical Exam  Physical Exam  Vitals reviewed  Constitutional:       Appearance: Normal appearance  He is normal weight  HENT:      Head: Normocephalic and atraumatic  Right Ear: External ear normal       Left Ear: External ear normal       Nose: Nose normal    Eyes:      Conjunctiva/sclera: Conjunctivae normal    Cardiovascular:      Rate and Rhythm: Normal rate  Pulmonary:      Effort: Pulmonary effort is normal    Musculoskeletal:         General: Normal range of motion  Cervical back: Normal range of motion  Skin:     General: Skin is warm and dry  Neurological:      Mental Status: He is alert           Vital Signs  ED Triage Vitals [05/18/23 1816]   Temperature Pulse Respirations Blood Pressure SpO2   98 °F (36 7 °C) (!) 116 18 131/93 98 %      Temp Source Heart Rate Source Patient Position - Orthostatic VS BP Location FiO2 (%)   Tympanic Monitor Sitting Left arm --      Pain Score       --           Vitals:    05/18/23 1816 05/18/23 2130   BP: 131/93    Pulse: (!) 116 55   Patient Position - Orthostatic VS: Sitting          Visual Acuity  Visual Acuity    Flowsheet Row Most Recent Value   L Pupil Size (mm) 2   R Pupil Size (mm) 2          ED Medications  Medications - No data to display    Diagnostic Studies  Results Reviewed     None                 CT head without contrast   Final Result by Brittny Guevara MD (05/18 1950)      No acute intracranial abnormality  Workstation performed: [id number]                    Procedures  Procedures         ED Course  ED Course as of 05/18/23 2150   Thu May 18, 2023   1951 CT head without contrast  No acute intracranial abnormality       2127 Patient now answering questions appropriately, states he is hungry, would like to leave  Will discharge at this time                                             Medical Decision Making  Patient presented after acting bizarrely around the [facility] center  Patient is in no acute distress at this time, denies any drug use, but does have an extensive history of drug abuse  Patient is in no acute distress at this time, and requested discharge, have returned to normal mentation at that time  Patient ambulated at emergency, no acute distress, without assistance    Amount and/or Complexity of Data Reviewed  Radiology: ordered  Decision-making details documented in ED Course  Disposition  Final diagnoses:   Substance abuse ([address] )     Time reflects when diagnosis was documented in both MDM as applicable and the Disposition within this note     Time User Action Codes Description Comment    5/18/2023  9:28 PM Felicia Woody Add [F19 10] Substance abuse [facility])       ED Disposition     ED Disposition   Discharge    Condition   Stable    Date/Time   Thu May 18, 2023  9:28 PM    Comment   Alexander Beauchamp discharge to home/self care                 Follow-up Information     Follow up With Specialties Details Why Contact Info Additional [address] [facility] Emergency Department Emergency Medicine  As needed, If symptoms worsen [address] [facility] [id number] [address] [facility] Emergency Department          Discharge Medication List as of 5/18/2023  9:28 PM      CONTINUE these medications which have NOT CHANGED    Details   benztropine (COGENTIN) 2 mg tablet Take 2 mg by mouth every morning, Starting Tue 6/22/2021, Historical Med      buPROPion (WELLBUTRIN SR) 150 mg 12 hr tablet Take 150 mg by mouth every morning, Starting Sun 6/20/2021, Historical Med      cloNIDine (CATAPRES) 0 2 mg tablet Take 0 2 mg by mouth daily at bedtime, Starting Tue 6/22/2021, Historical Med      divalproex sodium (DEPAKOTE ER) 500 mg 24 hr tablet Take 500 mg by mouth 2 (two) times a day, Starting u 5/6/2021, Historical Med      loxapine (LOXITANE) 50 MG capsule Multiple Dosages:Starting Fri 3/12/2021, Until Tue 5/11/2021 at 2359Take 1 capsule (50 mg total) by mouth 2 (two) times a day AND 2 capsules (100 mg total) daily at bedtime  , Print             No discharge procedures on file      PDMP Review     None          ED Provider  Electronically Signed by           Noemí Hung PA-C  05/18/23 0881

## 2023-06-05 ENCOUNTER — HOSPITAL ENCOUNTER (EMERGENCY)
Facility: HOSPITAL | Age: 38
Discharge: HOME | End: 2023-06-05
Attending: EMERGENCY MEDICINE
Payer: MEDICARE

## 2023-06-05 VITALS
RESPIRATION RATE: 18 BRPM | HEART RATE: 84 BPM | SYSTOLIC BLOOD PRESSURE: 97 MMHG | BODY MASS INDEX: 20.49 KG/M2 | TEMPERATURE: 98.3 F | OXYGEN SATURATION: 95 % | WEIGHT: 121.25 LBS | DIASTOLIC BLOOD PRESSURE: 68 MMHG

## 2023-06-05 DIAGNOSIS — F19.10 POLYSUBSTANCE ABUSE (HCC): Primary | ICD-10-CM

## 2023-06-05 PROCEDURE — 99283 EMERGENCY DEPT VISIT LOW MDM: CPT

## 2023-06-05 NOTE — CERTIFIED RECOVERY SPECIALIST
Certified  Note    Patient name: Nicky Prajapati  Location: ED 10/ED [address]: [address] [facility]  Attending:  Dolly Link, * MRN [id number]  : 1985  Age: 40 y o  Sex: male Date 2023         Substance Use History:     Social History     Substance and Sexual Activity   Alcohol Use Yes    Comment: social        Social History     Substance and Sexual Activity   Drug Use Yes   • Types: Marijuana, Other    Comment: K2       Admission Information  Substances Used at This Admission[de-identified] THC, Methamphetamine  Readmission in Last 30 Days?: Yes  Encounter Type[de-identified] Patient Face-to-Face    Recovery Support Plan  Declined All Services?: No  Medication Assisted Treatment[de-identified] No  Was Referral Made to Frannie Tobias?: No  Plan Discussed With Treatment Team[de-identified] Yes  Plan Discussed With[de-identified] Provider    Referral to Recovery Supports:  Xander Lira[de-identified] Yes  Resource Guide Given?: Yes  Follow Up With Patient[de-identified] Yes (Ongoing)  Family / Other Support[de-identified] No  Referral for Community Physical Health[de-identified] No  Referral for Community Mental Health[de-identified] No'    CRS received consult to meet with patient  CRS provided introductions and explanation of service  CRS provided explanation of recovery resources  Patient shared understanding  Patient requested snack and a drink  CRS to follow up, patient agreeable  Crackers and water provided    CRS updated nurse and provider        Leonard Puente

## 2023-06-05 NOTE — ED PROVIDER NOTES
History  Chief Complaint   Patient presents with   • Drug Problem     Found by [employer] by [employer] sleeping while standing  Pt admits to doing K2  Pt is lethargic and unable to answer any questions  52-year-old male with history of K2 use presenting the emergency department by police, found leaning [place] pole [place]  Has no complaints at this point, but appears quite intoxicated  Not answering questions consistently  Does follow commands though  Prior to Admission Medications   Prescriptions Last Dose Informant Patient Reported? Taking?   benztropine (COGENTIN) 2 mg tablet   Yes No   Sig: Take 2 mg by mouth every morning   Patient not taking: Reported on 8/20/2021   buPROPion (WELLBUTRIN SR) 150 mg 12 hr tablet   Yes No   Sig: Take 150 mg by mouth every morning   Patient not taking: Reported on 8/20/2021   cloNIDine (CATAPRES) 0 2 mg tablet   Yes No   Sig: Take 0 2 mg by mouth daily at bedtime   Patient not taking: Reported on 8/20/2021   divalproex sodium (DEPAKOTE ER) 500 mg 24 hr tablet   Yes No   Sig: Take 500 mg by mouth 2 (two) times a day   Patient not taking: Reported on 8/20/2021   loxapine (LOXITANE) 50 MG capsule   No No   Sig: Take 1 capsule (50 mg total) by mouth 2 (two) times a day AND 2 capsules (100 mg total) daily at bedtime  Patient not taking: Reported on 10/13/2021      Facility-Administered Medications: None       Past Medical History:   Diagnosis Date   • Drug abuse ([facility] [address] )    • Schizoaffective disorder ([facility] [address] )     resolved 05/16/14       Past Surgical History:   Procedure Laterality Date   • NO PAST SURGERIES         Family History   Problem Relation Age of Onset   • Anxiety disorder Mother    • Depression Mother    • Dysphagia Mother    • Stroke Mother         cva   • Diabetes type II Mother    • Other Mother         Hyponatremia   • Psychosis Mother      I have reviewed and agree with the history as documented      E-Cigarette/Vaping   • E-Cigarette Use Never User E-Cigarette/Vaping Substances   • Nicotine No    • THC No    • CBD No    • Flavoring No    • Other No    • Unknown No      Social History     Tobacco Use   • Smoking status: Unknown   • Smokeless tobacco: Never   Vaping Use   • Vaping Use: Never used   Substance Use Topics   • Alcohol use: Yes     Comment: social   • Drug use: Yes     Types: Marijuana, Other     Comment: K2       Review of Systems    Physical Exam  Physical Exam    Vital Signs  ED Triage Vitals   Temperature Pulse Respirations Blood Pressure SpO2   06/05/23 1245 06/05/23 1245 06/05/23 1245 06/05/23 1245 06/05/23 1245   98 3 °F (36 8 °C) (!) 115 16 116/73 95 %      Temp src Heart Rate Source Patient Position - Orthostatic VS BP Location FiO2 (%)   -- 06/05/23 1314 06/05/23 1414 06/05/23 1414 --    Monitor Lying Left arm       Pain Score       --                  Vitals:    06/05/23 1245 06/05/23 1314 06/05/23 1324 06/05/23 1414   BP: 116/73   97/68   Pulse: (!) 115 102 85 84   Patient Position - Orthostatic VS:    Lying         Visual Acuity      ED Medications  Medications - No data to display    Diagnostic Studies  Results Reviewed     None                 No orders to display              Procedures  Procedures         ED Course                                             Medical Decision Making  Monitored in the ED for 1-1/2 hours without any hypoxia  Patient requesting discharge  Offered rehab and detox, patient refused  Disposition  Final diagnoses:   Polysubstance abuse ([address] )     Time reflects when diagnosis was documented in both MDM as applicable and the Disposition within this note     Time User Action Codes Description Comment    6/5/2023  2:04 PM Delvis Copeland Add [F19 10] Polysubstance abuse [facility])       ED Disposition     ED Disposition   Discharge    Condition   Stable    Date/Time   Mon Jun 5, 2023  2:04 PM    Comment   Devonte Peck discharge to home/self care                 Follow-up Information     Follow up With Specialties Details Why Contact Info Additional [address]   [address], [address] [facility] [address]  [address] [facility] [address], [address], [address] [facility], [address]          Discharge Medication List as of 6/5/2023  2:04 PM      CONTINUE these medications which have NOT CHANGED    Details   benztropine (COGENTIN) 2 mg tablet Take 2 mg by mouth every morning, Starting Tue 6/22/2021, Historical Med      buPROPion (WELLBUTRIN SR) 150 mg 12 hr tablet Take 150 mg by mouth every morning, Starting Sun 6/20/2021, Historical Med      cloNIDine (CATAPRES) 0 2 mg tablet Take 0 2 mg by mouth daily at bedtime, Starting Tue 6/22/2021, Historical Med      divalproex sodium (DEPAKOTE ER) 500 mg 24 hr tablet Take 500 mg by mouth 2 (two) times a day, Starting u 5/6/2021, Historical Med      loxapine (LOXITANE) 50 MG capsule Multiple Dosages:Starting Fri 3/12/2021, Until Tue 5/11/2021 at 2359Take 1 capsule (50 mg total) by mouth 2 (two) times a day AND 2 capsules (100 mg total) daily at bedtime  , Print             No discharge procedures on file      PDMP Review     None          ED Provider  Electronically Signed by           Aleisha Mack MD  06/05/23 6700

## 2023-06-08 ENCOUNTER — HOSPITAL ENCOUNTER (EMERGENCY)
Facility: HOSPITAL | Age: 38
Discharge: HOME | End: 2023-06-08
Attending: EMERGENCY MEDICINE
Payer: MEDICARE

## 2023-06-08 VITALS
DIASTOLIC BLOOD PRESSURE: 76 MMHG | OXYGEN SATURATION: 98 % | BODY MASS INDEX: 22.47 KG/M2 | SYSTOLIC BLOOD PRESSURE: 104 MMHG | RESPIRATION RATE: 16 BRPM | WEIGHT: 132.94 LBS | TEMPERATURE: 98.8 F | HEART RATE: 91 BPM

## 2023-06-08 DIAGNOSIS — F19.10 SUBSTANCE ABUSE (HCC): Primary | ICD-10-CM

## 2023-06-08 PROCEDURE — 99284 EMERGENCY DEPT VISIT MOD MDM: CPT

## 2023-06-08 NOTE — ED PROVIDER NOTES
History  Chief Complaint   Patient presents with   • Overdose - Accidental     Brought in by EMS       Patient is a 41-year-old male  He has a history of schizoaffective disorder  He has a history of drug abuse  He has been to the emergency room several times last month for substance abuse  He did have a couple CAT scans of his head that were negative  Today he was found altered on the street  When the paramedics were called he got up and tried to run away  Police were called  They caught him and he was brought to the emergency room  There is no history of trauma  He reports smoking some weed today  He denies opiate use  Denies alcohol use today  He denies fever or being sick  Denies being suicidal           Prior to Admission Medications   Prescriptions Last Dose Informant Patient Reported? Taking?   benztropine (COGENTIN) 2 mg tablet   Yes No   Sig: Take 2 mg by mouth every morning   Patient not taking: Reported on 8/20/2021   buPROPion (WELLBUTRIN SR) 150 mg 12 hr tablet   Yes No   Sig: Take 150 mg by mouth every morning   Patient not taking: Reported on 8/20/2021   cloNIDine (CATAPRES) 0 2 mg tablet   Yes No   Sig: Take 0 2 mg by mouth daily at bedtime   Patient not taking: Reported on 8/20/2021   divalproex sodium (DEPAKOTE ER) 500 mg 24 hr tablet   Yes No   Sig: Take 500 mg by mouth 2 (two) times a day   Patient not taking: Reported on 8/20/2021   loxapine (LOXITANE) 50 MG capsule   No No   Sig: Take 1 capsule (50 mg total) by mouth 2 (two) times a day AND 2 capsules (100 mg total) daily at bedtime     Patient not taking: Reported on 10/13/2021      Facility-Administered Medications: None       Past Medical History:   Diagnosis Date   • Drug abuse ([facility] [address] )    • Schizoaffective disorder ([facility] [address] )     resolved 05/16/14       Past Surgical History:   Procedure Laterality Date   • NO PAST SURGERIES         Family History   Problem Relation Age of Onset   • Anxiety disorder Mother    • Depression Mother    • Dysphagia Mother    • Stroke Mother         cva   • Diabetes type II Mother    • Other Mother         Hyponatremia   • Psychosis Mother      I have reviewed and agree with the history as documented  E-Cigarette/Vaping   • E-Cigarette Use Never User      E-Cigarette/Vaping Substances   • Nicotine No    • THC No    • CBD No    • Flavoring No    • Other No    • Unknown No      Social History     Tobacco Use   • Smoking status: Unknown   • Smokeless tobacco: Never   Vaping Use   • Vaping Use: Never used   Substance Use Topics   • Alcohol use: Yes     Comment: social   • Drug use: Yes     Types: Marijuana, Other     Comment: K2       Review of Systems   Unable to perform ROS: Other (Drug abuse)       Physical Exam  Physical Exam  Vitals reviewed  Constitutional:       General: He is not in acute distress  Comments: Patient is lethargic  HENT:      Head: Normocephalic and atraumatic  Nose: Nose normal       Mouth/Throat:      Mouth: Mucous membranes are moist    Eyes:      General:         Right eye: No discharge  Left eye: No discharge  Extraocular Movements: Extraocular movements intact  Pupils: Pupils are equal, round, and reactive to light  Comments: Both sclera are injected  Cardiovascular:      Rate and Rhythm: Normal rate and regular rhythm  Pulses: Normal pulses  Heart sounds: Normal heart sounds  No murmur heard  No friction rub  No gallop  Pulmonary:      Effort: Pulmonary effort is normal  No respiratory distress  Breath sounds: Normal breath sounds  No stridor  No wheezing, rhonchi or rales  Abdominal:      General: Bowel sounds are normal  There is no distension  Palpations: Abdomen is soft  Tenderness: There is no abdominal tenderness  There is no right CVA tenderness, left CVA tenderness, guarding or rebound  Musculoskeletal:         General: No swelling, tenderness, deformity or signs of injury  Normal range of motion  Cervical back: Normal range of motion and neck supple  No rigidity or tenderness  Right lower leg: No edema  Left lower leg: No edema  Comments: No calf pain or unilateral leg swelling   Skin:     General: Skin is warm and dry  Coloration: Skin is not jaundiced or pale  Findings: No bruising, erythema or rash  Neurological:      General: No focal deficit present  Mental Status: He is lethargic  GCS: GCS eye subscore is 3  GCS verbal subscore is 5  GCS motor subscore is 6  Cranial Nerves: No facial asymmetry  Sensory: No sensory deficit  Motor: Motor function is intact  Psychiatric:         Mood and Affect: Mood normal          Behavior: Behavior normal          Vital Signs  ED Triage Vitals [06/08/23 1615]   Temperature Pulse Respirations Blood Pressure SpO2   (!) 100 8 °F (38 2 °C) (!) 115 20 100/64 96 %      Temp Source Heart Rate Source Patient Position - Orthostatic VS BP Location FiO2 (%)   Tympanic Monitor Lying Left arm --      Pain Score       --           Vitals:    06/08/23 1615 06/08/23 1716   BP: 100/64 104/76   Pulse: (!) 115 91   Patient Position - Orthostatic VS: Lying          Visual Acuity      ED Medications  Medications - No data to display    Diagnostic Studies  Results Reviewed     Procedure Component Value Units Date/Time    Rapid drug screen, urine [[id number]]     Lab Status: No result Specimen: Urine     Ethanol [[id number]]     Lab Status: No result Specimen: Blood                  No orders to display              Procedures  Procedures         ED Course                               SBIRT 20yo+    Flowsheet Row Most Recent Value   Initial Alcohol Screen: US AUDIT-C     1  How often do you have a drink containing alcohol? 0 Filed at: 06/08/2023 1719   2  How many drinks containing alcohol do you have on a typical day you are drinking? 0 Filed at: 06/08/2023 1719   3a  Male UNDER 65:  How often do you have five or more drinks on one "occasion? 0 Filed at: 06/08/2023 1719   3b  FEMALE Any Age, or MALE 65+: How often do you have 4 or more drinks on one occassion? 0 Filed at: 06/08/2023 1719   Audit-C Score 0 Filed at: 06/08/2023 1719   JOURDAN: How many times in the past year have you    Used an illegal drug or used a prescription medication for non-medical reasons? Daily or Almost Daily Filed at: 06/08/2023 1719   DAST-10: In the past 12 months       1  Have you used drugs other than those required for medical reasons? 0 Filed at: 06/08/2023 1719   2  Do you use more than one drug at a time? 0 Filed at: 06/08/2023 1719   3  Have you had medical problems as a result of your drug use (e g , memory loss, hepatitis, convulsions, bleeding, etc )? 0 Filed at: 06/08/2023 1719   4  Have you had \"blackouts\" or \"flashbacks\" as a result of drug use? YesNo 0 Filed at: 06/08/2023 1719   5  Do you ever feel bad or guilty about your drug use? 0 Filed at: 06/08/2023 1719   6  Does your spouse (or parent) ever complain about your involvement with drugs? 0 Filed at: 06/08/2023 1719   7  Have you neglected your family because of your use of drugs? 0 Filed at: 06/08/2023 1719   8  Have you engaged in illegal activities in order to obtain drugs? 0 Filed at: 06/08/2023 1719   9  Have you ever experienced withdrawal symptoms (felt sick) when you stopped taking drugs? 0 Filed at: 06/08/2023 1719   10  Are you always able to stop using drugs when you want to? 0 Filed at: 06/08/2023 1719   DAST-10 Score 0 Filed at: 06/08/2023 1719                    Medical Decision Making  Patient is now awake and alert  Tolerating p o  He does not want drug and alcohol treatment  Temperature was 100 8 Fahrenheit on arrival   I suspect this was secondary to running from the police  He is currently afebrile  There are no meningeal signs to suggest meningitis  Appropriate for discharge and outpatient management  There is no evidence of head trauma      Amount and/or Complexity of Data " Reviewed  Labs: ordered  Risk  Decision regarding hospitalization  Disposition  Final diagnoses:   Substance abuse ([address] )     Time reflects when diagnosis was documented in both MDM as applicable and the Disposition within this note     Time User Action Codes Description Comment    6/8/2023  5:20 PM Stacey Cure Add [F19 10] Substance abuse [facility])       ED Disposition     ED Disposition   Discharge    Condition   Stable    Date/Time   Thu Jun 8, 2023  5:20 PM    Comment   Brian Bourgeois discharge to home/self care  Follow-up Information     Follow up With Specialties Details Why [address] [facility] 2nd Floor Family Medicine In 1 week  [address] [place] [address]  [phone]            Patient's Medications   Discharge Prescriptions    No medications on file       No discharge procedures on file      PDMP Review     None          ED Provider  Electronically Signed by           Moises Meade MD  06/08/23 [address] [facility] Mc Astudillo MD  06/08/23 2651

## 2023-07-17 ENCOUNTER — HOSPITAL ENCOUNTER (EMERGENCY)
Facility: HOSPITAL | Age: 38
Discharge: HOME | End: 2023-07-17
Attending: INTERNAL MEDICINE
Payer: MEDICARE

## 2023-07-17 VITALS
OXYGEN SATURATION: 96 % | DIASTOLIC BLOOD PRESSURE: 65 MMHG | HEART RATE: 84 BPM | RESPIRATION RATE: 16 BRPM | BODY MASS INDEX: 20.42 KG/M2 | TEMPERATURE: 99 F | WEIGHT: 120.81 LBS | SYSTOLIC BLOOD PRESSURE: 91 MMHG

## 2023-07-17 DIAGNOSIS — F19.10 SUBSTANCE ABUSE (HCC): Primary | ICD-10-CM

## 2023-07-17 LAB — GLUCOSE SERPL-MCNC: 76 MG/DL (ref 65–140)

## 2023-07-17 PROCEDURE — 82948 REAGENT STRIP/BLOOD GLUCOSE: CPT

## 2023-07-17 PROCEDURE — 99284 EMERGENCY DEPT VISIT MOD MDM: CPT

## 2023-07-17 NOTE — ED PROVIDER NOTES
History  Chief Complaint   Patient presents with   • Overdose - Accidental     Found sleeping on sidewalk by [employer], attempted to administer narcan but pt refused. EMS called to scene and transported to ED.      40 y.o. M with PMH of drug abuse and schizoaffective disorder presents to ED via [employer] and EMS for drug use. Per [employer], patient was found sleeping on the sidewalk. Awoke to nonpainful tactile stimuli. [employer] brought out narcan, but patient swatted the narcan away, none was administered. [employer] called EMS who states he was awake alert and oriented on their arrival, good ETCO2, fell asleep in ambulance, aroused to verbal stimuli. did not require medications. Everyone denies period of apnea or decreased respirations. They report patient was only found with cigarettes. They deny altered mental status. Patient admits to marijuana use. Denies opioid use, alcohol use, or other drug use. Denies any acute complaints including F, chills, SOB, dizziness, vision change, CP, sob, nausea, vomiting, abdominal pain, diarrhea, myalgias, cough. Denies SI/HI. History provided by:  Patient, EMS personnel, police and medical records  Overdose - Accidental  Associated symptoms: no abdominal pain, no chest pain, no diarrhea, no nausea, no shortness of breath and no vomiting    Risk factors: similar prior episodes (per chart review )        Prior to Admission Medications   Prescriptions Last Dose Informant Patient Reported?  Taking?   benztropine (COGENTIN) 2 mg tablet   Yes No   Sig: Take 2 mg by mouth every morning   Patient not taking: Reported on 8/20/2021   buPROPion (WELLBUTRIN SR) 150 mg 12 hr tablet   Yes No   Sig: Take 150 mg by mouth every morning   Patient not taking: Reported on 8/20/2021   cloNIDine (CATAPRES) 0.2 mg tablet   Yes No   Sig: Take 0.2 mg by mouth daily at bedtime   Patient not taking: Reported on 8/20/2021   divalproex sodium (DEPAKOTE ER) 500 mg 24 hr tablet   Yes No   Sig: Take 500 mg by mouth 2 (two) times a day   Patient not taking: Reported on 8/20/2021   loxapine (LOXITANE) 50 MG capsule   No No   Sig: Take 1 capsule (50 mg total) by mouth 2 (two) times a day AND 2 capsules (100 mg total) daily at bedtime. Patient not taking: Reported on 10/13/2021      Facility-Administered Medications: None       Past Medical History:   Diagnosis Date   • Drug abuse ([address])    • Schizoaffective disorder ([address])     resolved 05/16/14       Past Surgical History:   Procedure Laterality Date   • NO PAST SURGERIES         Family History   Problem Relation Age of Onset   • Anxiety disorder Mother    • Depression Mother    • Dysphagia Mother    • Stroke Mother         cva   • Diabetes type II Mother    • Other Mother         Hyponatremia   • Psychosis Mother      I have reviewed and agree with the history as documented. E-Cigarette/Vaping   • E-Cigarette Use Never User      E-Cigarette/Vaping Substances   • Nicotine No    • THC No    • CBD No    • Flavoring No    • Other No    • Unknown No      Social History     Tobacco Use   • Smoking status: Unknown   • Smokeless tobacco: Never   Vaping Use   • Vaping Use: Never used   Substance Use Topics   • Alcohol use: Yes     Comment: social   • Drug use: Yes     Types: Marijuana, Other     Comment: K2       Review of Systems   Constitutional: Negative for chills and fever. Eyes: Negative for visual disturbance. Respiratory: Negative for shortness of breath. Cardiovascular: Negative for chest pain. Gastrointestinal: Negative for abdominal pain, diarrhea, nausea and vomiting. Musculoskeletal: Negative for myalgias. Neurological: Negative for dizziness, weakness and numbness. Psychiatric/Behavioral: Negative for suicidal ideas. All other systems reviewed and are negative. Physical Exam  Physical Exam  Vitals and nursing note reviewed. Constitutional:       General: He is sleeping. He is not in acute distress. Appearance: Normal appearance.  He is not ill-appearing, toxic-appearing or diaphoretic. Comments: Patient awake while being brought in by EMS, fell asleep in stretcher, easily aroused to voice. Patient is selectively cooperative. HENT:      Head: Normocephalic and atraumatic. Mouth/Throat:      Mouth: Mucous membranes are moist.   Eyes:      Comments: Pupils dilated. PERRL. EOMI. No nystagmus. Cardiovascular:      Rate and Rhythm: Normal rate and regular rhythm. Heart sounds: Normal heart sounds. Pulmonary:      Effort: Pulmonary effort is normal. No respiratory distress. Breath sounds: Normal breath sounds. Comments: Patient in no respiratory distress, speaking in full sentences, managing oral secretions without difficulty, no accessory muscle use, retractions, or belly breathing noted, no adventitious lung sounds auscultated bilaterally. Abdominal:      General: There is no distension. Palpations: Abdomen is soft. Skin:     General: Skin is warm and dry. Capillary Refill: Capillary refill takes less than 2 seconds. Neurological:      Mental Status: He is oriented to person, place, and time and easily aroused. Motor: No tremor or seizure activity. Psychiatric:         Behavior: Behavior is not aggressive. Thought Content: Thought content does not include suicidal ideation.          Vital Signs  ED Triage Vitals [07/17/23 1323]   Temperature Pulse Respirations Blood Pressure SpO2   99 °F (37.2 °C) (!) 122 16 148/87 94 %      Temp Source Heart Rate Source Patient Position - Orthostatic VS BP Location FiO2 (%)   Tympanic Monitor Lying Left arm --      Pain Score       --           Vitals:    07/17/23 1323 07/17/23 1419   BP: 148/87 91/65   Pulse: (!) 122 84   Patient Position - Orthostatic VS: Lying Sitting         Visual Acuity      ED Medications  Medications - No data to display    Diagnostic Studies  Results Reviewed     Procedure Component Value Units Date/Time    Fingerstick Glucose (POCT) [[id number]] (Normal) Collected: 07/17/23 1344    Lab Status: Final result Updated: 07/17/23 1346     POC Glucose 76 mg/dl                  No orders to display              Procedures  Procedures         ED Course  ED Course as of 07/17/23 1623   Mon Jul 17, 2023   1332 Pupils not pinpoint. PERRL. AAO x4. No acute complaints. No respiratory distress. Patient sleeping but awakens to verbal stimuli, stays awake throughout conversation. He reports he was walking around and smoked weed before the  found him. Denies other drug use. Multiple previous visits with similar presentation, reports of polysubstance use. Recent visit for K2 use. Denies SI/HI. 1416 Patient as been awake, eating, sitting comfortably on bed for 45 minutes. No lethargy. Remains AAOx4. Answering questions appropriately. Continues to deny acute complaints. Declines rehab. Was ambulated, walked with steady gait. Medical Decision Making  Patient brought to ED via EMS/[employer] for suspected substance use after being found sleeping on the sidewalk. Patient was never unresponsive, awoke to nonpainful tactile stimuli. No apnea/respiratory depression. Did not require narcan. Was AAOx4 on arrival, no acute complaints. No acute respiratory distress. No slurring of speech, able to recall events of today. Reports marijuana use today. Similar presentation to previous visits. No hypoglycemia. Fell asleep, easily aroused. Woke up on his own, ate. No lethargy. No narcan needed in ED. Patient observed. Prior to discharge was re-evaluated. No evidence of opioid overdose. Ambulates with steady gait. Outpatient substance use resources given. All imaging and/or lab testing discussed with patient, strict return to ED precautions discussed. Patient recommended to follow up promptly with appropriate outpatient provider. Patient and/or family members verbalizes understanding and agrees with plan.  Patient and/or family members were given opportunity to ask questions, all questions were answered at this time. Patient is stable for discharge.     Portions of the record may have been created with voice recognition software. Occasional wrong word or "sound a like" substitutions may have occurred due to the inherent limitations of voice recognition software. Read the chart carefully and recognize, using context, where substitutions have occurred. Amount and/or Complexity of Data Reviewed  Labs: ordered. Disposition  Final diagnoses:   Substance abuse ([address])     Time reflects when diagnosis was documented in both MDM as applicable and the Disposition within this note     Time User Action Codes Description Comment    7/17/2023  2:18 PM Jus Hitchcock Add [F19.10] Substance abuse [facility])       ED Disposition     ED Disposition   Discharge    Condition   Stable    Date/Time   Mon Jul 17, 2023  2:18 PM    Comment   Cony Crane discharge to home/self care.                Follow-up Information     Follow up With Specialties Details Why Contact Info Additional [address] [facility] Medicine Schedule an appointment as soon as possible for a visit  For follow up regarding your [facility] [id number]  [address] [place] [address]          Discharge Medication List as of 7/17/2023  2:19 PM      CONTINUE these medications which have NOT CHANGED    Details   benztropine (COGENTIN) 2 mg tablet Take 2 mg by mouth every morning, Starting Tue 6/22/2021, Historical Med      buPROPion [facility] SR) 150 mg 12 hr tablet Take 150 mg by mouth every morning, Starting Sun 6/20/2021, Historical Med      cloNIDine (CATAPRES) 0.2 mg tablet Take 0.2 mg by mouth daily at bedtime, Starting Tue 6/22/2021, Historical Med      divalproex sodium (DEPAKOTE ER) 500 mg 24 hr tablet Take 500 mg by mouth 2 (two) times a day, Starting Thu 5/6/2021, Historical Med      loxapine (LOXITANE) 50 MG capsule Multiple Dosages:Starting Fri 3/12/2021, Until Tue 5/11/2021 at 2359Take 1 capsule (50 mg total) by mouth 2 (two) times a day AND 2 capsules (100 mg total) daily at bedtime. , Print             No discharge procedures on file.     PDMP Review     None          ED Provider  Electronically Signed by           Bhavana Godinez PA-C  07/17/23 4917

## 2023-07-17 NOTE — DISCHARGE INSTRUCTIONS
Follow up with PCP. Return to ED for new or worsening symptoms as discussed.  Use resources given to you by our Certified .

## 2023-07-17 NOTE — CERTIFIED RECOVERY SPECIALIST
Certified  Note    Patient name: Johnie Davis  Location: ED 10/ED [address]: [address] [address]  Attending:  Abdifatah Lyle, * MRN [id number]  : 1985  Age: 40 y.o. Sex: male Date 2023         Substance Use History:     Social History     Substance and Sexual Activity   Alcohol Use Yes    Comment: social        Social History     Substance and Sexual Activity   Drug Use Yes   • Types: Marijuana, Other    Comment: K2       Admission Information  Substances Used at This Admission[de-identified] Opiates, Cocaine, THC, Methamphetamine  Readmission in Last 30 Days?: No  Encounter Type[de-identified] Patient Face-to-Face    Recovery Support Plan  Plan Discussed With Treatment Team[de-identified] Yes  Plan Discussed With[de-identified] Provider    Referral to Recovery Supports:  Resource Guide Given?: Yes  Follow Up With Patient[de-identified] No'    CRS received consult to meet with patient. CRS provided introductions and explanation of service. CRS provided patient with recovery resources and contact information. CRS pursued patient surrounding needs in the community. Patient doesn't report any needs. CRS explained programs available for him in the community and encouraged patient to reach out if he needs support. Provider made aware of contact with patient.       [place]

## 2023-07-17 NOTE — ED NOTES
Per patient request, sandwich, chips, cookies, and water provided. Provider aware and ok with same.      Amber Gonsalez  07/17/23 1368

## 2023-10-10 ENCOUNTER — HOSPITAL ENCOUNTER (EMERGENCY)
Facility: HOSPITAL | Age: 38
Discharge: HOME | End: 2023-10-10
Attending: EMERGENCY MEDICINE | Admitting: EMERGENCY MEDICINE
Payer: COMMERCIAL

## 2023-10-10 VITALS
DIASTOLIC BLOOD PRESSURE: 68 MMHG | HEART RATE: 87 BPM | OXYGEN SATURATION: 100 % | WEIGHT: 119.27 LBS | RESPIRATION RATE: 16 BRPM | TEMPERATURE: 99.2 F | SYSTOLIC BLOOD PRESSURE: 105 MMHG

## 2023-10-10 DIAGNOSIS — F19.10 POLYSUBSTANCE ABUSE (HCC): Primary | ICD-10-CM

## 2023-10-10 PROCEDURE — 99283 EMERGENCY DEPT VISIT LOW MDM: CPT | Performed by: EMERGENCY MEDICINE

## 2023-10-10 PROCEDURE — 99283 EMERGENCY DEPT VISIT LOW MDM: CPT

## 2023-10-10 NOTE — ED PROVIDER NOTES
History  Chief Complaint   Patient presents with   • Overdose - Accidental     K3OD     44-year-old male presenting emerged department with K2 overdose. Initially not answering questions but after about 15 minutes of being here, patient answering all questions. Has no complaints. Does attest to K2 use. Denies fever chills cough congestion rhinorrhea. No chest pain or shortness of breath          None       No past medical history on file. No past surgical history on file. No family history on file. I have reviewed and agree with the history as documented. No existing history information found. No existing history information found. Review of Systems   Constitutional: Negative for chills and fever. HENT: Negative for ear pain and sore throat. Eyes: Negative for pain and visual disturbance. Respiratory: Negative for cough and shortness of breath. Cardiovascular: Negative for chest pain and palpitations. Gastrointestinal: Negative for abdominal pain and vomiting. Genitourinary: Negative for dysuria and hematuria. Musculoskeletal: Negative for arthralgias and back pain. Skin: Negative for color change and rash. Neurological: Negative for seizures and syncope. All other systems reviewed and are negative. Physical Exam  Physical Exam  Vitals and nursing note reviewed. Constitutional:       General: He is not in acute distress. Appearance: He is well-developed. HENT:      Head: Normocephalic and atraumatic. Eyes:      Conjunctiva/sclera: Conjunctivae normal.   Cardiovascular:      Rate and Rhythm: Normal rate and regular rhythm. Heart sounds: No murmur heard. Pulmonary:      Effort: Pulmonary effort is normal. No respiratory distress. Breath sounds: Normal breath sounds. Abdominal:      Palpations: Abdomen is soft. Tenderness: There is no abdominal tenderness. Musculoskeletal:         General: No swelling. Cervical back: Neck supple. Skin:     General: Skin is warm and dry. Capillary Refill: Capillary refill takes less than 2 seconds. Neurological:      Mental Status: He is alert. Psychiatric:         Mood and Affect: Mood normal.         Vital Signs  ED Triage Vitals   Temperature Pulse Respirations Blood Pressure SpO2   10/10/23 1105 10/10/23 1105 10/10/23 1105 10/10/23 1105 10/10/23 1105   99.2 °F (37.3 °C) (!) 129 18 117/62 94 %      Temp Source Heart Rate Source Patient Position - Orthostatic VS BP Location FiO2 (%)   10/10/23 1105 10/10/23 1105 10/10/23 1105 10/10/23 1105 --   Tympanic Monitor Lying Left arm       Pain Score       10/10/23 1121       No Pain           Vitals:    10/10/23 1121 10/10/23 1126 10/10/23 1141 10/10/23 1229   BP:    105/68   Pulse: (!) 107 102 91 87   Patient Position - Orthostatic VS:    Sitting         Visual Acuity      ED Medications  Medications - No data to display    Diagnostic Studies  Results Reviewed     None                 No orders to display              Procedures  Procedures         ED Course                                             Medical Decision Making  78-year-old male with altered mental status secondary to K2 use. Resolved while in the ED. Well-appearing male. Disposition  Final diagnoses:   Polysubstance abuse ([address])     Time reflects when diagnosis was documented in both MDM as applicable and the Disposition within this note     Time User Action Codes Description Comment    10/10/2023 12:39 PM Matthew Hawthorne Add [F19.10] Polysubstance abuse [facility])       ED Disposition     ED Disposition   Discharge    Condition   Stable    Date/Time   Tue Oct 10, 2023 12:39 PM    Comment   Rocky Lundy discharge to home/self care.                Follow-up Information     Follow up With Specialties Details Why Contact Info [facility]   [address] [facility], [address] [place] [address]  [phone] [address] [facility], [address], [address] [facility]          Patient's Medications    No medications on file       No discharge procedures on file.     PDMP Review     None          ED Provider  Electronically Signed by           Margaret Mcdonald MD  10/10/23 Monisha Hubbard

## 2023-10-10 NOTE — ED NOTES
Assumed care of pt, pt transferred from [facility]arris bed for comfort. Pt awake and requesting food/drink, snacks provided.       David Taveras RN  10/10/23 4541

## 2023-10-13 ENCOUNTER — HOSPITAL ENCOUNTER (EMERGENCY)
Facility: HOSPITAL | Age: 38
Discharge: HOME | End: 2023-10-13
Attending: EMERGENCY MEDICINE
Payer: COMMERCIAL

## 2023-10-13 VITALS
DIASTOLIC BLOOD PRESSURE: 71 MMHG | HEART RATE: 87 BPM | TEMPERATURE: 97.6 F | SYSTOLIC BLOOD PRESSURE: 109 MMHG | OXYGEN SATURATION: 100 % | WEIGHT: 126 LBS | BODY MASS INDEX: 21.29 KG/M2 | RESPIRATION RATE: 18 BRPM

## 2023-10-13 DIAGNOSIS — F19.10 SUBSTANCE ABUSE (HCC): Primary | ICD-10-CM

## 2023-10-13 PROCEDURE — 99284 EMERGENCY DEPT VISIT MOD MDM: CPT

## 2023-10-13 PROCEDURE — 99283 EMERGENCY DEPT VISIT LOW MDM: CPT | Performed by: EMERGENCY MEDICINE

## 2023-10-25 ENCOUNTER — HOSPITAL ENCOUNTER (EMERGENCY)
Facility: HOSPITAL | Age: 38
Discharge: HOME | End: 2023-10-25
Attending: EMERGENCY MEDICINE
Payer: COMMERCIAL

## 2023-10-25 VITALS
TEMPERATURE: 98.7 F | DIASTOLIC BLOOD PRESSURE: 63 MMHG | BODY MASS INDEX: 21.42 KG/M2 | WEIGHT: 126.76 LBS | HEART RATE: 77 BPM | OXYGEN SATURATION: 99 % | SYSTOLIC BLOOD PRESSURE: 98 MMHG | RESPIRATION RATE: 14 BRPM

## 2023-10-25 DIAGNOSIS — F19.10 SUBSTANCE ABUSE (HCC): Primary | ICD-10-CM

## 2023-10-25 PROCEDURE — 99284 EMERGENCY DEPT VISIT MOD MDM: CPT

## 2023-10-25 PROCEDURE — 96360 HYDRATION IV INFUSION INIT: CPT

## 2023-10-25 PROCEDURE — 93005 ELECTROCARDIOGRAM TRACING: CPT

## 2023-10-25 PROCEDURE — 99285 EMERGENCY DEPT VISIT HI MDM: CPT | Performed by: EMERGENCY MEDICINE

## 2023-10-25 RX ORDER — NALOXONE HYDROCHLORIDE 1 MG/ML
1 INJECTION INTRAMUSCULAR; INTRAVENOUS; SUBCUTANEOUS ONCE
Status: COMPLETED | OUTPATIENT
Start: 2023-10-25 | End: 2023-10-25

## 2023-10-25 RX ADMIN — SODIUM CHLORIDE 1000 ML: 0.9 INJECTION, SOLUTION INTRAVENOUS at 20:14

## 2023-10-26 LAB
ATRIAL RATE: 100 BPM
P AXIS: 59 DEGREES
PR INTERVAL: 126 MS
QRS AXIS: 91 DEGREES
QRSD INTERVAL: 88 MS
QT INTERVAL: 340 MS
QTC INTERVAL: 438 MS
T WAVE AXIS: 39 DEGREES
VENTRICULAR RATE: 100 BPM

## 2023-10-26 NOTE — ED PROVIDER NOTES
History  Chief Complaint   Patient presents with    Overdose - Accidental     Patient was found unresponsive at the laundry mat, bystander gave him 4mg intranasal narcan. Patient woke up and ran away, patient was then found down the street by [employer], patient was stumbling around and confused. EMS gave 1mg of IV narcan. Patient awake, verbally slow to respond. A 41 yo male with pmhx of substance abuse and schizoaffective disorder; BIBA after being found unresponsive in a laundromat. Patient was administered intranasal Narcan by a bystander, and reportedly regained consciousness and left the laundromat. Police then found the patient stumbling and confused, patient was given an additional dose of Narcan with improvement in his mental status. On arrival to the ED, patient is drowsy but easily arousable to voice. He denies all complaints. He does report to "maybe" smoking marijuana tonight. He denies any other drug use and is unsure why he was at the [facility]. On review of records, pt has several ED visits for substance abuse. History provided by:  Patient and medical records  Overdose - Accidental      Prior to Admission Medications   Prescriptions Last Dose Informant Patient Reported?  Taking?   benztropine (COGENTIN) 2 mg tablet Not Taking  Yes No   Sig: Take 2 mg by mouth every morning   Patient not taking: Reported on 8/20/2021   buPROPion (WELLBUTRIN SR) 150 mg 12 hr tablet Not Taking  Yes No   Sig: Take 150 mg by mouth every morning   Patient not taking: Reported on 8/20/2021   cloNIDine (CATAPRES) 0.2 mg tablet Not Taking  Yes No   Sig: Take 0.2 mg by mouth daily at bedtime   Patient not taking: Reported on 8/20/2021   divalproex sodium (DEPAKOTE ER) 500 mg 24 hr tablet Not Taking  Yes No   Sig: Take 500 mg by mouth 2 (two) times a day   Patient not taking: Reported on 8/20/2021   loxapine (LOXITANE) 50 MG capsule   No No   Sig: Take 1 capsule (50 mg total) by mouth 2 (two) times a day AND 2 capsules (100 mg total) daily at bedtime. Patient not taking: Reported on 10/13/2021      Facility-Administered Medications: None       Past Medical History:   Diagnosis Date    Drug abuse ([address])     Schizoaffective disorder ([address])     resolved 05/16/14       Past Surgical History:   Procedure Laterality Date    NO PAST SURGERIES         Family History   Problem Relation Age of Onset    Anxiety disorder Mother     Depression Mother     Dysphagia Mother     Stroke Mother         cva    Diabetes type II Mother     Other Mother         Hyponatremia    Psychosis Mother      I have reviewed and agree with the history as documented. E-Cigarette/Vaping    E-Cigarette Use Never User      E-Cigarette/Vaping Substances    Nicotine No     THC No     CBD No     Flavoring No     Other No     Unknown No      Social History     Tobacco Use    Smoking status: Unknown    Smokeless tobacco: Never   Vaping Use    Vaping Use: Never used   Substance Use Topics    Alcohol use: Yes     Comment: social    Drug use: Yes     Types: Marijuana, Other     Comment: K2       Review of Systems   All other systems reviewed and are negative. Physical Exam  Physical Exam  General Appearance: drowsy, easily arousable to voice and able to participate in a conversation appropriately  Skin:  Warm, dry, intact. No cyanosis  HEENT: Atraumatic, normocephalic. No eye drainage. Normal hearing. Moist mucous membranes. Neck: Supple, trachea midline  Cardiac: RRR; no murmurs, rub, gallops. No pedal edema, 2+ pulses  Pulmonary: lungs CTAB; no wheezes, rales, rhonchi  Gastrointestinal: abdomen soft, nontender, nondistended; no guarding or rebound tenderness; good bowel sounds, no mass or bruits  Extremities:  No deformities.   No calf tenderness, no clubbing  Neuro:  no focal motor or sensory deficits, CN 2-12 grossly intact  Psych:  Normal mood and flat affect, normal judgement and insight      Vital Signs  ED Triage Vitals   Temperature Pulse Respirations Blood Pressure SpO2   10/25/23 1905 10/25/23 1905 10/25/23 1905 10/25/23 1905 10/25/23 1905   98.7 °F (37.1 °C) 101 16 98/55 96 %      Temp Source Heart Rate Source Patient Position - Orthostatic VS BP Location FiO2 (%)   10/25/23 1905 10/25/23 1905 10/25/23 1905 10/25/23 1905 --   Oral Monitor Lying Right arm       Pain Score       10/25/23 2003       No Pain           Vitals:    10/25/23 1905 10/25/23 2003 10/25/23 2056   BP: 98/55 (S) (!) 89/53 98/63   Pulse: 101 80 77   Patient Position - Orthostatic VS: Lying Lying Lying         Visual Acuity      ED Medications  Medications   naloxone (NARCAN) injection 1 mg (0 mg Intravenous Given to EMS 10/25/23 1900)   sodium chloride 0.9 % bolus 1,000 mL (0 mL Intravenous Stopped 10/25/23 2130)       Diagnostic Studies  Results Reviewed       None                   No orders to display              Procedures  Procedures   ECG 12 Lead Documentation  Date/Time: today/date: 10/25/2023  Performed by: Adryan Rose    ECG reviewed by me, the ED Provider: yes    Patient location:  ED   Previous ECG:  Compared to current, no change   Rate:  100  ECG rate assessment: normal    Rhythm: sinus tachycardia    Ectopy:  none    QRS axis:  Normal  Intervals: normal   Q waves: None   ST segments:  Normal  T waves: normal      Impression: Sinus tachycardia, otherwise normal EKG       ED Course  ED Course as of 10/28/23 0850   Wed Oct 25, 2023   2010 Blood Pressure(!): 89/53  Pt more awake and alert, asking for something to eat. Will PO challenge and give IVF   2059 On re-assessment, pt resting comfortably. He offers no complaints at this time, tolerating PO. He continues to deny drug abuse. Also reports compliance with his psychiatric medications. He denies SI and HI. Will allow completion of IVF with likely discharge home afterwards   2134 Pt ambulated to the restroom without assistance. He offers no complaints and feels comfortable with discharge home. SBIRT 20yo+      Flowsheet Row Most Recent Value   Initial Alcohol Screen: US AUDIT-C     1. How often do you have a drink containing alcohol? 0 Filed at: 10/25/2023 1907   2. How many drinks containing alcohol do you have on a typical day you are drinking? 0 Filed at: 10/25/2023 1907   3a. Male UNDER 65: How often do you have five or more drinks on one occasion? 0 Filed at: 10/25/2023 1907   Audit-C Score 0 Filed at: 10/25/2023 1907   JOURDAN: How many times in the past year have you. .. Used an illegal drug or used a prescription medication for non-medical reasons? Weekly Filed at: 10/25/2023 1907   DAST-10: In the past 12 months. ..    1. Have you used drugs other than those required for medical reasons? 1 Filed at: 10/25/2023 1907   2. Do you use more than one drug at a time? 0 Filed at: 10/25/2023 1907   3. Have you had medical problems as a result of your drug use (e.g., memory loss, hepatitis, convulsions, bleeding, etc.)? 0 Filed at: 10/25/2023 1907   4. Have you had "blackouts" or "flashbacks" as a result of drug use? YesNo 0 Filed at: 10/25/2023 1907   5. Do you ever feel bad or guilty about your drug use? 0 Filed at: 10/25/2023 1907   6. Does your spouse (or parent) ever complain about your involvement with drugs? 0 Filed at: 10/25/2023 1907   7. Have you neglected your family because of your use of drugs? 0 Filed at: 10/25/2023 1907   8. Have you engaged in illegal activities in order to obtain drugs? 0 Filed at: 10/25/2023 1907   9. Have you ever experienced withdrawal symptoms (felt sick) when you stopped taking drugs? 0 Filed at: 10/25/2023 1907   10. Are you always able to stop using drugs when you want to? 0 Filed at: 10/25/2023 1907   DAST-10 Score 1 Filed at: 10/25/2023 [place] [employer]  A 41 yo male presents with EMS after being found altered and stumbling by police, resolution of his symptoms following administration of narcan.   On arrival, pt drowsy but easily arousable to voice and able to answer questions appropriately. Neuro exam is nonfocal, remainder of exam atraumatic. Suspect likely drug abuse given pt's history and improvement in mental status with Narcan. Will continue to monitor in the ED, low threshold to administer additional narcan if mental status declines. Risk  Prescription drug management. Disposition  Final diagnoses:   Substance abuse ([address])     Time reflects when diagnosis was documented in both MDM as applicable and the Disposition within this note       Time User Action Codes Description Comment    10/25/2023  9:44 PM Geronimo Roman Add [F19.10] Substance abuse [facility])           ED Disposition       ED Disposition   Discharge    Condition   Stable    Date/Time   Wed Oct 25, 2023 2144    Comment   Jamir Baker discharge to home/self care.                    Follow-up Information       Follow up With Specialties Details Why Contact Info Additional [address] [facility] Family Medicine Schedule an appointment as soon as possible for a visit  To establish care with a family physician [address] [place] [phone]  [address] [place] [address], [address] [place] [address], [address] [place] [address], [address] [place] [address]            Discharge Medication List as of 10/25/2023  9:44 PM        CONTINUE these medications which have NOT CHANGED    Details   benztropine (COGENTIN) 2 mg tablet Take 2 mg by mouth every morning, Starting Tue 6/22/2021, Historical Med      buPROPion (WELLBUTRIN SR) 150 mg 12 hr tablet Take 150 mg by mouth every morning, Starting [place] 6/20/2021, Historical Med      cloNIDine (CATAPRES) 0.2 mg tablet Take 0.2 mg by mouth daily at bedtime, Starting Tue 6/22/2021, Historical Med      divalproex sodium (DEPAKOTE ER) 500 mg 24 hr tablet Take 500 mg by mouth 2 (two) times a day, Starting u 5/6/2021, Historical Med      loxapine (LOXITANE) 50 MG capsule Multiple Dosages:Starting Fri 3/12/2021, Until Tue 5/11/2021 at 2359Take 1 capsule (50 mg total) by mouth 2 (two) times a day AND 2 capsules (100 mg total) daily at bedtime. , Print             No discharge procedures on file.     PDMP Review       None            ED Provider  Electronically Signed by             Kinga Mcdowell DO  10/28/23 9554

## 2023-10-31 ENCOUNTER — HOSPITAL ENCOUNTER (EMERGENCY)
Facility: HOSPITAL | Age: 38
Discharge: HOME | End: 2023-11-01
Attending: EMERGENCY MEDICINE
Payer: COMMERCIAL

## 2023-10-31 DIAGNOSIS — F19.10 SUBSTANCE ABUSE (HCC): Primary | ICD-10-CM

## 2023-10-31 LAB — GLUCOSE SERPL-MCNC: 111 MG/DL (ref 65–140)

## 2023-10-31 PROCEDURE — 82948 REAGENT STRIP/BLOOD GLUCOSE: CPT

## 2023-10-31 PROCEDURE — 99284 EMERGENCY DEPT VISIT MOD MDM: CPT

## 2023-10-31 RX ORDER — NALOXONE HYDROCHLORIDE 1 MG/ML
1 INJECTION PARENTERAL ONCE
Status: COMPLETED | OUTPATIENT
Start: 2023-10-31 | End: 2023-10-31

## 2023-10-31 NOTE — Clinical Note
Pati Noel was seen and treated in our emergency department on 10/31/2023. No restrictions            Diagnosis:     Néstor Salazar  . He may return on this date: 11/02/2023         If you have any questions or concerns, please don't hesitate to call.       Tania García PA-C    ______________________________           _______________          _______________  Hospital Representative                              Date                                Time

## 2023-11-01 VITALS
TEMPERATURE: 97.5 F | OXYGEN SATURATION: 97 % | RESPIRATION RATE: 12 BRPM | BODY MASS INDEX: 19.04 KG/M2 | DIASTOLIC BLOOD PRESSURE: 65 MMHG | SYSTOLIC BLOOD PRESSURE: 106 MMHG | HEART RATE: 63 BPM | WEIGHT: 112.66 LBS

## 2023-11-01 NOTE — ED PROVIDER NOTES
History  Chief Complaint   Patient presents with    Overdose - Accidental     Pt brought in by EMS. Per EMS, pt was found "near-apneic" on the sidewalk. Was given 2 mg narcan IM by EMS, and then started breathing about 22 resp/min and became alert. Pt refusing to talk to staff or answer questions. However, pt did talk briefly to EMS. Patient is a 29-year-old male who is well-known to this emergency departman for doing K2, was found on the side of the road, reportedly near apneic, given Narcan and allegedly began to breathe more sufficiently at that time. Patient has a well-known history of doing K2, and becoming altered, with no acute distress. Patient does grunt to questions, but does not verbalize answers at this time. Does wake up to light stimulation      Overdose - Accidental  Context: intentional ingestion    Associated symptoms: altered mental status        Prior to Admission Medications   Prescriptions Last Dose Informant Patient Reported? Taking?   benztropine (COGENTIN) 2 mg tablet   Yes No   Sig: Take 2 mg by mouth every morning   Patient not taking: Reported on 8/20/2021   buPROPion (WELLBUTRIN SR) 150 mg 12 hr tablet   Yes No   Sig: Take 150 mg by mouth every morning   Patient not taking: Reported on 8/20/2021   cloNIDine (CATAPRES) 0.2 mg tablet   Yes No   Sig: Take 0.2 mg by mouth daily at bedtime   Patient not taking: Reported on 8/20/2021   divalproex sodium (DEPAKOTE ER) 500 mg 24 hr tablet   Yes No   Sig: Take 500 mg by mouth 2 (two) times a day   Patient not taking: Reported on 8/20/2021   loxapine (LOXITANE) 50 MG capsule   No No   Sig: Take 1 capsule (50 mg total) by mouth 2 (two) times a day AND 2 capsules (100 mg total) daily at bedtime.    Patient not taking: Reported on 10/13/2021      Facility-Administered Medications: None       Past Medical History:   Diagnosis Date    Drug abuse ([address])     Schizoaffective disorder ([address])     resolved 05/16/14       Past Surgical History:   Procedure Laterality Date    NO PAST SURGERIES         Family History   Problem Relation Age of Onset    Anxiety disorder Mother     Depression Mother     Dysphagia Mother     Stroke Mother         cva    Diabetes type II Mother     Other Mother         Hyponatremia    Psychosis Mother      I have reviewed and agree with the history as documented. E-Cigarette/Vaping    E-Cigarette Use Never User      E-Cigarette/Vaping Substances    Nicotine No     THC No     CBD No     Flavoring No     Other No     Unknown No      Social History     Tobacco Use    Smoking status: Unknown    Smokeless tobacco: Never   Vaping Use    Vaping Use: Never used   Substance Use Topics    Alcohol use: Yes     Comment: social    Drug use: Yes     Types: Marijuana, Other     Comment: K2       Review of Systems   Unable to perform ROS: Mental status change (Illicit substance intoxication)       Physical Exam  Physical Exam  Vitals reviewed. Constitutional:       Appearance: Normal appearance. He is normal weight. HENT:      Head: Normocephalic and atraumatic. Right Ear: External ear normal.      Left Ear: External ear normal.      Nose: Nose normal.   Eyes:      Conjunctiva/sclera: Conjunctivae normal.   Cardiovascular:      Rate and Rhythm: Normal rate. Pulmonary:      Effort: Pulmonary effort is normal.   Musculoskeletal:         General: Normal range of motion. Cervical back: Normal range of motion. Skin:     General: Skin is warm and dry. Neurological:      Mental Status: He is alert.          Vital Signs  ED Triage Vitals [10/31/23 2303]   Temperature Pulse Respirations Blood Pressure SpO2   (!) 97.1 °F (36.2 °C) 104 (!) 11 112/71 94 %      Temp Source Heart Rate Source Patient Position - Orthostatic VS BP Location FiO2 (%)   Tympanic Monitor Lying Left arm --      Pain Score       --           Vitals:    10/31/23 2303 10/31/23 2330 11/01/23 0000 11/01/23 0030   BP: 112/71 101/60 94/56 106/65   Pulse: 104 73 67 63   Patient Position - Orthostatic VS: Lying Lying Lying Lying         Visual Acuity      ED Medications  Medications   naloxone (FOR EMS ONLY) [facility]) 2 MG/2ML injection 2 mg (0 mg Does not apply Given to EMS 10/31/23 2308)       Diagnostic Studies  Results Reviewed       Procedure Component Value Units Date/Time    Fingerstick Glucose (POCT) [[id number]]  (Normal) Collected: 10/31/23 2312    Lab Status: Final result Updated: 10/31/23 2313     POC Glucose 111 mg/dl                    No orders to display              Procedures  Procedures         ED Course                                             Medical Decision Making  Patient is a 71-year-old male who came in for evaluation of altered mental status due to illicit substance abuse. Patient is in no acute distress, when alert and oriented, was discharged home. No acute distress, no sign of life-threatening pathology at this time    Amount and/or Complexity of Data Reviewed  Labs: ordered. Risk  Prescription drug management. Disposition  Final diagnoses:   Substance abuse ([address])     Time reflects when diagnosis was documented in both MDM as applicable and the Disposition within this note       Time User Action Codes Description Comment    11/1/2023 12:48 AM Delmy Raphael Add [F19.10] Substance abuse [facility])           ED Disposition       ED Disposition   Discharge    Condition   Stable    Date/Time   Wed Nov 1, 2023 12:48 AM    Comment   Erin Farrell discharge to home/self care.                    Follow-up Information       Follow up With Specialties Details Why Contact Info Additional [address] [facility] Emergency Department Emergency Medicine  As needed, If symptoms worsen [address] [phone]  [address] [facility] Emergency Department            Discharge Medication List as of 11/1/2023 12:48 AM        CONTINUE these medications which have NOT CHANGED    Details benztropine (COGENTIN) 2 mg tablet Take 2 mg by mouth every morning, Starting Tue 6/22/2021, Historical Med      buPROPion [facility] SR) 150 mg 12 hr tablet Take 150 mg by mouth every morning, Starting Sun 6/20/2021, Historical Med      cloNIDine (CATAPRES) 0.2 mg tablet Take 0.2 mg by mouth daily at bedtime, Starting Tue 6/22/2021, Historical Med      divalproex sodium (DEPAKOTE ER) 500 mg 24 hr tablet Take 500 mg by mouth 2 (two) times a day, Starting Thu 5/6/2021, Historical Med      loxapine (LOXITANE) 50 MG capsule Multiple Dosages:Starting Fri 3/12/2021, Until Tue 5/11/2021 at 2359Take 1 capsule (50 mg total) by mouth 2 (two) times a day AND 2 capsules (100 mg total) daily at bedtime. , Print             No discharge procedures on file.     PDMP Review       None            ED Provider  Electronically Signed by             Melanie Romero PA-C  11/01/23 5535

## 2023-11-01 NOTE — ED NOTES
Assumed care of patient - Patient resting in stretcher with no signs of distress at this time - pulse ox and cardiac monitoring continues - patient unwilling to communicate with this RN at this time.      Halley Rosas RN  10/31/23 7461

## 2023-11-11 ENCOUNTER — HOSPITAL ENCOUNTER (EMERGENCY)
Facility: HOSPITAL | Age: 38
Discharge: LEFT BEFORE DISCHARGE | End: 2023-11-11
Attending: EMERGENCY MEDICINE
Payer: COMMERCIAL

## 2023-11-11 ENCOUNTER — APPOINTMENT (EMERGENCY)
Dept: RADIOLOGY | Facility: HOSPITAL | Age: 38
End: 2023-11-11

## 2023-11-11 ENCOUNTER — APPOINTMENT (EMERGENCY)
Dept: CT IMAGING | Facility: HOSPITAL | Age: 38
End: 2023-11-11

## 2023-11-11 VITALS
HEART RATE: 77 BPM | WEIGHT: 127.65 LBS | TEMPERATURE: 98.1 F | RESPIRATION RATE: 16 BRPM | OXYGEN SATURATION: 97 % | DIASTOLIC BLOOD PRESSURE: 57 MMHG | SYSTOLIC BLOOD PRESSURE: 99 MMHG

## 2023-11-11 DIAGNOSIS — R41.82 ALTERED MENTAL STATUS: Primary | ICD-10-CM

## 2023-11-11 DIAGNOSIS — G93.40 ACUTE ENCEPHALOPATHY: ICD-10-CM

## 2023-11-11 LAB
ALBUMIN SERPL BCP-MCNC: 4.3 G/DL (ref 3.5–5)
ALBUMIN SERPL BCP-MCNC: 4.3 G/DL (ref 3.5–5)
ALP SERPL-CCNC: 67 U/L (ref 34–104)
ALP SERPL-CCNC: 67 U/L (ref 34–104)
ALT SERPL W P-5'-P-CCNC: 14 U/L (ref 7–52)
ALT SERPL W P-5'-P-CCNC: 14 U/L (ref 7–52)
AMMONIA PLAS-SCNC: 103 UMOL/L (ref 18–72)
AMMONIA PLAS-SCNC: 103 UMOL/L (ref 18–72)
AMPHETAMINES SERPL QL SCN: NEGATIVE
AMPHETAMINES SERPL QL SCN: NEGATIVE
ANION GAP SERPL CALCULATED.3IONS-SCNC: 15 MMOL/L
ANION GAP SERPL CALCULATED.3IONS-SCNC: 15 MMOL/L
APAP SERPL-MCNC: <2 UG/ML (ref 10–20)
APAP SERPL-MCNC: <2 UG/ML (ref 10–20)
AST SERPL W P-5'-P-CCNC: 18 U/L (ref 13–39)
AST SERPL W P-5'-P-CCNC: 18 U/L (ref 13–39)
BARBITURATES UR QL: NEGATIVE
BARBITURATES UR QL: NEGATIVE
BASOPHILS # BLD AUTO: 0.04 THOUSANDS/ÂΜL (ref 0–0.1)
BASOPHILS # BLD AUTO: 0.04 THOUSANDS/ÂΜL (ref 0–0.1)
BASOPHILS NFR BLD AUTO: 1 % (ref 0–1)
BASOPHILS NFR BLD AUTO: 1 % (ref 0–1)
BENZODIAZ UR QL: NEGATIVE
BENZODIAZ UR QL: NEGATIVE
BILIRUB DIRECT SERPL-MCNC: 0.04 MG/DL (ref 0–0.2)
BILIRUB DIRECT SERPL-MCNC: 0.04 MG/DL (ref 0–0.2)
BILIRUB SERPL-MCNC: 0.28 MG/DL (ref 0.2–1)
BILIRUB SERPL-MCNC: 0.28 MG/DL (ref 0.2–1)
BILIRUB UR QL STRIP: NEGATIVE
BILIRUB UR QL STRIP: NEGATIVE
BUN SERPL-MCNC: 6 MG/DL (ref 5–25)
BUN SERPL-MCNC: 6 MG/DL (ref 5–25)
CALCIUM SERPL-MCNC: 9 MG/DL (ref 8.4–10.2)
CALCIUM SERPL-MCNC: 9 MG/DL (ref 8.4–10.2)
CARDIAC TROPONIN I PNL SERPL HS: 2 NG/L
CARDIAC TROPONIN I PNL SERPL HS: 2 NG/L
CHLORIDE SERPL-SCNC: 98 MMOL/L (ref 96–108)
CHLORIDE SERPL-SCNC: 98 MMOL/L (ref 96–108)
CK SERPL-CCNC: 146 U/L (ref 39–308)
CK SERPL-CCNC: 146 U/L (ref 39–308)
CLARITY UR: CLEAR
CLARITY UR: CLEAR
CO2 SERPL-SCNC: 19 MMOL/L (ref 21–32)
CO2 SERPL-SCNC: 19 MMOL/L (ref 21–32)
COCAINE UR QL: NEGATIVE
COCAINE UR QL: NEGATIVE
COLOR UR: YELLOW
COLOR UR: YELLOW
CREAT SERPL-MCNC: 0.85 MG/DL (ref 0.6–1.3)
CREAT SERPL-MCNC: 0.85 MG/DL (ref 0.6–1.3)
EOSINOPHIL # BLD AUTO: 0.1 THOUSAND/ÂΜL (ref 0–0.61)
EOSINOPHIL # BLD AUTO: 0.1 THOUSAND/ÂΜL (ref 0–0.61)
EOSINOPHIL NFR BLD AUTO: 2 % (ref 0–6)
EOSINOPHIL NFR BLD AUTO: 2 % (ref 0–6)
ERYTHROCYTE [DISTWIDTH] IN BLOOD BY AUTOMATED COUNT: 13.9 % (ref 11.6–15.1)
ERYTHROCYTE [DISTWIDTH] IN BLOOD BY AUTOMATED COUNT: 13.9 % (ref 11.6–15.1)
ETHANOL SERPL-MCNC: <10 MG/DL
ETHANOL SERPL-MCNC: <10 MG/DL
FLUAV RNA RESP QL NAA+PROBE: NEGATIVE
FLUAV RNA RESP QL NAA+PROBE: NEGATIVE
FLUBV RNA RESP QL NAA+PROBE: NEGATIVE
FLUBV RNA RESP QL NAA+PROBE: NEGATIVE
GFR SERPL CREATININE-BSD FRML MDRD: 112 ML/MIN/1.73SQ M
GFR SERPL CREATININE-BSD FRML MDRD: 112 ML/MIN/1.73SQ M
GLUCOSE SERPL-MCNC: 125 MG/DL (ref 65–140)
GLUCOSE SERPL-MCNC: 125 MG/DL (ref 65–140)
GLUCOSE UR STRIP-MCNC: NEGATIVE MG/DL
GLUCOSE UR STRIP-MCNC: NEGATIVE MG/DL
HCT VFR BLD AUTO: 36.9 % (ref 36.5–49.3)
HCT VFR BLD AUTO: 36.9 % (ref 36.5–49.3)
HGB BLD-MCNC: 12.1 G/DL (ref 12–17)
HGB BLD-MCNC: 12.1 G/DL (ref 12–17)
HGB UR QL STRIP.AUTO: NEGATIVE
HGB UR QL STRIP.AUTO: NEGATIVE
IMM GRANULOCYTES # BLD AUTO: 0.03 THOUSAND/UL (ref 0–0.2)
IMM GRANULOCYTES # BLD AUTO: 0.03 THOUSAND/UL (ref 0–0.2)
IMM GRANULOCYTES NFR BLD AUTO: 1 % (ref 0–2)
IMM GRANULOCYTES NFR BLD AUTO: 1 % (ref 0–2)
KETONES UR STRIP-MCNC: NEGATIVE MG/DL
KETONES UR STRIP-MCNC: NEGATIVE MG/DL
LACTATE SERPL-SCNC: 0.7 MMOL/L (ref 0.5–2)
LACTATE SERPL-SCNC: 0.7 MMOL/L (ref 0.5–2)
LACTATE SERPL-SCNC: 8.1 MMOL/L (ref 0.5–2)
LACTATE SERPL-SCNC: 8.1 MMOL/L (ref 0.5–2)
LEUKOCYTE ESTERASE UR QL STRIP: NEGATIVE
LEUKOCYTE ESTERASE UR QL STRIP: NEGATIVE
LYMPHOCYTES # BLD AUTO: 2.85 THOUSANDS/ÂΜL (ref 0.6–4.47)
LYMPHOCYTES # BLD AUTO: 2.85 THOUSANDS/ÂΜL (ref 0.6–4.47)
LYMPHOCYTES NFR BLD AUTO: 49 % (ref 14–44)
LYMPHOCYTES NFR BLD AUTO: 49 % (ref 14–44)
MCH RBC QN AUTO: 30.6 PG (ref 26.8–34.3)
MCH RBC QN AUTO: 30.6 PG (ref 26.8–34.3)
MCHC RBC AUTO-ENTMCNC: 32.8 G/DL (ref 31.4–37.4)
MCHC RBC AUTO-ENTMCNC: 32.8 G/DL (ref 31.4–37.4)
MCV RBC AUTO: 93 FL (ref 82–98)
MCV RBC AUTO: 93 FL (ref 82–98)
MONOCYTES # BLD AUTO: 0.59 THOUSAND/ÂΜL (ref 0.17–1.22)
MONOCYTES # BLD AUTO: 0.59 THOUSAND/ÂΜL (ref 0.17–1.22)
MONOCYTES NFR BLD AUTO: 10 % (ref 4–12)
MONOCYTES NFR BLD AUTO: 10 % (ref 4–12)
NEUTROPHILS # BLD AUTO: 2.12 THOUSANDS/ÂΜL (ref 1.85–7.62)
NEUTROPHILS # BLD AUTO: 2.12 THOUSANDS/ÂΜL (ref 1.85–7.62)
NEUTS SEG NFR BLD AUTO: 37 % (ref 43–75)
NEUTS SEG NFR BLD AUTO: 37 % (ref 43–75)
NITRITE UR QL STRIP: NEGATIVE
NITRITE UR QL STRIP: NEGATIVE
NRBC BLD AUTO-RTO: 0 /100 WBCS
NRBC BLD AUTO-RTO: 0 /100 WBCS
OPIATES UR QL SCN: NEGATIVE
OPIATES UR QL SCN: NEGATIVE
OXYCODONE+OXYMORPHONE UR QL SCN: NEGATIVE
OXYCODONE+OXYMORPHONE UR QL SCN: NEGATIVE
PCP UR QL: NEGATIVE
PCP UR QL: NEGATIVE
PH UR STRIP.AUTO: 6.5 [PH] (ref 4.5–8)
PH UR STRIP.AUTO: 6.5 [PH] (ref 4.5–8)
PLATELET # BLD AUTO: 228 THOUSANDS/UL (ref 149–390)
PLATELET # BLD AUTO: 228 THOUSANDS/UL (ref 149–390)
PMV BLD AUTO: 8.7 FL (ref 8.9–12.7)
PMV BLD AUTO: 8.7 FL (ref 8.9–12.7)
POTASSIUM SERPL-SCNC: 4.2 MMOL/L (ref 3.5–5.3)
POTASSIUM SERPL-SCNC: 4.2 MMOL/L (ref 3.5–5.3)
PROCALCITONIN SERPL-MCNC: <0.05 NG/ML
PROCALCITONIN SERPL-MCNC: <0.05 NG/ML
PROT SERPL-MCNC: 6.2 G/DL (ref 6.4–8.4)
PROT SERPL-MCNC: 6.2 G/DL (ref 6.4–8.4)
PROT UR STRIP-MCNC: NEGATIVE MG/DL
PROT UR STRIP-MCNC: NEGATIVE MG/DL
RBC # BLD AUTO: 3.96 MILLION/UL (ref 3.88–5.62)
RBC # BLD AUTO: 3.96 MILLION/UL (ref 3.88–5.62)
RSV RNA RESP QL NAA+PROBE: NEGATIVE
RSV RNA RESP QL NAA+PROBE: NEGATIVE
SALICYLATES SERPL-MCNC: <5 MG/DL (ref 3–20)
SALICYLATES SERPL-MCNC: <5 MG/DL (ref 3–20)
SARS-COV-2 RNA RESP QL NAA+PROBE: NEGATIVE
SARS-COV-2 RNA RESP QL NAA+PROBE: NEGATIVE
SODIUM SERPL-SCNC: 132 MMOL/L (ref 135–147)
SODIUM SERPL-SCNC: 132 MMOL/L (ref 135–147)
SP GR UR STRIP.AUTO: 1.01 (ref 1–1.03)
SP GR UR STRIP.AUTO: 1.01 (ref 1–1.03)
THC UR QL: NEGATIVE
THC UR QL: NEGATIVE
TSH SERPL DL<=0.05 MIU/L-ACNC: 1.8 UIU/ML (ref 0.45–4.5)
TSH SERPL DL<=0.05 MIU/L-ACNC: 1.8 UIU/ML (ref 0.45–4.5)
UROBILINOGEN UR QL STRIP.AUTO: 0.2 E.U./DL
UROBILINOGEN UR QL STRIP.AUTO: 0.2 E.U./DL
WBC # BLD AUTO: 5.73 THOUSAND/UL (ref 4.31–10.16)
WBC # BLD AUTO: 5.73 THOUSAND/UL (ref 4.31–10.16)

## 2023-11-11 PROCEDURE — 96365 THER/PROPH/DIAG IV INF INIT: CPT

## 2023-11-11 PROCEDURE — 99285 EMERGENCY DEPT VISIT HI MDM: CPT | Performed by: EMERGENCY MEDICINE

## 2023-11-11 PROCEDURE — 96361 HYDRATE IV INFUSION ADD-ON: CPT

## 2023-11-11 PROCEDURE — 82550 ASSAY OF CK (CPK): CPT | Performed by: EMERGENCY MEDICINE

## 2023-11-11 PROCEDURE — 80179 DRUG ASSAY SALICYLATE: CPT | Performed by: EMERGENCY MEDICINE

## 2023-11-11 PROCEDURE — 80307 DRUG TEST PRSMV CHEM ANLYZR: CPT | Performed by: EMERGENCY MEDICINE

## 2023-11-11 PROCEDURE — 99285 EMERGENCY DEPT VISIT HI MDM: CPT

## 2023-11-11 PROCEDURE — 84145 PROCALCITONIN (PCT): CPT | Performed by: EMERGENCY MEDICINE

## 2023-11-11 PROCEDURE — 80143 DRUG ASSAY ACETAMINOPHEN: CPT | Performed by: EMERGENCY MEDICINE

## 2023-11-11 PROCEDURE — 96367 TX/PROPH/DG ADDL SEQ IV INF: CPT

## 2023-11-11 PROCEDURE — 80076 HEPATIC FUNCTION PANEL: CPT | Performed by: EMERGENCY MEDICINE

## 2023-11-11 PROCEDURE — 71045 X-RAY EXAM CHEST 1 VIEW: CPT

## 2023-11-11 PROCEDURE — 80048 BASIC METABOLIC PNL TOTAL CA: CPT | Performed by: EMERGENCY MEDICINE

## 2023-11-11 PROCEDURE — 82140 ASSAY OF AMMONIA: CPT | Performed by: EMERGENCY MEDICINE

## 2023-11-11 PROCEDURE — 81003 URINALYSIS AUTO W/O SCOPE: CPT

## 2023-11-11 PROCEDURE — 36415 COLL VENOUS BLD VENIPUNCTURE: CPT | Performed by: EMERGENCY MEDICINE

## 2023-11-11 PROCEDURE — 96368 THER/DIAG CONCURRENT INF: CPT

## 2023-11-11 PROCEDURE — 84443 ASSAY THYROID STIM HORMONE: CPT | Performed by: EMERGENCY MEDICINE

## 2023-11-11 PROCEDURE — 70450 CT HEAD/BRAIN W/O DYE: CPT

## 2023-11-11 PROCEDURE — 96375 TX/PRO/DX INJ NEW DRUG ADDON: CPT

## 2023-11-11 PROCEDURE — 87040 BLOOD CULTURE FOR BACTERIA: CPT | Performed by: EMERGENCY MEDICINE

## 2023-11-11 PROCEDURE — 82077 ASSAY SPEC XCP UR&BREATH IA: CPT | Performed by: EMERGENCY MEDICINE

## 2023-11-11 PROCEDURE — 85025 COMPLETE CBC W/AUTO DIFF WBC: CPT | Performed by: EMERGENCY MEDICINE

## 2023-11-11 PROCEDURE — 0241U HB NFCT DS VIR RESP RNA 4 TRGT: CPT | Performed by: EMERGENCY MEDICINE

## 2023-11-11 PROCEDURE — 84484 ASSAY OF TROPONIN QUANT: CPT | Performed by: EMERGENCY MEDICINE

## 2023-11-11 PROCEDURE — 93005 ELECTROCARDIOGRAM TRACING: CPT

## 2023-11-11 PROCEDURE — G1004 CDSM NDSC: HCPCS

## 2023-11-11 PROCEDURE — 96366 THER/PROPH/DIAG IV INF ADDON: CPT

## 2023-11-11 PROCEDURE — 83605 ASSAY OF LACTIC ACID: CPT | Performed by: EMERGENCY MEDICINE

## 2023-11-11 RX ORDER — CEFEPIME HYDROCHLORIDE 2 G/50ML
2000 INJECTION, SOLUTION INTRAVENOUS ONCE
Status: COMPLETED | OUTPATIENT
Start: 2023-11-11 | End: 2023-11-11

## 2023-11-11 RX ORDER — LOXAPINE SUCCINATE 10 MG/1
50 TABLET ORAL 3 TIMES DAILY
COMMUNITY

## 2023-11-11 RX ORDER — BENZTROPINE MESYLATE 0.5 MG/1
0.5 TABLET ORAL 3 TIMES DAILY
COMMUNITY

## 2023-11-11 RX ORDER — HYDROXYZINE PAMOATE 50 MG/1
50 CAPSULE ORAL
COMMUNITY

## 2023-11-11 RX ORDER — ONDANSETRON 2 MG/ML
4 INJECTION INTRAMUSCULAR; INTRAVENOUS ONCE
Status: COMPLETED | OUTPATIENT
Start: 2023-11-11 | End: 2023-11-11

## 2023-11-11 RX ORDER — SODIUM CHLORIDE, SODIUM GLUCONATE, SODIUM ACETATE, POTASSIUM CHLORIDE, MAGNESIUM CHLORIDE, SODIUM PHOSPHATE, DIBASIC, AND POTASSIUM PHOSPHATE .53; .5; .37; .037; .03; .012; .00082 G/100ML; G/100ML; G/100ML; G/100ML; G/100ML; G/100ML; G/100ML
1000 INJECTION, SOLUTION INTRAVENOUS ONCE
Status: COMPLETED | OUTPATIENT
Start: 2023-11-11 | End: 2023-11-11

## 2023-11-11 RX ORDER — DIVALPROEX SODIUM 500 MG/1
500 TABLET, DELAYED RELEASE ORAL
COMMUNITY

## 2023-11-11 RX ORDER — NALOXONE HYDROCHLORIDE 1 MG/ML
1 INJECTION PARENTERAL ONCE
Status: COMPLETED | OUTPATIENT
Start: 2023-11-11 | End: 2023-11-11

## 2023-11-11 RX ORDER — NALOXONE HYDROCHLORIDE 1 MG/ML
2 INJECTION INTRAMUSCULAR; INTRAVENOUS; SUBCUTANEOUS ONCE
Status: DISCONTINUED | OUTPATIENT
Start: 2023-11-11 | End: 2023-11-11

## 2023-11-11 RX ADMIN — SODIUM CHLORIDE 1000 ML: 0.9 INJECTION, SOLUTION INTRAVENOUS at 16:01

## 2023-11-11 RX ADMIN — ONDANSETRON 4 MG: 2 INJECTION INTRAMUSCULAR; INTRAVENOUS at 16:00

## 2023-11-11 RX ADMIN — SODIUM CHLORIDE, SODIUM GLUCONATE, SODIUM ACETATE, POTASSIUM CHLORIDE, MAGNESIUM CHLORIDE, SODIUM PHOSPHATE, DIBASIC, AND POTASSIUM PHOSPHATE 1000 ML: .53; .5; .37; .037; .03; .012; .00082 INJECTION, SOLUTION INTRAVENOUS at 17:22

## 2023-11-11 RX ADMIN — VANCOMYCIN HYDROCHLORIDE 1250 MG: 1 INJECTION, POWDER, LYOPHILIZED, FOR SOLUTION INTRAVENOUS at 17:22

## 2023-11-11 RX ADMIN — CEFEPIME HYDROCHLORIDE 2000 MG: 2 INJECTION, SOLUTION INTRAVENOUS at 16:32

## 2023-11-11 NOTE — ED PROVIDER NOTES
History  Chief Complaint   Patient presents with    Altered Mental Status     Pt found on 7/11 parking lot unresponsive. Flaccid. Known drug user. Pt given 0.8mg narcan. Padmini Foley is a 64-year-old male with past medical history of schizophrenia and polysubstance abuse. He was found unresponsive in a local parking lot today. Per EMS his initial oxygen saturations were in the low to mid 80s on room air initially. They reported that he was tachycardic in the 130s in route to the hospital and had fever of 102 Fahrenheit. He was given to 0.4 mg doses of Narcan with minimal response. On arrival to the ED he was fairly obtunded but would softly groan and/or partially open eyes to a vigorous sternal rub. During his ED course his mental status and vital signs have improved. He now responds to voice and can follow some simple commands. Altered Mental Status      Prior to Admission Medications   Prescriptions Last Dose Informant Patient Reported? Taking?   benztropine (COGENTIN) 0.5 mg tablet   Yes Yes   Sig: Take 0.5 mg by mouth 3 (three) times a day   divalproex sodium (DEPAKOTE) 500 mg DR tablet 11/10/2023  Yes Yes   Sig: Take 500 mg by mouth daily at bedtime   hydrOXYzine pamoate (VISTARIL) 50 mg capsule 11/10/2023  Yes Yes   Sig: Take 50 mg by mouth daily at bedtime   loxapine (LOXITANE) 10 mg capsule 11/11/2023  Yes Yes   Sig: Take 50 mg by mouth 3 (three) times a day 1 in the morning, 2 at night      Facility-Administered Medications: None       Past Medical History:   Diagnosis Date    Schizophrenia ([address])        History reviewed. No pertinent surgical history. History reviewed. No pertinent family history. I have reviewed and agree with the history as documented.     E-Cigarette/Vaping     E-Cigarette/Vaping Substances     Social History     Tobacco Use    Smoking status: Every Day     Packs/day: 1.00     Types: Cigarettes    Smokeless tobacco: Never   Substance Use Topics    Alcohol use: Never Review of Systems   Unable to perform ROS: Mental status change       Physical Exam  Physical Exam  Vitals and nursing note reviewed. Constitutional:       Appearance: He is well-developed. Comments: Somewhat disheveled, minimally responsive. HENT:      Head: Normocephalic and atraumatic. Eyes:      Conjunctiva/sclera: Conjunctivae normal.      Comments: Pupils are somewhat dilated bilaterally but responsive, perhaps 6-4 BL   Cardiovascular:      Rate and Rhythm: Normal rate and regular rhythm. Heart sounds: No murmur heard. Pulmonary:      Effort: Pulmonary effort is normal. No respiratory distress. Breath sounds: Normal breath sounds. Abdominal:      Palpations: Abdomen is soft. Tenderness: There is no abdominal tenderness. Musculoskeletal:         General: No swelling. Cervical back: Neck supple. Skin:     General: Skin is warm and dry. Capillary Refill: Capillary refill takes less than 2 seconds. Neurological:      GCS: GCS eye subscore is 2. GCS verbal subscore is 2. GCS motor subscore is 4. Comments: Minimally responsive but will partially open eyes to painful stimuli, does occasionally move extremities spontaneously without posturing, occasionally moans to vigorous painful stimuli.    Psychiatric:         Mood and Affect: Mood normal.         Vital Signs  ED Triage Vitals   Temperature Pulse Respirations Blood Pressure SpO2   11/11/23 1546 11/11/23 1544 11/11/23 1546 11/11/23 1544 11/11/23 1544   98.1 °F (36.7 °C) (!) 127 (!) 28 106/56 94 %      Temp Source Heart Rate Source Patient Position - Orthostatic VS BP Location FiO2 (%)   11/11/23 1546 11/11/23 1544 11/11/23 1544 11/11/23 1544 --   Rectal Monitor Lying Right arm       Pain Score       11/11/23 1800       No Pain           Vitals:    11/11/23 1544 11/11/23 1633 11/11/23 1800   BP: 106/56 93/55 96/67   Pulse: (!) 127 86 85   Patient Position - Orthostatic VS: Lying Lying Lying         Visual Acuity      ED Medications  Medications   naloxone (NARCAN) injection 2 mg (0 mg Intravenous Hold 11/11/23 1552)   vancomycin (VANCOCIN) 1,250 mg in sodium chloride 0.9 % 250 mL IVPB (1,250 mg Intravenous New Bag 11/11/23 1722)   sodium chloride 0.9 % bolus 1,000 mL (0 mL Intravenous Stopped 11/11/23 1706)   ondansetron (ZOFRAN) injection 4 mg (4 mg Intravenous Given 11/11/23 1600)   cefepime (MAXIPIME) IVPB (premix in dextrose) 2,000 mg 50 mL (0 mg Intravenous Stopped 11/11/23 1707)   multi-electrolyte (ISOLYTE-S PH 7.4) bolus 1,000 mL (1,000 mL Intravenous New Bag 11/11/23 1722)   naloxone (FOR EMS ONLY) (NARCAN) 2 MG/2ML injection 2 mg (0 mg Does not apply Given to EMS 11/11/23 1600)       Diagnostic Studies  Results Reviewed       Procedure Component Value Units Date/Time    TSH, 3rd generation with Free T4 reflex [[id number]]  (Normal) Collected: 11/11/23 1553    Lab Status: Final result Specimen: Blood from Arm, Left Updated: 11/11/23 1717     TSH 3RD GENERATON 1.798 uIU/mL     FLU/RSV/COVID - if FLU/RSV clinically relevant [[id number]]  (Normal) Collected: 11/11/23 1553    Lab Status: Final result Specimen: Nares from Nose Updated: 11/11/23 1642     SARS-CoV-2 Negative     INFLUENZA A PCR Negative     INFLUENZA B PCR Negative     RSV PCR Negative    Narrative:      FOR PEDIATRIC PATIENTS - copy/paste COVID Guidelines URL to browser: [email or web address]    SARS-CoV-2 assay is a Nucleic Acid Amplification assay intended for the  qualitative detection of nucleic acid from SARS-CoV-2 in nasopharyngeal  swabs. Results are for the presumptive identification of SARS-CoV-2 RNA. Positive results are indicative of infection with SARS-CoV-2, the virus  causing COVID-19, but do not rule out bacterial infection or co-infection  with other viruses.  Laboratories within the [facility] and its  territories are required to report all positive results to the appropriate  public health authorities. Negative results do not preclude SARS-CoV-2  infection and should not be used as the sole basis for treatment or other  patient management decisions. Negative results must be combined with  clinical observations, patient history, and epidemiological information. This test has not been FDA cleared or approved. This test has been authorized by FDA under an Emergency Use Authorization  (EUA). This test is only authorized for the duration of time the  declaration that circumstances exist justifying the authorization of the  emergency use of an in vitro diagnostic tests for detection of SARS-CoV-2  virus and/or diagnosis of COVID-19 infection under section 564(b)(1) of  the Act, 21 U. S.C. 807HDF-9(A)(5), unless the authorization is terminated  or revoked sooner. The test has been validated but independent review by FDA  and CLIA is pending. Test performed using Cepheid GeneXpert: This RT-PCR assay targets N2,  a region unique to SARS-CoV-2. A conserved region in the E-gene was chosen  for pan-Sarbecovirus detection which includes SARS-CoV-2. According to CMS-2020-01-R, this platform meets the definition of high-throughput technology. Lactic acid, plasma (w/reflex if result > 2.0) [[id number]]  (Abnormal) Collected: 11/11/23 1553    Lab Status: Final result Specimen: Blood from Arm, Left Updated: 11/11/23 1641     LACTIC ACID 8.1 mmol/L     Narrative:      Result may be elevated if tourniquet was used during collection.     Lactic acid 2 Hours [[id number]]     Lab Status: No result Specimen: Blood     Procalcitonin [[id number]]  (Normal) Collected: 11/11/23 1553    Lab Status: Final result Specimen: Blood from Arm, Left Updated: 11/11/23 1640     Procalcitonin <0.05 ng/ml     HS Troponin 0hr (reflex protocol) [[id number]]  (Normal) Collected: 11/11/23 1556    Lab Status: Final result Specimen: Blood from Arm, Left Updated: 11/11/23 1635     hs TnI 0hr 2 ng/L     HS Troponin I 4hr [[id number]]     Lab Status: No result Specimen: Blood     HS Troponin I 2hr [[id number]]     Lab Status: No result Specimen: Blood     Basic metabolic panel [[id number]]  (Abnormal) Collected: 11/11/23 1553    Lab Status: Final result Specimen: Blood from Arm, Left Updated: 11/11/23 1633     Sodium 132 mmol/L      Potassium 4.2 mmol/L      Chloride 98 mmol/L      CO2 19 mmol/L      ANION GAP 15 mmol/L      BUN 6 mg/dL      Creatinine 0.85 mg/dL      Glucose 125 mg/dL      Calcium 9.0 mg/dL      eGFR 112 ml/min/1.73sq m     Narrative:      [place] guidelines for Chronic Kidney Disease (CKD):     Stage 1 with normal or high GFR (GFR > 90 mL/min/1.73 square meters)    Stage 2 Mild CKD (GFR = 60-89 mL/min/1.73 square meters)    Stage 3A Moderate CKD (GFR = 45-59 mL/min/1.73 square meters)    Stage 3B Moderate CKD (GFR = 30-44 mL/min/1.73 square meters)    Stage 4 Severe CKD (GFR = 15-29 mL/min/1.73 square meters)    Stage 5 End Stage CKD (GFR <15 mL/min/1.73 square meters)  Note: GFR calculation is accurate only with a steady state creatinine    Hepatic function panel [[id number]]  (Abnormal) Collected: 11/11/23 1553    Lab Status: Final result Specimen: Blood from Arm, Left Updated: 11/11/23 1633     Total Bilirubin 0.28 mg/dL      Bilirubin, Direct 0.04 mg/dL      Alkaline Phosphatase 67 U/L      AST 18 U/L      ALT 14 U/L      Total Protein 6.2 g/dL      Albumin 4.3 g/dL     CK [[id number]]  (Normal) Collected: 11/11/23 1553    Lab Status: Final result Specimen: Blood from Arm, Left Updated: 11/11/23 1633     Total  U/L     Salicylate level [[id number]]  (Normal) Collected: 11/11/23 1553    Lab Status: Final result Specimen: Blood from Arm, Left Updated: 36/18/22 8760     Salicylate Lvl <5 mg/dL     Acetaminophen level-If concentration is detectable, please discuss with medical  on call.  [[id number]]  (Abnormal) Collected: 11/11/23 1553    Lab Status: Final result Specimen: Blood from Arm, Left Updated: 11/11/23 1633     Acetaminophen Level <2 ug/mL     Ammonia [[id number]]  (Abnormal) Collected: 11/11/23 1556    Lab Status: Final result Specimen: Blood from Arm, Left Updated: 11/11/23 1631     Ammonia 103 umol/L     Ethanol [[id number]]  (Normal) Collected: 11/11/23 1553    Lab Status: Final result Specimen: Blood from Arm, Left Updated: 11/11/23 1630     Ethanol Lvl <10 mg/dL     Blood culture #1 [[id number]] Collected: 11/11/23 1604    Lab Status: In process Specimen: Blood from Arm, Left Updated: 11/11/23 1610    Blood culture [[id number]] Collected: 11/11/23 1607    Lab Status: In process Specimen: Blood from Arm, Right Updated: 11/11/23 1610    CBC and differential [[id number]]  (Abnormal) Collected: 11/11/23 1553    Lab Status: Final result Specimen: Blood from Arm, Left Updated: 11/11/23 1607     WBC 5.73 Thousand/uL      RBC 3.96 Million/uL      Hemoglobin 12.1 g/dL      Hematocrit 36.9 %      MCV 93 fL      MCH 30.6 pg      MCHC 32.8 g/dL      RDW 13.9 %      MPV 8.7 fL      Platelets 396 Thousands/uL      nRBC 0 /100 WBCs      Neutrophils Relative 37 %      Immat GRANS % 1 %      Lymphocytes Relative 49 %      Monocytes Relative 10 %      Eosinophils Relative 2 %      Basophils Relative 1 %      Neutrophils Absolute 2.12 Thousands/µL      Immature Grans Absolute 0.03 Thousand/uL      Lymphocytes Absolute 2.85 Thousands/µL      Monocytes Absolute 0.59 Thousand/µL      Eosinophils Absolute 0.10 Thousand/µL      Basophils Absolute 0.04 Thousands/µL     Blood culture #2 [[id number]]     Lab Status: No result Specimen: Blood     Rapid drug screen, urine [[id number]]     Lab Status: No result Specimen: Urine                    CT head without contrast   Final Result by Maggie Talley MD (11/11 1654)      No acute intracranial abnormality.                   Workstation performed: [id number]         XR chest 1 view portable    (Results Pending)              Procedures  Procedures ED Course                               SBIRT 22yo+      Flowsheet Row Most Recent Value   Initial Alcohol Screen: US AUDIT-C     1. How often do you have a drink containing alcohol? 0 Filed at: 11/11/2023 1644   2. How many drinks containing alcohol do you have on a typical day you are drinking? 0 Filed at: 11/11/2023 1644   3a. Male UNDER 65: How often do you have five or more drinks on one occasion? 0 Filed at: 11/11/2023 1644   Audit-C Score 0 Filed at: 11/11/2023 1644   JOURDAN: How many times in the past year have you. .. Used an illegal drug or used a prescription medication for non-medical reasons? Never Filed at: 11/11/2023 1644                      Medical Decision Making  64-year-old male brought to ED after being found down in a nearby parking lot for altered mental status. The patient has a long and well-documented history of polysubstance abuse, typically K2 or opiates (current chart marked for merge with separate chart for same patient, see MRN [id number]). He was very obtunded upon presentation today with minimal response to Narcan, given dilation of pupils and tachycardia, elevated lactic acid, would suspect polysubstance potentially causing seizure with postictal period. Mental status and vital signs rapidly improved shortly after patient's arrival to ED. His neuro status returned to normal.  He had a GCS of 15 and was nonfocal.  He was able to answer all orientation questions correctly, ate a turkey sandwich without difficulty, and ambulated around the department without need for assistance. His elevated lactic acid returned to normal after IV fluids.   Patient was either unable or on willing to tell me much of anything about what happened today other than "I remember I got picked up by the " and "I remember I was walking home."  As such I strongly recommended admission at least overnight for observation as I could not explain exactly why patient had such profound alteration in consciousness today (though it does seem to fit with his previous presentations.)  Ultimately, patient elected to sign out [address]. We discussed possible risks of doing so and patient was able to read verbalize these risks in his own words. Patient given us permission to speak to his mother earlier during his ED stay and we attempted to contact her multiple times prior to the patient's discharge but she did not answer. Message was left. Amount and/or Complexity of Data Reviewed  Labs: ordered. Decision-making details documented in ED Course. Radiology: ordered. Decision-making details documented in ED Course. ECG/medicine tests: ordered and independent interpretation performed. Details: Sinus tachycardia at rate of 118, normal axis, normal intervals, no acute ST changes to suggest cardiac ischemia. Risk  Prescription drug management. Decision regarding hospitalization. Disposition  Final diagnoses: Altered mental status   Acute encephalopathy   Sepsis ([address])     Time reflects when diagnosis was documented in both MDM as applicable and the Disposition within this note       Time User Action Codes Description Comment    11/11/2023  6:00 PM Allyn Lis Add [R41.82] Altered mental status     11/11/2023  6:00 PM Allyn Lis Add [G93.40] Acute encephalopathy     11/11/2023  6:00 PM Allyn Lis Add [A41.9] Sepsis [facility])           ED Disposition       ED Disposition   Admit    Condition   Stable    Date/Time   Sat Nov 11, 2023 7268    Comment   Case was discussed with NAZANIN and the patient's admission status was agreed to be Admission Status: observation status to the service of Dr. Jevon Solano    None         Patient's Medications   Discharge Prescriptions    No medications on file       No discharge procedures on file.     PDMP Review       None            ED Provider  Electronically Signed by             Joanne Mccracken MD  11/11/23 8145 [facility]

## 2023-11-11 NOTE — ED NOTES
Patient transported to [address] [place] [address], [address] [place] [address]  11/11/23 0115

## 2023-11-12 ENCOUNTER — HOSPITAL ENCOUNTER (EMERGENCY)
Facility: HOSPITAL | Age: 38
Discharge: HOME | End: 2023-11-12
Attending: EMERGENCY MEDICINE | Admitting: EMERGENCY MEDICINE
Payer: COMMERCIAL

## 2023-11-12 VITALS
RESPIRATION RATE: 16 BRPM | WEIGHT: 130.29 LBS | HEART RATE: 74 BPM | SYSTOLIC BLOOD PRESSURE: 106 MMHG | OXYGEN SATURATION: 98 % | DIASTOLIC BLOOD PRESSURE: 73 MMHG | BODY MASS INDEX: 22.02 KG/M2 | TEMPERATURE: 97.6 F

## 2023-11-12 DIAGNOSIS — F19.10 SUBSTANCE ABUSE (HCC): Primary | ICD-10-CM

## 2023-11-12 LAB
ALBUMIN SERPL BCP-MCNC: 4.2 G/DL (ref 3.5–5)
ALP SERPL-CCNC: 69 U/L (ref 34–104)
ALT SERPL W P-5'-P-CCNC: 13 U/L (ref 7–52)
ANION GAP SERPL CALCULATED.3IONS-SCNC: 8 MMOL/L
AST SERPL W P-5'-P-CCNC: 16 U/L (ref 13–39)
ATRIAL RATE: 118 BPM
BASOPHILS # BLD AUTO: 0.03 THOUSANDS/ÂΜL (ref 0–0.1)
BASOPHILS NFR BLD AUTO: 1 % (ref 0–1)
BILIRUB SERPL-MCNC: 0.36 MG/DL (ref 0.2–1)
BUN SERPL-MCNC: 8 MG/DL (ref 5–25)
CALCIUM SERPL-MCNC: 9.4 MG/DL (ref 8.4–10.2)
CARDIAC TROPONIN I PNL SERPL HS: 2 NG/L
CHLORIDE SERPL-SCNC: 104 MMOL/L (ref 96–108)
CO2 SERPL-SCNC: 28 MMOL/L (ref 21–32)
CREAT SERPL-MCNC: 0.8 MG/DL (ref 0.6–1.3)
EOSINOPHIL # BLD AUTO: 0.02 THOUSAND/ÂΜL (ref 0–0.61)
EOSINOPHIL NFR BLD AUTO: 0 % (ref 0–6)
ERYTHROCYTE [DISTWIDTH] IN BLOOD BY AUTOMATED COUNT: 14.4 % (ref 11.6–15.1)
ETHANOL SERPL-MCNC: <10 MG/DL
GFR SERPL CREATININE-BSD FRML MDRD: 114 ML/MIN/1.73SQ M
GLUCOSE SERPL-MCNC: 108 MG/DL (ref 65–140)
GLUCOSE SERPL-MCNC: 108 MG/DL (ref 65–140)
HCT VFR BLD AUTO: 34.9 % (ref 36.5–49.3)
HGB BLD-MCNC: 11.6 G/DL (ref 12–17)
IMM GRANULOCYTES # BLD AUTO: 0.01 THOUSAND/UL (ref 0–0.2)
IMM GRANULOCYTES NFR BLD AUTO: 0 % (ref 0–2)
LYMPHOCYTES # BLD AUTO: 1.53 THOUSANDS/ÂΜL (ref 0.6–4.47)
LYMPHOCYTES NFR BLD AUTO: 30 % (ref 14–44)
MCH RBC QN AUTO: 30.9 PG (ref 26.8–34.3)
MCHC RBC AUTO-ENTMCNC: 33.2 G/DL (ref 31.4–37.4)
MCV RBC AUTO: 93 FL (ref 82–98)
MONOCYTES # BLD AUTO: 0.66 THOUSAND/ÂΜL (ref 0.17–1.22)
MONOCYTES NFR BLD AUTO: 13 % (ref 4–12)
NEUTROPHILS # BLD AUTO: 2.94 THOUSANDS/ÂΜL (ref 1.85–7.62)
NEUTS SEG NFR BLD AUTO: 56 % (ref 43–75)
NRBC BLD AUTO-RTO: 0 /100 WBCS
P AXIS: 57 DEGREES
PLATELET # BLD AUTO: 224 THOUSANDS/UL (ref 149–390)
PMV BLD AUTO: 9 FL (ref 8.9–12.7)
POTASSIUM SERPL-SCNC: 3.9 MMOL/L (ref 3.5–5.3)
PR INTERVAL: 136 MS
PROT SERPL-MCNC: 6 G/DL (ref 6.4–8.4)
QRS AXIS: 86 DEGREES
QRSD INTERVAL: 86 MS
QT INTERVAL: 340 MS
QTC INTERVAL: 476 MS
RBC # BLD AUTO: 3.76 MILLION/UL (ref 3.88–5.62)
SODIUM SERPL-SCNC: 140 MMOL/L (ref 135–147)
T WAVE AXIS: 63 DEGREES
VENTRICULAR RATE: 118 BPM
WBC # BLD AUTO: 5.19 THOUSAND/UL (ref 4.31–10.16)

## 2023-11-12 PROCEDURE — 36415 COLL VENOUS BLD VENIPUNCTURE: CPT | Performed by: EMERGENCY MEDICINE

## 2023-11-12 PROCEDURE — 96374 THER/PROPH/DIAG INJ IV PUSH: CPT

## 2023-11-12 PROCEDURE — 80053 COMPREHEN METABOLIC PANEL: CPT | Performed by: EMERGENCY MEDICINE

## 2023-11-12 PROCEDURE — 99284 EMERGENCY DEPT VISIT MOD MDM: CPT

## 2023-11-12 PROCEDURE — 96361 HYDRATE IV INFUSION ADD-ON: CPT

## 2023-11-12 PROCEDURE — 85025 COMPLETE CBC W/AUTO DIFF WBC: CPT | Performed by: EMERGENCY MEDICINE

## 2023-11-12 PROCEDURE — 93010 ELECTROCARDIOGRAM REPORT: CPT | Performed by: INTERNAL MEDICINE

## 2023-11-12 PROCEDURE — 84484 ASSAY OF TROPONIN QUANT: CPT | Performed by: EMERGENCY MEDICINE

## 2023-11-12 PROCEDURE — 82948 REAGENT STRIP/BLOOD GLUCOSE: CPT

## 2023-11-12 PROCEDURE — 82077 ASSAY SPEC XCP UR&BREATH IA: CPT | Performed by: EMERGENCY MEDICINE

## 2023-11-12 PROCEDURE — 93005 ELECTROCARDIOGRAM TRACING: CPT

## 2023-11-12 RX ORDER — NALOXONE HYDROCHLORIDE 1 MG/ML
1 INJECTION PARENTERAL ONCE
Status: COMPLETED | OUTPATIENT
Start: 2023-11-12 | End: 2023-11-12

## 2023-11-12 RX ORDER — NALOXONE HYDROCHLORIDE 1 MG/ML
2 INJECTION INTRAMUSCULAR; INTRAVENOUS; SUBCUTANEOUS ONCE
Status: COMPLETED | OUTPATIENT
Start: 2023-11-12 | End: 2023-11-12

## 2023-11-12 RX ADMIN — NALOXONE HYDROCHLORIDE 2 MG: 1 INJECTION PARENTERAL at 12:38

## 2023-11-12 RX ADMIN — NALOXONE HYDROCHLORIDE 4 MG: 4 SPRAY NASAL at 15:52

## 2023-11-12 RX ADMIN — SODIUM CHLORIDE 2000 ML: 0.9 INJECTION, SOLUTION INTRAVENOUS at 12:54

## 2023-11-12 NOTE — ED NOTES
Attempted to contact pt's mother (x3) per pt's request. No answer from mother, message was left by PETERSON Ambrocio Client, PETERSON  11/11/23 2008

## 2023-11-12 NOTE — ED PROVIDER NOTES
History  Chief Complaint   Patient presents with    Altered Mental Status     Pt seen here for same yesterday. Per EMS pt found in parking lot unresponsive 2 mg of narcan given pre hospital.      Patient is a 27-year-old male. Long history of substance abuse. He also has schizoaffective disorder. Continues K2. Apparently patient was here yesterday for a drug overdose. He did have a head CT at that time that was reportedly negative. He was discharged. He was found unresponsive again today. EMS administered Narcan. Patient did begin to breathe better, but did not wake up. There is no history of any trauma. No history of any fever. Otherwise history is limited by altered mental status. Patient has many prior ED visits for more substance abuse. Prior to Admission Medications   Prescriptions Last Dose Informant Patient Reported? Taking?   benztropine (COGENTIN) 2 mg tablet   Yes No   Sig: Take 2 mg by mouth every morning   Patient not taking: Reported on 8/20/2021   buPROPion (WELLBUTRIN SR) 150 mg 12 hr tablet   Yes No   Sig: Take 150 mg by mouth every morning   Patient not taking: Reported on 8/20/2021   cloNIDine (CATAPRES) 0.2 mg tablet   Yes No   Sig: Take 0.2 mg by mouth daily at bedtime   Patient not taking: Reported on 8/20/2021   divalproex sodium (DEPAKOTE ER) 500 mg 24 hr tablet   Yes No   Sig: Take 500 mg by mouth 2 (two) times a day   Patient not taking: Reported on 8/20/2021   loxapine (LOXITANE) 50 MG capsule   No No   Sig: Take 1 capsule (50 mg total) by mouth 2 (two) times a day AND 2 capsules (100 mg total) daily at bedtime.    Patient not taking: Reported on 10/13/2021      Facility-Administered Medications: None       Past Medical History:   Diagnosis Date    Drug abuse ([address])     Schizoaffective disorder ([address])     resolved 05/16/14       Past Surgical History:   Procedure Laterality Date    NO PAST SURGERIES         Family History   Problem Relation Age of Onset    Anxiety disorder Mother     Depression Mother     Dysphagia Mother     Stroke Mother         cva    Diabetes type II Mother     Other Mother         Hyponatremia    Psychosis Mother      I have reviewed and agree with the history as documented. E-Cigarette/Vaping    E-Cigarette Use Never User      E-Cigarette/Vaping Substances    Nicotine No     THC No     CBD No     Flavoring No     Other No     Unknown No      Social History     Tobacco Use    Smoking status: Unknown    Smokeless tobacco: Never   Vaping Use    Vaping Use: Never used   Substance Use Topics    Alcohol use: Yes     Comment: social    Drug use: Yes     Types: Marijuana, Other     Comment: K2       Review of Systems   Unable to perform ROS: Patient unresponsive       Physical Exam  Physical Exam  Vitals reviewed. Constitutional:       General: He is not in acute distress. Comments: Responsive to painful stimuli. HENT:      Head: Normocephalic and atraumatic. Mouth/Throat:      Comments: Dry  Eyes:      General: No scleral icterus. Pupils: Pupils are equal, round, and reactive to light. Pupils are equal.   Cardiovascular:      Rate and Rhythm: Normal rate and regular rhythm. Heart sounds: Normal heart sounds. Pulmonary:      Effort: Pulmonary effort is normal. No respiratory distress. Breath sounds: Normal breath sounds. No stridor. No wheezing, rhonchi or rales. Abdominal:      General: Bowel sounds are normal. There is no distension. Palpations: Abdomen is soft. Tenderness: There is no abdominal tenderness. There is no guarding. Musculoskeletal:         General: No swelling or tenderness. Cervical back: Neck supple. No rigidity. Skin:     General: Skin is warm and dry. Neurological:      Comments: Response to painful stimuli. There does not appear to be any lateralizing deficits.          Vital Signs  ED Triage Vitals [11/12/23 1207]   Temperature Pulse Respirations Blood Pressure SpO2   97.6 °F (36.4 °C) 92 16 101/57 93 %      Temp Source Heart Rate Source Patient Position - Orthostatic VS BP Location FiO2 (%)   Axillary Monitor Lying Right arm --      Pain Score       --           Vitals:    11/12/23 1240 11/12/23 1345 11/12/23 1445 11/12/23 1515   BP: (!) 84/49 106/73 100/67 106/73   Pulse: 95 82 76 74   Patient Position - Orthostatic VS: Lying Lying Lying          Visual Acuity  Visual Acuity      Flowsheet Row Most Recent Value   L Pupil Size (mm) 8   R Pupil Size (mm) 8            ED Medications  Medications   naloxone nasal- Given to patient by provider at discharge.  (NARCAN) 4 mg/0.1 mL nasal spray 4 mg (has no administration in time range)   naloxone (FOR EMS ONLY) [facility]) 2 MG/2ML injection 2 mg (0 mg Does not apply Given to EMS 11/12/23 1227)   naloxone [facility]) injection 2 mg (2 mg Intravenous Given 11/12/23 1238)   sodium chloride 0.9 % bolus 2,000 mL (0 mL Intravenous Stopped 11/12/23 1500)       Diagnostic Studies  Results Reviewed       Procedure Component Value Units Date/Time    HS Troponin I 4hr [[id number]]     Lab Status: No result Specimen: Blood     Ethanol [[id number]]  (Normal) Collected: 11/12/23 1255    Lab Status: Final result Specimen: Blood from Arm, Left Updated: 11/12/23 1324     Ethanol Lvl <10 mg/dL     HS Troponin I 2hr [[id number]]     Lab Status: No result Specimen: Blood     HS Troponin 0hr (reflex protocol) [[id number]]  (Normal) Collected: 11/12/23 1252    Lab Status: Final result Specimen: Blood from Arm, Left Updated: 11/12/23 1319     hs TnI 0hr 2 ng/L     Comprehensive metabolic panel [[id number]]  (Abnormal) Collected: 11/12/23 1252    Lab Status: Final result Specimen: Blood from Arm, Left Updated: 11/12/23 1312     Sodium 140 mmol/L      Potassium 3.9 mmol/L      Chloride 104 mmol/L      CO2 28 mmol/L      ANION GAP 8 mmol/L      BUN 8 mg/dL      Creatinine 0.80 mg/dL      Glucose 108 mg/dL      Calcium 9.4 mg/dL      AST 16 U/L      ALT 13 U/L      Alkaline Phosphatase 69 U/L Total Protein 6.0 g/dL      Albumin 4.2 g/dL      Total Bilirubin 0.36 mg/dL      eGFR 114 ml/min/1.73sq m     Narrative:      [place] guidelines for Chronic Kidney Disease (CKD):     Stage 1 with normal or high GFR (GFR > 90 mL/min/1.73 square meters)    Stage 2 Mild CKD (GFR = 60-89 mL/min/1.73 square meters)    Stage 3A Moderate CKD (GFR = 45-59 mL/min/1.73 square meters)    Stage 3B Moderate CKD (GFR = 30-44 mL/min/1.73 square meters)    Stage 4 Severe CKD (GFR = 15-29 mL/min/1.73 square meters)    Stage 5 End Stage CKD (GFR <15 mL/min/1.73 square meters)  Note: GFR calculation is accurate only with a steady state creatinine    CBC and differential [[id number]]  (Abnormal) Collected: 11/12/23 1252    Lab Status: Final result Specimen: Blood from Arm, Left Updated: 11/12/23 1257     WBC 5.19 Thousand/uL      RBC 3.76 Million/uL      Hemoglobin 11.6 g/dL      Hematocrit 34.9 %      MCV 93 fL      MCH 30.9 pg      MCHC 33.2 g/dL      RDW 14.4 %      MPV 9.0 fL      Platelets 102 Thousands/uL      nRBC 0 /100 WBCs      Neutrophils Relative 56 %      Immat GRANS % 0 %      Lymphocytes Relative 30 %      Monocytes Relative 13 %      Eosinophils Relative 0 %      Basophils Relative 1 %      Neutrophils Absolute 2.94 Thousands/µL      Immature Grans Absolute 0.01 Thousand/uL      Lymphocytes Absolute 1.53 Thousands/µL      Monocytes Absolute 0.66 Thousand/µL      Eosinophils Absolute 0.02 Thousand/µL      Basophils Absolute 0.03 Thousands/µL     Fingerstick Glucose (POCT) [[id number]]  (Normal) Collected: 11/12/23 1241    Lab Status: Final result Updated: 11/12/23 1242     POC Glucose 108 mg/dl     Rapid drug screen, urine [[id number]]     Lab Status: No result Specimen: Urine                    No orders to display              Procedures  ECG 12 Lead Documentation Only    Date/Time: 11/12/2023 12:37 PM    Performed by: Gavino Burris MD  Authorized by: Gavino Burris MD    ECG reviewed by me, the ED Provider: yes    Patient location:  ED  Comments:      Sinus tachycardia. Right axis deviation. No ectopy. Borderline EKG. Similar to prior EKG. CriticalCare Time    Date/Time: 11/12/2023 3:37 PM    Performed by: Molly Ruth MD  Authorized by: Molly Ruth MD    Critical care provider statement:     Critical care time (minutes):  60    Critical care time was exclusive of:  Separately billable procedures and treating other patients    Critical care was necessary to treat or prevent imminent or life-threatening deterioration of the following conditions:  CNS failure or compromise and toxidrome    Critical care was time spent personally by me on the following activities:  Obtaining history from patient or surrogate, development of treatment plan with patient or surrogate, discussions with consultants, evaluation of patient's response to treatment, examination of patient, ordering and performing treatments and interventions, ordering and review of laboratory studies, ordering and review of radiographic studies and re-evaluation of patient's condition    I assumed direction of critical care for this patient from another provider in my specialty: no             ED Course                                             Medical Decision Making  No real change with Narcan. Although patient was severely altered, he was maintaining his airway. He was monitored closely. He was reevaluated frequently. He did have transient hypotension that resolved with IV fluid hydration. Laboratory evaluation and EKG were nonspecific. Patient never presented a urine for urine tox. He did awake spontaneously. Now is awake and alert. He adamantly refuses drug and alcohol treatment. He was made aware that he is overdosing on a daily basis now. He was made aware that 1 of these times he will end up dead. He still is refusing treatment. Most likely this was K2.   Patient admits to smoking what he thought was marijuana prior to arrival.  Patient is currently awake and alert. Wants to go home. Appropriate for discharge and outpatient management. Amount and/or Complexity of Data Reviewed  Independent Historian: EMS  External Data Reviewed: labs, radiology and notes. Labs: ordered. Decision-making details documented in ED Course. ECG/medicine tests: ordered and independent interpretation performed. Decision-making details documented in ED Course. Risk  Prescription drug management. Decision regarding hospitalization. Disposition  Final diagnoses:   Substance abuse ([address])     Time reflects when diagnosis was documented in both MDM as applicable and the Disposition within this note       Time User Action Codes Description Comment    11/12/2023  3:39 PM Trevor Villarreal Add [F19.10] Substance abuse [facility])           ED Disposition       ED Disposition   Discharge    Condition   Stable    Date/Time   Sun Nov 12, 2023  3:39 PM    Comment   Baylee Mancilla discharge to home/self care. Follow-up Information       Follow up With Specialties Details Why [address] [facility] 2nd Floor Family Medicine In 1 week  [facility] [address]  [phone]              Patient's Medications   Discharge Prescriptions    No medications on file       No discharge procedures on file.     PDMP Review       None            ED Provider  Electronically Signed by             Trevor Villarreal MD  11/12/23 [address], [place]  11/13/23 8070

## 2023-11-12 NOTE — QUICK NOTE
Progress Note - Triage Asssessment   Chantal Cerda 40 y.o. male MRN: [id number]    Time Called ( Time): 9509  Date Called: 11/11/23  Room#: ED 22  Person requesting evaluation: Dr. Loulou Kitchen    Situation:    PMH schizophrenia and polysubstance abuse. Found down in parking lot, was initially hypoxic, and tachycardia. Pt received narcan with minimal response. Clinically improved in ED with fluid and abx. Lactic acid improved from 8.1 down to 0.7. Interventions:   N/A         Triage Assessment:   On my face to face evaluation, pt awake and interactive, states he feels better and just got some food. HR 99, BP 99/57, Pt on RA with SaO2 is 97% on Room Air. Pt does not currently have critical care needs. Patient can be admitted to med-surg level of care  Please reach out to critical care if patient's condition changes and will re-evaluate.     Recommendations discussed  with Dr. Loulou Kitchen

## 2023-11-13 LAB
ATRIAL RATE: 112 BPM
ATRIAL RATE: 116 BPM
P AXIS: 56 DEGREES
P AXIS: 57 DEGREES
PR INTERVAL: 128 MS
PR INTERVAL: 134 MS
QRS AXIS: 90 DEGREES
QRS AXIS: 92 DEGREES
QRSD INTERVAL: 84 MS
QRSD INTERVAL: 88 MS
QT INTERVAL: 328 MS
QT INTERVAL: 340 MS
QTC INTERVAL: 455 MS
QTC INTERVAL: 464 MS
T WAVE AXIS: 26 DEGREES
T WAVE AXIS: 29 DEGREES
VENTRICULAR RATE: 112 BPM
VENTRICULAR RATE: 116 BPM

## 2023-11-13 PROCEDURE — 93010 ELECTROCARDIOGRAM REPORT: CPT | Performed by: INTERNAL MEDICINE

## 2023-11-16 ENCOUNTER — HOSPITAL ENCOUNTER (EMERGENCY)
Facility: HOSPITAL | Age: 38
Discharge: HOME | End: 2023-11-16
Attending: EMERGENCY MEDICINE
Payer: COMMERCIAL

## 2023-11-16 ENCOUNTER — TELEPHONE (OUTPATIENT)
Dept: PSYCHIATRY | Facility: CLINIC | Age: 38
End: 2023-11-16

## 2023-11-16 VITALS
TEMPERATURE: 98.1 F | WEIGHT: 116.62 LBS | OXYGEN SATURATION: 98 % | HEART RATE: 100 BPM | BODY MASS INDEX: 19.71 KG/M2 | DIASTOLIC BLOOD PRESSURE: 68 MMHG | SYSTOLIC BLOOD PRESSURE: 122 MMHG | RESPIRATION RATE: 20 BRPM

## 2023-11-16 DIAGNOSIS — F19.90 RECREATIONAL DRUG USE: Primary | ICD-10-CM

## 2023-11-16 PROCEDURE — 99284 EMERGENCY DEPT VISIT MOD MDM: CPT

## 2023-11-16 PROCEDURE — 99283 EMERGENCY DEPT VISIT LOW MDM: CPT | Performed by: EMERGENCY MEDICINE

## 2023-11-16 NOTE — ED PROVIDER NOTES
History  Chief Complaint   Patient presents with    Overdose - Accidental     Brought in via ems, smoked K2, found laying on the ground. Patient shaking his head side to side and up and down to answer questions. Patient is a 27-year-old male. Arrives via EMS for concerns of overdose. They received no Narcan in the field. They arrive awake alert oriented, admitting to using K2. Patient has declined to undergo testing in the emergency room. They do not wants to be evaluated for rehab. They are agreeable to being monitored in the emergency room for short period time. Prior to Admission Medications   Prescriptions Last Dose Informant Patient Reported?  Taking?   benztropine (COGENTIN) 0.5 mg tablet   Yes No   Sig: Take 0.5 mg by mouth 3 (three) times a day   benztropine (COGENTIN) 2 mg tablet   Yes No   Sig: Take 2 mg by mouth every morning   Patient not taking: Reported on 8/20/2021   buPROPion (WELLBUTRIN SR) 150 mg 12 hr tablet   Yes No   Sig: Take 150 mg by mouth every morning   Patient not taking: Reported on 8/20/2021   cloNIDine (CATAPRES) 0.2 mg tablet   Yes No   Sig: Take 0.2 mg by mouth daily at bedtime   Patient not taking: Reported on 8/20/2021   divalproex sodium (DEPAKOTE ER) 500 mg 24 hr tablet   Yes No   Sig: Take 500 mg by mouth 2 (two) times a day   Patient not taking: Reported on 8/20/2021   divalproex sodium (DEPAKOTE) 500 mg DR tablet   Yes No   Sig: Take 500 mg by mouth daily at bedtime   hydrOXYzine pamoate (VISTARIL) 50 mg capsule   Yes No   Sig: Take 50 mg by mouth daily at bedtime   loxapine (LOXITANE) 10 mg capsule   Yes No   Sig: Take 50 mg by mouth 3 (three) times a day 1 in the morning, 2 at night      Facility-Administered Medications: None       Past Medical History:   Diagnosis Date    Drug abuse ([facility])     Schizoaffective disorder ([facility])     resolved 05/16/14    Schizophrenia ([facility])        Past Surgical History:   Procedure Laterality Date    NO PAST SURGERIES Family History   Problem Relation Age of Onset    Anxiety disorder Mother     Depression Mother     Dysphagia Mother     Stroke Mother         cva    Diabetes type II Mother     Other Mother         Hyponatremia    Psychosis Mother      I have reviewed and agree with the history as documented. E-Cigarette/Vaping    E-Cigarette Use Never User      E-Cigarette/Vaping Substances    Nicotine No     THC No     CBD No     Flavoring No     Other No     Unknown No      Social History     Tobacco Use    Smoking status: Unknown    Smokeless tobacco: Never   Vaping Use    Vaping Use: Never used   Substance Use Topics    Alcohol use: Yes     Comment: social    Drug use: Yes     Types: Marijuana, Other     Comment: K2       Review of Systems   Constitutional: Negative. Negative for chills and fever. HENT: Negative. Negative for rhinorrhea, sore throat, trouble swallowing and voice change. Eyes: Negative. Negative for pain and visual disturbance. Respiratory: Negative. Negative for cough, shortness of breath and wheezing. Cardiovascular: Negative. Negative for chest pain and palpitations. Gastrointestinal:  Negative for abdominal pain, diarrhea, nausea and vomiting. Genitourinary: Negative. Negative for dysuria and frequency. Musculoskeletal: Negative. Negative for neck pain and neck stiffness. Skin: Negative. Negative for rash. Neurological: Negative. Negative for dizziness, speech difficulty, weakness, light-headedness and numbness. Physical Exam  Physical Exam  Vitals and nursing note reviewed. Constitutional:       General: He is not in acute distress. Appearance: He is well-developed. HENT:      Head: Normocephalic and atraumatic. Eyes:      Conjunctiva/sclera: Conjunctivae normal.      Pupils: Pupils are equal, round, and reactive to light. Neck:      Trachea: No tracheal deviation. Cardiovascular:      Rate and Rhythm: Normal rate and regular rhythm.    Pulmonary: Effort: Pulmonary effort is normal. No respiratory distress. Breath sounds: Normal breath sounds. No wheezing or rales. Abdominal:      General: Bowel sounds are normal. There is no distension. Palpations: Abdomen is soft. Tenderness: There is no abdominal tenderness. There is no guarding or rebound. Musculoskeletal:         General: No tenderness or deformity. Normal range of motion. Cervical back: Normal range of motion and neck supple. Skin:     General: Skin is warm and dry. Capillary Refill: Capillary refill takes less than 2 seconds. Findings: No rash. Neurological:      Mental Status: He is alert and oriented to person, place, and time. Psychiatric:         Behavior: Behavior normal.         Vital Signs  ED Triage Vitals [11/16/23 1538]   Temperature Pulse Respirations Blood Pressure SpO2   98.1 °F (36.7 °C) (!) 112 17 123/69 97 %      Temp Source Heart Rate Source Patient Position - Orthostatic VS BP Location FiO2 (%)   Tympanic Monitor Lying Left arm --      Pain Score       No Pain           Vitals:    11/16/23 1538   BP: 123/69   Pulse: (!) 112   Patient Position - Orthostatic VS: Lying         Visual Acuity      ED Medications  Medications - No data to display    Diagnostic Studies  Results Reviewed       None                   No orders to display              Procedures  Procedures         ED Course                                             Medical Decision Making     Reviewed past medical records: Yes, previous history of drug abuse noted     History Provided by: Patient and EMS     Differential considered: Altered mental status, recreational drug use     Consideration of tests: Patient admits to using K2, is oriented x3, agreeable to being monitored for sobriety. Will give p.o. challenge prior to discharge. Patient declined rehab. I have reviewed the patient's visit and any testing done in the emergency department.   They have verbalized their understanding of any testing done today and have no further questions or concerns regarding their care in the emergency room. They will follow up with their primary care physician as well as with any specialist in their discharge instructions. Strict return precautions were discussed. Disposition  Final diagnoses:   Recreational drug use     Time reflects when diagnosis was documented in both MDM as applicable and the Disposition within this note       Time User Action Codes Description Comment    11/16/2023  4:25 PM Alisha Case [F19.90] Recreational drug use           ED Disposition       ED Disposition   Discharge    Condition   Stable    Date/Time   Thu Nov 16, 2023  4:24 PM    Comment   Baylee Mancilla discharge to home/self care. Follow-up Information       Follow up With Specialties Details Why Contact Info Additional [facility] In 1 week  [address] [facility], [facility] [facility] [id number]  [address] [place] [address], [address] [facility], [address] [place] [address]            Patient's Medications   Discharge Prescriptions    No medications on file       No discharge procedures on file.     PDMP Review       None            ED Provider  Electronically Signed by             Rayna Lyon DO  11/16/23 4947

## 2023-11-16 NOTE — TELEPHONE ENCOUNTER
Patient has been added to the Medication Management and Talk Therapy wait list without a referral.    Insurance: [facility]/ [address] [place] [address] Type:    Commercial []   Medicaid [x]   [place] (if applicable)   Medicare [x]  [facility]  Provider Preference: none  Virtual: Yes [] No [x]  Were outside resources sent: Yes [x] No []  Advised the mother to contact the PCP for a referral.

## 2023-11-17 ENCOUNTER — HOSPITAL ENCOUNTER (EMERGENCY)
Facility: HOSPITAL | Age: 38
Discharge: HOME | End: 2023-11-17
Attending: EMERGENCY MEDICINE
Payer: COMMERCIAL

## 2023-11-17 VITALS
WEIGHT: 124.1 LBS | RESPIRATION RATE: 20 BRPM | TEMPERATURE: 97.9 F | OXYGEN SATURATION: 95 % | HEART RATE: 101 BPM | SYSTOLIC BLOOD PRESSURE: 104 MMHG | DIASTOLIC BLOOD PRESSURE: 67 MMHG | BODY MASS INDEX: 20.97 KG/M2

## 2023-11-17 DIAGNOSIS — F19.10 POLYSUBSTANCE ABUSE (HCC): Primary | ICD-10-CM

## 2023-11-17 LAB
BACTERIA BLD CULT: NORMAL
BACTERIA BLD CULT: NORMAL

## 2023-11-17 PROCEDURE — 99284 EMERGENCY DEPT VISIT MOD MDM: CPT

## 2023-11-17 PROCEDURE — 99283 EMERGENCY DEPT VISIT LOW MDM: CPT | Performed by: EMERGENCY MEDICINE

## 2023-11-18 NOTE — ED PROVIDER NOTES
History  Chief Complaint   Patient presents with    Drug Problem     Patient arrives with EMS and [employer] d/t K2 "overdose" patient was combative en route; no meds given by EMS; restrained upon arrival     Patient is 41-year-old male presents for concerns of recreational drug use. Patient seen by me yesterday and admitted to using K2. He again admits to using K2 again tonight. Denies other recreational drug use or alcohol use. Per EMS, patient was becoming violent during transport. Patient initially placed in four-point restraints in the ER. However, he has been calm and cooperative since arrival, restraints were removed. Patient is not interested in rehab. Prior to Admission Medications   Prescriptions Last Dose Informant Patient Reported?  Taking?   benztropine (COGENTIN) 0.5 mg tablet   Yes No   Sig: Take 0.5 mg by mouth 3 (three) times a day   benztropine (COGENTIN) 2 mg tablet   Yes No   Sig: Take 2 mg by mouth every morning   Patient not taking: Reported on 8/20/2021   buPROPion (WELLBUTRIN SR) 150 mg 12 hr tablet   Yes No   Sig: Take 150 mg by mouth every morning   Patient not taking: Reported on 8/20/2021   cloNIDine (CATAPRES) 0.2 mg tablet   Yes No   Sig: Take 0.2 mg by mouth daily at bedtime   Patient not taking: Reported on 8/20/2021   divalproex sodium (DEPAKOTE ER) 500 mg 24 hr tablet   Yes No   Sig: Take 500 mg by mouth 2 (two) times a day   Patient not taking: Reported on 8/20/2021   divalproex sodium (DEPAKOTE) 500 mg DR tablet   Yes No   Sig: Take 500 mg by mouth daily at bedtime   hydrOXYzine pamoate (VISTARIL) 50 mg capsule   Yes No   Sig: Take 50 mg by mouth daily at bedtime   loxapine (LOXITANE) 10 mg capsule   Yes No   Sig: Take 50 mg by mouth 3 (three) times a day 1 in the morning, 2 at night      Facility-Administered Medications: None       Past Medical History:   Diagnosis Date    Drug abuse ([facility])     Schizoaffective disorder ([address] [place] [address])     resolved 05/16/14    Schizophrenia ([address] [place] [address]) Past Surgical History:   Procedure Laterality Date    NO PAST SURGERIES         Family History   Problem Relation Age of Onset    Anxiety disorder Mother     Depression Mother     Dysphagia Mother     Stroke Mother         cva    Diabetes type II Mother     Other Mother         Hyponatremia    Psychosis Mother      I have reviewed and agree with the history as documented. E-Cigarette/Vaping    E-Cigarette Use Never User      E-Cigarette/Vaping Substances    Nicotine No     THC No     CBD No     Flavoring No     Other No     Unknown No      Social History     Tobacco Use    Smoking status: Unknown    Smokeless tobacco: Never   Vaping Use    Vaping Use: Never used   Substance Use Topics    Alcohol use: Yes     Comment: social    Drug use: Yes     Types: Marijuana, Other     Comment: K2       Review of Systems   Constitutional: Negative. Negative for chills and fever. HENT: Negative. Negative for rhinorrhea, sore throat, trouble swallowing and voice change. Eyes: Negative. Negative for pain and visual disturbance. Respiratory: Negative. Negative for cough, shortness of breath and wheezing. Cardiovascular: Negative. Negative for chest pain and palpitations. Gastrointestinal:  Negative for abdominal pain, diarrhea, nausea and vomiting. Genitourinary: Negative. Negative for dysuria and frequency. Musculoskeletal: Negative. Negative for neck pain and neck stiffness. Skin: Negative. Negative for rash. Neurological: Negative. Negative for dizziness, speech difficulty, weakness, light-headedness and numbness. Physical Exam  Physical Exam  Vitals and nursing note reviewed. Constitutional:       General: He is not in acute distress. Appearance: He is well-developed. HENT:      Head: Normocephalic and atraumatic. Eyes:      Conjunctiva/sclera: Conjunctivae normal.      Pupils: Pupils are equal, round, and reactive to light. Neck:      Trachea: No tracheal deviation. Cardiovascular:      Rate and Rhythm: Normal rate and regular rhythm. Pulmonary:      Effort: Pulmonary effort is normal. No respiratory distress. Breath sounds: Normal breath sounds. No wheezing or rales. Abdominal:      General: Bowel sounds are normal. There is no distension. Palpations: Abdomen is soft. Tenderness: There is no abdominal tenderness. There is no guarding or rebound. Musculoskeletal:         General: No tenderness or deformity. Normal range of motion. Cervical back: Normal range of motion and neck supple. Skin:     General: Skin is warm and dry. Capillary Refill: Capillary refill takes less than 2 seconds. Findings: No rash. Neurological:      Mental Status: He is alert and oriented to person, place, and time. Psychiatric:         Behavior: Behavior normal.         Vital Signs  ED Triage Vitals [11/17/23 1838]   Temperature Pulse Respirations Blood Pressure SpO2   97.9 °F (36.6 °C) 101 20 104/67 95 %      Temp Source Heart Rate Source Patient Position - Orthostatic VS BP Location FiO2 (%)   Tympanic Monitor Lying Right arm --      Pain Score       --           Vitals:    11/17/23 1838   BP: 104/67   Pulse: 101   Patient Position - Orthostatic VS: Lying         Visual Acuity      ED Medications  Medications - No data to display    Diagnostic Studies  Results Reviewed       None                   No orders to display              Procedures  Procedures         ED Course                                             Medical Decision Making     Reviewed past medical records: Yes, seen for similar yesterday by me. History Provided by: Patient and EMS     Differential considered: Recreational drug overdose     Consideration of tests: Patient is hemodynamically stable, answering questions appropriately, able to be oriented to himself, his date of birth, [facility] of the [facility] and location.   He is declining further interventions in the ED but is agreeable to being monitored for sobriety. I have reviewed the patient's visit and any testing done in the emergency department. They have verbalized their understanding of any testing done today and have no further questions or concerns regarding their care in the emergency room. They will follow up with their primary care physician as well as with any specialist in their discharge instructions. Strict return precautions were discussed. Disposition  Final diagnoses:   Polysubstance abuse ([address])     Time reflects when diagnosis was documented in both MDM as applicable and the Disposition within this note       Time User Action Codes Description Comment    11/17/2023  7:14 PM Ely Wall Add [F19.10] Polysubstance abuse [facility])           ED Disposition       ED Disposition   Discharge    Condition   Stable    Date/Time   Fri Nov 17, 2023  7:14 PM    Comment   Doug Alicia discharge to home/self care. Follow-up Information       Follow up With Specialties Details Why Contact Info Danielito Vann In 1 week  [address] [place] [id number]  [address] [place] [address], [address] [place] [address], [address] [place] [address]            Patient's Medications   Discharge Prescriptions    No medications on file       No discharge procedures on file.     PDMP Review       None            ED Provider  Electronically Signed by             Majo Wheat DO  11/17/23 2042

## 2023-11-18 NOTE — RESTRAINT FACE TO FACE
Restraint Face to Face   Patricia Caicedo 40 y.o. male MRN: [id number]  Unit/Bed#: Z1H1 Encounter: [id number]      Physical Evaluation stable  Purpose for Restraints/ Seclusion High risk for causing significant disruption of treatment environment  and High risk for harm to others  Patient's reaction to the intervention positve  Patient's medical condition stable  Patient's Behavioral condition impreoved  Restraints to be terminated    Patient arrived with reports from EMS of being violent while being transported. Patient arrived, was placed in four-point, although shortly afterwards due to his calm nature and good behavior yesterday for similar presentation restraints removed.

## 2023-11-19 ENCOUNTER — HOSPITAL ENCOUNTER (EMERGENCY)
Facility: HOSPITAL | Age: 38
Discharge: HOME | End: 2023-11-19
Attending: EMERGENCY MEDICINE
Payer: COMMERCIAL

## 2023-11-19 VITALS
RESPIRATION RATE: 20 BRPM | HEART RATE: 108 BPM | BODY MASS INDEX: 21.5 KG/M2 | DIASTOLIC BLOOD PRESSURE: 66 MMHG | WEIGHT: 127.21 LBS | OXYGEN SATURATION: 95 % | TEMPERATURE: 98.5 F | SYSTOLIC BLOOD PRESSURE: 115 MMHG

## 2023-11-19 DIAGNOSIS — T50.901A DRUG OVERDOSE: Primary | ICD-10-CM

## 2023-11-19 PROCEDURE — 99284 EMERGENCY DEPT VISIT MOD MDM: CPT | Performed by: EMERGENCY MEDICINE

## 2023-11-19 PROCEDURE — 99284 EMERGENCY DEPT VISIT MOD MDM: CPT

## 2023-11-19 NOTE — ED PROVIDER NOTES
History  Chief Complaint   Patient presents with    Overdose - Accidental     Found in the street. Apparent K2. Combative with EMS and police when they got to him. Patient known to ED team, has no history of violence with ED team.      Patient is a 52-year-old male. He is a known K2 user. He has schizophrenia. He was found sleeping in the street after smoking K2. He was brought to the emergency room for evaluation. There is no history of trauma. History is limited by drug intoxication. Prior to Admission Medications   Prescriptions Last Dose Informant Patient Reported?  Taking?   benztropine (COGENTIN) 0.5 mg tablet   Yes No   Sig: Take 0.5 mg by mouth 3 (three) times a day   benztropine (COGENTIN) 2 mg tablet   Yes No   Sig: Take 2 mg by mouth every morning   Patient not taking: Reported on 8/20/2021   buPROPion (WELLBUTRIN SR) 150 mg 12 hr tablet   Yes No   Sig: Take 150 mg by mouth every morning   Patient not taking: Reported on 8/20/2021   cloNIDine (CATAPRES) 0.2 mg tablet   Yes No   Sig: Take 0.2 mg by mouth daily at bedtime   Patient not taking: Reported on 8/20/2021   divalproex sodium (DEPAKOTE ER) 500 mg 24 hr tablet   Yes No   Sig: Take 500 mg by mouth 2 (two) times a day   Patient not taking: Reported on 8/20/2021   divalproex sodium (DEPAKOTE) 500 mg DR tablet   Yes No   Sig: Take 500 mg by mouth daily at bedtime   hydrOXYzine pamoate (VISTARIL) 50 mg capsule   Yes No   Sig: Take 50 mg by mouth daily at bedtime   loxapine (LOXITANE) 10 mg capsule   Yes No   Sig: Take 50 mg by mouth 3 (three) times a day 1 in the morning, 2 at night      Facility-Administered Medications: None       Past Medical History:   Diagnosis Date    Drug abuse ([address] )     Schizoaffective disorder ([address] [facility])     resolved 05/16/14    Schizophrenia ([address] [facility])        Past Surgical History:   Procedure Laterality Date    NO PAST SURGERIES         Family History   Problem Relation Age of Onset    Anxiety disorder Mother     Depression Mother     Dysphagia Mother     Stroke Mother         cva    Diabetes type II Mother     Other Mother         Hyponatremia    Psychosis Mother      I have reviewed and agree with the history as documented. E-Cigarette/Vaping    E-Cigarette Use Never User      E-Cigarette/Vaping Substances    Nicotine No     THC No     CBD No     Flavoring No     Other No     Unknown No      Social History     Tobacco Use    Smoking status: Unknown    Smokeless tobacco: Never   Vaping Use    Vaping Use: Never used   Substance Use Topics    Alcohol use: Yes     Comment: social    Drug use: Yes     Types: Marijuana, Other     Comment: K2       Review of Systems   Reason unable to perform ROS: Drug intoxication. Physical Exam  Physical Exam  Vitals reviewed. Constitutional:       General: He is not in acute distress. HENT:      Head: Normocephalic and atraumatic. Nose: Nose normal.      Mouth/Throat:      Mouth: Mucous membranes are moist.   Eyes:      Comments: Dilated pupils bilaterally   Cardiovascular:      Rate and Rhythm: Regular rhythm. Tachycardia present. Pulses: Normal pulses. Heart sounds: Normal heart sounds. No murmur heard. No friction rub. No gallop. Pulmonary:      Effort: Pulmonary effort is normal. No respiratory distress. Breath sounds: Normal breath sounds. No stridor. No wheezing, rhonchi or rales. Abdominal:      General: Bowel sounds are normal. There is no distension. Palpations: Abdomen is soft. Tenderness: There is no abdominal tenderness. Musculoskeletal:         General: No swelling, tenderness, deformity or signs of injury. Cervical back: Neck supple. No rigidity or tenderness. Skin:     General: Skin is warm and dry. Capillary Refill: Capillary refill takes less than 2 seconds. Neurological:      Mental Status: He is lethargic. Motor: Motor function is intact.          Vital Signs  ED Triage Vitals [11/19/23 1839]   Temperature Pulse Respirations Blood Pressure SpO2   98.5 °F (36.9 °C) (!) 108 20 115/66 95 %      Temp Source Heart Rate Source Patient Position - Orthostatic VS BP Location FiO2 (%)   Oral Monitor Lying Left arm --      Pain Score       --           Vitals:    11/19/23 1839   BP: 115/66   Pulse: (!) 108   Patient Position - Orthostatic VS: Lying         Visual Acuity      ED Medications  Medications - No data to display    Diagnostic Studies  Results Reviewed       None                   No orders to display              Procedures  Procedures         ED Course                                             Medical Decision Making  Now awake and alert. Appropriate for discharge. Most likely K2 overdose. Differential diagnosis included intracranial hemorrhage, meningitis, acute stroke, hypoglycemia, other drug overdose. All these were considered but are unlikely. Risk  Decision regarding hospitalization. Disposition  Final diagnoses:   Drug overdose     Time reflects when diagnosis was documented in both MDM as applicable and the Disposition within this note       Time User Action Codes Description Comment    11/19/2023  8:18 PM Kori, [address] [place] [address] Drug overdose           ED Disposition       ED Disposition   Discharge    Condition   Stable    Date/Time   Sun Nov 19, 2023  8:18 PM    Comment   Noel Deluca discharge to home/self care. Follow-up Information       Follow up With Specialties Details Why [address] [facility] 2[facility] Floor Family Medicine In 1 week  [place] [address]  [phone]              Patient's Medications   Discharge Prescriptions    No medications on file       No discharge procedures on file.     PDMP Review       None            ED Provider  Electronically Signed by             Gillian Maxwell MD  11/19/23 2018

## 2023-11-21 ENCOUNTER — TELEPHONE (OUTPATIENT)
Dept: FAMILY MEDICINE CLINIC | Facility: CLINIC | Age: 38
End: 2023-11-21

## 2023-11-21 NOTE — TELEPHONE ENCOUNTER
first attempt to contact patient.  left message to return my call on answering machine        Please schedule ED f/u appt per Dr Shorty Villanueva

## 2023-11-22 NOTE — TELEPHONE ENCOUNTER
second attempt to contact patient. left message to return my call on answering machine.  Letter sent     Please schedule ED f/u appt per Dr Win Alcantar

## 2023-11-28 ENCOUNTER — TELEPHONE (OUTPATIENT)
Dept: FAMILY MEDICINE CLINIC | Facility: CLINIC | Age: 38
End: 2023-11-28

## 2023-11-28 NOTE — TELEPHONE ENCOUNTER
11/28/23    PT MOM CALLED OFFICE REQUESTING A CALL BACK       SPOKE TO PT MOM    INFORMED THE REASON OF MY CALL    PT MOM WANTED TO [address] AN APPT DUE TO RECEIVING A CALL/ MESSAGE TO [address] ED F/U        PT HAS BEEN SCHEDULED 12/05/23

## 2023-11-30 ENCOUNTER — OFFICE VISIT (OUTPATIENT)
Dept: FAMILY MEDICINE CLINIC | Facility: CLINIC | Age: 38
End: 2023-11-30

## 2023-11-30 VITALS
SYSTOLIC BLOOD PRESSURE: 118 MMHG | WEIGHT: 120 LBS | RESPIRATION RATE: 18 BRPM | TEMPERATURE: 97 F | BODY MASS INDEX: 20.28 KG/M2 | OXYGEN SATURATION: 99 % | DIASTOLIC BLOOD PRESSURE: 70 MMHG | HEART RATE: 78 BPM

## 2023-11-30 DIAGNOSIS — F25.0 SCHIZOAFFECTIVE DISORDER, BIPOLAR TYPE (HCC): Primary | Chronic | ICD-10-CM

## 2023-11-30 DIAGNOSIS — F19.10 SYNTHETIC CANNABINOID ABUSE (HCC): Chronic | ICD-10-CM

## 2023-11-30 PROCEDURE — 99213 OFFICE O/P EST LOW 20 MIN: CPT | Performed by: FAMILY MEDICINE

## 2023-11-30 RX ORDER — BENZTROPINE MESYLATE 0.5 MG/1
0.5 TABLET ORAL 3 TIMES DAILY
Qty: 90 TABLET | Refills: 0 | Status: SHIPPED | OUTPATIENT
Start: 2023-11-30

## 2023-11-30 RX ORDER — HYDROXYZINE PAMOATE 50 MG/1
50 CAPSULE ORAL
Qty: 30 CAPSULE | Refills: 0 | Status: SHIPPED | OUTPATIENT
Start: 2023-11-30 | End: 2023-12-30

## 2023-11-30 RX ORDER — LOXAPINE SUCCINATE 10 MG/1
50 TABLET ORAL 3 TIMES DAILY
Qty: 450 CAPSULE | Refills: 0 | Status: SHIPPED | OUTPATIENT
Start: 2023-11-30 | End: 2023-12-30

## 2023-11-30 RX ORDER — DIVALPROEX SODIUM 500 MG/1
500 TABLET, EXTENDED RELEASE ORAL 2 TIMES DAILY
Qty: 60 TABLET | Refills: 0 | Status: SHIPPED | OUTPATIENT
Start: 2023-11-30 | End: 2023-12-30

## 2023-11-30 NOTE — PROGRESS NOTES
Name: Rhys Esparza      : 1985      MRN: [id number]  Encounter Provider: Jsoé Howe MD  Encounter Date: 2023   Encounter department: [facility]    Assessment & Plan     1. Schizoaffective disorder, bipolar type (HCC)  Assessment & Plan:  Assessment   Patient with multiple ED admission due to illicit drug abuse, possibly K2. Currently stable from clinical standpoint with no signs of acute intoxication.    Currently on Wellbutrin 500 mg tid, loxapine 50 mg tid,and benztropine 0.5 mg tid.  Mother reports poor compliance with medication and psychiatry follow ups. Per EMR review last seen back in .   Patient also poorly compliant with office visits. Last seen on 2021.    Plan   -Mother and patient Educated on medications and close follow ups compliance.  -Medications restarted.   -Amb referral to social work placed, psychiatry placed toxicology and  addiction services.   -ED precaution given.   -Follow up with pcp in 2 weeks or earlier as needed.     Orders:  -     Ambulatory Referral to Social Work Care Management Program; Future  -     Ambulatory referral to Psych Services; Future  -     benztropine (COGENTIN) 0.5 mg tablet; Take 1 tablet (0.5 mg total) by mouth 3 (three) times a day  -     divalproex sodium (DEPAKOTE ER) 500 mg 24 hr tablet; Take 1 tablet (500 mg total) by mouth 2 (two) times a day  -     hydrOXYzine pamoate (VISTARIL) 50 mg capsule; Take 1 capsule (50 mg total) by mouth daily at bedtime  -     loxapine (LOXITANE) 10 mg capsule; Take 5 capsules (50 mg total) by mouth 3 (three) times a day 1 in the morning, 2 at night    2. Synthetic cannabinoid abuse (HCC)  -     Ambulatory referral to Addiction Services; Future  -     Ambulatory Referral to Toxicology; Future  -     Ambulatory Referral to Social Work Care Management Program; Future  -     Ambulatory referral to Psych Services; Future           Subjective      Patient not well know to my  service, last time seen by me was about 2 years ago.   It was a pleasure to see Rhys Esparza is a 37 y.o.  male with PMH significant for Schizoaffective disorder,and synthetic cannabinoid abuse with frequent ED visit 2/2 overdose mostly , here to follow up on his chronic conditions. Patient is accompanied by his mother. Per mother, patient was following with [facility]ry [facility]. But mother due to changes his medical insurance was unable to continue his follow ups. Mother states that patient needs to reestablish care with psychiatry services and also needs medication refill.         Review of Systems   Constitutional:  Negative for activity change, appetite change, chills, fatigue, fever and unexpected weight change.        Ros difficult to obtain    Respiratory:  Negative for cough, choking, chest tightness, shortness of breath, wheezing and stridor.    Cardiovascular:  Negative for palpitations.   Gastrointestinal:  Negative for abdominal distention, abdominal pain, nausea and vomiting.   Genitourinary:  Positive for urgency. Negative for decreased urine volume, difficulty urinating, dysuria, enuresis, flank pain, frequency and hematuria.   Musculoskeletal:  Negative for back pain.   Neurological:  Negative for light-headedness.   Psychiatric/Behavioral:  Negative for agitation, behavioral problems, confusion, decreased concentration, dysphoric mood, hallucinations, self-injury, sleep disturbance and suicidal ideas. The patient is not nervous/anxious and is not hyperactive.        Current Outpatient Medications on File Prior to Visit   Medication Sig    benztropine (COGENTIN) 2 mg tablet Take 2 mg by mouth every morning (Patient not taking: Reported on 8/20/2021)    buPROPion (WELLBUTRIN SR) 150 mg 12 hr tablet Take 150 mg by mouth every morning (Patient not taking: Reported on 8/20/2021)    cloNIDine (CATAPRES) 0.2 mg tablet Take 0.2 mg by mouth daily at bedtime (Patient not taking: Reported  on 8/20/2021)    divalproex sodium (DEPAKOTE) 500 mg DR tablet Take 500 mg by mouth daily at bedtime       Objective     /70 (BP Location: Left arm, Patient Position: Sitting, Cuff Size: Standard)   Pulse 78   Temp (!) 97 °F (36.1 °C) (Temporal)   Resp 18   Wt 54.4 kg (120 lb)   SpO2 99%   BMI 20.28 kg/m²     Physical Exam  Vitals and nursing note reviewed.   Constitutional:       General: He is not in acute distress.     Appearance: He is well-developed. He is not ill-appearing, toxic-appearing or diaphoretic.   HENT:      Head: Normocephalic and atraumatic.   Eyes:      General: No scleral icterus.     Extraocular Movements: Extraocular movements intact.   Cardiovascular:      Rate and Rhythm: Normal rate and regular rhythm. No extrasystoles are present.     Pulses:           Radial pulses are 2+ on the right side and 2+ on the left side.      Heart sounds: Normal heart sounds, S1 normal and S2 normal. No murmur heard.     No friction rub. No gallop.   Pulmonary:      Effort: Pulmonary effort is normal. No respiratory distress.      Breath sounds: Normal breath sounds and air entry.   Abdominal:      General: Bowel sounds are normal.      Palpations: Abdomen is soft. There is no mass.      Tenderness: There is no abdominal tenderness. There is no right CVA tenderness, left CVA tenderness, guarding or rebound.   Musculoskeletal:         General: No swelling, tenderness, deformity or signs of injury. Normal range of motion.      Cervical back: Normal range of motion.      Right lower leg: No edema.      Left lower leg: No edema.   Feet:      Right foot:      Skin integrity: No ulcer, skin breakdown, erythema, warmth, callus or dry skin.      Left foot:      Skin integrity: No ulcer, skin breakdown, erythema, warmth, callus or dry skin.   Skin:     General: Skin is warm.      Findings: No rash.   Neurological:      General: No focal deficit present.      Mental Status: He is alert.      GCS: GCS eye  subscore is 4. GCS verbal subscore is 5. GCS motor subscore is 6.      Cranial Nerves: Cranial nerves 2-12 are intact.      Sensory: Sensation is intact.      Motor: Motor function is intact.      Coordination: Coordination is intact.      Deep Tendon Reflexes:      Reflex Scores:       Patellar reflexes are 2+ on the right side and 2+ on the left side.  Psychiatric:         Attention and Perception: He is inattentive. He does not perceive auditory or visual hallucinations.         Mood and Affect: Mood is anxious. Mood is not depressed or elated. Affect is flat, angry and inappropriate. Affect is not blunt or tearful.         Speech: He is communicative. Speech is delayed. Speech is not rapid and pressured or tangential.         Behavior: Behavior is slowed. Behavior is cooperative.         Thought Content: Thought content is not paranoid or delusional. Thought content does not include homicidal or suicidal ideation. Thought content does not include homicidal or suicidal plan.         Cognition and Memory: Cognition is impaired. Memory is impaired.       José Howe MD

## 2023-12-05 ENCOUNTER — TELEPHONE (OUTPATIENT)
Dept: PSYCHIATRY | Facility: CLINIC | Age: 38
End: 2023-12-05

## 2023-12-18 NOTE — ASSESSMENT & PLAN NOTE
Assessment   Patient with multiple ED admission due to illicit drug abuse, possibly K2. Currently stable from clinical standpoint with no signs of acute intoxication.    Currently on Wellbutrin 500 mg tid, loxapine 50 mg tid,and benztropine 0.5 mg tid.  Mother reports poor compliance with medication and psychiatry follow ups. Per EMR review last seen back in 2021.   Patient also poorly compliant with office visits. Last seen on 7/2021.    Plan   -Mother and patient Educated on medications and close follow ups compliance.  -Medications restarted.   -Amb referral to social work placed, psychiatry placed toxicology and  addiction services.   -ED precaution given.   -Follow up with pcp in 2 weeks or earlier as needed.

## 2023-12-26 ENCOUNTER — PATIENT OUTREACH (OUTPATIENT)
Dept: FAMILY MEDICINE CLINIC | Facility: CLINIC | Age: 38
End: 2023-12-26

## 2023-12-26 NOTE — PROGRESS NOTES
GUERRERO BUSTAMANTE did receive a new referral from provider. Per chart review this indicates that Pt is dealing with MH disorder and substance abuse. After chart review GUERRERO BUSTAMANTE did place a call to Pt to assist as needed but Pt did not  the phone. GUERRERO BUSTAMANTE was unable to leave a VM. GUERRERO BUSTAMANTE will attempt to reach out the Pt a later time.    GUERRERO BUSTAMANTE is remain available for further assistance as needed.

## 2024-01-09 ENCOUNTER — PATIENT OUTREACH (OUTPATIENT)
Dept: FAMILY MEDICINE CLINIC | Facility: CLINIC | Age: 39
End: 2024-01-09

## 2024-01-09 NOTE — LETTER
[address] [place] [address], [address]  [facility] [address] [phone]    Re: Care coordination   1/9/2024       Dear Rhys,    We tried to reach you by phone on 1/9/2024 and was unfortunately unable to reach you.  It is important that you contact the [facility] as soon as possible at: [phone].    Sincerely,         Rafaelina Reyes

## 2024-01-09 NOTE — PROGRESS NOTES
GUERRERO BUSTAMANTE did place a second call to Pt but he did not  the phone. GUERRERO BUSTAMANTE was unable to leave . GUERRERO BUSTAMANTE will send Unable to Reach Letter by mail since [employer] is inactive at this time.     GUERRERO BUSTAMANTE is closing case today due to unable to contact Pt.  GUERRERO BUSTAMANTE is remain available for further assistance as needed.

## 2024-01-31 ENCOUNTER — TELEPHONE (OUTPATIENT)
Dept: OTHER | Age: 39
End: 2024-01-31

## 2024-01-31 NOTE — TELEPHONE ENCOUNTER
Angy, mother, called the MAT office returning call from Polly, MR.  Informed Angy that Polly is out today and that I would have Polly call her back tomorrow.    Please call.

## 2024-02-01 NOTE — TELEPHONE ENCOUNTER
Called pt and mother of pt answered the phone (Angy)  MR attempted to contact pt to review referral and confirm interest in [facility] program.     Angy stated that she was food shopping and not currently with pt.  MR will call back at a later time.

## 2024-02-05 ENCOUNTER — TELEPHONE (OUTPATIENT)
Dept: BEHAVIORAL/MENTAL HEALTH CLINIC | Facility: CLINIC | Age: 39
End: 2024-02-05

## 2024-02-05 NOTE — TELEPHONE ENCOUNTER
Residency: [facility]    Insurance:[employer] and [employer]    Referred by: José MCDANIELS     Advise interviewee that they need to answer all questions truthfully to allow for best care.    Presenting problem: k2     Used within 12 months? Yes     Last use? December   How much?    Language barrier or hearing impairment? No     What substance are you being referred to the [facility] program? k2    Do you have a psychiatric diagnosis?no taking Depakote and other psych medications     Do you currently feel safe?yes     Are you having thoughts of harming yourself or others?  No   Do you currently have a community treatment team or ?  [facility]     PT scheduled with Val Souza CM 2/6 AT 11AM

## 2024-02-06 ENCOUNTER — APPOINTMENT (OUTPATIENT)
Dept: LAB | Facility: HOSPITAL | Age: 39
End: 2024-02-06
Payer: COMMERCIAL

## 2024-02-06 DIAGNOSIS — Z79.899 ENCOUNTER FOR LONG-TERM (CURRENT) USE OF OTHER MEDICATIONS: ICD-10-CM

## 2024-02-06 DIAGNOSIS — E55.9 VITAMIN D DEFICIENCY: ICD-10-CM

## 2024-02-06 LAB
25(OH)D3 SERPL-MCNC: 39.8 NG/ML (ref 30–100)
ALBUMIN SERPL BCP-MCNC: 4.2 G/DL (ref 3.5–5)
ALP SERPL-CCNC: 76 U/L (ref 34–104)
ALT SERPL W P-5'-P-CCNC: 14 U/L (ref 7–52)
AMPHETAMINES SERPL QL SCN: NEGATIVE
ANION GAP SERPL CALCULATED.3IONS-SCNC: 5 MMOL/L
AST SERPL W P-5'-P-CCNC: 12 U/L (ref 13–39)
BARBITURATES UR QL: NEGATIVE
BASOPHILS # BLD AUTO: 0.04 THOUSANDS/ÂΜL (ref 0–0.1)
BASOPHILS NFR BLD AUTO: 1 % (ref 0–1)
BENZODIAZ UR QL: NEGATIVE
BILIRUB SERPL-MCNC: 0.3 MG/DL (ref 0.2–1)
BUN SERPL-MCNC: 6 MG/DL (ref 5–25)
CALCIUM SERPL-MCNC: 9.2 MG/DL (ref 8.4–10.2)
CHLORIDE SERPL-SCNC: 100 MMOL/L (ref 96–108)
CHOLEST SERPL-MCNC: 183 MG/DL
CO2 SERPL-SCNC: 30 MMOL/L (ref 21–32)
COCAINE UR QL: NEGATIVE
CREAT SERPL-MCNC: 0.73 MG/DL (ref 0.6–1.3)
EOSINOPHIL # BLD AUTO: 0.19 THOUSAND/ÂΜL (ref 0–0.61)
EOSINOPHIL NFR BLD AUTO: 3 % (ref 0–6)
ERYTHROCYTE [DISTWIDTH] IN BLOOD BY AUTOMATED COUNT: 14.2 % (ref 11.6–15.1)
GFR SERPL CREATININE-BSD FRML MDRD: 118 ML/MIN/1.73SQ M
GLUCOSE P FAST SERPL-MCNC: 97 MG/DL (ref 65–99)
HCT VFR BLD AUTO: 38 % (ref 36.5–49.3)
HDLC SERPL-MCNC: 82 MG/DL
HGB BLD-MCNC: 12.7 G/DL (ref 12–17)
IMM GRANULOCYTES # BLD AUTO: 0.07 THOUSAND/UL (ref 0–0.2)
IMM GRANULOCYTES NFR BLD AUTO: 1 % (ref 0–2)
LDLC SERPL CALC-MCNC: 80 MG/DL (ref 0–100)
LYMPHOCYTES # BLD AUTO: 2.83 THOUSANDS/ÂΜL (ref 0.6–4.47)
LYMPHOCYTES NFR BLD AUTO: 40 % (ref 14–44)
MCH RBC QN AUTO: 30.1 PG (ref 26.8–34.3)
MCHC RBC AUTO-ENTMCNC: 33.4 G/DL (ref 31.4–37.4)
MCV RBC AUTO: 90 FL (ref 82–98)
METHADONE UR QL: NEGATIVE
MONOCYTES # BLD AUTO: 0.98 THOUSAND/ÂΜL (ref 0.17–1.22)
MONOCYTES NFR BLD AUTO: 14 % (ref 4–12)
NEUTROPHILS # BLD AUTO: 2.89 THOUSANDS/ÂΜL (ref 1.85–7.62)
NEUTS SEG NFR BLD AUTO: 41 % (ref 43–75)
NONHDLC SERPL-MCNC: 101 MG/DL
NRBC BLD AUTO-RTO: 0 /100 WBCS
OPIATES UR QL SCN: NEGATIVE
OXYCODONE+OXYMORPHONE UR QL SCN: NEGATIVE
PCP UR QL: NEGATIVE
PLATELET # BLD AUTO: 261 THOUSANDS/UL (ref 149–390)
PMV BLD AUTO: 8 FL (ref 8.9–12.7)
POTASSIUM SERPL-SCNC: 4.1 MMOL/L (ref 3.5–5.3)
PROT SERPL-MCNC: 6.6 G/DL (ref 6.4–8.4)
RBC # BLD AUTO: 4.22 MILLION/UL (ref 3.88–5.62)
SODIUM SERPL-SCNC: 135 MMOL/L (ref 135–147)
THC UR QL: NEGATIVE
TRIGL SERPL-MCNC: 107 MG/DL
TSH SERPL DL<=0.05 MIU/L-ACNC: 2.01 UIU/ML (ref 0.45–4.5)
WBC # BLD AUTO: 7 THOUSAND/UL (ref 4.31–10.16)

## 2024-02-06 PROCEDURE — 80307 DRUG TEST PRSMV CHEM ANLYZR: CPT

## 2024-02-06 PROCEDURE — 80061 LIPID PANEL: CPT

## 2024-02-06 PROCEDURE — 82306 VITAMIN D 25 HYDROXY: CPT

## 2024-02-06 PROCEDURE — 84436 ASSAY OF TOTAL THYROXINE: CPT

## 2024-02-06 PROCEDURE — 36415 COLL VENOUS BLD VENIPUNCTURE: CPT

## 2024-02-06 PROCEDURE — 84443 ASSAY THYROID STIM HORMONE: CPT

## 2024-02-06 PROCEDURE — 80053 COMPREHEN METABOLIC PANEL: CPT

## 2024-02-06 PROCEDURE — 85025 COMPLETE CBC W/AUTO DIFF WBC: CPT

## 2024-02-07 ENCOUNTER — DOCUMENTATION (OUTPATIENT)
Dept: PSYCHIATRY | Facility: CLINIC | Age: 39
End: 2024-02-07

## 2024-02-08 ENCOUNTER — SOCIAL WORK (OUTPATIENT)
Dept: PSYCHIATRY | Facility: CLINIC | Age: 39
End: 2024-02-08
Payer: COMMERCIAL

## 2024-02-08 DIAGNOSIS — F19.10 SYNTHETIC CANNABINOID ABUSE (HCC): Chronic | ICD-10-CM

## 2024-02-08 DIAGNOSIS — F12.10 CANNABIS ABUSE, CONTINUOUS: Chronic | ICD-10-CM

## 2024-02-08 DIAGNOSIS — F25.0 SCHIZOAFFECTIVE DISORDER, BIPOLAR TYPE (HCC): Primary | Chronic | ICD-10-CM

## 2024-02-08 PROCEDURE — 90791 PSYCH DIAGNOSTIC EVALUATION: CPT | Performed by: COUNSELOR

## 2024-02-08 NOTE — PSYCH
" Behavioral Health Psychotherapy Assessment    Date of Initial Psychotherapy Assessment: 02/08/24  Referral Source: Probation  Has a release of information been signed for the referral source? NA    Preferred Name: Rhys Esparza  Preferred Pronouns: He/him  YOB: 1985 Age: 38 y.o.  Sex assigned at birth: male   Gender Identity: Male  Race:   Preferred Language: English    Emergency Contact:  Full Name: Angy Licona  Relationship to Client: mother  Contact information: [phone]    Primary Care Physician:  Destin Stratton MD  [address]  [facility] [address]  [phone]  Has a release of information been signed? NA    Physical Health History:  Past surgical procedures: n/a  Do you have a history of any of the following: other none  Do you have any mobility issues? No    Relevant Family History:  Rhys reports he was raised by mother and step father and two siblings until the age of 14 when his step father left.  Rhys reports no MELLISSA issues in his family.    Presenting Problem (What brings you in?)  \" Stay out of custodial and do what probation wants\"  \"Stay out of the hospital\"    Mental Health Advance Directive:  Do you currently have a Mental Health Advance Directive?no    Diagnosis:   Diagnosis ICD-10-CM Associated Orders   1. Schizoaffective disorder, bipolar type (HCC)  F25.0       2. Synthetic cannabinoid abuse (HCC)  F19.10       3. Cannabis abuse, continuous  F12.10           Initial Assessment:     Current Mental Status:    Appearance: casual      Behavior/Manner: guarded      Affect/Mood:  Good    Speech:  Normal and slow    Sleep:  Normal    Oriented to: oriented to self, oriented to place and oriented to time       Clinical Symptoms    Anxiety: yes      Psychosis: yes      Anxiety Symptoms: tremulousness and nervous/anxious      Psychosis Symptoms: hallucinations      Were you under the influence of drugs or alcohol: No      Have you ever been assaultive " to others or the environment: Yes      Have you ever been self-injurious: No      Additional Abuse/Self Harm history:    Has punched walls in the past.    Counseling History:  Previous Counseling or Treatment  (Mental Health or Drug & Alcohol): Yes    Previous Counseling Details:  Previous outpatient at [facility] for mental health. Previous inpatient mental health, last 2 years ago. Repots multiple previous inpatient mental health treatments. Rhys reports these usually occur when he begins arguing with his mother but can not explain how this results in his hospitalization.  Currently reports he receives mental health treatment at [facility]. Reports his medications are working at this time and he has no current symptoms of schizophrenia. Rhys does present with slowed responses and has some confusion. Possibly disorganization related to diagnosis.  Have you previously taken psychiatric medications: Yes    Previous Medications Attempted:  Multiple previous medications currently feels comfortable on the psychiatric medications he receives for mental health    Suicide Risk Assessment  Have you ever had a suicide attempt: No    Have you had incidents of suicidal ideation: No    Are you currently experiencing suicidal thoughts: No      Substance Abuse/Addiction Assessment:  Alcohol: Yes    Age of First Use:  22  Age of regular use:  Denies any regular use  Amount:  1 beer  Last use:  Unknown  Heroin: No    Fentanyl: No    Opiates: No    Cocaine: No    Amphetamines: No    Hallucinogens: No    Club Drugs: No    Benzodiazepines: No    Other Rx Meds: Yes    Age of First Use:  35  Age of regular use:  35 daily  Frequency:  Daily  Amount:  1 blunt  Method:  Smoke/pipe  Last Use:  12/2023  Rx Medications used:  K2  Marijuana: Yes    Age of First Use:  15  Age of regular use:  19 began smoking daily.  Frequency:  Daily  Amount:  1 blunt  Method:  Smoke/pipe  Last Use:  11/2023  Tobacco/Nicotine: Yes    Age of First Use:  15  Age  "of regular use:  19  Frequency:  Daily  Amount:  .5 pack  Method:  Smoke/pipe  Last Use:  2/8/2024  Are you interested in resources for smoking cessation: No    Have you experienced blackouts as a result of substance use: No    Have you had any periods of abstinence: Yes    Additional Abstinence information:  Rhys reports currently has stopped for 2 months.  Rhys struggles to understand his use or identify abstinence. Rhys does state he has stopped this time to \"stop getting arrested\".  Have you experienced symptoms of withdrawal: No    Have you ever overdosed on any substances?: Yes    Additional Overdose information:  Rhys reports multiple overdoses on K2.  Rhys states this resulted in ED visits.  Are you currently using any Medication Assisted Treatment for Substance Use: No      Disordered Eating History:  Do you have a history of disordered eating: No      Social Determinants of Health:    SDOH:  Addiction and other    Other SDOH:  Acute/severe mental illness    Trauma and Abuse History:    Have you ever been abused: No      Legal History:    Have you ever been arrested  or had a DUI: No      Have you been incarcerated: Yes      Are you currently on parole/probation: Yes      Officer's name:  [facility]    Release of information obtained: No      Any current Children and Youth involvement: No      Any pending legal charges: No      Additional Legal History:  Currently on probation of K2. Rhys reports he was incarcerated from Dec. 17,2023 to Feb. 2, 2024. Previous probation for possession of marijuana.    Relationship History:    Current marital status: single      Natural Supports:  Mother and siblings    Relationship History:  Rhys reports his mother is his primary support and will provide coordination of appointments and transportation to appointments. Rhys reports his mother also ensures he is taking his medications. Rhys reports his younger brother also resides in the " home.    Employment History    Are you currently employed: No      Currently seeking employment: No      Longest period of employment:  Worked at [employer] 3 years and then [employer] 3 years    Future work goals:  N/a reports he receives $1000/monthly for social security benefits.    Sources of income/financial support:  Social Security Disability (SSDI) and Supplemental Security Income (SSI)     History:      Status: no history of  duty  Educational History:     Have you ever been diagnosed with a learning disability: No      Highest level of education:  Other    Other education: did not complete 12th grade and did not graduate did not complete GED    School attended/attending:  [employer]    Have you ever had an IEP or 504-plan: No      Do you need assistance with reading or writing: No      Recommended Treatment:     Psychotherapy:  Individual sessions    Frequency:  1 time    Session frequency:  Weekly    Visit start and stop times:    02/08/24  Start Time: 2900

## 2024-02-09 NOTE — PROGRESS NOTES
Patient and this CM met for initial session within the [facility] office.     Patient was referred for k2 use, numerous hospital encounters for this, including overdoses.   Patient states to live with mother, seems to be primary support. Patient states to have no used any substance today    Patient has diagnosis of Schizophrenia, was being seen by Psychiatrist at [facility] or [facility], patient is unsure. Will call to clarify. [facility] but has not seen therapist in almost 3 months. Patient would like to continue services with Psychohterapist within [facility] office.     Patient did present somewhat scattered or distant throughout session. This CM did take time to better explain the services provided within the [facility] office to ensure patient understands why a referral was placed for our services.   Patient did state to feel safe, no danger to self or others. No current symptoms of SI/HI    Patient will establish care with Psychotherapist for future appointments. This CM will clarify where patient is receiving Psychiatry services

## 2024-02-15 ENCOUNTER — SOCIAL WORK (OUTPATIENT)
Dept: PSYCHIATRY | Facility: CLINIC | Age: 39
End: 2024-02-15

## 2024-02-15 DIAGNOSIS — F19.10 SYNTHETIC CANNABINOID ABUSE (HCC): Chronic | ICD-10-CM

## 2024-02-15 DIAGNOSIS — F12.10 CANNABIS ABUSE, CONTINUOUS: Chronic | ICD-10-CM

## 2024-02-15 DIAGNOSIS — F25.0 SCHIZOAFFECTIVE DISORDER, BIPOLAR TYPE (HCC): Primary | Chronic | ICD-10-CM

## 2024-02-15 NOTE — BH CRISIS PLAN
Client Name: Rhys Esparza       Client YOB: 1985    [place] Safety Plan      Creation Date: 2/15/24 Update Date: 2/15/24   Created By: Bobby Marc Last Updated By: Bobby Marc      Step 1: Warning Signs:   Warning Signs   arguing with mother   using K2            Step 2: Internal Coping Strategies:   Internal Coping Strategies   listen to music   going for a walk            Step 3: People and social settings that provide distraction:   Name Contact Information   Angy Licona [phone]    Places   n/a           Step 4: People whom I can ask for help during a crisis:      Name Contact Information    Angy Licona (mother) [phone]      Step 5: Professionals or agencies I can contact during a crisis:      Clinican/Agency Name Phone Emergency Contact    [facility] [phone]       [facility] Emergency Department Emergency Department Phone Emergency Department Address    [facility] -[phone] [address]        Crisis Phone Numbers:   Suicide Prevention Lifeline: Call or Text  645 Crisis Text Line: Text HOME to 544-238   Please note: Some [facility] do not have a separate number for Child/Adolescent specific crisis. If your county is not listed under Child/Adolescent, please call the adult number for your county      Adult Crisis Numbers: Child/Adolescent Crisis Numbers   [facility]: [phone] [facility]: [phone]   [facility]: [phone] [facility]: [phone]   [facility]: [phone] [place]: [phone]   [facility]: [phone] [place]: [phone]   [place]: [phone]   [facility]: [phone]   [facility]: [phone]   [facility]: [phone] (daytime) [phone] (after hours, weekends, holidays)      Step 6: Making the environment safer (plan for lethal means safety):   Patient did not identify any lethal methods: Yes      Optional: What is most important to me and worth living for?   My family     [place] Safety Plan. Roseann Sparks and Jose Roberto Duke. Used with permission of the authors.

## 2024-02-15 NOTE — BH TREATMENT PLAN
Outpatient Behavioral Health Psychotherapy Treatment Plan    Rhysramirez Parishneetu  1985     Date of Initial Psychotherapy Assessment: 2/8/2024   Date of Current Treatment Plan: 02/15/24  Treatment Plan Target Date: 8/15/2024  Treatment Plan Expiration Date: 8/15/2024    Diagnosis:   1. Schizoaffective disorder, bipolar type (HCC)        2. Cannabis abuse, continuous        3. Synthetic cannabinoid abuse (HCC)            Area(s) of Need: Need to stay away from K2 and other substances, mental health stability    Long Term Goal 1 (in the client's own words): Trying to finally quit K2    Stage of Change: Action    Target Date for completion: 2/15/2025     Anticipated therapeutic modalities: CBT, motivational interviewing     People identified to complete this goal: madonna Joiner, therapist      Objective 1: (identify the means of measuring success in meeting the objective): Rhys will have identified at least 5 triggers and have learned how to avoid these triggers      Objective 2: (identify the means of measuring success in meeting the objective): Rhys will have developed at least 5 healthy coping skills, provide clean UDS to probation, and will have maintained abstinence for at least 1 year      Long Term Goal 2 (in the client's own words): Learn how my schizophrenia affects me    Stage of Change: Action    Target Date for completion: 2/15/2024     Anticipated therapeutic modalities: CBT, education providing     People identified to complete this goal: Rhys psychiatrist, therapist      Objective 1: (identify the means of measuring success in meeting the objective): Being able to identify my behaviors that are influenced by my diagnosis      Objective 2: (identify the means of measuring success in meeting the objective): learning healthy coping skills to address behaviors related to my schizophrenia     Long Term Goal 3 (in the client's own words): I want to successfully complete probation    Stage of Change:  Action    Target Date for completion: 4/30/2024     Anticipated therapeutic modalities: CBT     People identified to complete this goal: Rhys , therapist      Objective 1: (identify the means of measuring success in meeting the objective): will discuss and identify behaviors that cause me to be unsuccessful at probation      Objective 2: (identify the means of measuring success in meeting the objective): Stay out of incarceration     I am currently under the care of a [facility] psychiatric provider: no    My [facility] psychiatric provider is: n/a    I am currently taking psychiatric medications: Yes, as prescribed    I feel that I will be ready for discharge from mental health care when I reach the following (measurable goal/objective): I will have successfully completed probation, stayed away from K2 for at least 1 year, and display an understanding of my diagnosis.     For children and adults who have a legal guardian:   Has there been any change to custody orders and/or guardianship status? NA. If yes, attach updated documentation.    I have created my Crisis Plan and have been offered a copy of this plan    Behavioral Health Treatment Plan [facility]: Diagnosis and Treatment Plan explained to Rhys Esparza acknowledges an understanding of their diagnosis. Rhys Esparza agrees to this treatment plan.    I have been offered a copy of this Treatment Plan. yes

## 2024-02-15 NOTE — PSYCH
Behavioral Health Psychotherapy Progress Note    Psychotherapy Provided: Individual Psychotherapy     1. Schizoaffective disorder, bipolar type (HCC)        2. Cannabis abuse, continuous        3. Synthetic cannabinoid abuse (HCC)            Goals addressed in session: Goal 1, Goal 2, and Goal 3      DATA: Rhys arrived on time for his session. Rhys and therapist completed Rhys's crisis plan and created Rhys's treatment plan. Both these plans were signed.  Rhys and therapist discussed Rhys's daily schedule and how this may be influential in Rhys's ability to maintain abstinence. Rhys was able to identify that he has no schedule and no activities throughout the day. Therapist discussed with Rhys the use of [facility] clubhouse or drop in center. Rhys stated he was uncertain if he would attend. Rhys and therapist discussed symptoms  of Rhys's mental health diagnosis and how these symptoms may lead Rhys to act in ways that may cause increased difficulties for Rhys. Rhys reports medication compliance. Rhys was scheduled for session in one week.  During this session, this clinician used the following therapeutic modalities: Client-centered Therapy, Cognitive Behavioral Therapy, Motivational Interviewing, and Supportive Psychotherapy    Substance Abuse was addressed during this session. If the client is diagnosed with a co-occurring substance use disorder, please indicate any changes in the frequency or amount of use: Rhys denied any substance use.  Therapist utilized stage appropriate interventions to address this substance abuse issue.   Therapsit reminded Rhys of his frequent ED visits due to his substance abuse and discussed with Rhys his goals that would be compromised if Rhys used. Rhys was able to identify he would not successfully complete probation if he began using again. Stage of change for addressing substance use diagnoses: Action    ASSESSMENT:   "Rhys Esparza presents with a Euthymic/ normal mood. Rhys continues to struggle with participation due to his acute mental health symptoms but was able to receive education.     his affect is Normal range and intensity and Flat, which is congruent, with his mood and the content of the session. The client has made progress on their goals.     Rhys Esparza presents with a none risk of suicide, none risk of self-harm, and none risk of harm to others.    For any risk assessment that surpasses a \"low\" rating, a safety plan must be developed.    A safety plan was indicated: yes  If yes, describe in detail initial    PLAN: Between sessions, Rhys Esparza will consider attending the drop in center. At the next session, the therapist will use Client-centered Therapy, Cognitive Behavioral Therapy, Motivational Interviewing, and Solution-Focused Therapy to address Rhys's need to have abstinence reinforced and to work with Rhys to understand how to address his mental health symptoms.    Behavioral Health Treatment Plan and Discharge Planning: Rhys Esparza is aware of and agrees to continue to work on their treatment plan. They have identified and are working toward their discharge goals. yes    Visit start and stop times:    02/15/24  Start Time: 1400  Stop Time: 1447  Total Visit Time: 47 minutes  "

## 2024-02-22 ENCOUNTER — SOCIAL WORK (OUTPATIENT)
Dept: PSYCHIATRY | Facility: CLINIC | Age: 39
End: 2024-02-22

## 2024-02-22 DIAGNOSIS — F25.0 SCHIZOAFFECTIVE DISORDER, BIPOLAR TYPE (HCC): Chronic | ICD-10-CM

## 2024-02-22 DIAGNOSIS — F12.10 CANNABIS ABUSE, CONTINUOUS: Chronic | ICD-10-CM

## 2024-02-22 DIAGNOSIS — F19.10 SYNTHETIC CANNABINOID ABUSE (HCC): Primary | Chronic | ICD-10-CM

## 2024-02-22 NOTE — PSYCH
Behavioral Health Psychotherapy Progress Note    Psychotherapy Provided: Individual Psychotherapy     1. Synthetic cannabinoid abuse (HCC)        2. Cannabis abuse, continuous        3. Schizoaffective disorder, bipolar type (HCC)            Goals addressed in session: Goal 1, Goal 2, and Goal 3      DATA: Rhys arrived on time for his session. Rhys and therapist discussed Rhys's thinking process and reviewed how Rhys's diagnosis of schizophrenia effects Rhys's ability to process information.  Therapist highlighted this and discussed with Rhys taking more time to make decisions to allow himself this time to process.  Rhys reports he continues to meet with his  and [facility] . Therapist stressed to Rhys the importance of finding healthy activities for throughout the day. Rhys required therapist to speak slowly and allow Rhys the opportunity to process information.  Therapist repeatedly ask Rhys if he understood and assisted with clarification as needed. Rhys reported medication compliance. Rhys was scheduled for a session in 2 weeks.  During this session, this clinician used the following therapeutic modalities: Client-centered Therapy, Cognitive Behavioral Therapy, Motivational Interviewing, and Supportive Psychotherapy    Substance Abuse was addressed during this session. If the client is diagnosed with a co-occurring substance use disorder, please indicate any changes in the frequency or amount of use: Rhys reported he continues to maintain abstinence from K2 and marijuana and denied any cravings.  Therapist utilized stage appropriate interventions to address this substance abuse issue.   Therapist attempted to discuss triggers with Rhys and Rhys indicated he did not understand.  Therapist provided education and worked with Rhys to identify one trigger and to identify a coping skill.  Rhys  "was able to identify his walking around [place] as a trigger and therapist assisted Rhys in identifying a new thinking pattern and a behavior to address this trigger. Rhys was receptive to this discussion but required extensive repetition and clarification to understand. Stage of change for addressing substance use diagnoses: Action    ASSESSMENT:  Rhys Esparza presents with a Euthymic/ normal mood. Rhys presents with processing difficulties but does appear to be engaged and committed to attend treatment.     his affect is Normal range and intensity and Flat, which is congruent, with his mood and the content of the session. The client has made progress on their goals.     Rhys Esparza presents with a none risk of suicide, none risk of self-harm, and none risk of harm to others.    For any risk assessment that surpasses a \"low\" rating, a safety plan must be developed.    A safety plan was indicated: no  If yes, describe in detail n/a    PLAN: Between sessions, Rhys Esparza will continue to work with probation to complete his clubhouse referral and to utilize the coping skills identifies to address his trigger to use K2. At the next session, the therapist will use Client-centered Therapy, Cognitive Behavioral Therapy, Motivational Interviewing, and Supportive Psychotherapy to address Rhys's relapse prevention needs and further understanding of his mental health symptoms.    Behavioral Health Treatment Plan and Discharge Planning: Rhys Esparza is aware of and agrees to continue to work on their treatment plan. They have identified and are working toward their discharge goals. yes    Visit start and stop times:    02/22/24  Start Time: 1054  Stop Time: 1132  Total Visit Time: 38 minutes  "

## 2024-03-02 ENCOUNTER — HOSPITAL ENCOUNTER (EMERGENCY)
Facility: HOSPITAL | Age: 39
Discharge: HOME | End: 2024-03-02
Attending: EMERGENCY MEDICINE
Payer: COMMERCIAL

## 2024-03-02 ENCOUNTER — APPOINTMENT (EMERGENCY)
Dept: RADIOLOGY | Facility: HOSPITAL | Age: 39
End: 2024-03-02
Payer: COMMERCIAL

## 2024-03-02 VITALS
BODY MASS INDEX: 22.14 KG/M2 | WEIGHT: 131 LBS | TEMPERATURE: 96.6 F | RESPIRATION RATE: 16 BRPM | SYSTOLIC BLOOD PRESSURE: 116 MMHG | DIASTOLIC BLOOD PRESSURE: 76 MMHG | HEART RATE: 94 BPM | OXYGEN SATURATION: 97 %

## 2024-03-02 DIAGNOSIS — S60.519A: Primary | ICD-10-CM

## 2024-03-02 DIAGNOSIS — F19.90 DRUG USE: ICD-10-CM

## 2024-03-02 PROCEDURE — 90715 TDAP VACCINE 7 YRS/> IM: CPT | Performed by: EMERGENCY MEDICINE

## 2024-03-02 PROCEDURE — 99284 EMERGENCY DEPT VISIT MOD MDM: CPT

## 2024-03-02 PROCEDURE — 73130 X-RAY EXAM OF HAND: CPT

## 2024-03-02 PROCEDURE — 90471 IMMUNIZATION ADMIN: CPT

## 2024-03-02 RX ADMIN — TETANUS TOXOID, REDUCED DIPHTHERIA TOXOID AND ACELLULAR PERTUSSIS VACCINE, ADSORBED 0.5 ML: 5; 2.5; 8; 8; 2.5 SUSPENSION INTRAMUSCULAR at 19:49

## 2024-03-03 ENCOUNTER — HOSPITAL ENCOUNTER (EMERGENCY)
Facility: HOSPITAL | Age: 39
Discharge: HOME | End: 2024-03-03
Attending: EMERGENCY MEDICINE | Admitting: EMERGENCY MEDICINE
Payer: COMMERCIAL

## 2024-03-03 VITALS
BODY MASS INDEX: 22.73 KG/M2 | DIASTOLIC BLOOD PRESSURE: 69 MMHG | SYSTOLIC BLOOD PRESSURE: 123 MMHG | WEIGHT: 134.48 LBS | HEART RATE: 107 BPM | TEMPERATURE: 97 F | RESPIRATION RATE: 18 BRPM | OXYGEN SATURATION: 100 %

## 2024-03-03 DIAGNOSIS — F19.10 DRUG ABUSE (HCC): Primary | ICD-10-CM

## 2024-03-03 PROCEDURE — 99282 EMERGENCY DEPT VISIT SF MDM: CPT | Performed by: EMERGENCY MEDICINE

## 2024-03-03 PROCEDURE — 99284 EMERGENCY DEPT VISIT MOD MDM: CPT

## 2024-03-03 NOTE — ED NOTES
Pt given apple juice and cookies   sitting up in bed   states he feels fine  alert and oriented  answers questions appropriately     Valery Sharma RN  03/03/24 1146

## 2024-03-03 NOTE — ED PROVIDER NOTES
History  Chief Complaint   Patient presents with    Overdose - Accidental     PT arrived by EMS. Pt found laying on the ground outside. Pt unable to answer triage questions at this time but able to denies that he did any drugs today.      38 year old male, found on ground outside, hx of drug use. Endorses K2 use today. Unsure of tetanus. Abrasions to hand.       Overdose - Accidental  Associated symptoms: no abdominal pain, no chest pain, no cough, no diarrhea, no nausea, no shortness of breath and no vomiting        Prior to Admission Medications   Prescriptions Last Dose Informant Patient Reported? Taking?   benztropine (COGENTIN) 0.5 mg tablet   No No   Sig: Take 1 tablet (0.5 mg total) by mouth 3 (three) times a day   benztropine (COGENTIN) 2 mg tablet   Yes No   Sig: Take 2 mg by mouth every morning   Patient not taking: Reported on 8/20/2021   buPROPion (WELLBUTRIN SR) 150 mg 12 hr tablet   Yes No   Sig: Take 150 mg by mouth every morning   Patient not taking: Reported on 8/20/2021   cloNIDine (CATAPRES) 0.2 mg tablet   Yes No   Sig: Take 0.2 mg by mouth daily at bedtime   Patient not taking: Reported on 8/20/2021   divalproex sodium (DEPAKOTE ER) 500 mg 24 hr tablet   No No   Sig: Take 1 tablet (500 mg total) by mouth 2 (two) times a day   divalproex sodium (DEPAKOTE) 500 mg DR tablet   Yes No   Sig: Take 500 mg by mouth daily at bedtime   hydrOXYzine pamoate (VISTARIL) 50 mg capsule   No No   Sig: Take 1 capsule (50 mg total) by mouth daily at bedtime   loxapine (LOXITANE) 10 mg capsule   No No   Sig: Take 5 capsules (50 mg total) by mouth 3 (three) times a day 1 in the morning, 2 at night      Facility-Administered Medications: None       Past Medical History:   Diagnosis Date    Drug abuse (HCC)     Schizoaffective disorder (HCC)     resolved 05/16/14    Schizophrenia (HCC)        Past Surgical History:   Procedure Laterality Date    NO PAST SURGERIES         Family History   Problem Relation Age of Onset     Anxiety disorder Mother     Depression Mother     Dysphagia Mother     Stroke Mother         cva    Diabetes type II Mother     Other Mother         Hyponatremia    Psychosis Mother      I have reviewed and agree with the history as documented.    E-Cigarette/Vaping    E-Cigarette Use Never User      E-Cigarette/Vaping Substances    Nicotine No     THC No     CBD No     Flavoring No     Other No     Unknown No      Social History     Tobacco Use    Smoking status: Unknown    Smokeless tobacco: Never   Vaping Use    Vaping status: Never Used   Substance Use Topics    Alcohol use: Yes     Comment: social    Drug use: Yes     Types: Marijuana, Other     Comment: K2       Review of Systems   Constitutional: Negative.  Negative for chills and fever.   HENT: Negative.  Negative for rhinorrhea, sore throat, trouble swallowing and voice change.    Eyes: Negative.  Negative for pain and visual disturbance.   Respiratory: Negative.  Negative for cough, shortness of breath and wheezing.    Cardiovascular: Negative.  Negative for chest pain and palpitations.   Gastrointestinal:  Negative for abdominal pain, diarrhea, nausea and vomiting.   Genitourinary: Negative.  Negative for dysuria and frequency.   Musculoskeletal: Negative.  Negative for neck pain and neck stiffness.   Skin:  Positive for wound. Negative for rash.   Neurological: Negative.  Negative for dizziness, speech difficulty, weakness, light-headedness and numbness.       Physical Exam  Physical Exam  Vitals and nursing note reviewed.   Constitutional:       General: He is not in acute distress.     Appearance: He is well-developed.   HENT:      Head: Normocephalic and atraumatic.   Eyes:      Conjunctiva/sclera: Conjunctivae normal.      Pupils: Pupils are equal, round, and reactive to light.   Neck:      Trachea: No tracheal deviation.   Cardiovascular:      Rate and Rhythm: Normal rate and regular rhythm.   Pulmonary:      Effort: Pulmonary effort is normal.  No respiratory distress.      Breath sounds: Normal breath sounds. No wheezing or rales.   Abdominal:      General: Bowel sounds are normal. There is no distension.      Palpations: Abdomen is soft.      Tenderness: There is no abdominal tenderness. There is no guarding or rebound.   Musculoskeletal:         General: No tenderness or deformity. Normal range of motion.      Cervical back: Normal range of motion and neck supple.   Skin:     General: Skin is warm and dry.      Capillary Refill: Capillary refill takes less than 2 seconds.      Findings: Signs of injury present. No rash.      Comments: Bilateral superficial abrasions on each hand. No restrictions in range of motion.    Neurological:      Mental Status: He is alert and oriented to person, place, and time.   Psychiatric:         Behavior: Behavior normal.         Vital Signs  ED Triage Vitals [03/02/24 1912]   Temperature Pulse Respirations Blood Pressure SpO2   (!) 96 °F (35.6 °C) (!) 116 20 156/85 98 %      Temp Source Heart Rate Source Patient Position - Orthostatic VS BP Location FiO2 (%)   Axillary Monitor Lying Left arm --      Pain Score       --           Vitals:    03/02/24 1912 03/02/24 1934   BP: 156/85 116/76   Pulse: (!) 116 94   Patient Position - Orthostatic VS: Lying Lying         Visual Acuity  Visual Acuity      Flowsheet Row Most Recent Value   L Pupil Size (mm) 3   R Pupil Size (mm) 3            ED Medications  Medications   tetanus-diphtheria-acellular pertussis (BOOSTRIX) IM injection 0.5 mL (0.5 mL Intramuscular Given 3/2/24 1949)       Diagnostic Studies  Results Reviewed       None                   XR hand 3+ views LEFT   ED Interpretation by Joshua Murray DO (03/02 2033)   No fracture or foreign body appreciated.      XR hand 3+ views RIGHT   ED Interpretation by Joshua Murray DO (03/02 2033)   No fracture or foreign body.                 Procedures  Procedures         ED Course                                              Medical Decision Making  38 year old male, admits to drug use. No fractures or dislocations on  hand xrays. Wounds cleansed. Woke up and ate food without difficulty. Ambulating without difficulty. Declined HOST or CRS.    Amount and/or Complexity of Data Reviewed  Radiology: ordered and independent interpretation performed.    Risk  Prescription drug management.             Disposition  Final diagnoses:   Hand abrasion, non-infected   Drug use     Time reflects when diagnosis was documented in both MDM as applicable and the Disposition within this note       Time User Action Codes Description Comment    3/2/2024  8:34 PM Joshua Murray [S60.519A] Hand abrasion, non-infected     3/2/2024  8:35 PM Joshua Murray [F19.90] Drug use           ED Disposition       ED Disposition   Discharge    Condition   Stable    Date/Time   Sat Mar 2, 2024  8:34 PM    Comment   Rhys Esparza discharge to home/self care.                   Follow-up Information       Follow up With Specialties Details Why Contact Info Additional Information    [facility] In 1 week  [address] [place] [id number] [phone] [facility], [address] [place] [address], [address], [id number] [phone]            Discharge Medication List as of 3/2/2024  8:36 PM        CONTINUE these medications which have NOT CHANGED    Details   !! benztropine (COGENTIN) 0.5 mg tablet Take 1 tablet (0.5 mg total) by mouth 3 (three) times a day, Starting Thu 11/30/2023, Normal      !! benztropine (COGENTIN) 2 mg tablet Take 2 mg by mouth every morning, Starting Tue 6/22/2021, Historical Med      buPROPion (WELLBUTRIN SR) 150 mg 12 hr tablet Take 150 mg by mouth every morning, Starting Sun 6/20/2021, Historical Med      cloNIDine (CATAPRES) 0.2 mg tablet Take 0.2 mg by mouth daily at bedtime,  Starting Tue 6/22/2021, Historical Med      divalproex sodium (DEPAKOTE ER) 500 mg 24 hr tablet Take 1 tablet (500 mg total) by mouth 2 (two) times a day, Starting u 11/30/2023, Until Sat 12/30/2023, Normal      divalproex sodium (DEPAKOTE) 500 mg DR tablet Take 500 mg by mouth daily at bedtime, Historical Med      hydrOXYzine pamoate (VISTARIL) 50 mg capsule Take 1 capsule (50 mg total) by mouth daily at bedtime, Starting u 11/30/2023, Until Sat 12/30/2023, Normal      loxapine (LOXITANE) 10 mg capsule Take 5 capsules (50 mg total) by mouth 3 (three) times a day 1 in the morning, 2 at night, Starting u 11/30/2023, Until Sat 12/30/2023, Normal       !! - Potential duplicate medications found. Please discuss with provider.          No discharge procedures on file.    PDMP Review       None            ED Provider  Electronically Signed by             Joshua Murray DO  03/02/24 2407

## 2024-03-03 NOTE — ED NOTES
Pt changed out of wet clothing, and placed in clean dry clothes. Wet linen changed, and pt given warm blankets     Katie Heaton RN  03/02/24 1926

## 2024-03-07 ENCOUNTER — SOCIAL WORK (OUTPATIENT)
Dept: PSYCHIATRY | Facility: CLINIC | Age: 39
End: 2024-03-07

## 2024-03-07 DIAGNOSIS — F19.10 SYNTHETIC CANNABINOID ABUSE (HCC): Chronic | ICD-10-CM

## 2024-03-07 DIAGNOSIS — F12.10 CANNABIS ABUSE, CONTINUOUS: Chronic | ICD-10-CM

## 2024-03-07 DIAGNOSIS — F25.0 SCHIZOAFFECTIVE DISORDER, BIPOLAR TYPE (HCC): Primary | Chronic | ICD-10-CM

## 2024-03-07 NOTE — PSYCH
Behavioral Health Psychotherapy Progress Note    Psychotherapy Provided: Individual Psychotherapy     1. Schizoaffective disorder, bipolar type (HCC)        2. Cannabis abuse, continuous        3. Synthetic cannabinoid abuse (HCC)            Goals addressed in session: Goal 1, Goal 2, and Goal 3      DATA: Rhys arrived on time for his session. Rhys and therapist reviewed Rhys's 2 ED visits this past weekend and how this was related to Rhys's use of K2. Therapist highlighted for Rhys his continued use of K2 and interactions with both police and EMS may result in more severe consequences, such as incarcerations. Rhys struggled to respond at times and therapist was required to break down each situation and resulting outcomes to assist Rhys in processing these 2 ED visits. Rhys and therapist discussed how Rhys's diagnosis of schizophrenia could affect Rhys's ability to correctly process information. Rhys reports he continues to take his medications as prescribed. Rhys was scheduled for a session next week.  During this session, this clinician used the following therapeutic modalities: Cognitive Behavioral Therapy, Cognitive Processing Therapy, Motivational Interviewing, and Supportive Psychotherapy    Substance Abuse was addressed during this session. If the client is diagnosed with a co-occurring substance use disorder, please indicate any changes in the frequency or amount of use: Rhys initially denied any use of K2 recently until therapist highlighted his 2 recent ED visits indicating Rhys had reported his use of K2. Therapist utilized stage appropriate interventions to address this substance abuse issue.   Therapist processed with Rhys the situation leading up to his use and the effects of Rhys's use of K2. Rhys initially stated he believed that K2 provided him with a clearer thought process, improved mood, more positive interaction with others, and increase in energy. Therapist  "reviewed with Rhys his past ED visits after the use of K2 and highlighted that through all reports the exact opposite occurred when Rhys smoked K2. Rhys reported a commitment to stop his K2 use at this time . Stage of change for addressing substance use diagnoses: Contemplation    ASSESSMENT:  Rhys Esparza presents with a Euthymic/ normal mood. Rhys continues to struggle with though processing difficulties and requires slowed, repetition to process information.     his affect is Normal range and intensity, which is congruent, with his mood and the content of the session. The client has made progress on their goals.     Rhys Esparza presents with a none risk of suicide, none risk of self-harm, and none risk of harm to others.    For any risk assessment that surpasses a \"low\" rating, a safety plan must be developed.    A safety plan was indicated: no  If yes, describe in detail n/a    PLAN: Between sessions, Rhys Esparza will stop his K2 use and avoid people that use K2. At the next session, the therapist will use Cognitive Behavioral Therapy, Cognitive Processing Therapy, Motivational Interviewing, and Supportive Psychotherapy to address Rhys's ongoing need to maintain abstinence from K2 and remain compliant with probation.    Behavioral Health Treatment Plan and Discharge Planning: Rhys Esparza is aware of and agrees to continue to work on their treatment plan. They have identified and are working toward their discharge goals. yes    Visit start and stop times:    03/07/24  Start Time: 1230  Stop Time: 1316  Total Visit Time: 46 minutes  "

## 2024-03-12 ENCOUNTER — HOSPITAL ENCOUNTER (EMERGENCY)
Facility: HOSPITAL | Age: 39
Discharge: HOME | End: 2024-03-12
Attending: EMERGENCY MEDICINE
Payer: COMMERCIAL

## 2024-03-12 VITALS
RESPIRATION RATE: 14 BRPM | DIASTOLIC BLOOD PRESSURE: 60 MMHG | HEART RATE: 78 BPM | WEIGHT: 116.62 LBS | OXYGEN SATURATION: 98 % | TEMPERATURE: 97.9 F | BODY MASS INDEX: 19.71 KG/M2 | SYSTOLIC BLOOD PRESSURE: 96 MMHG

## 2024-03-12 DIAGNOSIS — F19.90 RECREATIONAL DRUG USE: Primary | ICD-10-CM

## 2024-03-12 LAB — GLUCOSE SERPL-MCNC: 148 MG/DL (ref 65–140)

## 2024-03-12 PROCEDURE — 82948 REAGENT STRIP/BLOOD GLUCOSE: CPT

## 2024-03-12 PROCEDURE — 99283 EMERGENCY DEPT VISIT LOW MDM: CPT

## 2024-03-12 PROCEDURE — 99284 EMERGENCY DEPT VISIT MOD MDM: CPT

## 2024-03-13 NOTE — ED PROVIDER NOTES
History  Chief Complaint   Patient presents with    Recreational Drug Use     Known K2 user, was walking in street, then laid down on sidewalk.  Found by [employer]; brought in by EMS.  Pt is somnolent but responds to tactile stimulation.  Pt offers no speech; answers no questions.  Resps easy and unlabored.  BBS CTA.  Protective reflexes intact.       The patient is a 38-year-old male with a past medical history of schizoaffective disorder and polysubstance use disorder, who presents via EMS for suspected K2 use.  Per EMS the patient was picked up by [employer] because he was stumbling and almost walked into the street.  No outward signs of trauma.  Patient is drowsy and uncooperative with questioning.        Prior to Admission Medications   Prescriptions Last Dose Informant Patient Reported? Taking?   benztropine (COGENTIN) 0.5 mg tablet   No No   Sig: Take 1 tablet (0.5 mg total) by mouth 3 (three) times a day   benztropine (COGENTIN) 2 mg tablet   Yes No   Sig: Take 2 mg by mouth every morning   Patient not taking: Reported on 8/20/2021   buPROPion (WELLBUTRIN SR) 150 mg 12 hr tablet   Yes No   Sig: Take 150 mg by mouth every morning   Patient not taking: Reported on 8/20/2021   cloNIDine (CATAPRES) 0.2 mg tablet   Yes No   Sig: Take 0.2 mg by mouth daily at bedtime   Patient not taking: Reported on 8/20/2021   divalproex sodium (DEPAKOTE ER) 500 mg 24 hr tablet   No No   Sig: Take 1 tablet (500 mg total) by mouth 2 (two) times a day   divalproex sodium (DEPAKOTE) 500 mg DR tablet   Yes No   Sig: Take 500 mg by mouth daily at bedtime   hydrOXYzine pamoate (VISTARIL) 50 mg capsule   No No   Sig: Take 1 capsule (50 mg total) by mouth daily at bedtime   loxapine (LOXITANE) 10 mg capsule   No No   Sig: Take 5 capsules (50 mg total) by mouth 3 (three) times a day 1 in the morning, 2 at night      Facility-Administered Medications: None       Past Medical History:   Diagnosis Date    Drug abuse (HCC)     Schizoaffective disorder  (HCC)     resolved 05/16/14    Schizophrenia (HCC)        Past Surgical History:   Procedure Laterality Date    NO PAST SURGERIES         Family History   Problem Relation Age of Onset    Anxiety disorder Mother     Depression Mother     Dysphagia Mother     Stroke Mother         cva    Diabetes type II Mother     Other Mother         Hyponatremia    Psychosis Mother      I have reviewed and agree with the history as documented.    E-Cigarette/Vaping    E-Cigarette Use Never User      E-Cigarette/Vaping Substances    Nicotine No     THC No     CBD No     Flavoring No     Other No     Unknown No      Social History     Tobacco Use    Smoking status: Unknown    Smokeless tobacco: Never   Vaping Use    Vaping status: Never Used   Substance Use Topics    Alcohol use: Yes     Comment: social    Drug use: Yes     Types: Marijuana, Other     Comment: K2       Review of Systems   Unable to perform ROS: Mental status change       Physical Exam  Physical Exam  Vitals and nursing note reviewed.   Constitutional:       Appearance: He is normal weight. He is not toxic-appearing.   HENT:      Head: Normocephalic and atraumatic.      Right Ear: External ear normal.      Left Ear: External ear normal.      Nose: Nose normal. No nasal deformity or signs of injury.      Mouth/Throat:      Lips: Pink.      Mouth: Mucous membranes are moist.      Pharynx: Oropharynx is clear. Uvula midline.   Eyes:      General: Lids are normal. Vision grossly intact. Gaze aligned appropriately.      Conjunctiva/sclera: Conjunctivae normal.      Pupils: Pupils are equal, round, and reactive to light.   Cardiovascular:      Rate and Rhythm: Normal rate and regular rhythm.      Heart sounds: S1 normal and S2 normal. No murmur heard.     No friction rub. No gallop.   Pulmonary:      Effort: Pulmonary effort is normal. No respiratory distress.      Breath sounds: Normal breath sounds and air entry. No stridor or transmitted upper airway sounds. No  wheezing, rhonchi or rales.   Abdominal:      General: Abdomen is flat.      Palpations: Abdomen is soft.      Tenderness: There is no abdominal tenderness. There is no guarding or rebound.   Musculoskeletal:      Cervical back: Normal, full passive range of motion without pain and neck supple.      Thoracic back: Normal.      Lumbar back: Normal.   Skin:     Findings: No abrasion, bruising, ecchymosis, erythema, laceration, rash or wound.   Neurological:      GCS: GCS eye subscore is 4. GCS verbal subscore is 2. GCS motor subscore is 5.      Comments: Patient is somnolent and will not answers questions, but he does open his eyes spontaneously and moves all extremities.   Psychiatric:         Attention and Perception: He is inattentive.         Behavior: Behavior is uncooperative and slowed.         Vital Signs  ED Triage Vitals [03/12/24 2032]   Temperature Pulse Respirations Blood Pressure SpO2   97.9 °F (36.6 °C) (!) 108 14 125/74 99 %      Temp Source Heart Rate Source Patient Position - Orthostatic VS BP Location FiO2 (%)   Tympanic -- -- -- --      Pain Score       --           Vitals:    03/12/24 2032 03/12/24 2116 03/12/24 2122 03/12/24 2232   BP: 125/74 93/50  96/60   Pulse: (!) 108 76 76 78         Visual Acuity  Visual Acuity      Flowsheet Row Most Recent Value   L Pupil Size (mm) 4   R Pupil Size (mm) 4   L Pupil Shape Round   R Pupil Shape Round            ED Medications  Medications - No data to display    Diagnostic Studies  Results Reviewed       Procedure Component Value Units Date/Time    Fingerstick Glucose (POCT) [[id number]]  (Abnormal) Collected: 03/12/24 2026    Lab Status: Final result Updated: 03/12/24 2028     POC Glucose 148 mg/dl                    No orders to display              Procedures  Procedures         ED Course  ED Course as of 03/13/24 0212   Tue Mar 12, 2024   2239 Patient is more alert and cooperative with questioning.  He reports using K2 tonight, but denies any other drug  or alcohol use.  He has no complaints at this time.   2240 POC Glucose(!): 148   2256 Patient sat up and ate a sandwich.  Vitals remained stable. He is A&Ox4 at this time.  Patient ambulated out of the department independently with a steady gait.          Medical Decision Making  Patient presented for suspected substance use.  On arrival he is somnolent, but is protecting his airway.  Differential diagnosis includes but is not limited to substance use disorder, polysubstance use disorder, hypoglycemia, or doubt psychiatric illness.  POCT blood glucose was 148.  Once more alert the patient did admit to K2 use tonight.  Prior to discharge he was A&O x 4 with a steady gait.  He does not want referrals to detox/rehab.  Return precautions discussed and he verbalized understanding.  Follow-up with PCP, and return to the ED in the interim with new or worsening symptoms.       Problems Addressed:  Recreational drug use: acute illness or injury    Amount and/or Complexity of Data Reviewed  Labs:  Decision-making details documented in ED Course.         Disposition  Final diagnoses:   Recreational drug use     Time reflects when diagnosis was documented in both MDM as applicable and the Disposition within this note       Time User Action Codes Description Comment    3/12/2024 10:55 PM Ruby Parrish Add [F19.90] Recreational drug use           ED Disposition       ED Disposition   Discharge    Condition   Stable    Date/Time   Tue Mar 12, 2024 10:55 PM    Comment   Rhys Esparza discharge to home/self care.                   Follow-up Information    None         Discharge Medication List as of 3/12/2024 11:08 PM        CONTINUE these medications which have NOT CHANGED    Details   !! benztropine (COGENTIN) 0.5 mg tablet Take 1 tablet (0.5 mg total) by mouth 3 (three) times a day, Starting u 11/30/2023, Normal      !! benztropine (COGENTIN) 2 mg tablet Take 2 mg by mouth every morning, Starting Tue 6/22/2021, Historical Med       buPROPion (WELLBUTRIN SR) 150 mg 12 hr tablet Take 150 mg by mouth every morning, Starting Sun 6/20/2021, Historical Med      cloNIDine (CATAPRES) 0.2 mg tablet Take 0.2 mg by mouth daily at bedtime, Starting Tue 6/22/2021, Historical Med      divalproex sodium (DEPAKOTE ER) 500 mg 24 hr tablet Take 1 tablet (500 mg total) by mouth 2 (two) times a day, Starting Thu 11/30/2023, Until Sat 12/30/2023, Normal      divalproex sodium (DEPAKOTE) 500 mg DR tablet Take 500 mg by mouth daily at bedtime, Historical Med      hydrOXYzine pamoate (VISTARIL) 50 mg capsule Take 1 capsule (50 mg total) by mouth daily at bedtime, Starting u 11/30/2023, Until Sat 12/30/2023, Normal      loxapine (LOXITANE) 10 mg capsule Take 5 capsules (50 mg total) by mouth 3 (three) times a day 1 in the morning, 2 at night, Starting u 11/30/2023, Until Sat 12/30/2023, Normal       !! - Potential duplicate medications found. Please discuss with provider.          No discharge procedures on file.    PDMP Review       None            ED Provider  Electronically Signed by             Ruby Parrish PA-C  03/13/24 0216

## 2024-03-13 NOTE — ED NOTES
Pt awakens to calling, but offers no verbal response.  Resps easy and regular.  Sleeping when undisturbed.     Ryan Smith RN  03/12/24 [id number]

## 2024-03-14 ENCOUNTER — SOCIAL WORK (OUTPATIENT)
Dept: PSYCHIATRY | Facility: CLINIC | Age: 39
End: 2024-03-14

## 2024-03-14 ENCOUNTER — TELEPHONE (OUTPATIENT)
Dept: BEHAVIORAL/MENTAL HEALTH CLINIC | Facility: CLINIC | Age: 39
End: 2024-03-14

## 2024-03-14 DIAGNOSIS — F12.10 CANNABIS ABUSE, CONTINUOUS: Primary | Chronic | ICD-10-CM

## 2024-03-14 DIAGNOSIS — F25.0 SCHIZOAFFECTIVE DISORDER, BIPOLAR TYPE (HCC): Chronic | ICD-10-CM

## 2024-03-14 DIAGNOSIS — F19.10 SYNTHETIC CANNABINOID ABUSE (HCC): Chronic | ICD-10-CM

## 2024-03-14 PROCEDURE — NOSHOW: Performed by: COUNSELOR

## 2024-03-14 NOTE — TELEPHONE ENCOUNTER
"Patient mother is calling regarding cancelling an appointment.    Date/Time: 3/14/24    Reason:\"I forgot he had this appt\"    Patient was rescheduled: YES [x] NO []  If yes, when was Patient reschedule for: 3/19 8am  "

## 2024-03-14 NOTE — PSYCH
No Call. No Show. No Charge    Rhys Esparza no showed 03/14/24 appointment , staff called and left message to reschedule appointment     Treatment Plan not due at this session.

## 2024-03-17 ENCOUNTER — HOSPITAL ENCOUNTER (EMERGENCY)
Facility: HOSPITAL | Age: 39
Discharge: HOME | End: 2024-03-17
Attending: STUDENT IN AN ORGANIZED HEALTH CARE EDUCATION/TRAINING PROGRAM | Admitting: STUDENT IN AN ORGANIZED HEALTH CARE EDUCATION/TRAINING PROGRAM
Payer: COMMERCIAL

## 2024-03-17 VITALS
DIASTOLIC BLOOD PRESSURE: 59 MMHG | RESPIRATION RATE: 18 BRPM | HEART RATE: 62 BPM | SYSTOLIC BLOOD PRESSURE: 97 MMHG | TEMPERATURE: 97.6 F | OXYGEN SATURATION: 99 %

## 2024-03-17 DIAGNOSIS — F19.10 DRUG ABUSE (HCC): Primary | ICD-10-CM

## 2024-03-17 PROCEDURE — 99282 EMERGENCY DEPT VISIT SF MDM: CPT

## 2024-03-17 NOTE — ED NOTES
Ate everything I gave him   he fell back to sleep   easily aroused     Valery Sharma, PETERSON  03/17/24 1926

## 2024-03-17 NOTE — ED PROVIDER NOTES
"History  Chief Complaint   Patient presents with    Drug Problem     Patient found on the ground picking larisa/altered. Patient admits to smoking K2.     38-year-old male with a history of drug abuse was brought in by police after being found acting abnormally on the street.  Patient endorsed smoking \"a little bit\" of K2 prior to arrival.  Denied any other drug use today.  Only complaints is that he is hungry.  Otherwise not really answering question.        Prior to Admission Medications   Prescriptions Last Dose Informant Patient Reported? Taking?   benztropine (COGENTIN) 0.5 mg tablet   No No   Sig: Take 1 tablet (0.5 mg total) by mouth 3 (three) times a day   benztropine (COGENTIN) 2 mg tablet   Yes No   Sig: Take 2 mg by mouth every morning   Patient not taking: Reported on 8/20/2021   buPROPion (WELLBUTRIN SR) 150 mg 12 hr tablet   Yes No   Sig: Take 150 mg by mouth every morning   Patient not taking: Reported on 8/20/2021   cloNIDine (CATAPRES) 0.2 mg tablet   Yes No   Sig: Take 0.2 mg by mouth daily at bedtime   Patient not taking: Reported on 8/20/2021   divalproex sodium (DEPAKOTE ER) 500 mg 24 hr tablet   No No   Sig: Take 1 tablet (500 mg total) by mouth 2 (two) times a day   divalproex sodium (DEPAKOTE) 500 mg DR tablet   Yes No   Sig: Take 500 mg by mouth daily at bedtime   loxapine (LOXITANE) 10 mg capsule   No No   Sig: Take 5 capsules (50 mg total) by mouth 3 (three) times a day 1 in the morning, 2 at night      Facility-Administered Medications: None       Past Medical History:   Diagnosis Date    Drug abuse (HCC)     Schizoaffective disorder (HCC)     resolved 05/16/14    Schizophrenia (HCC)        Past Surgical History:   Procedure Laterality Date    NO PAST SURGERIES         Family History   Problem Relation Age of Onset    Anxiety disorder Mother     Depression Mother     Dysphagia Mother     Stroke Mother         cva    Diabetes type II Mother     Other Mother         Hyponatremia    " Psychosis Mother      I have reviewed and agree with the history as documented.    E-Cigarette/Vaping    E-Cigarette Use Never User      E-Cigarette/Vaping Substances    Nicotine No     THC No     CBD No     Flavoring No     Other No     Unknown No      Social History     Tobacco Use    Smoking status: Unknown    Smokeless tobacco: Never   Vaping Use    Vaping status: Never Used   Substance Use Topics    Alcohol use: Yes     Comment: social    Drug use: Yes     Types: Marijuana, Other     Comment: K2        Review of Systems   Unable to perform ROS: Other (Under the influence of K2)       Physical Exam  ED Triage Vitals   Temperature Pulse Respirations Blood Pressure SpO2   03/17/24 1812 03/17/24 1812 03/17/24 1812 03/17/24 1812 03/17/24 1812   97.6 °F (36.4 °C) 78 15 110/67 95 %      Temp Source Heart Rate Source Patient Position - Orthostatic VS BP Location FiO2 (%)   03/17/24 1812 03/17/24 1812 03/17/24 1812 03/17/24 1812 --   Tympanic Monitor Lying Right arm       Pain Score       03/17/24 1858       No Pain             Orthostatic Vital Signs  Vitals:    03/17/24 1812 03/17/24 1858 03/17/24 1926   BP: 110/67 (!) 87/50 97/59   Pulse: 78 74 62   Patient Position - Orthostatic VS: Lying Lying Lying       Physical Exam  Vitals and nursing note reviewed.   Constitutional:       Appearance: He is well-developed.      Comments: Tired appearing   HENT:      Head: Normocephalic and atraumatic.      Right Ear: External ear normal.      Left Ear: External ear normal.      Nose: Nose normal.      Mouth/Throat:      Mouth: Mucous membranes are moist.   Eyes:      Extraocular Movements: Extraocular movements intact.   Cardiovascular:      Rate and Rhythm: Normal rate and regular rhythm.   Pulmonary:      Effort: Pulmonary effort is normal. No respiratory distress.   Abdominal:      General: There is no distension.   Musculoskeletal:         General: No deformity.      Cervical back: Neck supple.   Skin:     General: Skin is  warm and dry.   Neurological:      Mental Status: He is alert.         ED Medications  Medications - No data to display    Diagnostic Studies  Results Reviewed       None                   No orders to display         Procedures  Procedures      ED Course                                       Medical Decision Making  Patient remained hemodynamically stable while resting comfortably in stretcher during her stay here in the ED.  Patient no complaints and tolerated p.o.  Patient was instructed to follow-up closely with his PCP, and drug cessation was discussed.  Patient understands and agrees with the plan.  Strict return precautions given if symptoms are worsening or not resolving.  Patient discharged in stable condition.            Disposition  Final diagnoses:   Drug abuse (HCC)     Time reflects when diagnosis was documented in both MDM as applicable and the Disposition within this note       Time User Action Codes Description Comment    3/17/2024  8:43 PM Estevan Cid Add [F19.10] Drug abuse (HCC)           ED Disposition       ED Disposition   Discharge    Condition   Stable    Date/Time   Sun Mar 17, 2024  8:43 PM    Comment   Rhys Esparza discharge to home/self care.                   Follow-up Information       Follow up With Specialties Details Why Contact Info Additional Information    [facility] Call in 1 day For ED follow-up [address] [place] [address] [phone] [facility], [address] [place] [address], [address] [phone]    [facility] Emergency Department Emergency Medicine Go to  If symptoms worsen or do not resolve [address] [facility] [address] [phone] [facility] Emergency Department            Patient's Medications   Discharge Prescriptions    No medications on file     No  discharge procedures on file.    PDMP Review       None             ED Provider  Attending physically available and evaluated Rhys Esparza. I managed the patient along with the ED Attending.    Electronically Signed by           Estevan Cid DO  03/17/24 2049

## 2024-03-17 NOTE — ED NOTES
Pt asked for a snack and turkey sandwich and granola bar given with megan Sharma RN  03/17/24 6731       Valery Sharma RN  03/17/24 5548

## 2024-03-18 NOTE — ED ATTENDING ATTESTATION
3/17/2024  I, Christina Lewsi DO, saw and evaluated the patient. I have discussed the patient with the resident/non-physician practitioner and agree with the resident's/non-physician practitioner's findings, Plan of Care, and MDM as documented in the resident's/non-physician practitioner's note, except where noted. All available labs and Radiology studies were reviewed.  I was present for key portions of any procedure(s) performed by the resident/non-physician practitioner and I was immediately available to provide assistance.     At this point I agree with the current assessment done in the Emergency Department. No evidence of any trauma. Plan to monitor patient.     ED Course         Critical Care Time  Procedures

## 2024-03-19 ENCOUNTER — SOCIAL WORK (OUTPATIENT)
Dept: PSYCHIATRY | Facility: CLINIC | Age: 39
End: 2024-03-19
Payer: COMMERCIAL

## 2024-03-19 DIAGNOSIS — F19.10 SYNTHETIC CANNABINOID ABUSE (HCC): Chronic | ICD-10-CM

## 2024-03-19 DIAGNOSIS — F25.0 SCHIZOAFFECTIVE DISORDER, BIPOLAR TYPE (HCC): Primary | Chronic | ICD-10-CM

## 2024-03-19 DIAGNOSIS — F12.10 CANNABIS ABUSE, CONTINUOUS: Chronic | ICD-10-CM

## 2024-03-19 PROCEDURE — 90834 PSYTX W PT 45 MINUTES: CPT | Performed by: COUNSELOR

## 2024-03-19 NOTE — PSYCH
Behavioral Health Psychotherapy Progress Note    Psychotherapy Provided: Individual Psychotherapy     1. Schizoaffective disorder, bipolar type (HCC)        2. Synthetic cannabinoid abuse (HCC)        3. Cannabis abuse, continuous            Goals addressed in session: Goal 1, Goal 2, and Goal 3      DATA: Rhys arrived on time for his session. Therapist discussed with Rhys his recent ED visit after smoking K2. Therapist attempted to work with Rhys to define his reason for coming to therapy. Rhys was able to recognize he was only coming to therapy to satisfy his probation and once this was completed he would end therapy. Rhys signed an [facility] for [facility] adult probation as to ensure that probation is aware that Rhys is attending sessions. Therapist attempted to education Rhys regarding the interaction of his mental health needs and his substance abuse and how this can continue to cause Rhys to need frequent ED visits. Therapist highlighted for Rhys that he had been to the ED 4 times since starting therapy a little over 1 month ago and that this was primarily due to Rhys's use of illicit substances. Rhys did admit to also drinking beer at his home at times but stated he did not feel he had an alcohol problem. Rhys stated he is uncertain of his next psychiatric appointment. Rhys was scheduled for his next session in 1 week.  During this session, this clinician used the following therapeutic modalities: Client-centered Therapy, Cognitive Behavioral Therapy, Motivational Interviewing, and Solution-Focused Therapy    Substance Abuse was addressed during this session. If the client is diagnosed with a co-occurring substance use disorder, please indicate any changes in the frequency or amount of use: Rhys did admit to both K2 and alcohol use.  Therapist utilized stage appropriate interventions to address this substance abuse issue.   Therapist discussed the dangers of K2 and alcohol use with  "Rhys, especially due to Rhys's diagnosis of a thought disorder.  Rhys was warned that his thinking could become increasingly compromised due to use and that would result in his visits to the ED. Therapist worked with Rhys to determine a harm reduction technique and it was determined that Rhys should use closer to home so that he can enter his home after use and not be on the street acting bizarrely, which results in Rhys's frequent ED visits.  Stage of change for addressing substance use diagnoses: Pre-contemplation    ASSESSMENT:  Rhys Esparza presents with a Euthymic/ normal mood. Rhys continues to struggle with though blocking and needs information repeated and time to process.      his affect is Normal range and intensity, which is congruent, with his mood and the content of the session. The client has made progress on their goals.     Rhys Esparza presents with a none risk of suicide, none risk of self-harm, and none risk of harm to others.    For any risk assessment that surpasses a \"low\" rating, a safety plan must be developed.    A safety plan was indicated: no  If yes, describe in detail n/a    PLAN: Between sessions, Rhys Esparza will attempt to limit his K2 use but if he cannot Rhys will use K2 closer to his home and enter his home after his use. At the next session, the therapist will use Client-centered Therapy, Cognitive Behavioral Therapy, Motivational Interviewing, and Supportive Psychotherapy to address Rhys's ongoing desire to successfully complete probation that will require Rhys to curb his K2 use and develop a better understanding of his mental health symptoms.    Behavioral Health Treatment Plan and Discharge Planning: Rhys Esparza is aware of and agrees to continue to work on their treatment plan. They have identified and are working toward their discharge goals. yes    Visit start and stop times:    03/19/24  Start Time: 0757  Stop Time: 0840  Total Visit Time: 43 " minutes

## 2024-03-22 ENCOUNTER — TELEPHONE (OUTPATIENT)
Dept: OTHER | Age: 39
End: 2024-03-22

## 2024-03-22 NOTE — TELEPHONE ENCOUNTER
Angy, mother, called the MAT office to cancel Rhys's appt with Bobby KUMAR, therapist, due to his incarceration, but she would not elaborate. Appt cancelled.    For your review.

## 2024-04-04 ENCOUNTER — DOCUMENTATION (OUTPATIENT)
Dept: PSYCHIATRY | Facility: CLINIC | Age: 39
End: 2024-04-04

## 2024-04-04 DIAGNOSIS — F19.10 SYNTHETIC CANNABINOID ABUSE (HCC): Chronic | ICD-10-CM

## 2024-04-04 DIAGNOSIS — F25.0 SCHIZOAFFECTIVE DISORDER, BIPOLAR TYPE (HCC): Primary | Chronic | ICD-10-CM

## 2024-04-04 DIAGNOSIS — F12.10 CANNABIS ABUSE, CONTINUOUS: Chronic | ICD-10-CM

## 2024-04-04 NOTE — PROGRESS NOTES
Psychotherapy Discharge Summary    Preferred Name: Rhys Esparza  YOB: 1985    Admission date to psychotherapy: 2/8/2024    Referred by: [facility]    Presenting Problem: Synthetic cannabinoid abuse, schizoaffective disorder    Course of treatment included : individual therapy     Progress/Outcome of Treatment Goals (brief summary of course of treatment) Rhys struggled to participate in treatment due to acute symptoms of schizoaffective disorder and ongoing K2 abuse. Rhys attended session but continued ongoing use of K2. Rhys was incarcerated and therapist received contact from Rhys's [facility] officer, Hosea, indicating Rhys would be referred to either inpatient [facility] treatment or a group home upon release from intermediate.    Treatment Complications (if any): Rhys had significant impairment and struggled to participate in sessions due to blocked thought processes.    Treatment Progress: poor    Current [facility] Psychiatric Provider: n/a    Discharge Medications include: benztropine (COGENTIN) 0.5 mg tablet    benztropine (COGENTIN) 2 mg tablet  buPROPion (WELLBUTRIN SR) 150 mg 12 hr tablet    cloNIDine (CATAPRES) 0.2 mg tablet      divalproex sodium (DEPAKOTE) 500 mg DR tablet    Discharge Date: 4/4/2024    Discharge Diagnosis:   1. Schizoaffective disorder, bipolar type (HCC)        2. Cannabis abuse, continuous        3. Synthetic cannabinoid abuse (HCC)            Criteria for Discharge:  incarceration    Aftercare recommendations include (include specific referral names and phone numbers, if appropriate): As arranged by DOC or  probation department    Prognosis: poor

## 2024-10-09 ENCOUNTER — APPOINTMENT (EMERGENCY)
Dept: CT IMAGING | Facility: HOSPITAL | Age: 39
End: 2024-10-09
Payer: COMMERCIAL

## 2024-10-09 ENCOUNTER — HOSPITAL ENCOUNTER (EMERGENCY)
Facility: HOSPITAL | Age: 39
Discharge: HOME | End: 2024-10-09
Attending: EMERGENCY MEDICINE
Payer: COMMERCIAL

## 2024-10-09 VITALS
WEIGHT: 102.51 LBS | BODY MASS INDEX: 17.32 KG/M2 | OXYGEN SATURATION: 100 % | SYSTOLIC BLOOD PRESSURE: 134 MMHG | TEMPERATURE: 98.9 F | HEART RATE: 83 BPM | RESPIRATION RATE: 16 BRPM | DIASTOLIC BLOOD PRESSURE: 76 MMHG

## 2024-10-09 DIAGNOSIS — F19.90 RECREATIONAL DRUG USE: Primary | ICD-10-CM

## 2024-10-09 LAB
2HR DELTA HS TROPONIN: >7 NG/L
ALBUMIN SERPL BCG-MCNC: 4.2 G/DL (ref 3.5–5)
ALP SERPL-CCNC: 64 U/L (ref 34–104)
ALT SERPL W P-5'-P-CCNC: 11 U/L (ref 7–52)
AMPHETAMINES SERPL QL SCN: NEGATIVE
ANION GAP SERPL CALCULATED.3IONS-SCNC: 8 MMOL/L (ref 4–13)
APAP SERPL-MCNC: <2 UG/ML (ref 10–20)
AST SERPL W P-5'-P-CCNC: 19 U/L (ref 13–39)
ATRIAL RATE: 113 BPM
ATRIAL RATE: 76 BPM
BACTERIA UR QL AUTO: ABNORMAL /HPF
BARBITURATES UR QL: NEGATIVE
BASOPHILS # BLD AUTO: 0.02 THOUSANDS/ΜL (ref 0–0.1)
BASOPHILS NFR BLD AUTO: 0 % (ref 0–1)
BENZODIAZ UR QL: NEGATIVE
BILIRUB SERPL-MCNC: 0.3 MG/DL (ref 0.2–1)
BILIRUB UR QL STRIP: NEGATIVE
BUN SERPL-MCNC: 16 MG/DL (ref 5–25)
CALCIUM SERPL-MCNC: 9.2 MG/DL (ref 8.4–10.2)
CARDIAC TROPONIN I PNL SERPL HS: 9 NG/L
CARDIAC TROPONIN I PNL SERPL HS: <2 NG/L
CARDIAC TROPONIN I PNL SERPL HS: <2 NG/L
CHLORIDE SERPL-SCNC: 102 MMOL/L (ref 96–108)
CLARITY UR: CLEAR
CO2 SERPL-SCNC: 27 MMOL/L (ref 21–32)
COCAINE UR QL: NEGATIVE
COLOR UR: ABNORMAL
CREAT SERPL-MCNC: 0.76 MG/DL (ref 0.6–1.3)
EOSINOPHIL # BLD AUTO: 0.04 THOUSAND/ΜL (ref 0–0.61)
EOSINOPHIL NFR BLD AUTO: 1 % (ref 0–6)
ERYTHROCYTE [DISTWIDTH] IN BLOOD BY AUTOMATED COUNT: 14.9 % (ref 11.6–15.1)
ETHANOL SERPL-MCNC: <10 MG/DL
FENTANYL UR QL SCN: NEGATIVE
GFR SERPL CREATININE-BSD FRML MDRD: 116 ML/MIN/1.73SQ M
GLUCOSE SERPL-MCNC: 112 MG/DL (ref 65–140)
GLUCOSE SERPL-MCNC: 119 MG/DL (ref 65–140)
GLUCOSE UR STRIP-MCNC: NEGATIVE MG/DL
HCT VFR BLD AUTO: 37.2 % (ref 36.5–49.3)
HGB BLD-MCNC: 12.5 G/DL (ref 12–17)
HGB UR QL STRIP.AUTO: NEGATIVE
HYDROCODONE UR QL SCN: NEGATIVE
IMM GRANULOCYTES # BLD AUTO: 0.01 THOUSAND/UL (ref 0–0.2)
IMM GRANULOCYTES NFR BLD AUTO: 0 % (ref 0–2)
KETONES UR STRIP-MCNC: ABNORMAL MG/DL
LEUKOCYTE ESTERASE UR QL STRIP: 25
LYMPHOCYTES # BLD AUTO: 1.34 THOUSANDS/ΜL (ref 0.6–4.47)
LYMPHOCYTES NFR BLD AUTO: 25 % (ref 14–44)
MAGNESIUM SERPL-MCNC: 2 MG/DL (ref 1.9–2.7)
MCH RBC QN AUTO: 30.2 PG (ref 26.8–34.3)
MCHC RBC AUTO-ENTMCNC: 33.6 G/DL (ref 31.4–37.4)
MCV RBC AUTO: 90 FL (ref 82–98)
METHADONE UR QL: NEGATIVE
MONOCYTES # BLD AUTO: 0.62 THOUSAND/ΜL (ref 0.17–1.22)
MONOCYTES NFR BLD AUTO: 12 % (ref 4–12)
MUCOUS THREADS UR QL AUTO: ABNORMAL
NEUTROPHILS # BLD AUTO: 3.3 THOUSANDS/ΜL (ref 1.85–7.62)
NEUTS SEG NFR BLD AUTO: 62 % (ref 43–75)
NITRITE UR QL STRIP: NEGATIVE
NON-SQ EPI CELLS URNS QL MICRO: ABNORMAL /HPF
NRBC BLD AUTO-RTO: 0 /100 WBCS
OPIATES UR QL SCN: NEGATIVE
OXYCODONE+OXYMORPHONE UR QL SCN: NEGATIVE
P AXIS: 58 DEGREES
P AXIS: 63 DEGREES
PCP UR QL: NEGATIVE
PH UR STRIP.AUTO: 6.5 [PH]
PLATELET # BLD AUTO: 236 THOUSANDS/UL (ref 149–390)
PMV BLD AUTO: 8.5 FL (ref 8.9–12.7)
POTASSIUM SERPL-SCNC: 3.9 MMOL/L (ref 3.5–5.3)
PR INTERVAL: 116 MS
PR INTERVAL: 118 MS
PROT SERPL-MCNC: 6.3 G/DL (ref 6.4–8.4)
PROT UR STRIP-MCNC: ABNORMAL MG/DL
QRS AXIS: 78 DEGREES
QRS AXIS: 83 DEGREES
QRSD INTERVAL: 80 MS
QRSD INTERVAL: 86 MS
QT INTERVAL: 330 MS
QT INTERVAL: 392 MS
QTC INTERVAL: 441 MS
QTC INTERVAL: 452 MS
RBC # BLD AUTO: 4.14 MILLION/UL (ref 3.88–5.62)
RBC #/AREA URNS AUTO: ABNORMAL /HPF
SALICYLATES SERPL-MCNC: <5 MG/DL (ref 3–20)
SODIUM SERPL-SCNC: 137 MMOL/L (ref 135–147)
SP GR UR STRIP.AUTO: 1.01 (ref 1–1.04)
T WAVE AXIS: 31 DEGREES
T WAVE AXIS: 49 DEGREES
THC UR QL: POSITIVE
UROBILINOGEN UA: 4 MG/DL
VENTRICULAR RATE: 113 BPM
VENTRICULAR RATE: 76 BPM
WBC # BLD AUTO: 5.33 THOUSAND/UL (ref 4.31–10.16)
WBC #/AREA URNS AUTO: ABNORMAL /HPF

## 2024-10-09 PROCEDURE — 99285 EMERGENCY DEPT VISIT HI MDM: CPT

## 2024-10-09 PROCEDURE — 36415 COLL VENOUS BLD VENIPUNCTURE: CPT

## 2024-10-09 PROCEDURE — 96360 HYDRATION IV INFUSION INIT: CPT

## 2024-10-09 PROCEDURE — 96361 HYDRATE IV INFUSION ADD-ON: CPT

## 2024-10-09 PROCEDURE — 93005 ELECTROCARDIOGRAM TRACING: CPT

## 2024-10-09 PROCEDURE — 80143 DRUG ASSAY ACETAMINOPHEN: CPT

## 2024-10-09 PROCEDURE — 81001 URINALYSIS AUTO W/SCOPE: CPT

## 2024-10-09 PROCEDURE — 83735 ASSAY OF MAGNESIUM: CPT

## 2024-10-09 PROCEDURE — 80307 DRUG TEST PRSMV CHEM ANLYZR: CPT

## 2024-10-09 PROCEDURE — 80179 DRUG ASSAY SALICYLATE: CPT

## 2024-10-09 PROCEDURE — 81003 URINALYSIS AUTO W/O SCOPE: CPT

## 2024-10-09 PROCEDURE — 82077 ASSAY SPEC XCP UR&BREATH IA: CPT

## 2024-10-09 PROCEDURE — 80053 COMPREHEN METABOLIC PANEL: CPT

## 2024-10-09 PROCEDURE — 93010 ELECTROCARDIOGRAM REPORT: CPT | Performed by: INTERNAL MEDICINE

## 2024-10-09 PROCEDURE — 82948 REAGENT STRIP/BLOOD GLUCOSE: CPT

## 2024-10-09 PROCEDURE — 85025 COMPLETE CBC W/AUTO DIFF WBC: CPT

## 2024-10-09 PROCEDURE — 93010 ELECTROCARDIOGRAM REPORT: CPT | Performed by: STUDENT IN AN ORGANIZED HEALTH CARE EDUCATION/TRAINING PROGRAM

## 2024-10-09 PROCEDURE — 84484 ASSAY OF TROPONIN QUANT: CPT

## 2024-10-09 PROCEDURE — 70450 CT HEAD/BRAIN W/O DYE: CPT

## 2024-10-09 RX ADMIN — SODIUM CHLORIDE 1000 ML: 0.9 INJECTION, SOLUTION INTRAVENOUS at 11:47

## 2024-10-09 NOTE — DISCHARGE INSTR - OTHER ORDERS
[facility]  Certified     [facility]  [place]  [phone]    [facility]   [address] [place] [address] 2nd floor [address]   [place] [address]  [phone]          [facility] [phone]   [place] [phone]  [place] [phone]  [place] [phone]   [phone]  [place] [phone]     [facility]  [address] [facility] [address]  [place] [address]  [phone]    [facility] [place]  [address] [facility]  [place]  [address]  [phone]    [facility]   [email or web address]  [phone]

## 2024-10-09 NOTE — ED PROVIDER NOTES
"Final diagnoses:   Recreational drug use     ED Disposition       ED Disposition   Discharge    Condition   Stable    Date/Time   Wed Oct 9, 2024  4:28 PM    Comment   Rhys Esparza discharge to home/self care.                   Assessment & Plan       Medical Decision Making  Ddx includes but is not limited to intoxication, ICH, encephalopathy, cva, electrolyte imbalance, infection/sepsis, hypoglycemia.  Afebrile.  No findings concerning for acute infection.  Coma panel negative.  No significant electrolyte disturbance.  ECG without acute ischemic changes or dysrhythmia.  Heart score 1.  EKG within normal limits.  CT head without acute findings.  Patient observed for few hours with resolution of altered mental status.  Admits to recreational drug use.  No SI or HI.  At time of discharge clinically sober, ambulating with steady gait answering respiratory failure, and oriented x 4.    All imaging and/or lab testing discussed with patient, strict return to ED precautions discussed. Patient recommended to follow up promptly with appropriate outpatient provider and risk of morbidity/mortality if patient does not follow up as recommended was discussed. Patient and/or family members verbalizes understanding and agrees with plan. Patient and/or family members were given opportunity to ask questions, all questions were answered at this time. Patient is stable for discharge.     Portions of the record may have been created with voice recognition software. Occasional wrong word or \"sound a like\" substitutions may have occurred due to the inherent limitations of voice recognition software. Read the chart carefully and recognize, using context, where substitutions have occurred.       Amount and/or Complexity of Data Reviewed  Labs: ordered. Decision-making details documented in ED Course.  Radiology: ordered.        ED Course as of 10/10/24 2201   Wed Oct 09, 2024   1137 No paraphernalia found by EMS. GCS 15. Patient " uncooperative not answering questions. Thinks she is under arrest.    1143 Procedure Note: EKG  Date/Time: 10/09/24 11:43 AM   Performed by: Chantel Davis   Authorized by: Chantel Davis  ECG interpreted by me, the ED Provider: yes   The EKG demonstrates:  Rate 113 bpm  Rhythm Sinus tachycardia   QTc 452 ms   No ST elevations/depressions     1416 Patient admits to smoking marijuana.    1418 hs TnI 2hr: 9   1520 Patient now AAOx4. Answering questions appropriately. States he used marijuana and it must have been laced with something. He denies other drug or alcohol use. Denies SI/HI/Hallucinations. Denies pain or other symptoms including CP, SOB, palpitations.    1623 hs TnI 4hr: <2   1628 Procedure Note: EKG  Date/Time: 10/09/24 4:28 PM   Performed by: Chantel Davis   Authorized by: Chantel Davis  ECG interpreted by me, the ED Provider: yes   The EKG demonstrates:  Rate 76 bpm  Rhythm NSR   QTc 441 ms   No ST elevations/depressions     1630 AAOx4. Ambulating with steady gait.        Medications   sodium chloride 0.9 % bolus 1,000 mL (1,000 mL Intravenous New Bag 10/9/24 1147)       ED Risk Strat Scores   HEART Risk Score      Flowsheet Row Most Recent Value   Heart Score Risk Calculator    History 0 Filed at: 10/10/2024 2154   ECG 0 Filed at: 10/10/2024 2154   Age 0 Filed at: 10/10/2024 2154   Risk Factors 1 Filed at: 10/10/2024 2154   Troponin 0 Filed at: 10/10/2024 2154   HEART Score 1 Filed at: 10/10/2024 2154                                                   History of Present Illness       Chief Complaint   Patient presents with    Overdose - Accidental     Pt brought in by EMS after suspected K2 use stumbling on the streets and hugging a trash can. No meds PTA.        Past Medical History:   Diagnosis Date    Drug abuse (HCC)     Schizoaffective disorder (HCC)     resolved 05/16/14    Schizophrenia (HCC)       Past Surgical History:   Procedure Laterality Date    NO PAST SURGERIES        Family History   Problem  Relation Age of Onset    Anxiety disorder Mother     Depression Mother     Dysphagia Mother     Stroke Mother         cva    Diabetes type II Mother     Other Mother         Hyponatremia    Psychosis Mother       Social History     Tobacco Use    Smoking status: Unknown    Smokeless tobacco: Never   Vaping Use    Vaping status: Never Used   Substance Use Topics    Alcohol use: Yes     Comment: social    Drug use: Yes     Types: Marijuana, Other     Comment: K2      E-Cigarette/Vaping    E-Cigarette Use Never User       E-Cigarette/Vaping Substances    Nicotine No     THC No     CBD No     Flavoring No     Other No     Unknown No       I have reviewed and agree with the history as documented.     38 M presenting to ED via EMS after found stumbling and hugging trash can. Per EMS suspected K2 use. No drug paraphernalia found on patient or at scene. On arrival patient selectively answering questions. Answered his name correctly. When asked what happened he said he was arrested. Did not answer any other questions, followed commands.       History provided by:  EMS personnel  History limited by:  Mental status change  Overdose - Accidental      Review of Systems   Unable to perform ROS: Mental status change           Objective       ED Triage Vitals [10/09/24 1127]   Temperature Pulse Blood Pressure Respirations SpO2 Patient Position - Orthostatic VS   98.9 °F (37.2 °C) (!) 124 120/86 16 95 % Lying      Temp Source Heart Rate Source BP Location FiO2 (%) Pain Score    Tympanic Monitor Left arm -- --      Vitals      Date and Time Temp Pulse SpO2 Resp BP Pain Score FACES Pain Rating User   10/09/24 1634 -- 83 100 % 16 -- -- -- KR   10/09/24 1633 -- -- -- -- 134/76 -- -- KR   10/09/24 1155 -- 98 96 % 16 93/66 -- -- KR   10/09/24 1127 98.9 °F (37.2 °C) 124 95 % 16 120/86 -- -- HM            Physical Exam  Vitals and nursing note reviewed.   Constitutional:       General: He is not in acute distress.     Appearance: He is not  ill-appearing, toxic-appearing or diaphoretic.   HENT:      Head: Normocephalic and atraumatic.      Mouth/Throat:      Mouth: Mucous membranes are moist.   Eyes:      Extraocular Movements: Extraocular movements intact.      Pupils: Pupils are equal, round, and reactive to light.      Comments: Bilateral mydriasis, PERRL. No nystagmus.    Cardiovascular:      Rate and Rhythm: Regular rhythm. Tachycardia present.      Heart sounds: Normal heart sounds.   Pulmonary:      Effort: Pulmonary effort is normal. No respiratory distress.      Breath sounds: Normal breath sounds.   Abdominal:      General: There is no distension.      Palpations: Abdomen is soft.      Tenderness: There is no abdominal tenderness.   Musculoskeletal:         General: No signs of injury.   Skin:     General: Skin is warm and dry.      Capillary Refill: Capillary refill takes less than 2 seconds.   Neurological:      Mental Status: He is alert.      GCS: GCS eye subscore is 4. GCS verbal subscore is 4. GCS motor subscore is 6.      Motor: No seizure activity.   Psychiatric:         Speech: Speech is not slurred.         Results Reviewed       Procedure Component Value Units Date/Time    HS Troponin I 4hr [[id number]] Collected: 10/09/24 1554    Lab Status: Final result Specimen: Blood from Arm, Left Updated: 10/09/24 1621     hs TnI 4hr <2 ng/L      Delta 4hr hsTnI --    Urine Microscopic [[id number]]  (Abnormal) Collected: 10/09/24 1458    Lab Status: Final result Specimen: Urine, Other Updated: 10/09/24 1539     RBC, UA None Seen /hpf      WBC, UA 2-4 /hpf      Epithelial Cells Occasional /hpf      Bacteria, UA Occasional /hpf      MUCUS THREADS Occasional    Rapid drug screen, urine [[id number]]  (Abnormal) Collected: 10/09/24 1459    Lab Status: Final result Specimen: Urine, Other Updated: 10/09/24 1528     Amph/Meth UR Negative     Barbiturate Ur Negative     Benzodiazepine Urine Negative     Cocaine Urine Negative     Methadone Urine Negative      Opiate Urine Negative     PCP Ur Negative     THC Urine Positive     Oxycodone Urine Negative     Fentanyl Urine Negative     HYDROCODONE URINE Negative    Narrative:      Presumptive report. If requested, specimen will be sent to reference lab for confirmation.  FOR MEDICAL PURPOSES ONLY.   IF CONFIRMATION NEEDED PLEASE CONTACT THE LAB WITHIN 5 DAYS.    Drug Screen Cutoff Levels:  AMPHETAMINE/METHAMPHETAMINES  1000 ng/mL  BARBITURATES     200 ng/mL  BENZODIAZEPINES     200 ng/mL  COCAINE      300 ng/mL  METHADONE      300 ng/mL  OPIATES      300 ng/mL  PHENCYCLIDINE     25 ng/mL  THC       50 ng/mL  OXYCODONE      100 ng/mL  FENTANYL      5 ng/mL  HYDROCODONE     300 ng/mL    UA w Reflex to Microscopic w Reflex to Culture [[id number]]  (Abnormal) Collected: 10/09/24 1458    Lab Status: Final result Specimen: Urine, Other Updated: 10/09/24 1515     Color, UA Cassia     Clarity, UA Clear     Specific Gravity, UA 1.010     pH, UA 6.5     Leukocytes, UA 25.0     Nitrite, UA Negative     Protein, UA 15 (Trace) mg/dl      Glucose, UA Negative mg/dl      Ketones, UA 5 (Trace) mg/dl      Bilirubin, UA Negative     Occult Blood, UA Negative     UROBILINOGEN UA 4.0 mg/dL     HS Troponin I 2hr [[id number]]  (Normal) Collected: 10/09/24 1347    Lab Status: Final result Specimen: Blood from Arm, Left Updated: 10/09/24 1418     hs TnI 2hr 9 ng/L      Delta 2hr hsTnI >7 ng/L     Acetaminophen level-If concentration is detectable, please discuss with medical  on call. [[id number]]  (Abnormal) Collected: 10/09/24 1146    Lab Status: Final result Specimen: Blood from Arm, Left Updated: 10/09/24 1325     Acetaminophen Level <2 ug/mL     Salicylate level [[id number]]  (Normal) Collected: 10/09/24 1146    Lab Status: Final result Specimen: Blood from Arm, Left Updated: 10/09/24 1325     Salicylate Lvl <5 mg/dL     HS Troponin 0hr (reflex protocol) [[id number]]  (Normal) Collected: 10/09/24 1146    Lab Status: Final result  Specimen: Blood from Arm, Left Updated: 10/09/24 1218     hs TnI 0hr <2 ng/L     Comprehensive metabolic panel [[id number]]  (Abnormal) Collected: 10/09/24 1146    Lab Status: Final result Specimen: Blood from Arm, Left Updated: 10/09/24 1215     Sodium 137 mmol/L      Potassium 3.9 mmol/L      Chloride 102 mmol/L      CO2 27 mmol/L      ANION GAP 8 mmol/L      BUN 16 mg/dL      Creatinine 0.76 mg/dL      Glucose 112 mg/dL      Calcium 9.2 mg/dL      AST 19 U/L      ALT 11 U/L      Alkaline Phosphatase 64 U/L      Total Protein 6.3 g/dL      Albumin 4.2 g/dL      Total Bilirubin 0.30 mg/dL      eGFR 116 ml/min/1.73sq m     Narrative:      [employer] guidelines for Chronic Kidney Disease (CKD):     Stage 1 with normal or high GFR (GFR > 90 mL/min/1.73 square meters)    Stage 2 Mild CKD (GFR = 60-89 mL/min/1.73 square meters)    Stage 3A Moderate CKD (GFR = 45-59 mL/min/1.73 square meters)    Stage 3B Moderate CKD (GFR = 30-44 mL/min/1.73 square meters)    Stage 4 Severe CKD (GFR = 15-29 mL/min/1.73 square meters)    Stage 5 End Stage CKD (GFR <15 mL/min/1.73 square meters)  Note: GFR calculation is accurate only with a steady state creatinine    Magnesium [[id number]]  (Normal) Collected: 10/09/24 1146    Lab Status: Final result Specimen: Blood from Arm, Left Updated: 10/09/24 1215     Magnesium 2.0 mg/dL     Ethanol [[id number]]  (Normal) Collected: 10/09/24 1146    Lab Status: Final result Specimen: Blood from Arm, Left Updated: 10/09/24 1215     Ethanol Lvl <10 mg/dL     CBC and differential [[id number]]  (Abnormal) Collected: 10/09/24 1146    Lab Status: Final result Specimen: Blood from Arm, Left Updated: 10/09/24 1158     WBC 5.33 Thousand/uL      RBC 4.14 Million/uL      Hemoglobin 12.5 g/dL      Hematocrit 37.2 %      MCV 90 fL      MCH 30.2 pg      MCHC 33.6 g/dL      RDW 14.9 %      MPV 8.5 fL      Platelets 236 Thousands/uL      nRBC 0 /100 WBCs      Segmented % 62 %      Immature  Grans % 0 %      Lymphocytes % 25 %      Monocytes % 12 %      Eosinophils Relative 1 %      Basophils Relative 0 %      Absolute Neutrophils 3.30 Thousands/µL      Absolute Immature Grans 0.01 Thousand/uL      Absolute Lymphocytes 1.34 Thousands/µL      Absolute Monocytes 0.62 Thousand/µL      Eosinophils Absolute 0.04 Thousand/µL      Basophils Absolute 0.02 Thousands/µL     Fingerstick Glucose (POCT) [[id number]]  (Normal) Collected: 10/09/24 1135    Lab Status: Final result Specimen: Blood Updated: 10/09/24 1136     POC Glucose 119 mg/dl             CT head without contrast   Final Interpretation by Baudilio Quinones MD (10/09 1306)      No acute intracranial hemorrhage, mass effect or edema.                  Workstation performed: [id number]             Procedures    ED Medication and Procedure Management   Prior to Admission Medications   Prescriptions Last Dose Informant Patient Reported? Taking?   benztropine (COGENTIN) 0.5 mg tablet   No No   Sig: Take 1 tablet (0.5 mg total) by mouth 3 (three) times a day   benztropine (COGENTIN) 2 mg tablet   Yes No   Sig: Take 2 mg by mouth every morning   Patient not taking: Reported on 8/20/2021   buPROPion (WELLBUTRIN SR) 150 mg 12 hr tablet   Yes No   Sig: Take 150 mg by mouth every morning   Patient not taking: Reported on 8/20/2021   cloNIDine (CATAPRES) 0.2 mg tablet   Yes No   Sig: Take 0.2 mg by mouth daily at bedtime   Patient not taking: Reported on 8/20/2021   divalproex sodium (DEPAKOTE ER) 500 mg 24 hr tablet   No No   Sig: Take 1 tablet (500 mg total) by mouth 2 (two) times a day   divalproex sodium (DEPAKOTE) 500 mg DR tablet   Yes No   Sig: Take 500 mg by mouth daily at bedtime   loxapine (LOXITANE) 10 mg capsule   No No   Sig: Take 5 capsules (50 mg total) by mouth 3 (three) times a day 1 in the morning, 2 at night      Facility-Administered Medications: None     Discharge Medication List as of 10/9/2024  4:29 PM        CONTINUE these medications  which have NOT CHANGED    Details   !! benztropine (COGENTIN) 0.5 mg tablet Take 1 tablet (0.5 mg total) by mouth 3 (three) times a day, Starting Thu 11/30/2023, Normal      !! benztropine (COGENTIN) 2 mg tablet Take 2 mg by mouth every morning, Starting Tue 6/22/2021, Historical Med      buPROPion (WELLBUTRIN SR) 150 mg 12 hr tablet Take 150 mg by mouth every morning, Starting Sun 6/20/2021, Historical Med      cloNIDine (CATAPRES) 0.2 mg tablet Take 0.2 mg by mouth daily at bedtime, Starting Tue 6/22/2021, Historical Med      divalproex sodium (DEPAKOTE ER) 500 mg 24 hr tablet Take 1 tablet (500 mg total) by mouth 2 (two) times a day, Starting Thu 11/30/2023, Until Sat 12/30/2023, Normal      divalproex sodium (DEPAKOTE) 500 mg DR tablet Take 500 mg by mouth daily at bedtime, Historical Med      loxapine (LOXITANE) 10 mg capsule Take 5 capsules (50 mg total) by mouth 3 (three) times a day 1 in the morning, 2 at night, Starting Thu 11/30/2023, Until Sat 12/30/2023, Normal       !! - Potential duplicate medications found. Please discuss with provider.        No discharge procedures on file.  ED SEPSIS DOCUMENTATION   Time reflects when diagnosis was documented in both MDM as applicable and the Disposition within this note       Time User Action Codes Description Comment    10/9/2024  4:24 PM Chantel Davis Add [F19.90] Recreational drug use                  Chantel Davis PA-C  10/10/24 5664

## 2024-10-09 NOTE — CERTIFIED RECOVERY SPECIALIST
"   Certified  Note    Patient name: Rhys Esparza  Location: Z3H1/Z3H1  [place]: [facility]  Attending:  Lewis Mitchell MD MRN [id number]  : 1985  Age: 38 y.o.    Sex: male Date 10/9/2024         Substance Use History:     Social History     Substance and Sexual Activity   Alcohol Use Yes    Comment: social        Social History     Substance and Sexual Activity   Drug Use Yes    Types: Marijuana, Other    Comment: K2     Time spent: 15 minutes    CRS met with patient to offer support and explain services. (Prior conversation with provider indicated patient wouldn't give any information/answer any questions)  CRS inquires about patients hospital visit to which he replies \"they said I was doing some weird stuff and I thought I was arrested\" CRS clarified with help from provider that he did not get arrested but the EMS said he was \"altered mental status, hugged a garbage can\"     CRS ensured patient he was not in any trouble nor was he arrested; then CRS and patient engaged very briefly in conversation about certain substances that can cause this behavior and patient admitted to smoking marijuana. Substance and safety education was provided by CRS, patient was receptive.    CRS conveyed context of this conversation with patient to his provider who was appreciative.    CRS provided resources and will continue to follow through this admission.                Wayne Rg       "

## 2024-10-17 ENCOUNTER — HOSPITAL ENCOUNTER (EMERGENCY)
Facility: HOSPITAL | Age: 39
Discharge: HOME | End: 2024-10-17
Attending: EMERGENCY MEDICINE
Payer: COMMERCIAL

## 2024-10-17 VITALS
TEMPERATURE: 98.9 F | OXYGEN SATURATION: 100 % | SYSTOLIC BLOOD PRESSURE: 104 MMHG | SYSTOLIC BLOOD PRESSURE: 104 MMHG | RESPIRATION RATE: 16 BRPM | DIASTOLIC BLOOD PRESSURE: 73 MMHG | OXYGEN SATURATION: 100 % | HEART RATE: 83 BPM | HEART RATE: 83 BPM | TEMPERATURE: 98.9 F | RESPIRATION RATE: 16 BRPM | DIASTOLIC BLOOD PRESSURE: 73 MMHG

## 2024-10-17 DIAGNOSIS — F19.10 SUBSTANCE ABUSE (HCC): Primary | ICD-10-CM

## 2024-10-17 DIAGNOSIS — R41.82 ALTERED MENTAL STATUS: ICD-10-CM

## 2024-10-17 LAB
ALBUMIN SERPL BCG-MCNC: 4.2 G/DL (ref 3.5–5)
ALBUMIN SERPL BCG-MCNC: 4.2 G/DL (ref 3.5–5)
ALP SERPL-CCNC: 62 U/L (ref 34–104)
ALP SERPL-CCNC: 62 U/L (ref 34–104)
ALT SERPL W P-5'-P-CCNC: 11 U/L (ref 7–52)
ALT SERPL W P-5'-P-CCNC: 11 U/L (ref 7–52)
ANION GAP SERPL CALCULATED.3IONS-SCNC: 9 MMOL/L (ref 4–13)
ANION GAP SERPL CALCULATED.3IONS-SCNC: 9 MMOL/L (ref 4–13)
AST SERPL W P-5'-P-CCNC: 20 U/L (ref 13–39)
AST SERPL W P-5'-P-CCNC: 20 U/L (ref 13–39)
BASOPHILS # BLD AUTO: 0.03 THOUSANDS/ΜL (ref 0–0.1)
BASOPHILS # BLD AUTO: 0.03 THOUSANDS/ΜL (ref 0–0.1)
BASOPHILS NFR BLD AUTO: 0 % (ref 0–1)
BASOPHILS NFR BLD AUTO: 0 % (ref 0–1)
BILIRUB SERPL-MCNC: 0.57 MG/DL (ref 0.2–1)
BILIRUB SERPL-MCNC: 0.57 MG/DL (ref 0.2–1)
BUN SERPL-MCNC: 10 MG/DL (ref 5–25)
BUN SERPL-MCNC: 10 MG/DL (ref 5–25)
CALCIUM SERPL-MCNC: 9.2 MG/DL (ref 8.4–10.2)
CALCIUM SERPL-MCNC: 9.2 MG/DL (ref 8.4–10.2)
CHLORIDE SERPL-SCNC: 100 MMOL/L (ref 96–108)
CHLORIDE SERPL-SCNC: 100 MMOL/L (ref 96–108)
CO2 SERPL-SCNC: 28 MMOL/L (ref 21–32)
CO2 SERPL-SCNC: 28 MMOL/L (ref 21–32)
CREAT SERPL-MCNC: 0.82 MG/DL (ref 0.6–1.3)
CREAT SERPL-MCNC: 0.82 MG/DL (ref 0.6–1.3)
EOSINOPHIL # BLD AUTO: 0.11 THOUSAND/ΜL (ref 0–0.61)
EOSINOPHIL # BLD AUTO: 0.11 THOUSAND/ΜL (ref 0–0.61)
EOSINOPHIL NFR BLD AUTO: 1 % (ref 0–6)
EOSINOPHIL NFR BLD AUTO: 1 % (ref 0–6)
ERYTHROCYTE [DISTWIDTH] IN BLOOD BY AUTOMATED COUNT: 15 % (ref 11.6–15.1)
ERYTHROCYTE [DISTWIDTH] IN BLOOD BY AUTOMATED COUNT: 15 % (ref 11.6–15.1)
ETHANOL SERPL-MCNC: <10 MG/DL
ETHANOL SERPL-MCNC: <10 MG/DL
GFR SERPL CREATININE-BSD FRML MDRD: 51 ML/MIN/1.73SQ M
GFR SERPL CREATININE-BSD FRML MDRD: 51 ML/MIN/1.73SQ M
GLUCOSE SERPL-MCNC: 131 MG/DL (ref 65–140)
GLUCOSE SERPL-MCNC: 131 MG/DL (ref 65–140)
HCT VFR BLD AUTO: 36.6 % (ref 36.5–49.3)
HCT VFR BLD AUTO: 36.6 % (ref 36.5–49.3)
HGB BLD-MCNC: 12.2 G/DL (ref 12–17)
HGB BLD-MCNC: 12.2 G/DL (ref 12–17)
IMM GRANULOCYTES # BLD AUTO: 0.01 THOUSAND/UL (ref 0–0.2)
IMM GRANULOCYTES # BLD AUTO: 0.01 THOUSAND/UL (ref 0–0.2)
IMM GRANULOCYTES NFR BLD AUTO: 0 % (ref 0–2)
IMM GRANULOCYTES NFR BLD AUTO: 0 % (ref 0–2)
LYMPHOCYTES # BLD AUTO: 2.09 THOUSANDS/ΜL (ref 0.6–4.47)
LYMPHOCYTES # BLD AUTO: 2.09 THOUSANDS/ΜL (ref 0.6–4.47)
LYMPHOCYTES NFR BLD AUTO: 25 % (ref 14–44)
LYMPHOCYTES NFR BLD AUTO: 25 % (ref 14–44)
MAGNESIUM SERPL-MCNC: 1.9 MG/DL (ref 1.9–2.7)
MAGNESIUM SERPL-MCNC: 1.9 MG/DL (ref 1.9–2.7)
MCH RBC QN AUTO: 30 PG (ref 26.8–34.3)
MCH RBC QN AUTO: 30 PG (ref 26.8–34.3)
MCHC RBC AUTO-ENTMCNC: 33.3 G/DL (ref 31.4–37.4)
MCHC RBC AUTO-ENTMCNC: 33.3 G/DL (ref 31.4–37.4)
MCV RBC AUTO: 90 FL (ref 82–98)
MCV RBC AUTO: 90 FL (ref 82–98)
MONOCYTES # BLD AUTO: 0.9 THOUSAND/ΜL (ref 0.17–1.22)
MONOCYTES # BLD AUTO: 0.9 THOUSAND/ΜL (ref 0.17–1.22)
MONOCYTES NFR BLD AUTO: 11 % (ref 4–12)
MONOCYTES NFR BLD AUTO: 11 % (ref 4–12)
NEUTROPHILS # BLD AUTO: 5.19 THOUSANDS/ΜL (ref 1.85–7.62)
NEUTROPHILS # BLD AUTO: 5.19 THOUSANDS/ΜL (ref 1.85–7.62)
NEUTS SEG NFR BLD AUTO: 63 % (ref 43–75)
NEUTS SEG NFR BLD AUTO: 63 % (ref 43–75)
NRBC BLD AUTO-RTO: 0 /100 WBCS
NRBC BLD AUTO-RTO: 0 /100 WBCS
PHOSPHATE SERPL-MCNC: 4 MG/DL (ref 2.3–4.1)
PHOSPHATE SERPL-MCNC: 4 MG/DL (ref 2.3–4.1)
PLATELET # BLD AUTO: 237 THOUSANDS/UL (ref 149–390)
PLATELET # BLD AUTO: 237 THOUSANDS/UL (ref 149–390)
PMV BLD AUTO: 8.7 FL (ref 8.9–12.7)
PMV BLD AUTO: 8.7 FL (ref 8.9–12.7)
POTASSIUM SERPL-SCNC: 3.7 MMOL/L (ref 3.5–5.3)
POTASSIUM SERPL-SCNC: 3.7 MMOL/L (ref 3.5–5.3)
PROT SERPL-MCNC: 6.4 G/DL (ref 6.4–8.4)
PROT SERPL-MCNC: 6.4 G/DL (ref 6.4–8.4)
RBC # BLD AUTO: 4.07 MILLION/UL (ref 3.88–5.62)
RBC # BLD AUTO: 4.07 MILLION/UL (ref 3.88–5.62)
SODIUM SERPL-SCNC: 137 MMOL/L (ref 135–147)
SODIUM SERPL-SCNC: 137 MMOL/L (ref 135–147)
WBC # BLD AUTO: 8.33 THOUSAND/UL (ref 4.31–10.16)
WBC # BLD AUTO: 8.33 THOUSAND/UL (ref 4.31–10.16)

## 2024-10-17 PROCEDURE — 85025 COMPLETE CBC W/AUTO DIFF WBC: CPT | Performed by: EMERGENCY MEDICINE

## 2024-10-17 PROCEDURE — 96361 HYDRATE IV INFUSION ADD-ON: CPT

## 2024-10-17 PROCEDURE — 83735 ASSAY OF MAGNESIUM: CPT | Performed by: EMERGENCY MEDICINE

## 2024-10-17 PROCEDURE — 82077 ASSAY SPEC XCP UR&BREATH IA: CPT | Performed by: EMERGENCY MEDICINE

## 2024-10-17 PROCEDURE — 99285 EMERGENCY DEPT VISIT HI MDM: CPT | Performed by: EMERGENCY MEDICINE

## 2024-10-17 PROCEDURE — 84100 ASSAY OF PHOSPHORUS: CPT | Performed by: EMERGENCY MEDICINE

## 2024-10-17 PROCEDURE — 99284 EMERGENCY DEPT VISIT MOD MDM: CPT

## 2024-10-17 PROCEDURE — 96360 HYDRATION IV INFUSION INIT: CPT

## 2024-10-17 PROCEDURE — 93005 ELECTROCARDIOGRAM TRACING: CPT

## 2024-10-17 PROCEDURE — 80053 COMPREHEN METABOLIC PANEL: CPT | Performed by: EMERGENCY MEDICINE

## 2024-10-17 PROCEDURE — 36415 COLL VENOUS BLD VENIPUNCTURE: CPT | Performed by: EMERGENCY MEDICINE

## 2024-10-17 RX ADMIN — SODIUM CHLORIDE 1000 ML: 0.9 INJECTION, SOLUTION INTRAVENOUS at 16:40

## 2024-10-17 NOTE — ED PROVIDER NOTES
Time reflects when diagnosis was documented in both MDM as applicable and the Disposition within this note       Time User Action Codes Description Comment    10/17/2024  6:12 PM Bhanu Alexander Add [F19.10] Substance abuse (HCC)     10/17/2024  6:12 PM Bhanu Alexander Add [R41.82] Altered mental status           ED Disposition       ED Disposition   Discharge    Condition   Stable    Date/Time   Thu Oct 17, 2024  6:12 PM    Comment   Bryant Nahun discharge to home/self care.                   Assessment & Plan       Medical Decision Making  Unknown aged individual presents after suspected overdose.  The patient was found rolling around in the street with altered mentation.  There is suspicion of K2 use but the patient has not been able to confirm this.  Patient was found to have midsize pupils by EMS along with sinus tachycardia and altered mentation.  There are no external signs of trauma or injury.  No drug paraphernalia was found on the patient.    Amount and/or Complexity of Data Reviewed  Labs: ordered.        ED Course as of 10/17/24 1938   Thu Oct 17, 2024   1808 Patient is more awake and alert and is able to answer questions.  He currently states he is not recalling whether or not he is complete.  He does report that he is hungry and thirsty.  Will start with a liquid p.o. challenge.   1938 Patient reassessed and remains fully awake, alert, and oriented.  He tolerated a meal and is requesting discharge home at this time.       Medications   sodium chloride 0.9 % bolus 1,000 mL (0 mL Intravenous Stopped 10/17/24 1857)       ED Risk Strat Scores                           SBIRT 22yo+      Flowsheet Row Most Recent Value   Initial Alcohol Screen: US AUDIT-C     1. How often do you have a drink containing alcohol? 1 Filed at: 10/17/2024 1846   2. How many drinks containing alcohol do you have on a typical day you are drinking?  2 Filed at: 10/17/2024 1846   3a. Male UNDER 65: How often do you have five or more  "drinks on one occasion? 1 Filed at: 10/17/2024 1846   Audit-C Score 4 Filed at: 10/17/2024 1846   NANDINI: How many times in the past year have you...    Used an illegal drug or used a prescription medication for non-medical reasons? Daily or Almost Daily Filed at: 10/17/2024 1846   DAST-10: In the past 12 months...    1. Have you used drugs other than those required for medical reasons? 1 Filed at: 10/17/2024 1846   2. Do you use more than one drug at a time? 1 Filed at: 10/17/2024 1846   3. Have you had medical problems as a result of your drug use (e.g., memory loss, hepatitis, convulsions, bleeding, etc.)? 0 Filed at: 10/17/2024 1846   4. Have you had \"blackouts\" or \"flashbacks\" as a result of drug use?YesNo 1 Filed at: 10/17/2024 1846   5. Do you ever feel bad or guilty about your drug use? 1 Filed at: 10/17/2024 1846   6. Does your spouse (or parent) ever complain about your involvement with drugs? 1 Filed at: 10/17/2024 1846   7. Have you neglected your family because of your use of drugs? 1 Filed at: 10/17/2024 1846   8. Have you engaged in illegal activities in order to obtain drugs? 0 Filed at: 10/17/2024 1846   9. Have you ever experienced withdrawal symptoms (felt sick) when you stopped taking drugs? 1 Filed at: 10/17/2024 1846   10. Are you always able to stop using drugs when you want to? 1 Filed at: 10/17/2024 1846   DAST-10 Score 8 Filed at: 10/17/2024 1846                            History of Present Illness       Chief Complaint   Patient presents with    Overdose - Accidental       History reviewed. No pertinent past medical history.   History reviewed. No pertinent surgical history.   History reviewed. No pertinent family history.   Social History     Tobacco Use    Smoking status: Unknown      E-Cigarette/Vaping      E-Cigarette/Vaping Substances      I have reviewed and agree with the history as documented.       Altered Mental Status  Severity:  Severe  Most recent episode:  Today  Episode " history:  Single  Timing:  Constant  Progression:  Waxing and waning  Context comment:  Suspected drug use  Associated symptoms: no vomiting        Review of Systems   Unable to perform ROS: Mental status change   Gastrointestinal:  Negative for vomiting.           Objective       ED Triage Vitals   Temperature Pulse Blood Pressure Respirations SpO2 Patient Position - Orthostatic VS   10/17/24 1610 10/17/24 1610 10/17/24 1610 10/17/24 1610 10/17/24 1610 10/17/24 1610   98.9 °F (37.2 °C) (!) 130 109/68 20 100 % Sitting      Temp Source Heart Rate Source BP Location FiO2 (%) Pain Score    10/17/24 1610 10/17/24 1709 10/17/24 1610 -- 10/17/24 1610    Oral Monitor Left arm  No Pain      Vitals      Date and Time Temp Pulse SpO2 Resp BP Pain Score FACES Pain Rating User   10/17/24 1933 -- 83 100 % 16 104/73 -- -- ST   10/17/24 1709 -- 88 100 % 16 92/60 -- -- LH   10/17/24 1610 98.9 °F (37.2 °C) 130 100 % 20 109/68 No Pain -- SN            Physical Exam  Vitals and nursing note reviewed.   Constitutional:       Appearance: He is ill-appearing and toxic-appearing.   HENT:      Head: Normocephalic and atraumatic.      Right Ear: Tympanic membrane, ear canal and external ear normal.      Left Ear: Tympanic membrane, ear canal and external ear normal.      Nose: Nose normal.      Mouth/Throat:      Mouth: Mucous membranes are moist.      Pharynx: Oropharynx is clear.   Eyes:      Comments: Pupils 4 mm bilaterally - sluggish   Cardiovascular:      Rate and Rhythm: Tachycardia present.      Pulses: Normal pulses.      Heart sounds: Normal heart sounds.   Pulmonary:      Effort: Pulmonary effort is normal. No respiratory distress.      Breath sounds: Normal breath sounds.   Abdominal:      General: Abdomen is flat. Bowel sounds are normal.   Neurological:      GCS: GCS eye subscore is 2. GCS verbal subscore is 2. GCS motor subscore is 5.   Psychiatric:         Mood and Affect: Affect is inappropriate.         Speech: He is  noncommunicative.         Results Reviewed       Procedure Component Value Units Date/Time    Comprehensive metabolic panel [[id number]] Collected: 10/17/24 1632    Lab Status: Final result Specimen: Blood from Arm, Right Updated: 10/17/24 1652     Sodium 137 mmol/L      Potassium 3.7 mmol/L      Chloride 100 mmol/L      CO2 28 mmol/L      ANION GAP 9 mmol/L      BUN 10 mg/dL      Creatinine 0.82 mg/dL      Glucose 131 mg/dL      Calcium 9.2 mg/dL      AST 20 U/L      ALT 11 U/L      Alkaline Phosphatase 62 U/L      Total Protein 6.4 g/dL      Albumin 4.2 g/dL      Total Bilirubin 0.57 mg/dL      eGFR 51 ml/min/1.73sq m     Narrative:      National Kidney Disease Foundation guidelines for Chronic Kidney Disease (CKD):     Stage 1 with normal or high GFR (GFR > 90 mL/min/1.73 square meters)    Stage 2 Mild CKD (GFR = 60-89 mL/min/1.73 square meters)    Stage 3A Moderate CKD (GFR = 45-59 mL/min/1.73 square meters)    Stage 3B Moderate CKD (GFR = 30-44 mL/min/1.73 square meters)    Stage 4 Severe CKD (GFR = 15-29 mL/min/1.73 square meters)    Stage 5 End Stage CKD (GFR <15 mL/min/1.73 square meters)  Note: GFR calculation is accurate only with a steady state creatinine    Phosphorus [[id number]]  (Normal) Collected: 10/17/24 1632    Lab Status: Final result Specimen: Blood from Arm, Right Updated: 10/17/24 1652     Phosphorus 4.0 mg/dL     Magnesium [[id number]]  (Normal) Collected: 10/17/24 1632    Lab Status: Final result Specimen: Blood from Arm, Right Updated: 10/17/24 1652     Magnesium 1.9 mg/dL     Ethanol [[id number]]  (Normal) Collected: 10/17/24 1632    Lab Status: Final result Specimen: Blood from Arm, Right Updated: 10/17/24 1650     Ethanol Lvl <10 mg/dL     CBC and differential [[id number]]  (Abnormal) Collected: 10/17/24 1632    Lab Status: Final result Specimen: Blood from Arm, Right Updated: 10/17/24 1637     WBC 8.33 Thousand/uL      RBC 4.07 Million/uL      Hemoglobin 12.2 g/dL      Hematocrit 36.6 %       MCV 90 fL      MCH 30.0 pg      MCHC 33.3 g/dL      RDW 15.0 %      MPV 8.7 fL      Platelets 237 Thousands/uL      nRBC 0 /100 WBCs      Segmented % 63 %      Immature Grans % 0 %      Lymphocytes % 25 %      Monocytes % 11 %      Eosinophils Relative 1 %      Basophils Relative 0 %      Absolute Neutrophils 5.19 Thousands/µL      Absolute Immature Grans 0.01 Thousand/uL      Absolute Lymphocytes 2.09 Thousands/µL      Absolute Monocytes 0.90 Thousand/µL      Eosinophils Absolute 0.11 Thousand/µL      Basophils Absolute 0.03 Thousands/µL     Rapid drug screen, urine [[id number]]     Lab Status: No result Specimen: Urine     UA (URINE) with reflex to Scope [[id number]]     Lab Status: No result Specimen: Urine             No orders to display       ECG 12 Lead Documentation Only    Date/Time: 10/17/2024 4:10 PM    Performed by: Bhanu Alexander DO  Authorized by: Bhanu Alexander DO    ECG reviewed by me, the ED Provider: yes    Patient location:  ED  Rate:     ECG rate:  123    ECG rate assessment: tachycardic    Rhythm:     Rhythm: sinus tachycardia    Ectopy:     Ectopy: none    QRS:     QRS axis:  Normal  Conduction:     Conduction: normal    ST segments:     ST segments:  Normal  T waves:     T waves: non-specific        ED Medication and Procedure Management   None     Patient's Medications    No medications on file     No discharge procedures on file.  ED SEPSIS DOCUMENTATION   Time reflects when diagnosis was documented in both MDM as applicable and the Disposition within this note       Time User Action Codes Description Comment    10/17/2024  6:12 PM Bhanu Alexander Add [F19.10] Substance abuse (HCC)     10/17/2024  6:12 PM Bhanu Alexander Add [R41.82] Altered mental status                  Bhanu Alexander DO  10/17/24 1939

## 2024-10-18 LAB
ATRIAL RATE: 123 BPM
ATRIAL RATE: 123 BPM
P AXIS: 66 DEGREES
P AXIS: 66 DEGREES
PR INTERVAL: 124 MS
PR INTERVAL: 124 MS
QRS AXIS: 86 DEGREES
QRS AXIS: 86 DEGREES
QRSD INTERVAL: 82 MS
QRSD INTERVAL: 82 MS
QT INTERVAL: 332 MS
QT INTERVAL: 332 MS
QTC INTERVAL: 475 MS
QTC INTERVAL: 475 MS
T WAVE AXIS: 32 DEGREES
T WAVE AXIS: 32 DEGREES
VENTRICULAR RATE: 123 BPM
VENTRICULAR RATE: 123 BPM

## 2024-10-18 PROCEDURE — 93010 ELECTROCARDIOGRAM REPORT: CPT | Performed by: INTERNAL MEDICINE

## 2024-10-20 ENCOUNTER — HOSPITAL ENCOUNTER (EMERGENCY)
Facility: HOSPITAL | Age: 39
Discharge: HOME | End: 2024-10-20
Attending: EMERGENCY MEDICINE
Payer: COMMERCIAL

## 2024-10-20 ENCOUNTER — APPOINTMENT (EMERGENCY)
Dept: RADIOLOGY | Facility: HOSPITAL | Age: 39
End: 2024-10-20
Payer: COMMERCIAL

## 2024-10-20 ENCOUNTER — APPOINTMENT (EMERGENCY)
Dept: CT IMAGING | Facility: HOSPITAL | Age: 39
End: 2024-10-20
Payer: COMMERCIAL

## 2024-10-20 VITALS
TEMPERATURE: 97.5 F | BODY MASS INDEX: 22.17 KG/M2 | HEART RATE: 72 BPM | DIASTOLIC BLOOD PRESSURE: 62 MMHG | OXYGEN SATURATION: 100 % | RESPIRATION RATE: 18 BRPM | SYSTOLIC BLOOD PRESSURE: 103 MMHG | WEIGHT: 131.17 LBS

## 2024-10-20 DIAGNOSIS — F19.10 SUBSTANCE ABUSE (HCC): Primary | ICD-10-CM

## 2024-10-20 LAB
ALBUMIN SERPL BCG-MCNC: 4.3 G/DL (ref 3.5–5)
ALP SERPL-CCNC: 63 U/L (ref 34–104)
ALT SERPL W P-5'-P-CCNC: 11 U/L (ref 7–52)
AMPHETAMINES SERPL QL SCN: NEGATIVE
ANION GAP SERPL CALCULATED.3IONS-SCNC: 12 MMOL/L (ref 4–13)
AST SERPL W P-5'-P-CCNC: 18 U/L (ref 13–39)
ATRIAL RATE: 107 BPM
BACTERIA UR QL AUTO: ABNORMAL /HPF
BARBITURATES UR QL: NEGATIVE
BASOPHILS # BLD AUTO: 0.02 THOUSANDS/ΜL (ref 0–0.1)
BASOPHILS NFR BLD AUTO: 0 % (ref 0–1)
BENZODIAZ UR QL: NEGATIVE
BILIRUB SERPL-MCNC: 0.32 MG/DL (ref 0.2–1)
BILIRUB UR QL STRIP: NEGATIVE
BUN SERPL-MCNC: 22 MG/DL (ref 5–25)
CALCIUM SERPL-MCNC: 9.3 MG/DL (ref 8.4–10.2)
CARDIAC TROPONIN I PNL SERPL HS: <2 NG/L
CHLORIDE SERPL-SCNC: 103 MMOL/L (ref 96–108)
CLARITY UR: CLEAR
CO2 SERPL-SCNC: 25 MMOL/L (ref 21–32)
COCAINE UR QL: NEGATIVE
COLOR UR: ABNORMAL
CREAT SERPL-MCNC: 0.79 MG/DL (ref 0.6–1.3)
EOSINOPHIL # BLD AUTO: 0.05 THOUSAND/ΜL (ref 0–0.61)
EOSINOPHIL NFR BLD AUTO: 1 % (ref 0–6)
ERYTHROCYTE [DISTWIDTH] IN BLOOD BY AUTOMATED COUNT: 15.6 % (ref 11.6–15.1)
ETHANOL SERPL-MCNC: <10 MG/DL
FENTANYL UR QL SCN: NEGATIVE
FINE GRAN CASTS URNS QL MICRO: ABNORMAL /LPF
GFR SERPL CREATININE-BSD FRML MDRD: 114 ML/MIN/1.73SQ M
GLUCOSE SERPL-MCNC: 87 MG/DL (ref 65–140)
GLUCOSE SERPL-MCNC: 97 MG/DL (ref 65–140)
GLUCOSE UR STRIP-MCNC: NEGATIVE MG/DL
HCT VFR BLD AUTO: 37.6 % (ref 36.5–49.3)
HGB BLD-MCNC: 12.4 G/DL (ref 12–17)
HGB UR QL STRIP.AUTO: 10
HYDROCODONE UR QL SCN: NEGATIVE
IMM GRANULOCYTES # BLD AUTO: 0.01 THOUSAND/UL (ref 0–0.2)
IMM GRANULOCYTES NFR BLD AUTO: 0 % (ref 0–2)
KETONES UR STRIP-MCNC: ABNORMAL MG/DL
LEUKOCYTE ESTERASE UR QL STRIP: 25
LYMPHOCYTES # BLD AUTO: 1.46 THOUSANDS/ΜL (ref 0.6–4.47)
LYMPHOCYTES NFR BLD AUTO: 33 % (ref 14–44)
MCH RBC QN AUTO: 30.7 PG (ref 26.8–34.3)
MCHC RBC AUTO-ENTMCNC: 33 G/DL (ref 31.4–37.4)
MCV RBC AUTO: 93 FL (ref 82–98)
METHADONE UR QL: NEGATIVE
MONOCYTES # BLD AUTO: 0.68 THOUSAND/ΜL (ref 0.17–1.22)
MONOCYTES NFR BLD AUTO: 15 % (ref 4–12)
MUCOUS THREADS UR QL AUTO: ABNORMAL
NEUTROPHILS # BLD AUTO: 2.24 THOUSANDS/ΜL (ref 1.85–7.62)
NEUTS SEG NFR BLD AUTO: 51 % (ref 43–75)
NITRITE UR QL STRIP: NEGATIVE
NON-SQ EPI CELLS URNS QL MICRO: ABNORMAL /HPF
NRBC BLD AUTO-RTO: 0 /100 WBCS
OPIATES UR QL SCN: NEGATIVE
OXYCODONE+OXYMORPHONE UR QL SCN: NEGATIVE
P AXIS: 48 DEGREES
PCP UR QL: NEGATIVE
PH UR STRIP.AUTO: 6.5 [PH]
PLATELET # BLD AUTO: 225 THOUSANDS/UL (ref 149–390)
PMV BLD AUTO: 9.3 FL (ref 8.9–12.7)
POTASSIUM SERPL-SCNC: 3.9 MMOL/L (ref 3.5–5.3)
PR INTERVAL: 112 MS
PROT SERPL-MCNC: 6.5 G/DL (ref 6.4–8.4)
PROT UR STRIP-MCNC: ABNORMAL MG/DL
QRS AXIS: 69 DEGREES
QRSD INTERVAL: 82 MS
QT INTERVAL: 344 MS
QTC INTERVAL: 459 MS
RBC # BLD AUTO: 4.04 MILLION/UL (ref 3.88–5.62)
RBC #/AREA URNS AUTO: ABNORMAL /HPF
SODIUM SERPL-SCNC: 140 MMOL/L (ref 135–147)
SP GR UR STRIP.AUTO: 1.01 (ref 1–1.04)
T WAVE AXIS: 36 DEGREES
THC UR QL: NEGATIVE
UROBILINOGEN UA: 1 MG/DL
VENTRICULAR RATE: 107 BPM
WBC # BLD AUTO: 4.46 THOUSAND/UL (ref 4.31–10.16)
WBC #/AREA URNS AUTO: ABNORMAL /HPF

## 2024-10-20 PROCEDURE — 99285 EMERGENCY DEPT VISIT HI MDM: CPT | Performed by: PHYSICIAN ASSISTANT

## 2024-10-20 PROCEDURE — 70450 CT HEAD/BRAIN W/O DYE: CPT

## 2024-10-20 PROCEDURE — 82077 ASSAY SPEC XCP UR&BREATH IA: CPT | Performed by: PHYSICIAN ASSISTANT

## 2024-10-20 PROCEDURE — 71045 X-RAY EXAM CHEST 1 VIEW: CPT

## 2024-10-20 PROCEDURE — 81001 URINALYSIS AUTO W/SCOPE: CPT | Performed by: PHYSICIAN ASSISTANT

## 2024-10-20 PROCEDURE — 81003 URINALYSIS AUTO W/O SCOPE: CPT | Performed by: PHYSICIAN ASSISTANT

## 2024-10-20 PROCEDURE — 93005 ELECTROCARDIOGRAM TRACING: CPT

## 2024-10-20 PROCEDURE — 96360 HYDRATION IV INFUSION INIT: CPT

## 2024-10-20 PROCEDURE — 93010 ELECTROCARDIOGRAM REPORT: CPT | Performed by: INTERNAL MEDICINE

## 2024-10-20 PROCEDURE — 84484 ASSAY OF TROPONIN QUANT: CPT | Performed by: PHYSICIAN ASSISTANT

## 2024-10-20 PROCEDURE — 85025 COMPLETE CBC W/AUTO DIFF WBC: CPT | Performed by: PHYSICIAN ASSISTANT

## 2024-10-20 PROCEDURE — 99284 EMERGENCY DEPT VISIT MOD MDM: CPT

## 2024-10-20 PROCEDURE — 80307 DRUG TEST PRSMV CHEM ANLYZR: CPT | Performed by: PHYSICIAN ASSISTANT

## 2024-10-20 PROCEDURE — 36415 COLL VENOUS BLD VENIPUNCTURE: CPT | Performed by: PHYSICIAN ASSISTANT

## 2024-10-20 PROCEDURE — 80053 COMPREHEN METABOLIC PANEL: CPT | Performed by: PHYSICIAN ASSISTANT

## 2024-10-20 PROCEDURE — 82948 REAGENT STRIP/BLOOD GLUCOSE: CPT

## 2024-10-20 RX ADMIN — SODIUM CHLORIDE 1000 ML: 0.9 INJECTION, SOLUTION INTRAVENOUS at 12:20

## 2024-10-20 NOTE — ED NOTES
Pt requested a sandwich, this tech gave him a turkey sandwich and chips.      Daphne Mahan  10/20/24 7389

## 2024-10-20 NOTE — ED NOTES
Ibuprofen 600 mg by mouth as needed for right hip pain  Cyclobenzaprine as needed for tightness/muscle spasm  Maintain hydration  Use heating pad to the right hip/lower back area  If symptoms persist or does not improve, I recommend you seek consultation with orthopedics with your primary care provider  Seek chiropractic care   Go to the nearest emergency room worsening complaint          Remember that examination and testing performed in the immediate care center, is not a comprehensive evaluation for all medical conditions and does not replace the need for follow-up with your primary care doctor. In the urgent care center, we are only able to evaluate your symptoms in the current condition, but symptoms may change or worsen.    Xrays will be interpreted by the radiologist and you will be contacted if there are any concerning findings. Although you felt safe to be discharged today, if your symptoms persist or change you need to be re-evaluated by your regular/ primary care doctor as soon as possible.       Patient Education     Arthralgia    Arthralgia is the term for pain in or around the joint. It is a symptom, not a disease. This pain may involve one or more joints. In some cases, the pain moves from joint to joint.  There are many causes for joint pain. These include:  · Injury  · Wearing out the joint surface (osteoarthritis)  · Inflammation of the joint because of crystals in the joint fluid (gout)  · Infection inside the joint    · Inflammation of the fluid-filled sacs around the joint (bursitis)  · Autoimmune disorders such as rheumatoid arthritis or lupus  · Inflammation of chords that attach muscle to bone (tendonitis)  Home care  · Rest the involved joint(s) until your symptoms improve.   · Eat a healthy diet, exercise as advised by your healthcare provider and stay at a healthy weight  · You may be prescribed pain medicine. If none is prescribed, you may use acetaminophen or ibuprofen to control pain  Patient tolerating sandwich and ginger ale     Tricia Aguayo, RN  10/20/24 3876     and inflammation.    Follow-up care  Follow up with your healthcare provider or as advised.  When to seek medical advice  Call your healthcare provider right away if any of the following occurs:  · Pain, swelling, or redness of joint increases  · Pain worsens or recurs after a period of improvement  · Pain moves to other joints  · You cannot bear weight on the affected joint   · You cannot move the affected joint  · Joint appears deformed  · New rash appears  · Fever of 100.4ºF (38ºC) or higher, or as directed by your healthcare provider  · New symptoms appear  Naomi last reviewed this educational content on 8/1/2019  © 6932-6217 [employer]. All rights reserved. This information is not intended as a substitute for professional medical care. Always follow your healthcare professional's instructions.

## 2024-10-21 ENCOUNTER — HOSPITAL ENCOUNTER (EMERGENCY)
Facility: HOSPITAL | Age: 39
Discharge: HOME | End: 2024-10-21
Attending: EMERGENCY MEDICINE | Admitting: EMERGENCY MEDICINE
Payer: COMMERCIAL

## 2024-10-21 VITALS
SYSTOLIC BLOOD PRESSURE: 92 MMHG | DIASTOLIC BLOOD PRESSURE: 51 MMHG | RESPIRATION RATE: 14 BRPM | BODY MASS INDEX: 22.62 KG/M2 | HEART RATE: 79 BPM | OXYGEN SATURATION: 99 % | TEMPERATURE: 98.6 F | WEIGHT: 133.82 LBS

## 2024-10-21 DIAGNOSIS — F19.10 SUBSTANCE ABUSE (HCC): Primary | ICD-10-CM

## 2024-10-21 PROCEDURE — 99284 EMERGENCY DEPT VISIT MOD MDM: CPT

## 2024-10-21 PROCEDURE — 99283 EMERGENCY DEPT VISIT LOW MDM: CPT | Performed by: EMERGENCY MEDICINE

## 2024-10-21 NOTE — ED PROVIDER NOTES
Time reflects when diagnosis was documented in both MDM as applicable and the Disposition within this note       Time User Action Codes Description Comment    10/21/2024  8:57 PM Kadeem Yarbrough Add [F19.10] Substance abuse (HCC)           ED Disposition       ED Disposition   Discharge    Condition   Stable    Date/Time   Mon Oct 21, 2024  8:56 PM    Comment   Rhys Esparza discharge to home/self care.                   Assessment & Plan       Medical Decision Making  Problems Addressed:  Substance abuse (HCC): chronic illness or injury with exacerbation, progression, or side effects of treatment     Details: Known MELLISSA.  Denied any use, but with initial presentation, most likely K2 abuse.  Walked out of ER without any issues.     Amount and/or Complexity of Data Reviewed  Independent Historian: EMS  External Data Reviewed: notes.        ED Course as of 10/21/24 2123   Mon Oct 21, 2024   2056 Patient awake and alert.  Still denying any drug use.  Asking for a sandwich.  Will provide.       Medications - No data to display    ED Risk Strat Scores                                               History of Present Illness       Chief Complaint   Patient presents with    Overdose - Accidental     Pt arriving via ems from a K2 overdose.        Past Medical History:   Diagnosis Date    Drug abuse (HCC)     Schizoaffective disorder (HCC)     resolved 05/16/14    Schizophrenia (HCC)       Past Surgical History:   Procedure Laterality Date    NO PAST SURGERIES        Family History   Problem Relation Age of Onset    Anxiety disorder Mother     Depression Mother     Dysphagia Mother     Stroke Mother         cva    Diabetes type II Mother     Other Mother         Hyponatremia    Psychosis Mother       Social History     Tobacco Use    Smoking status: Unknown    Smokeless tobacco: Never   Vaping Use    Vaping status: Never Used   Substance Use Topics    Alcohol use: Yes     Comment: social    Drug use: Yes     Types: Marijuana,  Other     Comment: K2      E-Cigarette/Vaping    E-Cigarette Use Never User       E-Cigarette/Vaping Substances    Nicotine No     THC No     CBD No     Flavoring No     Other No     Unknown No       I have reviewed and agree with the history as documented.     Patient is a 38-year-old male with a known h/o MELLISSA who presents after being found acting strange on [address].  [facility] familiar with patient and just picked him up recently for same.  Patient known user of K2.  Denies any substance abuse at this time.  Accucheck 100.  No complaints.  Denies any etoh use.       Review of Systems   Unable to perform ROS: Patient nonverbal (will only shake head no to any questions.)           Objective       ED Triage Vitals [10/21/24 1941]   Temperature Pulse Blood Pressure Respirations SpO2 Patient Position - Orthostatic VS   98.6 °F (37 °C) (!) 109 115/62 16 96 % Lying      Temp Source Heart Rate Source BP Location FiO2 (%) Pain Score    Oral Monitor Left arm -- --      Vitals      Date and Time Temp Pulse SpO2 Resp BP Pain Score FACES Pain Rating User   10/21/24 2023 -- 79 99 % 14 92/51 -- -- CH   10/21/24 1941 98.6 °F (37 °C) 109 96 % 16 115/62 -- -- KA            Physical Exam  Vitals and nursing note reviewed.   Constitutional:       Comments: Disheveled.   HENT:      Head: Normocephalic and atraumatic.      Right Ear: Tympanic membrane, ear canal and external ear normal.      Left Ear: Tympanic membrane, ear canal and external ear normal.      Nose: Nose normal.   Cardiovascular:      Rate and Rhythm: Normal rate and regular rhythm.      Pulses: Normal pulses.      Heart sounds: Normal heart sounds.   Pulmonary:      Effort: Pulmonary effort is normal.      Breath sounds: Normal breath sounds.   Abdominal:      General: Bowel sounds are normal.      Palpations: Abdomen is soft.   Musculoskeletal:      Cervical back: Neck supple.   Skin:     General: Skin is warm.      Capillary Refill: Capillary refill takes less than 2  seconds.   Neurological:      Mental Status: He is alert.      Cranial Nerves: No cranial nerve deficit.      Motor: No weakness.       Results Reviewed       None            No orders to display       Procedures    ED Medication and Procedure Management   Prior to Admission Medications   Prescriptions Last Dose Informant Patient Reported? Taking?   benztropine (COGENTIN) 0.5 mg tablet   No No   Sig: Take 1 tablet (0.5 mg total) by mouth 3 (three) times a day   benztropine (COGENTIN) 2 mg tablet   Yes No   Sig: Take 2 mg by mouth every morning   Patient not taking: Reported on 8/20/2021   buPROPion (WELLBUTRIN SR) 150 mg 12 hr tablet   Yes No   Sig: Take 150 mg by mouth every morning   Patient not taking: Reported on 8/20/2021   cloNIDine (CATAPRES) 0.2 mg tablet   Yes No   Sig: Take 0.2 mg by mouth daily at bedtime   Patient not taking: Reported on 8/20/2021   divalproex sodium (DEPAKOTE ER) 500 mg 24 hr tablet   No No   Sig: Take 1 tablet (500 mg total) by mouth 2 (two) times a day   divalproex sodium (DEPAKOTE) 500 mg DR tablet   Yes No   Sig: Take 500 mg by mouth daily at bedtime   loxapine (LOXITANE) 10 mg capsule   No No   Sig: Take 5 capsules (50 mg total) by mouth 3 (three) times a day 1 in the morning, 2 at night      Facility-Administered Medications: None     Discharge Medication List as of 10/21/2024  8:57 PM        CONTINUE these medications which have NOT CHANGED    Details   !! benztropine (COGENTIN) 0.5 mg tablet Take 1 tablet (0.5 mg total) by mouth 3 (three) times a day, Starting u 11/30/2023, Normal      !! benztropine (COGENTIN) 2 mg tablet Take 2 mg by mouth every morning, Starting Tue 6/22/2021, Historical Med      buPROPion (WELLBUTRIN SR) 150 mg 12 hr tablet Take 150 mg by mouth every morning, Starting Sun 6/20/2021, Historical Med      cloNIDine (CATAPRES) 0.2 mg tablet Take 0.2 mg by mouth daily at bedtime, Starting Tue 6/22/2021, Historical Med      divalproex sodium (DEPAKOTE ER) 500 mg  24 hr tablet Take 1 tablet (500 mg total) by mouth 2 (two) times a day, Starting Thu 11/30/2023, Until Sat 12/30/2023, Normal      divalproex sodium (DEPAKOTE) 500 mg DR tablet Take 500 mg by mouth daily at bedtime, Historical Med      loxapine (LOXITANE) 10 mg capsule Take 5 capsules (50 mg total) by mouth 3 (three) times a day 1 in the morning, 2 at night, Starting Thu 11/30/2023, Until Sat 12/30/2023, Normal       !! - Potential duplicate medications found. Please discuss with provider.        No discharge procedures on file.  ED SEPSIS DOCUMENTATION   Time reflects when diagnosis was documented in both MDM as applicable and the Disposition within this note       Time User Action Codes Description Comment    10/21/2024  8:57 PM Kadeem Yarbrough Add [F19.10] Substance abuse (HCC)                  Kadeem Yarbrough MD  10/21/24 8011

## 2024-10-23 ENCOUNTER — HOSPITAL ENCOUNTER (EMERGENCY)
Facility: HOSPITAL | Age: 39
Discharge: HOME | End: 2024-10-23
Attending: EMERGENCY MEDICINE
Payer: COMMERCIAL

## 2024-10-23 VITALS
RESPIRATION RATE: 18 BRPM | SYSTOLIC BLOOD PRESSURE: 95 MMHG | HEIGHT: 65 IN | HEART RATE: 90 BPM | WEIGHT: 122.58 LBS | TEMPERATURE: 99 F | OXYGEN SATURATION: 100 % | BODY MASS INDEX: 20.42 KG/M2 | DIASTOLIC BLOOD PRESSURE: 59 MMHG

## 2024-10-23 DIAGNOSIS — F19.10 SUBSTANCE ABUSE (HCC): Primary | ICD-10-CM

## 2024-10-23 PROCEDURE — 99284 EMERGENCY DEPT VISIT MOD MDM: CPT

## 2024-10-23 PROCEDURE — 96360 HYDRATION IV INFUSION INIT: CPT

## 2024-10-23 RX ADMIN — SODIUM CHLORIDE 1000 ML: 0.9 INJECTION, SOLUTION INTRAVENOUS at 13:29

## 2024-10-23 NOTE — CERTIFIED RECOVERY SPECIALIST
"   Certified  Note    Patient name: Rhys Esparza  Location: ED-42/ED-42  [facility]: [facility]  Attending:  Brenda Perales DO MRN [id number]  : 1985  Age: 38 y.o.    Sex: male Date 10/23/2024         Substance Use History:     Social History     Substance and Sexual Activity   Alcohol Use Yes    Comment: social        Social History     Substance and Sexual Activity   Drug Use Yes    Types: Marijuana, Other    Comment: K2       Time spent: 18 minutes    Consult Rec'd: Y  Patient seen in: ED  Stage of change: Precontemplation    Substance used: K2  Frequency/Quantity: Daily  Date of last use: Today 10/23/24    CRS met with patient and discussed visit to hospital. Patient appears drowsy but stated he \"doesn't want help; that he smokes K2 every day\".     CRS provided business card and resources.                                 '          Franchesca Rg       "

## 2024-10-23 NOTE — DISCHARGE INSTR - OTHER ORDERS
Franchesca Rg  Certified     [facility]  [place], [place] and [facility]  [phone]    [facility]   [address] [place] [address] [address]nd floor [address]   [place] [address]  [phone]      [facility] [phone]   [place] [phone]  [place] [phone]  [place] [phone]   [phone]  [place] [phone]     [facility]  [address] [facility] [address]  [place] [address]  [phone]    [place]   [phone]    [facility] [place] [id number] [facility]  [place]  [address]  [phone]  [facility]   [email or web address]   [email or web address]    [facility]   [email or web address]  [phone]

## 2024-10-23 NOTE — ED PROVIDER NOTES
Time reflects when diagnosis was documented in both MDM as applicable and the Disposition within this note       Time User Action Codes Description Comment    10/23/2024  2:21 PM Doug uQezada Add [T50.901A] Overdose     10/23/2024  2:33 PM Doug Quezada Add [F19.10] Substance abuse (HCC)     10/23/2024  2:33 PM Doug Quezada Modify [F19.10] Substance abuse (HCC)     10/23/2024  2:33 PM Doug Quezada Remove [T50.901A] Overdose           ED Disposition       ED Disposition   Discharge    Condition   Stable    Date/Time   Wed Oct 23, 2024  2:21 PM    Comment   Rhys Parishneeut discharge to home/self care.                   Assessment & Plan       Medical Decision Making  O2 sats remained normal. Was able to tolerate PO. Clinically sober.     I have discussed the plan to discharge pt from ED. The patient was discharged in stable condition.  Patient ambulated off the department.  Extensive return to emergency department precautions were discussed.  Follow up with appropriate providers including primary care physician was discussed.  Patient and/or their  primary decision maker expressed understanding.  Patient remained stable during entire emergency department stay.          ED Course as of 10/23/24 1509   Wed Oct 23, 2024   1330 Plan to let him sober up. Monitor pulse ox.    1429 Pt more awake. Will PO challenge    1508 Pt wants to leave. Able to tolerate PO challenge. Clinically sober. Reports he is going to walk home.        Medications   sodium chloride 0.9 % bolus 1,000 mL (1,000 mL Intravenous New Bag 10/23/24 1329)       ED Risk Strat Scores                                               History of Present Illness       Chief Complaint   Patient presents with    Overdose - Accidental     Arrives EMS after being found unresponsive by bystanders. Bystanders dumped water on patient in attempt to wake him up. Pt lethargic but following commands. Per ems, pt known to use K2       Past Medical History:   Diagnosis Date     Drug abuse (HCC)     Schizoaffective disorder (HCC)     resolved 05/16/14    Schizophrenia (HCC)       Past Surgical History:   Procedure Laterality Date    NO PAST SURGERIES        Family History   Problem Relation Age of Onset    Anxiety disorder Mother     Depression Mother     Dysphagia Mother     Stroke Mother         cva    Diabetes type II Mother     Other Mother         Hyponatremia    Psychosis Mother       Social History     Tobacco Use    Smoking status: Unknown    Smokeless tobacco: Never   Vaping Use    Vaping status: Never Used   Substance Use Topics    Alcohol use: Yes     Comment: social    Drug use: Yes     Types: Marijuana, Other     Comment: K2      E-Cigarette/Vaping    E-Cigarette Use Never User       E-Cigarette/Vaping Substances    Nicotine No     THC No     CBD No     Flavoring No     Other No     Unknown No       I have reviewed and agree with the history as documented.     38 YOM with PMH known substance abuse presents today via [facility]. Was found unresponsive by bystanders and they dumped water on pt. Pt woke up but lethargic. Known to [facility] and [employer] to be a K2 user. Has recent ER visits for K2 overdose and usage.        Review of Systems   Constitutional:  Negative for chills.   Gastrointestinal:  Negative for abdominal pain, diarrhea, nausea and vomiting.   All other systems reviewed and are negative.          Objective       ED Triage Vitals [10/23/24 1326]   Temperature Pulse Blood Pressure Respirations SpO2 Patient Position - Orthostatic VS   99 °F (37.2 °C) 92 90/51 18 97 % Sitting      Temp Source Heart Rate Source BP Location FiO2 (%) Pain Score    Oral Monitor Right arm -- --      Vitals      Date and Time Temp Pulse SpO2 Resp BP Pain Score FACES Pain Rating User   10/23/24 1430 -- 90 100 % 18 95/59 -- -- DW   10/23/24 1345 -- 80 98 % 18 92/53 -- -- DW   10/23/24 1326 99 °F (37.2 °C) 92 97 % 18 90/51 -- -- DW            Physical Exam  Vitals and nursing note reviewed.   Constitutional:        General: He is not in acute distress.     Appearance: Normal appearance. He is well-developed. He is not ill-appearing.      Comments: Pt is lethargic but arousable to verbal stimuli, follows commands.    HENT:      Head: Normocephalic and atraumatic.   Eyes:      Conjunctiva/sclera: Conjunctivae normal.   Cardiovascular:      Rate and Rhythm: Normal rate.   Pulmonary:      Effort: Pulmonary effort is normal. No respiratory distress.      Breath sounds: Normal breath sounds.   Musculoskeletal:         General: Normal range of motion.      Cervical back: Normal range of motion and neck supple.   Skin:     General: Skin is warm and dry.   Psychiatric:         Mood and Affect: Mood normal.         Behavior: Behavior normal.         Results Reviewed       None            No orders to display       Procedures    ED Medication and Procedure Management   Prior to Admission Medications   Prescriptions Last Dose Informant Patient Reported? Taking?   benztropine (COGENTIN) 0.5 mg tablet   No No   Sig: Take 1 tablet (0.5 mg total) by mouth 3 (three) times a day   benztropine (COGENTIN) 2 mg tablet   Yes No   Sig: Take 2 mg by mouth every morning   Patient not taking: Reported on 8/20/2021   buPROPion (WELLBUTRIN SR) 150 mg 12 hr tablet   Yes No   Sig: Take 150 mg by mouth every morning   Patient not taking: Reported on 8/20/2021   cloNIDine (CATAPRES) 0.2 mg tablet   Yes No   Sig: Take 0.2 mg by mouth daily at bedtime   Patient not taking: Reported on 8/20/2021   divalproex sodium (DEPAKOTE ER) 500 mg 24 hr tablet   No No   Sig: Take 1 tablet (500 mg total) by mouth 2 (two) times a day   divalproex sodium (DEPAKOTE) 500 mg DR tablet   Yes No   Sig: Take 500 mg by mouth daily at bedtime   loxapine (LOXITANE) 10 mg capsule   No No   Sig: Take 5 capsules (50 mg total) by mouth 3 (three) times a day 1 in the morning, 2 at night      Facility-Administered Medications: None     Patient's Medications   Discharge Prescriptions     No medications on file     No discharge procedures on file.  ED SEPSIS DOCUMENTATION   Time reflects when diagnosis was documented in both MDM as applicable and the Disposition within this note       Time User Action Codes Description Comment    10/23/2024  2:21 PM Doug Quezada Add [T50.901A] Overdose     10/23/2024  2:33 PM Doug Quezada Add [F19.10] Substance abuse ([facility])     10/23/2024  2:33 PM Doug Quezada Modify [F19.10] Substance abuse ([facility])     10/23/2024  2:33 PM Doug Quezada Remove [T50.901A] Overdose                  Doug Quezada PA-C  10/23/24 7606

## 2024-10-24 ENCOUNTER — APPOINTMENT (EMERGENCY)
Dept: CT IMAGING | Facility: HOSPITAL | Age: 39
End: 2024-10-24
Payer: COMMERCIAL

## 2024-10-24 ENCOUNTER — HOSPITAL ENCOUNTER (EMERGENCY)
Facility: HOSPITAL | Age: 39
Discharge: HOME | End: 2024-10-25
Payer: COMMERCIAL

## 2024-10-24 ENCOUNTER — HOSPITAL ENCOUNTER (EMERGENCY)
Facility: HOSPITAL | Age: 39
Discharge: HOME | End: 2024-10-24
Attending: EMERGENCY MEDICINE
Payer: COMMERCIAL

## 2024-10-24 VITALS
OXYGEN SATURATION: 96 % | TEMPERATURE: 99.2 F | HEART RATE: 87 BPM | RESPIRATION RATE: 16 BRPM | BODY MASS INDEX: 27.22 KG/M2 | WEIGHT: 163.58 LBS | SYSTOLIC BLOOD PRESSURE: 105 MMHG | DIASTOLIC BLOOD PRESSURE: 83 MMHG

## 2024-10-24 DIAGNOSIS — F19.10 SUBSTANCE ABUSE (HCC): Primary | ICD-10-CM

## 2024-10-24 DIAGNOSIS — T50.901A ACCIDENTAL DRUG OVERDOSE, INITIAL ENCOUNTER: Primary | ICD-10-CM

## 2024-10-24 DIAGNOSIS — F19.10 SUBSTANCE ABUSE (HCC): ICD-10-CM

## 2024-10-24 LAB
GLUCOSE SERPL-MCNC: 118 MG/DL (ref 65–140)
GLUCOSE SERPL-MCNC: 94 MG/DL (ref 65–140)
GLUCOSE SERPL-MCNC: 94 MG/DL (ref 65–140)

## 2024-10-24 PROCEDURE — 82948 REAGENT STRIP/BLOOD GLUCOSE: CPT

## 2024-10-24 PROCEDURE — 96360 HYDRATION IV INFUSION INIT: CPT

## 2024-10-24 PROCEDURE — 99284 EMERGENCY DEPT VISIT MOD MDM: CPT

## 2024-10-24 PROCEDURE — 70450 CT HEAD/BRAIN W/O DYE: CPT

## 2024-10-24 PROCEDURE — 99285 EMERGENCY DEPT VISIT HI MDM: CPT

## 2024-10-24 PROCEDURE — 99283 EMERGENCY DEPT VISIT LOW MDM: CPT | Performed by: EMERGENCY MEDICINE

## 2024-10-24 PROCEDURE — 93005 ELECTROCARDIOGRAM TRACING: CPT

## 2024-10-24 PROCEDURE — 99283 EMERGENCY DEPT VISIT LOW MDM: CPT

## 2024-10-24 RX ORDER — NALOXONE HYDROCHLORIDE 1 MG/ML
2 INJECTION PARENTERAL ONCE
Status: COMPLETED | OUTPATIENT
Start: 2024-10-24 | End: 2024-10-24

## 2024-10-24 RX ORDER — NALOXONE HYDROCHLORIDE 1 MG/ML
1 INJECTION PARENTERAL ONCE
Status: COMPLETED | OUTPATIENT
Start: 2024-10-24 | End: 2024-10-24

## 2024-10-24 RX ADMIN — SODIUM CHLORIDE 1000 ML: 0.9 INJECTION, SOLUTION INTRAVENOUS at 22:52

## 2024-10-24 RX ADMIN — SODIUM CHLORIDE 1000 ML: 0.9 INJECTION, SOLUTION INTRAVENOUS at 22:25

## 2024-10-24 NOTE — ED PROVIDER NOTES
Time reflects when diagnosis was documented in both MDM as applicable and the Disposition within this note       Time User Action Codes Description Comment    10/24/2024  5:12 PM John Pitts Add [F19.10] Substance abuse (HCC)           ED Disposition       ED Disposition   Discharge    Condition   Stable    Date/Time   Thu Oct 24, 2024  5:12 PM    Comment   Rhys Auaugusto discharge to home/self care.                   Assessment & Plan       Medical Decision Making  38-year-old gentleman was found lying on the sidewalk.  He is known to abuse K2.  On evaluation the patient is able to awaken and often answer a couple questions.  He admits to smoking K2 and denies use of any other substances.  He offers no acute complaints.  No obvious signs of trauma on examination.  Blood pressure was slightly low and he was slightly tachycardic but otherwise vital signs were stable.    Amount and/or Complexity of Data Reviewed  Labs: ordered.        ED Course as of 10/24/24 1715   Thu Oct 24, 2024   1713 The patient remains fully awake and alert.  Ate a sandwich and ambulated out of the department in no distress.  BP improved.  Advised to abstain from ongoing drug use.       Medications - No data to display    ED Risk Strat Scores                                               History of Present Illness       Chief Complaint   Patient presents with    Recreational Drug Use     Pt. Brought via [facility] for recreational drug use.        Past Medical History:   Diagnosis Date    Drug abuse (HCC)     Schizoaffective disorder (HCC)     resolved 05/16/14    Schizophrenia (HCC)       Past Surgical History:   Procedure Laterality Date    NO PAST SURGERIES        Family History   Problem Relation Age of Onset    Anxiety disorder Mother     Depression Mother     Dysphagia Mother     Stroke Mother         cva    Diabetes type II Mother     Other Mother         Hyponatremia    Psychosis Mother       Social History     Tobacco Use    Smoking status:  Unknown    Smokeless tobacco: Never   Vaping Use    Vaping status: Never Used   Substance Use Topics    Alcohol use: Yes     Comment: social    Drug use: Yes     Types: Marijuana, Other     Comment: K2      E-Cigarette/Vaping    E-Cigarette Use Never User       E-Cigarette/Vaping Substances    Nicotine No     THC No     CBD No     Flavoring No     Other No     Unknown No       I have reviewed and agree with the history as documented.       Addiction Problem  Similar prior episodes: yes    Severity:  Unable to specify  Onset quality:  Unable to specify  Duration: hours.  Timing:  Constant  Progression:  Waxing and waning  Chronicity:  Recurrent  Suspected agents: K2.      Review of Systems   Unable to perform ROS: Mental status change           Objective       ED Triage Vitals [10/24/24 1508]   Temperature Pulse Blood Pressure Respirations SpO2 Patient Position - Orthostatic VS   99.2 °F (37.3 °C) (!) 110 102/57 16 96 % Lying      Temp Source Heart Rate Source BP Location FiO2 (%) Pain Score    Oral Monitor Left arm -- --      Vitals      Date and Time Temp Pulse SpO2 Resp BP Pain Score FACES Pain Rating User   10/24/24 1711 -- 87 -- -- 105/83 -- -- AK   10/24/24 1645 -- 90 -- 16 94/53 -- -- JM   10/24/24 1508 99.2 °F (37.3 °C) 110 96 % 16 102/57 -- -- JW            Physical Exam  Vitals and nursing note reviewed.   Constitutional:       General: He is not in acute distress.     Appearance: Normal appearance. He is well-developed. He is not ill-appearing, toxic-appearing or diaphoretic.   HENT:      Head: Normocephalic and atraumatic.      Right Ear: External ear normal.      Left Ear: External ear normal.      Nose: Nose normal.      Mouth/Throat:      Mouth: Mucous membranes are moist.      Pharynx: Oropharynx is clear.   Eyes:      Conjunctiva/sclera: Conjunctivae normal.      Pupils: Pupils are equal, round, and reactive to light.   Cardiovascular:      Rate and Rhythm: Normal rate and regular rhythm.      Heart  sounds: Normal heart sounds.   Pulmonary:      Effort: Pulmonary effort is normal. No respiratory distress.      Breath sounds: Normal breath sounds.   Abdominal:      General: Bowel sounds are normal. There is no distension.      Palpations: Abdomen is soft.      Tenderness: There is no abdominal tenderness. There is no guarding.   Musculoskeletal:         General: Normal range of motion.      Cervical back: Neck supple. No rigidity.      Right lower leg: No edema.      Left lower leg: No edema.   Skin:     General: Skin is warm and dry.      Capillary Refill: Capillary refill takes less than 2 seconds.   Neurological:      General: No focal deficit present.   Psychiatric:         Speech: Speech is slurred.         Behavior: Behavior is slowed.         Results Reviewed       Procedure Component Value Units Date/Time    Fingerstick Glucose (POCT) [[id number]]  (Normal) Collected: 10/24/24 1508    Lab Status: Final result Specimen: Blood Updated: 10/24/24 1509     POC Glucose 118 mg/dl             No orders to display       Procedures    ED Medication and Procedure Management   Prior to Admission Medications   Prescriptions Last Dose Informant Patient Reported? Taking?   benztropine (COGENTIN) 0.5 mg tablet   No No   Sig: Take 1 tablet (0.5 mg total) by mouth 3 (three) times a day   benztropine (COGENTIN) 2 mg tablet   Yes No   Sig: Take 2 mg by mouth every morning   Patient not taking: Reported on 8/20/2021   buPROPion (WELLBUTRIN SR) 150 mg 12 hr tablet   Yes No   Sig: Take 150 mg by mouth every morning   Patient not taking: Reported on 8/20/2021   cloNIDine (CATAPRES) 0.2 mg tablet   Yes No   Sig: Take 0.2 mg by mouth daily at bedtime   Patient not taking: Reported on 8/20/2021   divalproex sodium (DEPAKOTE ER) 500 mg 24 hr tablet   No No   Sig: Take 1 tablet (500 mg total) by mouth 2 (two) times a day   divalproex sodium (DEPAKOTE) 500 mg DR tablet   Yes No   Sig: Take 500 mg by mouth daily at bedtime   loxapine  (LOXITANE) 10 mg capsule   No No   Sig: Take 5 capsules (50 mg total) by mouth 3 (three) times a day 1 in the morning, 2 at night      Facility-Administered Medications: None     Patient's Medications   Discharge Prescriptions    No medications on file     No discharge procedures on file.  ED SEPSIS DOCUMENTATION   Time reflects when diagnosis was documented in both MDM as applicable and the Disposition within this note       Time User Action Codes Description Comment    10/24/2024  5:12 PM John Pitts Add [F19.10] Substance abuse (HCC)                  John Pitts DO  10/24/24 2995

## 2024-10-24 NOTE — CERTIFIED RECOVERY SPECIALIST
Certified  Note    Patient name: Rhys Esparza  Location: ED 15/ED 15  [place]: [facility]  Attending:  John Pitts DO MRN [id number]  : 1985  Age: 38 y.o.    Sex: male Date 10/24/2024         Substance Use History:     Social History     Substance and Sexual Activity   Alcohol Use Yes    Comment: social        Social History     Substance and Sexual Activity   Drug Use Yes    Types: Marijuana, Other    Comment: K2     [facility] [place] [facility] and messaged doctor on case to advise patient was seen yesterday in [place] location and declined services also that CRS wont make to  ED in time today. Patient was offered HOST by ED staff and again declined services/ help. If patient in ED tomorrow, CRS will follow up.    Resources provided.               Wayne Rg

## 2024-10-25 VITALS
HEART RATE: 80 BPM | SYSTOLIC BLOOD PRESSURE: 101 MMHG | OXYGEN SATURATION: 96 % | OXYGEN SATURATION: 96 % | HEART RATE: 80 BPM | TEMPERATURE: 99 F | RESPIRATION RATE: 13 BRPM | DIASTOLIC BLOOD PRESSURE: 56 MMHG | SYSTOLIC BLOOD PRESSURE: 101 MMHG | TEMPERATURE: 99 F | RESPIRATION RATE: 13 BRPM | DIASTOLIC BLOOD PRESSURE: 56 MMHG

## 2024-10-25 LAB
ATRIAL RATE: 94 BPM
ATRIAL RATE: 94 BPM
P AXIS: 73 DEGREES
P AXIS: 73 DEGREES
PR INTERVAL: 128 MS
PR INTERVAL: 128 MS
QRS AXIS: 89 DEGREES
QRS AXIS: 89 DEGREES
QRSD INTERVAL: 92 MS
QRSD INTERVAL: 92 MS
QT INTERVAL: 372 MS
QT INTERVAL: 372 MS
QTC INTERVAL: 465 MS
QTC INTERVAL: 465 MS
T WAVE AXIS: 67 DEGREES
T WAVE AXIS: 67 DEGREES
VENTRICULAR RATE: 94 BPM
VENTRICULAR RATE: 94 BPM

## 2024-10-25 PROCEDURE — 93010 ELECTROCARDIOGRAM REPORT: CPT | Performed by: INTERNAL MEDICINE

## 2024-10-25 NOTE — ED PROVIDER NOTES
Time reflects when diagnosis was documented in both MDM as applicable and the Disposition within this note       Time User Action Codes Description Comment    10/25/2024  2:30 AM Grayson Armando Add [T50.901A] Accidental drug overdose, initial encounter     10/25/2024  2:30 AM Grayson Armando Add [F19.10] Substance abuse (HCC)           ED Disposition       ED Disposition   Discharge    Condition   Stable    Date/Time   Fri Oct 25, 2024  2:30 AM    Comment   Stewart Jacobs discharge to home/self care.                   Assessment & Plan       Medical Decision Making  Patient with history as below presented with a drug overdose. History obtained from patient and EMS report.    Differential diagnosis includes: Drug overdose, glucose derangement, arrhythmia, intracranial bleed    Plan: CT head, ECG, fingerstick glucose, monitor for sobriety    ECG independently interpreted by myself as below.  Fingerstick glucose normal. Independently reviewed imaging without acute emergent pathology.  Patient monitored for several hours in the emergency department and clinically sober on my reevaluation.  No complaints upon clinical sobriety.  Endorses ingesting K2 today.  Denies any other substances.  Offered rehabilitation for drug use which he declined.  Stable for outpatient management.    Disposition: Discharged with instructions to obtain outpatient follow up of patient's symptoms and findings, with strict return precautions if patient develops new or worsening symptoms. Patient understands this plan and is agreeable. All questions answered. Patient discharged home with return precautions.    Amount and/or Complexity of Data Reviewed  Labs: ordered. Decision-making details documented in ED Course.  Radiology: ordered.    Risk  Prescription drug management.        ED Course as of 10/25/24 0513   u Oct 24, 2024   2229 POC Glucose: 94   2229 Procedure Note: EKG  Date/Time: 10/24/24 10:29 PM   Interpreted by: Grayson Armando    Indications / Diagnosis: suspected overdose  ECG reviewed by me, the ED Provider: yes   The EKG demonstrates:  Rhythm: normal sinus  Intervals: normal intervals  Axis: normal axis  QRS/Blocks: normal QRS  ST Changes: No acute ST Changes, no STD/JENNIFER.   2326 Temperature(!): 93.5 °F (34.2 °C)  Nurse applying bear [email or web address]   2332 Pulse(!): 45  Reassessed the patient.  His mental status is improved and he is following commands.  He is bradycardic but asymptomatic at this time so will continue to monitor.   Fri Oct 25, 2024   0231 Patient clinically sober.  Following multistep commands.  Ambulating with a steady gait.  Not interested in rehabilitation for his drug use.       Medications   naloxone (FOR EMS ONLY) (NARCAN) 2 MG/2ML injection 4 mg (0 mg Does not apply Given to EMS 10/24/24 2206)   naloxone (FOR EMS ONLY) (NARCAN) 2 MG/2ML injection 2 mg (0 mg Does not apply Given to EMS 10/24/24 2206)   sodium chloride 0.9 % bolus 1,000 mL (0 mL Intravenous Stopped 10/24/24 2249)   sodium chloride 0.9 % bolus 1,000 mL (0 mL Intravenous Stopped 10/24/24 2329)       ED Risk Strat Scores                                               History of Present Illness       Chief Complaint   Patient presents with    Overdose - Accidental     Pt came in via ems for suspected K2 overdose. Pt found unresponsive, police gave 2 of narcan, ems gave .4 of narcan.        No past medical history on file.   No past surgical history on file.   No family history on file.       No existing history information found.   No existing history information found.   I have reviewed and agree with the history as documented.     Patient is an unidentified young-appearing male presenting for a reported overdose.  Patient reportedly overdosed on K2 prior to arrival.  Patient was reportedly recognized by staff members as having presented with similar over the last few days.  Patient received Narcan by EMS.  History otherwise limited secondary to mental  status.        Review of Systems   Unable to perform ROS: Mental status change           Objective       ED Triage Vitals   Temperature Pulse Blood Pressure Respirations SpO2 Patient Position - Orthostatic VS   10/24/24 2324 10/24/24 2204 10/24/24 2204 10/24/24 2204 10/24/24 2204 10/24/24 2204   (!) 93.5 °F (34.2 °C) 99 (S) 97/59 19 97 % Lying      Temp Source Heart Rate Source BP Location FiO2 (%) Pain Score    10/24/24 2324 10/24/24 2204 10/24/24 2204 -- --    Rectal Monitor Right arm        Vitals      Date and Time Temp Pulse SpO2 Resp BP Pain Score FACES Pain Rating User   10/25/24 0215 99 °F (37.2 °C) 80 96 % 13 101/56 -- --    10/25/24 0100 -- 74 97 % 15 97/55 -- --    10/25/24 0029 -- 72 99 % 16 98/59 -- -- RK   10/24/24 2345 -- 52 100 % 16 102/74 -- -- RK   10/24/24 2330 -- 45 100 % 17 104/76 -- -- SS   10/24/24 2324 93.5 °F (34.2 °C) -- -- -- -- -- -- SS   10/24/24 2315 -- 51 100 % 17 99/71 -- -- SS   10/24/24 2300 -- -- 100 % 17 91/56 -- -- SS   10/24/24 2245 -- 70 100 % 17 92/51 -- -- SS   10/24/24 2230 -- 73 100 % 16  93/56 -- -- RK   10/24/24 2204 -- 99 97 % 19  97/59 -- -- SS            Physical Exam  Constitutional:       Appearance: He is ill-appearing.   HENT:      Head: Normocephalic.      Comments: Small healing abrasion over the forehead, looks several days old     Right Ear: External ear normal.      Left Ear: External ear normal.      Nose: Nose normal.      Mouth/Throat:      Mouth: Mucous membranes are dry.   Eyes:      Extraocular Movements: Extraocular movements intact.      Conjunctiva/sclera: Conjunctivae normal.      Pupils: Pupils are equal, round, and reactive to light.      Comments: Pupils 5 mm, equal and reactive   Cardiovascular:      Rate and Rhythm: Normal rate and regular rhythm.      Heart sounds: Normal heart sounds. No murmur heard.     No friction rub. No gallop.   Pulmonary:      Effort: Pulmonary effort is normal.      Breath sounds: Normal breath sounds.   Abdominal:       General: Abdomen is flat.      Palpations: Abdomen is soft.      Tenderness: There is no abdominal tenderness.   Musculoskeletal:      Right lower leg: No edema.      Left lower leg: No edema.   Skin:     General: Skin is warm and dry.      Capillary Refill: Capillary refill takes less than 2 seconds.   Neurological:      General: No focal deficit present.      Comments: Patient somnolent, awakens to sternal rub and moves purposefully.         Results Reviewed       Procedure Component Value Units Date/Time    Fingerstick Glucose (POCT) [[id number]]  (Normal) Collected: 10/24/24 2225    Lab Status: Final result Specimen: Blood Updated: 10/24/24 2226     POC Glucose 94 mg/dl             CT head without contrast   Final Interpretation by Joel Walters DO (10/25 0014)      No acute intracranial abnormality.                  Workstation performed: [id number]             Procedures    ED Medication and Procedure Management   None     There are no discharge medications for this patient.    No discharge procedures on file.  ED SEPSIS DOCUMENTATION   Time reflects when diagnosis was documented in both MDM as applicable and the Disposition within this note       Time User Action Codes Description Comment    10/25/2024  2:30 AM Grayson Armando [T50.901A] Accidental drug overdose, initial encounter     10/25/2024  2:30 AM Grayson Armando [F19.10] Substance abuse (HCC)                  Grayson Armando DO  10/25/24 0513

## 2024-10-25 NOTE — DISCHARGE INSTRUCTIONS
Your evaluation suggests that your symptoms are due to a non emergent cause.    Please follow up with your primary care physician within two days.    Stop doing drugs.    Return to the Emergency Department if you experience worsening or concerning symptoms.    Thank you for choosing us for your care.

## 2024-10-25 NOTE — ED NOTES
Tr PEARSON notified of pt Bradycardia and hypothermia. Tr DO at bedside.      Yani Brito RN  10/24/24 7259

## 2025-02-28 ENCOUNTER — HOSPITAL ENCOUNTER (EMERGENCY)
Facility: HOSPITAL | Age: 40
Discharge: HOME | End: 2025-02-28
Attending: EMERGENCY MEDICINE
Payer: COMMERCIAL

## 2025-02-28 VITALS
WEIGHT: 121.25 LBS | RESPIRATION RATE: 22 BRPM | BODY MASS INDEX: 20.18 KG/M2 | TEMPERATURE: 98.1 F | OXYGEN SATURATION: 99 % | SYSTOLIC BLOOD PRESSURE: 113 MMHG | HEART RATE: 88 BPM | DIASTOLIC BLOOD PRESSURE: 75 MMHG

## 2025-02-28 DIAGNOSIS — F19.10 SUBSTANCE ABUSE (HCC): Primary | ICD-10-CM

## 2025-02-28 PROCEDURE — 99284 EMERGENCY DEPT VISIT MOD MDM: CPT

## 2025-02-28 PROCEDURE — 99283 EMERGENCY DEPT VISIT LOW MDM: CPT | Performed by: EMERGENCY MEDICINE

## 2025-02-28 NOTE — ED PROVIDER NOTES
Time reflects when diagnosis was documented in both MDM as applicable and the Disposition within this note       Time User Action Codes Description Comment    2/28/2025  5:23 PM Kadeem Yarbrough Add [F19.10] Substance abuse (HCC)           ED Disposition       ED Disposition   Discharge    Condition   Stable    Date/Time   Fri Feb 28, 2025  5:23 PM    Comment   Rhys Ashleeaugusto discharge to home/self care.                   Assessment & Plan       Medical Decision Making  Problems Addressed:  Substance abuse (HCC): chronic illness or injury with exacerbation, progression, or side effects of treatment     Details: Patient denies any use but history and physical c/w continued K2 use.     Amount and/or Complexity of Data Reviewed  Independent Historian: EMS  External Data Reviewed: notes.        ED Course as of 02/28/25 1724   Fri Feb 28, 2025   1723 Patient awake and alert.  Still denying any drug use.       Medications - No data to display    ED Risk Strat Scores                            SBIRT 22yo+      Flowsheet Row Most Recent Value   Initial Alcohol Screen: US AUDIT-C     1. How often do you have a drink containing alcohol? 1 Filed at: 02/28/2025 1623   2. How many drinks containing alcohol do you have on a typical day you are drinking?  2 Filed at: 02/28/2025 1623   3a. Male UNDER 65: How often do you have five or more drinks on one occasion? 0 Filed at: 02/28/2025 1623   Audit-C Score 3 Filed at: 02/28/2025 1623   JOURDAN: How many times in the past year have you...    Used an illegal drug or used a prescription medication for non-medical reasons? Daily or Almost Daily Filed at: 02/28/2025 1623   DAST-10: In the past 12 months...    1. Have you used drugs other than those required for medical reasons? 1 Filed at: 02/28/2025 1623   2. Do you use more than one drug at a time? 1 Filed at: 02/28/2025 1623   3. Have you had medical problems as a result of your drug use (e.g., memory loss, hepatitis, convulsions, bleeding,  "etc.)? 0 Filed at: 02/28/2025 1623   4. Have you had \"blackouts\" or \"flashbacks\" as a result of drug use?YesNo 1 Filed at: 02/28/2025 9213   5. Do you ever feel bad or guilty about your drug use? 1 Filed at: 02/28/2025 1623   6. Does your spouse (or parent) ever complain about your involvement with drugs? 1 Filed at: 02/28/2025 1623   7. Have you neglected your family because of your use of drugs? 1 Filed at: 02/28/2025 1623   8. Have you engaged in illegal activities in order to obtain drugs? 0 Filed at: 02/28/2025 1623   9. Have you ever experienced withdrawal symptoms (felt sick) when you stopped taking drugs? 1 Filed at: 02/28/2025 4233   10. Are you always able to stop using drugs when you want to? 1 Filed at: 02/28/2025 1623   DAST-10 Score 8 Filed at: 02/28/2025 1623                            History of Present Illness       Chief Complaint   Patient presents with    Overdose - Accidental     Pt bought in by EMS after k2 overdose. Pt not answering questions during triage.       Past Medical History:   Diagnosis Date    Drug abuse (HCC)     Schizoaffective disorder (HCC)     resolved 05/16/14    Schizophrenia (HCC)       Past Surgical History:   Procedure Laterality Date    NO PAST SURGERIES        Family History   Problem Relation Age of Onset    Anxiety disorder Mother     Depression Mother     Dysphagia Mother     Stroke Mother         cva    Diabetes type II Mother     Other Mother         Hyponatremia    Psychosis Mother       Social History     Tobacco Use    Smoking status: Unknown    Smokeless tobacco: Never   Vaping Use    Vaping status: Never Used   Substance Use Topics    Alcohol use: Yes     Comment: social    Drug use: Yes     Types: Marijuana, Other     Comment: K2      E-Cigarette/Vaping    E-Cigarette Use Never User       E-Cigarette/Vaping Substances    Nicotine No     THC No     CBD No     Flavoring No     Other No     Unknown No       I have reviewed and agree with the history as " documented.     Patient is a 39-year-old male well known to this department with a h/o MELLISSA who present after found acting strange on a local street.  Denies smoking K2.  Unable to provide any further information.         Review of Systems   Unable to perform ROS: Other (intoxication)           Objective       ED Triage Vitals [02/28/25 1527]   Temperature Pulse Blood Pressure Respirations SpO2 Patient Position - Orthostatic VS   98.1 °F (36.7 °C) (!) 120 113/75 22 98 % Lying      Temp Source Heart Rate Source BP Location FiO2 (%) Pain Score    Oral Monitor Left arm -- --      Vitals      Date and Time Temp Pulse SpO2 Resp BP Pain Score FACES Pain Rating User   02/28/25 1622 -- 88 99 % -- -- -- -- JR   02/28/25 1527 98.1 °F (36.7 °C) 120 98 % 22 113/75 -- -- JR            Physical Exam  Vitals and nursing note reviewed.   Constitutional:       Appearance: Normal appearance.   HENT:      Head: Normocephalic and atraumatic.      Ears:      Comments: Patient without any swelling, tenderness to palpation, no septal hematoma, no hemotympanum, no orbital tenderness to palpation, no TMJ tenderness, no malocclusion.       Nose: Nose normal.      Mouth/Throat:      Mouth: Mucous membranes are moist.   Eyes:      Extraocular Movements: Extraocular movements intact.      Conjunctiva/sclera: Conjunctivae normal.      Pupils: Pupils are equal, round, and reactive to light.   Cardiovascular:      Rate and Rhythm: Normal rate and regular rhythm.      Pulses: Normal pulses.      Heart sounds: Normal heart sounds.   Pulmonary:      Effort: Pulmonary effort is normal.      Breath sounds: Normal breath sounds.   Abdominal:      General: Bowel sounds are normal.      Palpations: Abdomen is soft.   Musculoskeletal:      Cervical back: Normal range of motion and neck supple.   Neurological:      Mental Status: He is alert.      Comments: Able to follow commands.  Slow to respond         Results Reviewed       None            No orders to  display       Procedures    ED Medication and Procedure Management   Prior to Admission Medications   Prescriptions Last Dose Informant Patient Reported? Taking?   benztropine (COGENTIN) 0.5 mg tablet   No No   Sig: Take 1 tablet (0.5 mg total) by mouth 3 (three) times a day   benztropine (COGENTIN) 2 mg tablet   Yes No   Sig: Take 2 mg by mouth every morning   Patient not taking: Reported on 8/20/2021   buPROPion (WELLBUTRIN SR) 150 mg 12 hr tablet   Yes No   Sig: Take 150 mg by mouth every morning   Patient not taking: Reported on 8/20/2021   cloNIDine (CATAPRES) 0.2 mg tablet   Yes No   Sig: Take 0.2 mg by mouth daily at bedtime   Patient not taking: Reported on 8/20/2021   divalproex sodium (DEPAKOTE) 500 mg DR tablet   Yes No   Sig: Take 500 mg by mouth daily at bedtime   loxapine (LOXITANE) 10 mg capsule   No No   Sig: Take 5 capsules (50 mg total) by mouth 3 (three) times a day 1 in the morning, 2 at night      Facility-Administered Medications: None     Patient's Medications   Discharge Prescriptions    No medications on file     No discharge procedures on file.  ED SEPSIS DOCUMENTATION   Time reflects when diagnosis was documented in both MDM as applicable and the Disposition within this note       Time User Action Codes Description Comment    2/28/2025  5:23 PM Kadeem Yarbrough Add [F19.10] Substance abuse (HCC)                  Kadeem Yarbrough MD  02/28/25 5107

## 2025-03-02 ENCOUNTER — HOSPITAL ENCOUNTER (EMERGENCY)
Facility: HOSPITAL | Age: 40
Discharge: HOME | End: 2025-03-02
Attending: EMERGENCY MEDICINE | Admitting: EMERGENCY MEDICINE
Payer: COMMERCIAL

## 2025-03-02 VITALS
SYSTOLIC BLOOD PRESSURE: 116 MMHG | RESPIRATION RATE: 18 BRPM | OXYGEN SATURATION: 100 % | WEIGHT: 124.34 LBS | HEART RATE: 91 BPM | DIASTOLIC BLOOD PRESSURE: 80 MMHG

## 2025-03-02 DIAGNOSIS — F19.10 SUBSTANCE ABUSE (HCC): Primary | ICD-10-CM

## 2025-03-02 LAB
ALBUMIN SERPL BCG-MCNC: 4.3 G/DL (ref 3.5–5)
ALP SERPL-CCNC: 106 U/L (ref 34–104)
ALT SERPL W P-5'-P-CCNC: 17 U/L (ref 7–52)
ANION GAP SERPL CALCULATED.3IONS-SCNC: 7 MMOL/L (ref 4–13)
AST SERPL W P-5'-P-CCNC: 18 U/L (ref 13–39)
BASOPHILS # BLD AUTO: 0.03 THOUSANDS/ÂΜL (ref 0–0.1)
BASOPHILS NFR BLD AUTO: 1 % (ref 0–1)
BILIRUB SERPL-MCNC: 0.3 MG/DL (ref 0.2–1)
BUN SERPL-MCNC: 9 MG/DL (ref 5–25)
CALCIUM SERPL-MCNC: 8.9 MG/DL (ref 8.4–10.2)
CHLORIDE SERPL-SCNC: 98 MMOL/L (ref 96–108)
CO2 SERPL-SCNC: 28 MMOL/L (ref 21–32)
CREAT SERPL-MCNC: 0.67 MG/DL (ref 0.6–1.3)
EOSINOPHIL # BLD AUTO: 0.03 THOUSAND/ÂΜL (ref 0–0.61)
EOSINOPHIL NFR BLD AUTO: 1 % (ref 0–6)
ERYTHROCYTE [DISTWIDTH] IN BLOOD BY AUTOMATED COUNT: 14.4 % (ref 11.6–15.1)
GFR SERPL CREATININE-BSD FRML MDRD: 121 ML/MIN/1.73SQ M
GLUCOSE SERPL-MCNC: 128 MG/DL (ref 65–140)
HCT VFR BLD AUTO: 38.7 % (ref 36.5–49.3)
HGB BLD-MCNC: 12.8 G/DL (ref 12–17)
IMM GRANULOCYTES # BLD AUTO: 0.01 THOUSAND/UL (ref 0–0.2)
IMM GRANULOCYTES NFR BLD AUTO: 0 % (ref 0–2)
LYMPHOCYTES # BLD AUTO: 1.34 THOUSANDS/ÂΜL (ref 0.6–4.47)
LYMPHOCYTES NFR BLD AUTO: 26 % (ref 14–44)
MCH RBC QN AUTO: 29.6 PG (ref 26.8–34.3)
MCHC RBC AUTO-ENTMCNC: 33.1 G/DL (ref 31.4–37.4)
MCV RBC AUTO: 89 FL (ref 82–98)
MONOCYTES # BLD AUTO: 0.6 THOUSAND/ÂΜL (ref 0.17–1.22)
MONOCYTES NFR BLD AUTO: 12 % (ref 4–12)
NEUTROPHILS # BLD AUTO: 3.22 THOUSANDS/ÂΜL (ref 1.85–7.62)
NEUTS SEG NFR BLD AUTO: 60 % (ref 43–75)
NRBC BLD AUTO-RTO: 0 /100 WBCS
PLATELET # BLD AUTO: 244 THOUSANDS/UL (ref 149–390)
PMV BLD AUTO: 9.3 FL (ref 8.9–12.7)
POTASSIUM SERPL-SCNC: 4 MMOL/L (ref 3.5–5.3)
PROT SERPL-MCNC: 6.6 G/DL (ref 6.4–8.4)
RBC # BLD AUTO: 4.33 MILLION/UL (ref 3.88–5.62)
SODIUM SERPL-SCNC: 133 MMOL/L (ref 135–147)
TSH SERPL DL<=0.05 MIU/L-ACNC: 2.03 UIU/ML (ref 0.45–4.5)
WBC # BLD AUTO: 5.23 THOUSAND/UL (ref 4.31–10.16)

## 2025-03-02 PROCEDURE — 96361 HYDRATE IV INFUSION ADD-ON: CPT

## 2025-03-02 PROCEDURE — 36415 COLL VENOUS BLD VENIPUNCTURE: CPT

## 2025-03-02 PROCEDURE — 85025 COMPLETE CBC W/AUTO DIFF WBC: CPT

## 2025-03-02 PROCEDURE — 80053 COMPREHEN METABOLIC PANEL: CPT

## 2025-03-02 PROCEDURE — 99283 EMERGENCY DEPT VISIT LOW MDM: CPT

## 2025-03-02 PROCEDURE — 96360 HYDRATION IV INFUSION INIT: CPT

## 2025-03-02 PROCEDURE — 84443 ASSAY THYROID STIM HORMONE: CPT

## 2025-03-02 PROCEDURE — 99284 EMERGENCY DEPT VISIT MOD MDM: CPT

## 2025-03-02 RX ADMIN — SODIUM CHLORIDE 1000 ML: 0.9 INJECTION, SOLUTION INTRAVENOUS at 12:35

## 2025-03-02 NOTE — ED PROVIDER NOTES
Time reflects when diagnosis was documented in both MDM as applicable and the Disposition within this note       Time User Action Codes Description Comment    3/2/2025  2:05 PM Ryan Scott Add [F19.10] Substance abuse (HCC)           ED Disposition       ED Disposition   Discharge    Condition   Stable    Date/Time   Sun Mar 2, 2025  2:05 PM    Comment   Rhys Esparza discharge to home/self care.                   Assessment & Plan       Medical Decision Making  Patient is a 39-year-old male who comes in after likely K2 use.  Patient lab work was overall within normal limits, did sober up, states that he had no further complaints at this time, would like to be discharged.  Patient was seen eating and drinking in his room prior to discharge, without any signs of vomiting.  No signs of trauma currently.  Patient was discharged, and seen ambulating out of the Emergency Department in no acute distress, without need of assistance, walking in a straight line, entering questions appropriately    Amount and/or Complexity of Data Reviewed  Labs: ordered.             Medications   sodium chloride 0.9 % bolus 1,000 mL (0 mL Intravenous Stopped 3/2/25 1411)       ED Risk Strat Scores                                                History of Present Illness       Chief Complaint   Patient presents with    Recreational Drug Use     Pt was staggering down the street. Bystanders gave 4mg of Narcan. EMS was called.       Past Medical History:   Diagnosis Date    Disease of thyroid gland     Schizoaffective disorder, bipolar type (HCC)       No past surgical history on file.   No family history on file.       E-Cigarette/Vaping      E-Cigarette/Vaping Substances      I have reviewed and agree with the history as documented.     Patient is a 39-year-old male extremely well-known to this emergency department, coming in after being found stumbling down the street.  Patient was given Narcan by a bystander.  Per EMS, unknown if  patient responded to Narcan.  Patient does not have a history of opiate use, and does prefer synthetic spice instead.  Patient does respond to painful stimuli, but does not answer questions, which is his normal baseline when he is high.          Review of Systems   Unable to perform ROS: Mental status change (Intoxicated)           Objective       ED Triage Vitals   Temp Pulse Blood Pressure Respirations SpO2 Patient Position - Orthostatic VS   -- 03/02/25 1222 03/02/25 1222 03/02/25 1222 03/02/25 1222 03/02/25 1222    (!) 146 132/88 16 95 % Lying      Temp src Heart Rate Source BP Location FiO2 (%) Pain Score    -- 03/02/25 1222 03/02/25 1222 -- 03/02/25 1313     Monitor Left arm  No Pain      Vitals      Date and Time Temp Pulse SpO2 Resp BP Pain Score FACES Pain Rating User   03/02/25 1400 -- 91 100 % 18 116/80 -- -- SA   03/02/25 1313 -- 88 98 % 16 -- No Pain -- AS   03/02/25 1222 -- unable to obtain, pt combatative with tympanic temp, unable to follow directions for oral temp -- -- -- -- -- -- AS   03/02/25 1222 -- 146 95 % 16 132/88 -- -- KR            Physical Exam  Vitals reviewed.   Constitutional:       Appearance: Normal appearance. He is normal weight.   HENT:      Head: Normocephalic and atraumatic.      Comments: No raccoon eyes, no Patterson sign, no debris on patient's head, no osseous instability of the head.  No signs of trauma     Right Ear: External ear normal.      Left Ear: External ear normal.      Nose: Nose normal.   Eyes:      Conjunctiva/sclera: Conjunctivae normal.   Cardiovascular:      Rate and Rhythm: Tachycardia present.   Pulmonary:      Effort: Pulmonary effort is normal.   Abdominal:      Palpations: Abdomen is soft.   Musculoskeletal:         General: Normal range of motion.      Cervical back: Normal range of motion.   Skin:     General: Skin is warm and dry.   Neurological:      Mental Status: He is alert.         Results Reviewed       Procedure Component Value Units Date/Time     TSH, 3rd generation with Free T4 reflex [[id number]]  (Normal) Collected: 03/02/25 1234    Lab Status: Final result Specimen: Blood from Arm, Right Updated: 03/02/25 1316     TSH 3RD GENERATON 2.028 uIU/mL     Comprehensive metabolic panel [[id number]]  (Abnormal) Collected: 03/02/25 1234    Lab Status: Final result Specimen: Blood from Arm, Right Updated: 03/02/25 1304     Sodium 133 mmol/L      Potassium 4.0 mmol/L      Chloride 98 mmol/L      CO2 28 mmol/L      ANION GAP 7 mmol/L      BUN 9 mg/dL      Creatinine 0.67 mg/dL      Glucose 128 mg/dL      Calcium 8.9 mg/dL      AST 18 U/L      ALT 17 U/L      Alkaline Phosphatase 106 U/L      Total Protein 6.6 g/dL      Albumin 4.3 g/dL      Total Bilirubin 0.30 mg/dL      eGFR 121 ml/min/1.73sq m     Narrative:      National Kid[employer] guidelines for Chronic Kidney Disease (CKD):     Stage 1 with normal or high GFR (GFR > 90 mL/min/1.73 square meters)    Stage 2 Mild CKD (GFR = 60-89 mL/min/1.73 square meters)    Stage 3A Moderate CKD (GFR = 45-59 mL/min/1.73 square meters)    Stage 3B Moderate CKD (GFR = 30-44 mL/min/1.73 square meters)    Stage 4 Severe CKD (GFR = 15-29 mL/min/1.73 square meters)    Stage 5 End Stage CKD (GFR <15 mL/min/1.73 square meters)  Note: GFR calculation is accurate only with a steady state creatinine    CBC and differential [[id number]] Collected: 03/02/25 1234    Lab Status: Final result Specimen: Blood from Arm, Right Updated: 03/02/25 1242     WBC 5.23 Thousand/uL      RBC 4.33 Million/uL      Hemoglobin 12.8 g/dL      Hematocrit 38.7 %      MCV 89 fL      MCH 29.6 pg      MCHC 33.1 g/dL      RDW 14.4 %      MPV 9.3 fL      Platelets 244 Thousands/uL      nRBC 0 /100 WBCs      Segmented % 60 %      Immature Grans % 0 %      Lymphocytes % 26 %      Monocytes % 12 %      Eosinophils Relative 1 %      Basophils Relative 1 %      Absolute Neutrophils 3.22 Thousands/µL      Absolute Immature Grans 0.01 Thousand/uL      Absolute  Lymphocytes 1.34 Thousands/µL      Absolute Monocytes 0.60 Thousand/µL      Eosinophils Absolute 0.03 Thousand/µL      Basophils Absolute 0.03 Thousands/µL             No orders to display       Procedures    ED Medication and Procedure Management   None     There are no discharge medications for this patient.    No discharge procedures on file.  ED SEPSIS DOCUMENTATION   Time reflects when diagnosis was documented in both MDM as applicable and the Disposition within this note       Time User Action Codes Description Comment    3/2/2025  2:05 PM Ryan Scott Add [F19.10] Substance abuse (HCC)                  Ryan Scott PA-C  03/02/25 1417

## 2025-03-02 NOTE — Clinical Note
Rhys Esparza was seen and treated in our emergency department on 3/2/2025.    No restrictions            Diagnosis:     Rhys  .    He may return on this date: 03/03/2025         If you have any questions or concerns, please don't hesitate to call.      Ryan Scott PA-C    ______________________________           _______________          _______________  Hospital Representative                              Date                                Time

## 2025-03-03 ENCOUNTER — HOSPITAL ENCOUNTER (EMERGENCY)
Facility: HOSPITAL | Age: 40
Discharge: HOME | End: 2025-03-03
Attending: EMERGENCY MEDICINE | Admitting: EMERGENCY MEDICINE
Payer: COMMERCIAL

## 2025-03-03 VITALS
SYSTOLIC BLOOD PRESSURE: 105 MMHG | BODY MASS INDEX: 21.39 KG/M2 | TEMPERATURE: 97.9 F | DIASTOLIC BLOOD PRESSURE: 76 MMHG | WEIGHT: 128.53 LBS | RESPIRATION RATE: 18 BRPM | HEART RATE: 79 BPM | OXYGEN SATURATION: 99 %

## 2025-03-03 DIAGNOSIS — F19.90 RECREATIONAL DRUG USE: Primary | ICD-10-CM

## 2025-03-03 LAB — GLUCOSE SERPL-MCNC: 95 MG/DL (ref 65–140)

## 2025-03-03 PROCEDURE — 99284 EMERGENCY DEPT VISIT MOD MDM: CPT

## 2025-03-03 PROCEDURE — 82948 REAGENT STRIP/BLOOD GLUCOSE: CPT

## 2025-03-03 PROCEDURE — 99283 EMERGENCY DEPT VISIT LOW MDM: CPT

## 2025-03-04 ENCOUNTER — APPOINTMENT (EMERGENCY)
Dept: CT IMAGING | Facility: HOSPITAL | Age: 40
End: 2025-03-04
Payer: COMMERCIAL

## 2025-03-04 ENCOUNTER — APPOINTMENT (EMERGENCY)
Dept: RADIOLOGY | Facility: HOSPITAL | Age: 40
End: 2025-03-04
Payer: COMMERCIAL

## 2025-03-04 ENCOUNTER — HOSPITAL ENCOUNTER (EMERGENCY)
Facility: HOSPITAL | Age: 40
Discharge: HOME | End: 2025-03-05
Attending: EMERGENCY MEDICINE
Payer: COMMERCIAL

## 2025-03-04 DIAGNOSIS — F19.10 SUBSTANCE ABUSE (HCC): ICD-10-CM

## 2025-03-04 DIAGNOSIS — T50.901A OVERDOSE: Primary | ICD-10-CM

## 2025-03-04 LAB
ALBUMIN SERPL BCG-MCNC: 4.3 G/DL (ref 3.5–5)
ALP SERPL-CCNC: 97 U/L (ref 34–104)
ALT SERPL W P-5'-P-CCNC: 21 U/L (ref 7–52)
AMMONIA PLAS-SCNC: 55 UMOL/L (ref 18–72)
AMPHETAMINES SERPL QL SCN: NEGATIVE
ANION GAP SERPL CALCULATED.3IONS-SCNC: 6 MMOL/L (ref 4–13)
APAP SERPL-MCNC: <2 UG/ML (ref 10–20)
AST SERPL W P-5'-P-CCNC: 25 U/L (ref 13–39)
BARBITURATES UR QL: NEGATIVE
BASE EX.OXY STD BLDV CALC-SCNC: 89.3 % (ref 60–80)
BASE EXCESS BLDV CALC-SCNC: 1.1 MMOL/L
BASOPHILS # BLD AUTO: 0.03 THOUSANDS/ÂΜL (ref 0–0.1)
BASOPHILS NFR BLD AUTO: 0 % (ref 0–1)
BENZODIAZ UR QL: NEGATIVE
BILIRUB SERPL-MCNC: 0.22 MG/DL (ref 0.2–1)
BILIRUB UR QL STRIP: NEGATIVE
BUN SERPL-MCNC: 15 MG/DL (ref 5–25)
CALCIUM SERPL-MCNC: 9.3 MG/DL (ref 8.4–10.2)
CARDIAC TROPONIN I PNL SERPL HS: 4 NG/L (ref ?–50)
CHLORIDE SERPL-SCNC: 103 MMOL/L (ref 96–108)
CLARITY UR: CLEAR
CO2 SERPL-SCNC: 30 MMOL/L (ref 21–32)
COCAINE UR QL: NEGATIVE
COLOR UR: COLORLESS
CREAT SERPL-MCNC: 0.76 MG/DL (ref 0.6–1.3)
EOSINOPHIL # BLD AUTO: 0.04 THOUSAND/ÂΜL (ref 0–0.61)
EOSINOPHIL NFR BLD AUTO: 1 % (ref 0–6)
ERYTHROCYTE [DISTWIDTH] IN BLOOD BY AUTOMATED COUNT: 14.6 % (ref 11.6–15.1)
ETHANOL SERPL-MCNC: <10 MG/DL
FENTANYL UR QL SCN: NEGATIVE
GFR SERPL CREATININE-BSD FRML MDRD: 115 ML/MIN/1.73SQ M
GLUCOSE SERPL-MCNC: 182 MG/DL (ref 65–140)
GLUCOSE SERPL-MCNC: 211 MG/DL (ref 65–140)
GLUCOSE UR STRIP-MCNC: NEGATIVE MG/DL
HCO3 BLDV-SCNC: 26.3 MMOL/L (ref 24–30)
HCT VFR BLD AUTO: 38.5 % (ref 36.5–49.3)
HGB BLD-MCNC: 12.6 G/DL (ref 12–17)
HGB UR QL STRIP.AUTO: NEGATIVE
HYDROCODONE UR QL SCN: NEGATIVE
IMM GRANULOCYTES # BLD AUTO: 0.02 THOUSAND/UL (ref 0–0.2)
IMM GRANULOCYTES NFR BLD AUTO: 0 % (ref 0–2)
KETONES UR STRIP-MCNC: NEGATIVE MG/DL
LACTATE SERPL-SCNC: 1.6 MMOL/L (ref 0.5–2)
LEUKOCYTE ESTERASE UR QL STRIP: NEGATIVE
LYMPHOCYTES # BLD AUTO: 1.97 THOUSANDS/ÂΜL (ref 0.6–4.47)
LYMPHOCYTES NFR BLD AUTO: 27 % (ref 14–44)
MCH RBC QN AUTO: 29.5 PG (ref 26.8–34.3)
MCHC RBC AUTO-ENTMCNC: 32.7 G/DL (ref 31.4–37.4)
MCV RBC AUTO: 90 FL (ref 82–98)
METHADONE UR QL: NEGATIVE
MONOCYTES # BLD AUTO: 0.7 THOUSAND/ÂΜL (ref 0.17–1.22)
MONOCYTES NFR BLD AUTO: 9 % (ref 4–12)
NEUTROPHILS # BLD AUTO: 4.67 THOUSANDS/ÂΜL (ref 1.85–7.62)
NEUTS SEG NFR BLD AUTO: 63 % (ref 43–75)
NITRITE UR QL STRIP: NEGATIVE
NRBC BLD AUTO-RTO: 0 /100 WBCS
O2 CT BLDV-SCNC: 16.4 ML/DL
OPIATES UR QL SCN: NEGATIVE
OXYCODONE+OXYMORPHONE UR QL SCN: NEGATIVE
PCO2 BLDV: 44 MM HG (ref 42–50)
PCP UR QL: NEGATIVE
PH BLDV: 7.39 [PH] (ref 7.3–7.4)
PH UR STRIP.AUTO: 6.5 [PH]
PLATELET # BLD AUTO: 258 THOUSANDS/UL (ref 149–390)
PMV BLD AUTO: 9.3 FL (ref 8.9–12.7)
PO2 BLDV: 94.6 MM HG (ref 35–45)
POTASSIUM SERPL-SCNC: 4 MMOL/L (ref 3.5–5.3)
PROT SERPL-MCNC: 6.5 G/DL (ref 6.4–8.4)
PROT UR STRIP-MCNC: NEGATIVE MG/DL
RBC # BLD AUTO: 4.27 MILLION/UL (ref 3.88–5.62)
SALICYLATES SERPL-MCNC: <5 MG/DL (ref 3–20)
SODIUM SERPL-SCNC: 139 MMOL/L (ref 135–147)
SP GR UR STRIP.AUTO: 1 (ref 1–1.03)
THC UR QL: NEGATIVE
UROBILINOGEN UR STRIP-ACNC: <2 MG/DL
WBC # BLD AUTO: 7.43 THOUSAND/UL (ref 4.31–10.16)

## 2025-03-04 PROCEDURE — 99285 EMERGENCY DEPT VISIT HI MDM: CPT | Performed by: EMERGENCY MEDICINE

## 2025-03-04 PROCEDURE — 80179 DRUG ASSAY SALICYLATE: CPT | Performed by: EMERGENCY MEDICINE

## 2025-03-04 PROCEDURE — 36415 COLL VENOUS BLD VENIPUNCTURE: CPT | Performed by: EMERGENCY MEDICINE

## 2025-03-04 PROCEDURE — 82077 ASSAY SPEC XCP UR&BREATH IA: CPT | Performed by: EMERGENCY MEDICINE

## 2025-03-04 PROCEDURE — 84484 ASSAY OF TROPONIN QUANT: CPT | Performed by: EMERGENCY MEDICINE

## 2025-03-04 PROCEDURE — 80053 COMPREHEN METABOLIC PANEL: CPT | Performed by: EMERGENCY MEDICINE

## 2025-03-04 PROCEDURE — 96360 HYDRATION IV INFUSION INIT: CPT

## 2025-03-04 PROCEDURE — 72125 CT NECK SPINE W/O DYE: CPT

## 2025-03-04 PROCEDURE — 93005 ELECTROCARDIOGRAM TRACING: CPT

## 2025-03-04 PROCEDURE — 83605 ASSAY OF LACTIC ACID: CPT | Performed by: EMERGENCY MEDICINE

## 2025-03-04 PROCEDURE — 99285 EMERGENCY DEPT VISIT HI MDM: CPT

## 2025-03-04 PROCEDURE — 85025 COMPLETE CBC W/AUTO DIFF WBC: CPT | Performed by: EMERGENCY MEDICINE

## 2025-03-04 PROCEDURE — 71045 X-RAY EXAM CHEST 1 VIEW: CPT

## 2025-03-04 PROCEDURE — 80307 DRUG TEST PRSMV CHEM ANLYZR: CPT | Performed by: EMERGENCY MEDICINE

## 2025-03-04 PROCEDURE — 80143 DRUG ASSAY ACETAMINOPHEN: CPT | Performed by: EMERGENCY MEDICINE

## 2025-03-04 PROCEDURE — 81003 URINALYSIS AUTO W/O SCOPE: CPT | Performed by: EMERGENCY MEDICINE

## 2025-03-04 PROCEDURE — 96374 THER/PROPH/DIAG INJ IV PUSH: CPT

## 2025-03-04 PROCEDURE — 82948 REAGENT STRIP/BLOOD GLUCOSE: CPT

## 2025-03-04 PROCEDURE — 96361 HYDRATE IV INFUSION ADD-ON: CPT

## 2025-03-04 PROCEDURE — 82805 BLOOD GASES W/O2 SATURATION: CPT | Performed by: EMERGENCY MEDICINE

## 2025-03-04 PROCEDURE — 70450 CT HEAD/BRAIN W/O DYE: CPT

## 2025-03-04 PROCEDURE — 82140 ASSAY OF AMMONIA: CPT | Performed by: EMERGENCY MEDICINE

## 2025-03-04 RX ORDER — NALOXONE HYDROCHLORIDE 1 MG/ML
1 INJECTION PARENTERAL ONCE
Status: COMPLETED | OUTPATIENT
Start: 2025-03-04 | End: 2025-03-04

## 2025-03-04 RX ORDER — NALOXONE HYDROCHLORIDE 1 MG/ML
INJECTION INTRAMUSCULAR; INTRAVENOUS; SUBCUTANEOUS
Status: COMPLETED
Start: 2025-03-04 | End: 2025-03-04

## 2025-03-04 RX ORDER — NALOXONE HYDROCHLORIDE 1 MG/ML
4 INJECTION INTRAMUSCULAR; INTRAVENOUS; SUBCUTANEOUS ONCE
Status: COMPLETED | OUTPATIENT
Start: 2025-03-04 | End: 2025-03-04

## 2025-03-04 RX ORDER — NALOXONE HYDROCHLORIDE 0.4 MG/ML
4 INJECTION, SOLUTION INTRAMUSCULAR; INTRAVENOUS; SUBCUTANEOUS ONCE
Status: COMPLETED | OUTPATIENT
Start: 2025-03-04 | End: 2025-03-04

## 2025-03-04 RX ADMIN — NALOXONE HYDROCHLORIDE 4 MG: 1 INJECTION PARENTERAL at 19:34

## 2025-03-04 RX ADMIN — NALOXONE HYDROCHLORIDE 4 MG: 0.4 INJECTION, SOLUTION INTRAMUSCULAR; INTRAVENOUS; SUBCUTANEOUS at 19:55

## 2025-03-04 RX ADMIN — SODIUM CHLORIDE 1000 ML: 0.9 INJECTION, SOLUTION INTRAVENOUS at 20:35

## 2025-03-04 RX ADMIN — NALOXONE HYDROCHLORIDE 4 MG: 1 INJECTION PARENTERAL at 19:35

## 2025-03-04 RX ADMIN — SODIUM CHLORIDE 1000 ML: 0.9 INJECTION, SOLUTION INTRAVENOUS at 19:58

## 2025-03-04 NOTE — ED PROVIDER NOTES
Time reflects when diagnosis was documented in both MDM as applicable and the Disposition within this note       Time User Action Codes Description Comment    3/3/2025 10:07 PM Ruby Parrish Add [F19.90] Recreational drug use           ED Disposition       ED Disposition   Discharge    Condition   Stable    Date/Time   Mon Mar 3, 2025 10:07 PM    Comment   Rhys Esparza discharge to home/self care.                   Assessment & Plan         Medical Decision Making  Patient well-known to the department due to K2 use presents after being found unresponsive in the community.  No Narcan given prehospital as patient had dilated pupils.  Differential diagnosis includes was not limited to recreational substance use, hypoglycemia, or acute intracranial abnormality.  As there is no evidence of trauma low suspicion for an acute intracranial abnormality such as a subdural or epidural hematoma, SAH, or ICH.  Point of care glucose within normal limits.  Patient does endorse using K2 tonight.  He was monitored in the department for 2 hours during which time he became more alert and his mild tachycardia resolved.  He ambulated out of the department unassisted with a steady gait.    Amount and/or Complexity of Data Reviewed  Labs: ordered.      Medications - No data to display    ED Risk Strat Scores          History of Present Illness       Chief Complaint   Patient presents with    Recreational Drug Use     Pt brought in by EMS stating he used K2 and is acting lethargic.        Past Medical History:   Diagnosis Date    Drug abuse (HCC)     Schizoaffective disorder (HCC)     resolved 05/16/14    Schizophrenia (HCC)       Past Surgical History:   Procedure Laterality Date    NO PAST SURGERIES        Family History   Problem Relation Age of Onset    Anxiety disorder Mother     Depression Mother     Dysphagia Mother     Stroke Mother         cva    Diabetes type II Mother     Other Mother         Hyponatremia    Psychosis Mother        Social History     Tobacco Use    Smoking status: Unknown    Smokeless tobacco: Never   Vaping Use    Vaping status: Never Used   Substance Use Topics    Alcohol use: Yes     Comment: social    Drug use: Yes     Types: Marijuana, Other     Comment: K2      E-Cigarette/Vaping    E-Cigarette Use Never User       E-Cigarette/Vaping Substances    Nicotine No     THC No     CBD No     Flavoring No     Other No     Unknown No       I have reviewed and agree with the history as documented.     The patient is a 39 y.o. male well known to the department fro K2 use and schizophrenia.  EMS was called by bystanders in the community as the patient was found lying on the street unresponsive.  Narcan was not given prehospital by EMS as the patient was noted to have dilated pupils.  On arrival patient is lethargic but is able to open his eyes and answer one-word questions.  He does endorse K2 use, but denies any other drug use.          Review of Systems   Unable to perform ROS: Other (Intoxication)       Objective       ED Triage Vitals   Temperature Pulse Blood Pressure Respirations SpO2 Patient Position - Orthostatic VS   03/03/25 2019 03/03/25 2018 03/03/25 2018 03/03/25 2018 03/03/25 2018 03/03/25 2018   97.9 °F (36.6 °C) (!) 108 109/68 16 97 % Lying      Temp Source Heart Rate Source BP Location FiO2 (%) Pain Score    03/03/25 2019 03/03/25 2018 03/03/25 2018 -- --    Oral Monitor Left arm        Vitals      Date and Time Temp Pulse SpO2 Resp BP Pain Score FACES Pain Rating User   03/03/25 2136 -- 79 99 % 18 105/76 -- -- MN   03/03/25 2019 97.9 °F (36.6 °C) -- -- -- -- -- -- JM   03/03/25 2018 -- 108 97 % 16 109/68 -- --             Physical Exam  Vitals and nursing note reviewed.   Constitutional:       General: He is awake. He is not in acute distress.     Appearance: He is overweight. He is not toxic-appearing or diaphoretic.      Comments: Disheveled appearance   HENT:      Head: Normocephalic and atraumatic. No  raccoon eyes, Patterson's sign, abrasion, contusion or laceration.      Right Ear: External ear normal.      Left Ear: External ear normal.      Nose: Nose normal. No nasal deformity or signs of injury.      Mouth/Throat:      Lips: Pink.      Mouth: Mucous membranes are moist.      Pharynx: Oropharynx is clear. Uvula midline.   Eyes:      General: Lids are normal. Vision grossly intact. Gaze aligned appropriately.      Conjunctiva/sclera: Conjunctivae normal.      Pupils: Pupils are equal, round, and reactive to light.      Comments: Pupils are 3-4 mm bilaterally   Cardiovascular:      Rate and Rhythm: Regular rhythm. Tachycardia present.      Heart sounds: S1 normal and S2 normal. No murmur heard.     No friction rub. No gallop.   Pulmonary:      Effort: Pulmonary effort is normal. No respiratory distress.      Breath sounds: Normal breath sounds and air entry. No wheezing, rhonchi or rales.   Chest:      Chest wall: No tenderness.   Abdominal:      General: Abdomen is flat. There is no distension.      Palpations: Abdomen is soft. There is no mass.      Tenderness: There is no abdominal tenderness. There is no guarding or rebound.   Musculoskeletal:      Cervical back: Normal, full passive range of motion without pain and neck supple. No rigidity or crepitus. No spinous process tenderness or muscular tenderness.      Thoracic back: Normal. No spasms, tenderness or bony tenderness.      Lumbar back: Normal. No spasms, tenderness or bony tenderness.   Skin:     General: Skin is warm and dry.      Capillary Refill: Capillary refill takes less than 2 seconds.      Coloration: Skin is not pale.      Findings: No rash.   Neurological:      Mental Status: He is oriented to person, place, and time.      GCS: GCS eye subscore is 4. GCS verbal subscore is 4. GCS motor subscore is 6.      Comments: Patient is somnolent, but does open his eyes spontaneously, he answers yes or no questions, and obeys commands.   Psychiatric:          Attention and Perception: He is inattentive.         Behavior: Behavior is cooperative.         Results Reviewed       Procedure Component Value Units Date/Time    Fingerstick Glucose (POCT) [[id number]]  (Normal) Collected: 03/03/25 2017    Lab Status: Final result Specimen: Blood Updated: 03/03/25 2018     POC Glucose 95 mg/dl             No orders to display       Procedures    ED Medication and Procedure Management   Prior to Admission Medications   Prescriptions Last Dose Informant Patient Reported? Taking?   benztropine (COGENTIN) 0.5 mg tablet   No No   Sig: Take 1 tablet (0.5 mg total) by mouth 3 (three) times a day   benztropine (COGENTIN) 2 mg tablet   Yes No   Sig: Take 2 mg by mouth every morning   Patient not taking: Reported on 8/20/2021   buPROPion (WELLBUTRIN SR) 150 mg 12 hr tablet   Yes No   Sig: Take 150 mg by mouth every morning   Patient not taking: Reported on 8/20/2021   cloNIDine (CATAPRES) 0.2 mg tablet   Yes No   Sig: Take 0.2 mg by mouth daily at bedtime   Patient not taking: Reported on 8/20/2021   divalproex sodium (DEPAKOTE) 500 mg DR tablet   Yes No   Sig: Take 500 mg by mouth daily at bedtime   loxapine (LOXITANE) 10 mg capsule   No No   Sig: Take 5 capsules (50 mg total) by mouth 3 (three) times a day 1 in the morning, 2 at night      Facility-Administered Medications: None     Discharge Medication List as of 3/3/2025 10:07 PM        CONTINUE these medications which have NOT CHANGED    Details   !! benztropine (COGENTIN) 0.5 mg tablet Take 1 tablet (0.5 mg total) by mouth 3 (three) times a day, Starting u 11/30/2023, Normal      !! benztropine (COGENTIN) 2 mg tablet Take 2 mg by mouth every morning, Starting Tue 6/22/2021, Historical Med      buPROPion (WELLBUTRIN SR) 150 mg 12 hr tablet Take 150 mg by mouth every morning, Starting Sun 6/20/2021, Historical Med      cloNIDine (CATAPRES) 0.2 mg tablet Take 0.2 mg by mouth daily at bedtime, Starting Tue 6/22/2021, Historical Med       divalproex sodium (DEPAKOTE) 500 mg DR tablet Take 500 mg by mouth daily at bedtime, Historical Med      loxapine (LOXITANE) 10 mg capsule Take 5 capsules (50 mg total) by mouth 3 (three) times a day 1 in the morning, 2 at night, Starting Thu 11/30/2023, Until Sat 12/30/2023, Normal       !! - Potential duplicate medications found. Please discuss with provider.        No discharge procedures on file.  ED SEPSIS DOCUMENTATION   Time reflects when diagnosis was documented in both MDM as applicable and the Disposition within this note       Time User Action Codes Description Comment    3/3/2025 10:07 PM Ruby Parrish Add [F19.90] Recreational drug use                  Ruby Parrish PA-C  03/04/25 0535

## 2025-03-05 ENCOUNTER — APPOINTMENT (EMERGENCY)
Dept: CT IMAGING | Facility: HOSPITAL | Age: 40
End: 2025-03-05
Payer: COMMERCIAL

## 2025-03-05 VITALS
BODY MASS INDEX: 21.24 KG/M2 | OXYGEN SATURATION: 100 % | SYSTOLIC BLOOD PRESSURE: 114 MMHG | WEIGHT: 127.65 LBS | DIASTOLIC BLOOD PRESSURE: 80 MMHG | HEART RATE: 82 BPM | RESPIRATION RATE: 18 BRPM | TEMPERATURE: 97.5 F

## 2025-03-05 VITALS
TEMPERATURE: 97.5 F | OXYGEN SATURATION: 96 % | SYSTOLIC BLOOD PRESSURE: 98 MMHG | WEIGHT: 123.9 LBS | HEART RATE: 70 BPM | RESPIRATION RATE: 16 BRPM | DIASTOLIC BLOOD PRESSURE: 62 MMHG

## 2025-03-05 DIAGNOSIS — T50.901A ACCIDENTAL DRUG OVERDOSE, INITIAL ENCOUNTER: Primary | ICD-10-CM

## 2025-03-05 LAB
ALBUMIN SERPL BCG-MCNC: 3.9 G/DL (ref 3.5–5)
ALP SERPL-CCNC: 93 U/L (ref 34–104)
ALT SERPL W P-5'-P-CCNC: 16 U/L (ref 7–52)
ANION GAP SERPL CALCULATED.3IONS-SCNC: 6 MMOL/L (ref 4–13)
APAP SERPL-MCNC: <2 UG/ML (ref 10–20)
AST SERPL W P-5'-P-CCNC: 19 U/L (ref 13–39)
ATRIAL RATE: 103 BPM
BASOPHILS # BLD AUTO: 0.01 THOUSANDS/ÂΜL (ref 0–0.1)
BASOPHILS NFR BLD AUTO: 0 % (ref 0–1)
BILIRUB SERPL-MCNC: 0.26 MG/DL (ref 0.2–1)
BUN SERPL-MCNC: 10 MG/DL (ref 5–25)
CALCIUM SERPL-MCNC: 9.1 MG/DL (ref 8.4–10.2)
CHLORIDE SERPL-SCNC: 105 MMOL/L (ref 96–108)
CO2 SERPL-SCNC: 30 MMOL/L (ref 21–32)
CREAT SERPL-MCNC: 0.61 MG/DL (ref 0.6–1.3)
EOSINOPHIL # BLD AUTO: 0.07 THOUSAND/ÂΜL (ref 0–0.61)
EOSINOPHIL NFR BLD AUTO: 1 % (ref 0–6)
ERYTHROCYTE [DISTWIDTH] IN BLOOD BY AUTOMATED COUNT: 14.5 % (ref 11.6–15.1)
ETHANOL SERPL-MCNC: <10 MG/DL
GFR SERPL CREATININE-BSD FRML MDRD: 126 ML/MIN/1.73SQ M
GLUCOSE SERPL-MCNC: 104 MG/DL (ref 65–140)
HCT VFR BLD AUTO: 36.1 % (ref 36.5–49.3)
HGB BLD-MCNC: 11.8 G/DL (ref 12–17)
IMM GRANULOCYTES # BLD AUTO: 0.04 THOUSAND/UL (ref 0–0.2)
IMM GRANULOCYTES NFR BLD AUTO: 1 % (ref 0–2)
LYMPHOCYTES # BLD AUTO: 2.04 THOUSANDS/ÂΜL (ref 0.6–4.47)
LYMPHOCYTES NFR BLD AUTO: 37 % (ref 14–44)
MCH RBC QN AUTO: 29.7 PG (ref 26.8–34.3)
MCHC RBC AUTO-ENTMCNC: 32.7 G/DL (ref 31.4–37.4)
MCV RBC AUTO: 91 FL (ref 82–98)
MONOCYTES # BLD AUTO: 0.54 THOUSAND/ÂΜL (ref 0.17–1.22)
MONOCYTES NFR BLD AUTO: 10 % (ref 4–12)
NEUTROPHILS # BLD AUTO: 2.77 THOUSANDS/ÂΜL (ref 1.85–7.62)
NEUTS SEG NFR BLD AUTO: 51 % (ref 43–75)
NRBC BLD AUTO-RTO: 0 /100 WBCS
P AXIS: 54 DEGREES
PLATELET # BLD AUTO: 221 THOUSANDS/UL (ref 149–390)
PMV BLD AUTO: 8.7 FL (ref 8.9–12.7)
POTASSIUM SERPL-SCNC: 4 MMOL/L (ref 3.5–5.3)
PR INTERVAL: 126 MS
PROT SERPL-MCNC: 5.8 G/DL (ref 6.4–8.4)
QRS AXIS: 60 DEGREES
QRSD INTERVAL: 90 MS
QT INTERVAL: 358 MS
QTC INTERVAL: 469 MS
RBC # BLD AUTO: 3.97 MILLION/UL (ref 3.88–5.62)
SALICYLATES SERPL-MCNC: <5 MG/DL (ref 3–20)
SODIUM SERPL-SCNC: 141 MMOL/L (ref 135–147)
T WAVE AXIS: 39 DEGREES
VENTRICULAR RATE: 103 BPM
WBC # BLD AUTO: 5.47 THOUSAND/UL (ref 4.31–10.16)

## 2025-03-05 PROCEDURE — 99285 EMERGENCY DEPT VISIT HI MDM: CPT

## 2025-03-05 PROCEDURE — 80053 COMPREHEN METABOLIC PANEL: CPT

## 2025-03-05 PROCEDURE — 70450 CT HEAD/BRAIN W/O DYE: CPT

## 2025-03-05 PROCEDURE — 80179 DRUG ASSAY SALICYLATE: CPT

## 2025-03-05 PROCEDURE — 80143 DRUG ASSAY ACETAMINOPHEN: CPT

## 2025-03-05 PROCEDURE — 85025 COMPLETE CBC W/AUTO DIFF WBC: CPT

## 2025-03-05 PROCEDURE — 82077 ASSAY SPEC XCP UR&BREATH IA: CPT

## 2025-03-05 PROCEDURE — 93010 ELECTROCARDIOGRAM REPORT: CPT

## 2025-03-05 PROCEDURE — 93005 ELECTROCARDIOGRAM TRACING: CPT

## 2025-03-05 RX ORDER — NALOXONE HYDROCHLORIDE 1 MG/ML
2 INJECTION INTRAMUSCULAR; INTRAVENOUS; SUBCUTANEOUS ONCE
Status: COMPLETED | OUTPATIENT
Start: 2025-03-05 | End: 2025-03-05

## 2025-03-05 RX ADMIN — NALOXONE HYDROCHLORIDE 4 MG: 4 SPRAY NASAL at 17:33

## 2025-03-05 RX ADMIN — NALOXONE HYDROCHLORIDE 2 MG: 1 INJECTION INTRAMUSCULAR; INTRAVENOUS; SUBCUTANEOUS at 13:59

## 2025-03-05 NOTE — ED PROVIDER NOTES
Pt Name: Rhys Esparza  MRN: [id number]  Birthdate 1985  Age/Sex: 39 y.o. male  Date of evaluation: 3/4/2025  PCP: No primary care provider on file.        FINAL IMPRESSION    Final diagnoses:   Overdose   Substance abuse (HCC)         DISPOSITION/PLAN    Time reflects when diagnosis was documented in both MDM as applicable and the Disposition within this note       Time User Action Codes Description Comment    3/5/2025 12:45 AM Mindy Saunders Add [T50.901A] Overdose     3/5/2025 12:45 AM Mindy Saunders Add [F19.10] Substance abuse (HCC)           ED Disposition       ED Disposition   Discharge    Condition   Stable    Date/Time   Wed Mar 5, 2025 12:45 AM    Comment   Rhys Esparza discharge to home/self care.                   Follow-up Information       Follow up With Specialties Details Why Contact Info Additional Information    [facility] Emergency Department Emergency Medicine Go to  As needed, If symptoms worsen [address] [place] [id number]  [phone] [facility] Emergency Department, [address] [place] [address], [phone]              PATIENT REFERRED TO:    [facility] Emergency Department  [address] [place], [place] [id number]  [phone]  Go to   As needed, If symptoms worsen      DISCHARGE MEDICATIONS:    Patient's Medications    No medications on file       No discharge procedures on file.          CHIEF COMPLAINT    Chief Complaint   Patient presents with    Overdose - Accidental     Unknown downtime         HPI    Rhys presents to the Emergency Department after he was found unresponsive.  He has a significant history of substance abuse.  He was given Narcan by EMS.          History provided by:  Medical records and EMS personnel  History limited by:  Acuity of condition and mental status change        Past Medical and Surgical History    No past medical history on file.    No past surgical history  on file.    No family history on file.           .    Allergies    Not on File    Home Medications    Prior to Admission medications    Not on File           Review of Systems    Review of Systems   Unable to perform ROS: Mental status change       Physical Exam      ED Triage Vitals   Temperature Pulse Respirations Blood Pressure SpO2   03/04/25 2015 03/04/25 1935 03/04/25 1935 03/04/25 1935 03/04/25 1935   97.5 °F (36.4 °C) (!) 131 16 101/61 94 %      Temp Source Heart Rate Source Patient Position - Orthostatic VS BP Location FiO2 (%)   03/04/25 2015 03/04/25 1935 03/04/25 1935 03/04/25 1935 --   Oral Monitor Lying Right arm       Pain Score       --                      Physical Exam  Cardiovascular:      Rate and Rhythm: Normal rate and regular rhythm.   Pulmonary:      Effort: Pulmonary effort is normal. No tachypnea, bradypnea, accessory muscle usage, prolonged expiration or respiratory distress.      Breath sounds: No decreased air movement.   Neurological:      Mental Status: He is lethargic.      GCS: GCS eye subscore is 2. GCS verbal subscore is 4. GCS motor subscore is 4.                Assessment and Plan    Rhys Esparza is a 39 y.o. male who presents unresponsive with suspected overdose. Physical examination remarkable for abrasions on his knuckles and decreased LOC. Differential diagnosis (not completely inclusive) includes overdose/ less likely trauma/ infection/ other. Plan will be to perform diagnostic testing and treat symptomatically.      MDM      Diagnostic Results    EKG:  normal EKG, normal sinus rhythm, unchanged from previous tracings    EKG INTERPRETATION  EKG Interpretation    Rate: 110 BPM  Rhythm: sinus tachycardia  Axis: normal  Intervals: Normal, no blocks, QTc 465ms  T waves: nonspecific  ST segments: nonspecific    Impression: nondiagnostic EKG. EKG for comparison: not immediately available  EKG interpreted by me.   Interpretation by Mindy Saunders DO  EKG reviewed and  "interpreted independently.    Labs:    Results for orders placed or performed during the hospital encounter of 03/04/25   Acetaminophen level-\"If concentration is detectable, please discuss with medical  on call.\"   Result Value Ref Range    Acetaminophen Level <2 (L) 10 - 20 ug/mL   Ethanol   Result Value Ref Range    Ethanol Lvl <10 <10 mg/dL   Salicylate level   Result Value Ref Range    Salicylate Lvl <5 3 - 20 mg/dL   CBC and differential   Result Value Ref Range    WBC 7.43 4.31 - 10.16 Thousand/uL    RBC 4.27 3.88 - 5.62 Million/uL    Hemoglobin 12.6 12.0 - 17.0 g/dL    Hematocrit 38.5 36.5 - 49.3 %    MCV 90 82 - 98 fL    MCH 29.5 26.8 - 34.3 pg    MCHC 32.7 31.4 - 37.4 g/dL    RDW 14.6 11.6 - 15.1 %    MPV 9.3 8.9 - 12.7 fL    Platelets 258 149 - 390 Thousands/uL    nRBC 0 /100 WBCs    Segmented % 63 43 - 75 %    Immature Grans % 0 0 - 2 %    Lymphocytes % 27 14 - 44 %    Monocytes % 9 4 - 12 %    Eosinophils Relative 1 0 - 6 %    Basophils Relative 0 0 - 1 %    Absolute Neutrophils 4.67 1.85 - 7.62 Thousands/µL    Absolute Immature Grans 0.02 0.00 - 0.20 Thousand/uL    Absolute Lymphocytes 1.97 0.60 - 4.47 Thousands/µL    Absolute Monocytes 0.70 0.17 - 1.22 Thousand/µL    Eosinophils Absolute 0.04 0.00 - 0.61 Thousand/µL    Basophils Absolute 0.03 0.00 - 0.10 Thousands/µL   Ammonia   Result Value Ref Range    Ammonia 55 18 - 72 umol/L   Comprehensive metabolic panel   Result Value Ref Range    Sodium 139 135 - 147 mmol/L    Potassium 4.0 3.5 - 5.3 mmol/L    Chloride 103 96 - 108 mmol/L    CO2 30 21 - 32 mmol/L    ANION GAP 6 4 - 13 mmol/L    BUN 15 5 - 25 mg/dL    Creatinine 0.76 0.60 - 1.30 mg/dL    Glucose 182 (H) 65 - 140 mg/dL    Calcium 9.3 8.4 - 10.2 mg/dL    AST 25 13 - 39 U/L    ALT 21 7 - 52 U/L    Alkaline Phosphatase 97 34 - 104 U/L    Total Protein 6.5 6.4 - 8.4 g/dL    Albumin 4.3 3.5 - 5.0 g/dL    Total Bilirubin 0.22 0.20 - 1.00 mg/dL    eGFR 115 ml/min/1.73sq m   HS Troponin 0hr " "(reflex protocol)   Result Value Ref Range    hs TnI 0hr 4 \"Refer to ACS Flowchart\"- see link ng/L   Rapid drug screen, urine   Result Value Ref Range    Amph/Meth UR Negative Negative    Barbiturate Ur Negative Negative    Benzodiazepine Urine Negative Negative    Cocaine Urine Negative Negative    Methadone Urine Negative Negative    Opiate Urine Negative Negative    PCP Ur Negative Negative    THC Urine Negative Negative    Oxycodone Urine Negative Negative    Fentanyl Urine Negative Negative    HYDROCODONE URINE Negative Negative   UA w Reflex to Microscopic w Reflex to Culture    Specimen: Urine, Straight Cath   Result Value Ref Range    Color, UA Colorless     Clarity, UA Clear     Specific Gravity, UA 1.005 1.003 - 1.030    pH, UA 6.5 4.5, 5.0, 5.5, 6.0, 6.5, 7.0, 7.5, 8.0    Leukocytes, UA Negative Negative    Nitrite, UA Negative Negative    Protein, UA Negative Negative mg/dl    Glucose, UA Negative Negative mg/dl    Ketones, UA Negative Negative mg/dl    Urobilinogen, UA <2.0 <2.0 mg/dl mg/dl    Bilirubin, UA Negative Negative    Occult Blood, UA Negative Negative   Blood gas, venous   Result Value Ref Range    pH, Tj 7.394 7.300 - 7.400    pCO2, Tj 44.0 42.0 - 50.0 mm Hg    pO2, Tj 94.6 (H) 35.0 - 45.0 mm Hg    HCO3, Tj 26.3 24 - 30 mmol/L    Base Excess, Tj 1.1 mmol/L    O2 Content, Tj 16.4 ml/dL    O2 HGB, VENOUS 89.3 (H) 60.0 - 80.0 %   Lactic acid, plasma (w/reflex if result > 2.0)   Result Value Ref Range    LACTIC ACID 1.6 0.5 - 2.0 mmol/L   ECG 12 lead   Result Value Ref Range    Ventricular Rate 110 BPM    Atrial Rate 110 BPM    NY Interval 136 ms    QRSD Interval 90 ms    QT Interval 344 ms    QTC Interval 465 ms    P Axis 77 degrees    QRS Axis 90 degrees    T Wave Axis 58 degrees   Fingerstick Glucose (POCT)   Result Value Ref Range    POC Glucose 211 (H) 65 - 140 mg/dl       All labs reviewed and utilized in the medical decision making process    Radiology:    CT head without contrast "   Final Result      No acute intracranial abnormality.                  Workstation performed: [id number]         CT spine cervical wo contrast   Final Result      No cervical spine fracture or traumatic malalignment.                  Workstation performed: [id number]         XR chest 1 view portable    (Results Pending)     No acute disease in the chest    All radiology studies independently viewed by me and interpreted by the radiologist.    Procedure    Procedures      ED Course of Care and Re-Assessments    Over the course of several hours he returned to baseline and was ok for discharge.     Medications   naloxone (FOR EMS ONLY) (NARCAN) 2 MG/2ML injection 2 mg (0 mg Does not apply Given to EMS 3/4/25 1937)   naloxone (NARCAN) injection 4 mg (4 mg Intravenous Given 3/4/25 1955)   sodium chloride 0.9 % bolus 1,000 mL (0 mL Intravenous Stopped 3/4/25 2029)   naloxone (NARCAN) injection 4 mg (4 mg Intravenous Given 3/4/25 1934)   sodium chloride 0.9 % bolus 1,000 mL (0 mL Intravenous Stopped 3/4/25 2135)                  Mindy Saunders, DO Mindy Saunders DO  03/05/25 0055

## 2025-03-05 NOTE — ED PROVIDER NOTES
"Time reflects when diagnosis was documented in both MDM as applicable and the Disposition within this note       Time User Action Codes Description Comment    3/5/2025  5:28 PM Chantel Davis Add [T50.901A] Accidental drug overdose, initial encounter           ED Disposition       ED Disposition   Discharge    Condition   Stable    Date/Time   Wed Mar 5, 2025  5:29 PM    Comment   Rhys Esparza discharge to home/self care.                   Assessment & Plan       Medical Decision Making  Ddx includes but is not limited to intoxication/overdose, infection, ICH, hypoglycemia, electrolyte imbalance.  No acute respiratory distress.  New abrasion to forehead from previous visit per chart review, CT head ordered, no acute findings. Ecg without acute ischemic changes.,  Panel without any elevated findings.  Lites within normal limits.  No hypoglycemia.  Observed in emergency department with complete return to baseline, GCS of 15, declines detox/rehab, clinically sober at time of discharge.    All imaging and/or lab testing discussed with patient, strict return to ED precautions discussed. Patient recommended to follow up promptly with appropriate outpatient provider and risk of morbidity/mortality if patient does not follow up as recommended was discussed. Patient and/or family members verbalizes understanding and agrees with plan. Patient and/or family members were given opportunity to ask questions, all questions were answered at this time. Patient is stable for discharge.     Portions of the record may have been created with voice recognition software. Occasional wrong word or \"sound a like\" substitutions may have occurred due to the inherent limitations of voice recognition software. Read the chart carefully and recognize, using context, where substitutions have occurred.       Amount and/or Complexity of Data Reviewed  Labs: ordered.  Radiology: ordered.    Risk  Prescription drug management.        ED Course as of " 03/05/25 1741   Wed Mar 05, 2025   1352 Seen yesterday for similar presentation.  Knuckle abrasions were noted at that time per chart review,  However forehead abrasion noted today.  Will reimage CT of head.   1357 Per EMS glucose 123 mg/dL   1401 Refills not pinpoint.  No respiratory distress or decreased respirations.  GCS 13 on arrival.  Abrasions to knuckles and forehead noted.  Per EMS, police officers on scene report he is a well-known user of K2/opioids.   1642 Procedure Note: EKG  Date/Time: 03/05/25 4:42 PM   Performed by: Chantel Davis   Authorized by: Chantel Davis  ECG interpreted by me, the ED Provider: yes   The EKG demonstrates:  Rate 103 ms  Rhythm sinus tachycardia   QTc 469 ms   No ST elevations/depressions     1727 GCS 15. Patient AAOx4. Ambulating with steady gait. Answering questions appropriately. Declines detox/rehab. Re-examined. Denies any acute complaints. Denies pain. Denies bleeding. NAD.        Medications   naloxone (NARCAN) intranasal 2 mg (2 mg Nasal Given 3/5/25 1359)       ED Risk Strat Scores                                                History of Present Illness       Chief Complaint   Patient presents with    Overdose - Accidental     Pt found lying in the middle of the street on [address]. Pt is a known K2 user. Pt was seen here the other day for the same thing.       Past Medical History:   Diagnosis Date    Disease of thyroid gland     Drug abuse (HCC)     Schizoaffective disorder (HCC)     resolved 05/16/14    Schizoaffective disorder, bipolar type (HCC)     Schizophrenia (HCC)       Past Surgical History:   Procedure Laterality Date    NO PAST SURGERIES        Family History   Problem Relation Age of Onset    Anxiety disorder Mother     Depression Mother     Dysphagia Mother     Stroke Mother         cva    Diabetes type II Mother     Other Mother         Hyponatremia    Psychosis Mother       Social History     Tobacco Use    Smoking status: Unknown    Smokeless tobacco:  Never   Vaping Use    Vaping status: Never Used   Substance Use Topics    Alcohol use: Yes     Comment: social    Drug use: Yes     Types: Marijuana, Other     Comment: K2      E-Cigarette/Vaping    E-Cigarette Use Never User       E-Cigarette/Vaping Substances    Nicotine No     THC No     CBD No     Flavoring No     Other No     Unknown No       I have reviewed and agree with the history as documented.     39-year-old male past medical history of polysubstance abuse, schizoaffective disorder presents to emergency department via EMS for suspected drug use after being found stumbling on the road by police.  Has been seen in multiple hospitals for similar symptoms over the past couple of days with history of K2/opioid use.  No Narcan was given prior to arrival.  Patient arrives lethargic GCS 13.       History provided by:  Patient and EMS personnel  History limited by:  Mental status change  Overdose - Accidental  Associated symptoms: altered mental status and lethargy    Associated symptoms: no diaphoresis and no vomiting        Review of Systems   Unable to perform ROS: Mental status change   Constitutional:  Negative for diaphoresis.   Gastrointestinal:  Negative for vomiting.           Objective       ED Triage Vitals   Temperature Pulse Blood Pressure Respirations SpO2 Patient Position - Orthostatic VS   03/05/25 1345 03/05/25 1345 03/05/25 1345 03/05/25 1345 03/05/25 1345 03/05/25 1345   (!) 96.5 °F (35.8 °C) (!) 121 112/55 20 96 % Lying      Temp Source Heart Rate Source BP Location FiO2 (%) Pain Score    03/05/25 1448 03/05/25 1345 03/05/25 1401 -- 03/05/25 1448    Oral Monitor Left arm  No Pain      Vitals      Date and Time Temp Pulse SpO2 Resp BP Pain Score FACES Pain Rating User   03/05/25 1724 -- 82 100 % 18 114/80 No Pain -- SN   03/05/25 1642 -- 107 99 % 20 111/80 No Pain -- SN   03/05/25 1448 97.5 °F (36.4 °C) 63 -- 18 120/85 No Pain -- SN   03/05/25 1401 -- 105 96 % 18 106/68 -- -- MH   03/05/25 1345  96.5 °F (35.8 °C) 121 96 % 20 112/55 -- -- KR            Physical Exam  Vitals and nursing note reviewed.   Constitutional:       General: He is sleeping. He is not in acute distress.     Appearance: He is not toxic-appearing or diaphoretic.      Comments: Unkempt    HENT:      Head:        Mouth/Throat:      Mouth: Mucous membranes are moist.   Eyes:      Pupils: Pupils are equal, round, and reactive to light.      Comments: Pupils not pinpoint.   Cardiovascular:      Rate and Rhythm: Regular rhythm. Tachycardia present.      Heart sounds: Normal heart sounds.   Pulmonary:      Effort: Pulmonary effort is normal. No respiratory distress.      Breath sounds: Normal breath sounds.   Abdominal:      General: There is no distension.      Palpations: Abdomen is soft.      Tenderness: There is no abdominal tenderness.      Comments: No guarding    Musculoskeletal:      Comments: Abrasions to multiple knuckles. No active bleeding. No purulent drainage, swelling, erythema, crepitus, warmth.    Skin:     General: Skin is warm and dry.      Findings: No bruising.   Neurological:      Mental Status: He is easily aroused. He is lethargic.      GCS: GCS eye subscore is 4. GCS verbal subscore is 4. GCS motor subscore is 5.         Results Reviewed       Procedure Component Value Units Date/Time    Comprehensive metabolic panel [[id number]]  (Abnormal) Collected: 03/05/25 1357    Lab Status: Final result Specimen: Blood from Arm, Left Updated: 03/05/25 1421     Sodium 141 mmol/L      Potassium 4.0 mmol/L      Chloride 105 mmol/L      CO2 30 mmol/L      ANION GAP 6 mmol/L      BUN 10 mg/dL      Creatinine 0.61 mg/dL      Glucose 104 mg/dL      Calcium 9.1 mg/dL      AST 19 U/L      ALT 16 U/L      Alkaline Phosphatase 93 U/L      Total Protein 5.8 g/dL      Albumin 3.9 g/dL      Total Bilirubin 0.26 mg/dL      eGFR 126 ml/min/1.73sq m     Narrative:      National Kid[employer] Disease Foundation guidelines for Chronic Kidney Disease  (CKD):     Stage 1 with normal or high GFR (GFR > 90 mL/min/1.73 square meters)    Stage 2 Mild CKD (GFR = 60-89 mL/min/1.73 square meters)    Stage 3A Moderate CKD (GFR = 45-59 mL/min/1.73 square meters)    Stage 3B Moderate CKD (GFR = 30-44 mL/min/1.73 square meters)    Stage 4 Severe CKD (GFR = 15-29 mL/min/1.73 square meters)    Stage 5 End Stage CKD (GFR <15 mL/min/1.73 square meters)  Note: GFR calculation is accurate only with a steady state creatinine    Ethanol [[id number]]  (Normal) Collected: 03/05/25 1357    Lab Status: Final result Specimen: Blood from Arm, Left Updated: 03/05/25 1421     Ethanol Lvl <10 mg/dL     Salicylate level [[id number]]  (Normal) Collected: 03/05/25 1357    Lab Status: Final result Specimen: Blood from Arm, Left Updated: 03/05/25 1421     Salicylate Lvl <5 mg/dL     Acetaminophen level-If concentration is detectable, please discuss with medical  on call. [[id number]]  (Abnormal) Collected: 03/05/25 1357    Lab Status: Final result Specimen: Blood from Arm, Left Updated: 03/05/25 1421     Acetaminophen Level <2 ug/mL     CBC and differential [[id number]]  (Abnormal) Collected: 03/05/25 1357    Lab Status: Final result Specimen: Blood from Arm, Left Updated: 03/05/25 1404     WBC 5.47 Thousand/uL      RBC 3.97 Million/uL      Hemoglobin 11.8 g/dL      Hematocrit 36.1 %      MCV 91 fL      MCH 29.7 pg      MCHC 32.7 g/dL      RDW 14.5 %      MPV 8.7 fL      Platelets 221 Thousands/uL      nRBC 0 /100 WBCs      Segmented % 51 %      Immature Grans % 1 %      Lymphocytes % 37 %      Monocytes % 10 %      Eosinophils Relative 1 %      Basophils Relative 0 %      Absolute Neutrophils 2.77 Thousands/µL      Absolute Immature Grans 0.04 Thousand/uL      Absolute Lymphocytes 2.04 Thousands/µL      Absolute Monocytes 0.54 Thousand/µL      Eosinophils Absolute 0.07 Thousand/µL      Basophils Absolute 0.01 Thousands/µL             CT head without contrast   Final Interpretation by  Jessee Barragan MD (03/05 0597)      No acute intracranial abnormality.                  Workstation performed: [id number]             Procedures    ED Medication and Procedure Management   Prior to Admission Medications   Prescriptions Last Dose Informant Patient Reported? Taking?   benztropine (COGENTIN) 0.5 mg tablet   No No   Sig: Take 1 tablet (0.5 mg total) by mouth 3 (three) times a day   benztropine (COGENTIN) 2 mg tablet   Yes No   Sig: Take 2 mg by mouth every morning   Patient not taking: Reported on 8/20/2021   buPROPion (WELLBUTRIN SR) 150 mg 12 hr tablet   Yes No   Sig: Take 150 mg by mouth every morning   Patient not taking: Reported on 8/20/2021   cloNIDine (CATAPRES) 0.2 mg tablet   Yes No   Sig: Take 0.2 mg by mouth daily at bedtime   Patient not taking: Reported on 8/20/2021   divalproex sodium (DEPAKOTE) 500 mg DR tablet   Yes No   Sig: Take 500 mg by mouth daily at bedtime   loxapine (LOXITANE) 10 mg capsule   No No   Sig: Take 5 capsules (50 mg total) by mouth 3 (three) times a day 1 in the morning, 2 at night      Facility-Administered Medications: None     Discharge Medication List as of 3/5/2025  5:31 PM        CONTINUE these medications which have NOT CHANGED    Details   !! benztropine (COGENTIN) 0.5 mg tablet Take 1 tablet (0.5 mg total) by mouth 3 (three) times a day, Starting Thu 11/30/2023, Normal      !! benztropine (COGENTIN) 2 mg tablet Take 2 mg by mouth every morning, Starting Tue 6/22/2021, Historical Med      buPROPion (WELLBUTRIN SR) 150 mg 12 hr tablet Take 150 mg by mouth every morning, Starting Sun 6/20/2021, Historical Med      cloNIDine (CATAPRES) 0.2 mg tablet Take 0.2 mg by mouth daily at bedtime, Starting Tue 6/22/2021, Historical Med      divalproex sodium (DEPAKOTE) 500 mg DR tablet Take 500 mg by mouth daily at bedtime, Historical Med      loxapine (LOXITANE) 10 mg capsule Take 5 capsules (50 mg total) by mouth 3 (three) times a day 1 in the morning, 2 at night,  Starting Thu 11/30/2023, Until Sat 12/30/2023, Normal       !! - Potential duplicate medications found. Please discuss with provider.          ED SEPSIS DOCUMENTATION   Time reflects when diagnosis was documented in both MDM as applicable and the Disposition within this note       Time User Action Codes Description Comment    3/5/2025  5:28 PM Chantel Davis Add [T50.901A] Accidental drug overdose, initial encounter                  Chantel Davis PA-C  03/05/25 174

## 2025-03-05 NOTE — ED NOTES
Pt awake and oriented, given water and snacks. Provider made aware       Bernarda Francisco RN  03/05/25 000

## 2025-03-06 ENCOUNTER — APPOINTMENT (EMERGENCY)
Dept: CT IMAGING | Facility: HOSPITAL | Age: 40
End: 2025-03-06
Payer: COMMERCIAL

## 2025-03-06 ENCOUNTER — HOSPITAL ENCOUNTER (EMERGENCY)
Facility: HOSPITAL | Age: 40
Discharge: HOME | End: 2025-03-07
Attending: EMERGENCY MEDICINE
Payer: COMMERCIAL

## 2025-03-06 VITALS
HEART RATE: 87 BPM | DIASTOLIC BLOOD PRESSURE: 57 MMHG | WEIGHT: 121.69 LBS | TEMPERATURE: 98.9 F | BODY MASS INDEX: 20.25 KG/M2 | SYSTOLIC BLOOD PRESSURE: 103 MMHG | OXYGEN SATURATION: 96 % | RESPIRATION RATE: 16 BRPM

## 2025-03-06 DIAGNOSIS — T50.901A ACCIDENTAL OVERDOSE, INITIAL ENCOUNTER: Primary | ICD-10-CM

## 2025-03-06 DIAGNOSIS — T07.XXXA MULTIPLE CONTUSIONS: ICD-10-CM

## 2025-03-06 LAB
ATRIAL RATE: 110 BPM
GLUCOSE SERPL-MCNC: 87 MG/DL (ref 65–140)
P AXIS: 77 DEGREES
PR INTERVAL: 136 MS
QRS AXIS: 90 DEGREES
QRSD INTERVAL: 90 MS
QT INTERVAL: 344 MS
QTC INTERVAL: 465 MS
T WAVE AXIS: 58 DEGREES
VENTRICULAR RATE: 110 BPM

## 2025-03-06 PROCEDURE — 99284 EMERGENCY DEPT VISIT MOD MDM: CPT

## 2025-03-06 PROCEDURE — 70450 CT HEAD/BRAIN W/O DYE: CPT

## 2025-03-06 PROCEDURE — 93010 ELECTROCARDIOGRAM REPORT: CPT | Performed by: INTERNAL MEDICINE

## 2025-03-06 PROCEDURE — 82948 REAGENT STRIP/BLOOD GLUCOSE: CPT

## 2025-03-06 PROCEDURE — 99284 EMERGENCY DEPT VISIT MOD MDM: CPT | Performed by: PHYSICIAN ASSISTANT

## 2025-03-06 PROCEDURE — 72125 CT NECK SPINE W/O DYE: CPT

## 2025-03-06 NOTE — ED PROVIDER NOTES
Time reflects when diagnosis was documented in both MDM as applicable and the Disposition within this note       Time User Action Codes Description Comment    3/6/2025  9:58 PM Brielle Souza Add [T50.901A] Accidental overdose, initial encounter     3/6/2025  9:58 PM Brielle Souza Add [T07.XXXA] Multiple contusions           ED Disposition       None          Assessment & Plan       Medical Decision Making  39y.o male presents to the ER for evaluation. Patient is mildly tachycardic. His temperature is 94. Otherwise vitals are stable. Patient is in no acute distress. On exam, pupils are equal and reactive. EOM intact and non-tender. No facial trauma seen but he does have grass in his hair concerning for a fall. Moist mucous membranes seen. No signs of throat infection. No trouble swallowing or handling secretions. No neck swelling. Breathing is non-labored. No tachypnea or accessory muscle use. Lungs are clear. Heart is regular rate and rhythm. Abdomen is soft and non-tender. No guarding, rigidity, distention or pulsatile masses palpated. No spinal tenderness on exam. DDX consists of but not limited to: drug overdose, hypogylcemia, head injury, contusion, fracture, intracranial abnormality. Will check imaging and glucose. Will monitor.     1728 CT head without contrast  No acute intracranial abnormality.    1728 CT cervical spine without contrast  No cervical spine fracture or traumatic malalignment.    1829 POC Glucose: 87    1920 Patient continues to deny any complaints. Still appears to be under the influence.    2157 Patient continues to deny any complaints. Will continue to monitor. Temperature is 98 without the mateo hugger for the last 50 minutes.    2159     Patient signed out to Danay Maurer PA-C awaiting reassessment and clinical sobriety. Patient is stable.         Problems Addressed:  Accidental overdose, initial encounter: acute illness or injury  Multiple contusions: acute illness or  injury    Amount and/or Complexity of Data Reviewed  Independent Historian:      Details: Patient is historian  Labs: ordered. Decision-making details documented in ED Course.  Radiology: ordered. Decision-making details documented in ED Course.        ED Course as of 03/06/25 2201   Thu Mar 06, 2025   1728 CT head without contrast  No acute intracranial abnormality.   1728 CT cervical spine without contrast  No cervical spine fracture or traumatic malalignment.   1829 POC Glucose: 87   1920 Patient continues to deny any complaints. Still appears to be under the influence.   2157 Patient continues to deny any complaints. Will continue to monitor.        Medications - No data to display    ED Risk Strat Scores                                                History of Present Illness       Chief Complaint   Patient presents with    Overdose - Accidental     Pt arrives via EMS. Pt found on sidewalk and semi responsive. Pt is disoriented to place and time. Pt appears to have fallen. Shrubs in hair and abrasion to left elbow. GCS 14       Past Medical History:   Diagnosis Date    Disease of thyroid gland     Drug abuse (HCC)     Schizoaffective disorder (HCC)     resolved 05/16/14    Schizoaffective disorder, bipolar type (HCC)     Schizophrenia ([facility])       Past Surgical History:   Procedure Laterality Date    NO PAST SURGERIES        Family History   Problem Relation Age of Onset    Anxiety disorder Mother     Depression Mother     Dysphagia Mother     Stroke Mother         cva    Diabetes type II Mother     Other Mother         Hyponatremia    Psychosis Mother       Social History     Tobacco Use    Smoking status: Unknown    Smokeless tobacco: Never   Vaping Use    Vaping status: Never Used   Substance Use Topics    Alcohol use: Yes     Comment: social    Drug use: Yes     Types: Marijuana, Other     Comment: K2      E-Cigarette/Vaping    E-Cigarette Use Never User       E-Cigarette/Vaping Substances    Nicotine No      THC No     CBD No     Flavoring No     Other No     Unknown No       I have reviewed and agree with the history as documented.     39y.o male with PMH of thyroid disease, drug abuse and schizoaffective presents to the ER for evaluation. Patient was brought in by EMS after he was found on the sidewalk semi responsive. Patient denies having any complaints. He cannot tell me what happened. He continues to stare at me and only respond to very few questions. It appears he did fall. He has grass in his hair. He has abrasions to his hands. This is his 5th visit this week for the same exact symptoms. He has had labs and imaging completed multiple times this week and then has been discharged home. He has an extensive drug use history.        History provided by:  Patient   used: No        Review of Systems   All other systems reviewed and are negative.          Objective       ED Triage Vitals   Temperature Pulse Blood Pressure Respirations SpO2 Patient Position - Orthostatic VS   03/06/25 1633 03/06/25 1557 03/06/25 1557 03/06/25 1557 03/06/25 1557 03/06/25 1557   (!) 94 °F (34.4 °C) 105 102/66 16 97 % Lying      Temp Source Heart Rate Source BP Location FiO2 (%) Pain Score    03/06/25 1633 03/06/25 1557 03/06/25 1557 -- --    Oral Monitor Right arm        Vitals      Date and Time Temp Pulse SpO2 Resp BP Pain Score FACES Pain Rating User   03/06/25 2000 -- 83 98 % 16 102/55 -- -- SR   03/06/25 1800 97.6 °F (36.4 °C) 83 98 % 16 98/60 -- -- SR   03/06/25 1739 94.4 °F (34.7 °C) -- -- -- -- -- -- SR   03/06/25 1633 94 °F (34.4 °C) -- -- -- -- -- -- SR   03/06/25 1630 -- 89 100 % 16 -- -- -- SR   03/06/25 1557 -- 105 97 % 16 102/66 -- -- SR            Physical Exam  Vitals and nursing note reviewed.   Constitutional:       General: He is not in acute distress.     Appearance: He is not toxic-appearing.   HENT:      Head: Normocephalic and atraumatic.      Mouth/Throat:      Lips: Pink. No lesions.       Mouth: Mucous membranes are moist.   Eyes:      General: Lids are normal.      Extraocular Movements: Extraocular movements intact.      Conjunctiva/sclera: Conjunctivae normal.      Pupils: Pupils are equal, round, and reactive to light.   Neck:      Trachea: No tracheal deviation.   Cardiovascular:      Rate and Rhythm: Normal rate and regular rhythm.      Heart sounds: Normal heart sounds, S1 normal and S2 normal. No murmur heard.     No friction rub. No gallop.   Pulmonary:      Effort: Pulmonary effort is normal. No respiratory distress.      Breath sounds: Normal breath sounds. No decreased breath sounds, wheezing, rhonchi or rales.   Chest:      Chest wall: No tenderness.   Abdominal:      General: Bowel sounds are normal. There is no distension.      Palpations: Abdomen is soft.      Tenderness: There is no abdominal tenderness. There is no guarding or rebound.   Musculoskeletal:      Cervical back: Normal, normal range of motion and neck supple.      Thoracic back: Normal.      Lumbar back: Normal.   Skin:     General: Skin is warm and dry.      Findings: No rash.      Comments: Abrasions seen to right hand and elbow. Areas are non-tender to palpation. Normal ROM of fingers and elbow. Neurovascularly intact.   Neurological:      Mental Status: He is alert and oriented to person, place, and time.      GCS: GCS eye subscore is 4. GCS verbal subscore is 5. GCS motor subscore is 6.      Cranial Nerves: No dysarthria or facial asymmetry.   Psychiatric:         Mood and Affect: Mood normal.         Results Reviewed       Procedure Component Value Units Date/Time    Fingerstick Glucose (POCT) [[id number]]  (Normal) Collected: 03/06/25 1824    Lab Status: Final result Specimen: Blood Updated: 03/06/25 1825     POC Glucose 87 mg/dl             CT head without contrast   Final Interpretation by Warren Moran MD (03/06 1710)      No acute intracranial abnormality.                  Workstation performed: [id number]          CT cervical spine without contrast   Final Interpretation by Warren Moran MD (03/06 1711)      No cervical spine fracture or traumatic malalignment.                  Workstation performed: [id number]             Procedures    ED Medication and Procedure Management   Prior to Admission Medications   Prescriptions Last Dose Informant Patient Reported? Taking?   benztropine (COGENTIN) 0.5 mg tablet   No No   Sig: Take 1 tablet (0.5 mg total) by mouth 3 (three) times a day   benztropine (COGENTIN) 2 mg tablet   Yes No   Sig: Take 2 mg by mouth every morning   Patient not taking: Reported on 8/20/2021   buPROPion (WELLBUTRIN SR) 150 mg 12 hr tablet   Yes No   Sig: Take 150 mg by mouth every morning   Patient not taking: Reported on 8/20/2021   cloNIDine (CATAPRES) 0.2 mg tablet   Yes No   Sig: Take 0.2 mg by mouth daily at bedtime   Patient not taking: Reported on 8/20/2021   divalproex sodium (DEPAKOTE) 500 mg DR tablet   Yes No   Sig: Take 500 mg by mouth daily at bedtime   loxapine (LOXITANE) 10 mg capsule   No No   Sig: Take 5 capsules (50 mg total) by mouth 3 (three) times a day 1 in the morning, 2 at night      Facility-Administered Medications: None     Patient's Medications   Discharge Prescriptions    No medications on file     No discharge procedures on file.  ED SEPSIS DOCUMENTATION   Time reflects when diagnosis was documented in both MDM as applicable and the Disposition within this note       Time User Action Codes Description Comment    3/6/2025  9:58 PM Brielle Souza Add [T50.901A] Accidental overdose, initial encounter     3/6/2025  9:58 PM Brielle Souza Add [T07.XXXA] Multiple contusions                  Brielle Souza PA-C  03/06/25 6413

## 2025-03-07 NOTE — ED CARE HANDOFF
"Emergency Department Sign Out Note        Sign out and transfer of care from Brielle Souza PA-C. See Separate Emergency Department note.     The patient, Rhys Esparza, was evaluated by the previous provider for overdose K2.      Per initial providers note:     \"39y.o male with PMH of thyroid disease, drug abuse and schizoaffective presents to the ER for evaluation. Patient was brought in by EMS after he was found on the sidewalk semi responsive. Patient denies having any complaints. He cannot tell me what happened. It appears he did fall. He has grass in his hair. He has abrasions to his hands. This is his 5th visit this week for the same exact symptoms. He has had labs and imaging completed multiple times this week and then has been discharged home. He has an extensive drug use history. \"    Workup Completed:  Glucose, CT cervical spine, and CT head.    ED Course / Workup Pending (followup):    Per sign out, waiting for reassessment, clinical sobriety and discharge.           0110 Patient alert and oriented x 4.  Patient able to ambulate with steady gait and answering questions appropriately.  Patient states he is feeling much better and would like to leave.  Patient offered transportation services, however declined. Given substance abuse resources. Instructed patient to follow-up with PCP.  Discussed strict return precautions, patient understands and agrees with plan.               Procedures  Medical Decision Making  Amount and/or Complexity of Data Reviewed  Labs: ordered.  Radiology: ordered.            Disposition  Final diagnoses:   Accidental overdose, initial encounter   Multiple contusions     Time reflects when diagnosis was documented in both MDM as applicable and the Disposition within this note       Time User Action Codes Description Comment    3/6/2025  9:58 PM Brielle Souza Add [T50.901A] Accidental overdose, initial encounter     3/6/2025  9:58 PM Brielle Souza Add [T07.XXXA] Multiple " contusions           ED Disposition       ED Disposition   Discharge    Condition   Stable    Date/Time   Fri Mar 7, 2025  1:09 AM    Comment   Rhys Esparza discharge to home/self care.                   Follow-up Information       Follow up With Specialties Details Why Contact Info Additional Information    [facility] Schedule an appointment as soon as possible for a visit  As needed [address] [place] [address], [address] [place] [address] [phone] [facility], [address] [place] [address], [place] [address], [place], [place], [address] [phone]          Discharge Medication List as of 3/7/2025  1:10 AM        CONTINUE these medications which have NOT CHANGED    Details   !! benztropine (COGENTIN) 0.5 mg tablet Take 1 tablet (0.5 mg total) by mouth 3 (three) times a day, Starting Thu 11/30/2023, Normal      !! benztropine (COGENTIN) 2 mg tablet Take 2 mg by mouth every morning, Starting Tue 6/22/2021, Historical Med      buPROPion (WELLBUTRIN SR) 150 mg 12 hr tablet Take 150 mg by mouth every morning, Starting Sun 6/20/2021, Historical Med      cloNIDine (CATAPRES) 0.2 mg tablet Take 0.2 mg by mouth daily at bedtime, Starting Tue 6/22/2021, Historical Med      divalproex sodium (DEPAKOTE) 500 mg DR tablet Take 500 mg by mouth daily at bedtime, Historical Med      loxapine (LOXITANE) 10 mg capsule Take 5 capsules (50 mg total) by mouth 3 (three) times a day 1 in the morning, 2 at night, Starting Thu 11/30/2023, Until Sat 12/30/2023, Normal       !! - Potential duplicate medications found. Please discuss with provider.        No discharge procedures on file.       ED Provider  Electronically Signed by     Danay Maurer PA-C  03/07/25 0514

## 2025-03-07 NOTE — ED NOTES
Pt alert and oriented and able to ambulate with steady gate down hallway, provider made aware.     Thelma Luz RN  03/07/25 0113

## 2025-03-07 NOTE — DISCHARGE INSTRUCTIONS
Follow-up with PCP.  Return to ED if symptoms worsen, fail to improve, or develop as discussed.  Symptomatic care as discussed.

## 2025-03-10 ENCOUNTER — HOSPITAL ENCOUNTER (EMERGENCY)
Facility: HOSPITAL | Age: 40
End: 2025-03-11
Attending: EMERGENCY MEDICINE
Payer: COMMERCIAL

## 2025-03-10 DIAGNOSIS — F25.0 SCHIZOAFFECTIVE DISORDER, BIPOLAR TYPE (HCC): Primary | ICD-10-CM

## 2025-03-10 PROBLEM — F29 PSYCHOSIS (HCC): Status: ACTIVE | Noted: 2025-03-10

## 2025-03-10 LAB
AMPHETAMINES SERPL QL SCN: NEGATIVE
BARBITURATES UR QL: NEGATIVE
BENZODIAZ UR QL: NEGATIVE
COCAINE UR QL: NEGATIVE
ETHANOL EXG-MCNC: 0 MG/DL
FENTANYL UR QL SCN: NEGATIVE
HYDROCODONE UR QL SCN: NEGATIVE
METHADONE UR QL: NEGATIVE
OPIATES UR QL SCN: NEGATIVE
OXYCODONE+OXYMORPHONE UR QL SCN: NEGATIVE
PCP UR QL: NEGATIVE
THC UR QL: NEGATIVE
VALPROATE SERPL-MCNC: 97 UG/ML (ref 50–100)

## 2025-03-10 PROCEDURE — 80164 ASSAY DIPROPYLACETIC ACD TOT: CPT

## 2025-03-10 PROCEDURE — 80307 DRUG TEST PRSMV CHEM ANLYZR: CPT | Performed by: EMERGENCY MEDICINE

## 2025-03-10 PROCEDURE — 99284 EMERGENCY DEPT VISIT MOD MDM: CPT

## 2025-03-10 PROCEDURE — 82075 ASSAY OF BREATH ETHANOL: CPT | Performed by: EMERGENCY MEDICINE

## 2025-03-10 PROCEDURE — 99285 EMERGENCY DEPT VISIT HI MDM: CPT | Performed by: EMERGENCY MEDICINE

## 2025-03-10 PROCEDURE — 36415 COLL VENOUS BLD VENIPUNCTURE: CPT

## 2025-03-10 RX ORDER — LORAZEPAM 1 MG/1
2 TABLET ORAL ONCE
Status: COMPLETED | OUTPATIENT
Start: 2025-03-10 | End: 2025-03-10

## 2025-03-10 RX ORDER — DIVALPROEX SODIUM 500 MG/1
500 TABLET, FILM COATED, EXTENDED RELEASE ORAL 2 TIMES DAILY
Status: DISCONTINUED | OUTPATIENT
Start: 2025-03-10 | End: 2025-03-11 | Stop reason: HOSPADM

## 2025-03-10 RX ADMIN — LORAZEPAM 2 MG: 1 TABLET ORAL at 20:29

## 2025-03-10 RX ADMIN — DIVALPROEX SODIUM 500 MG: 500 TABLET, EXTENDED RELEASE ORAL at 15:15

## 2025-03-10 RX ADMIN — ARIPIPRAZOLE 15 MG: 5 TABLET ORAL at 15:15

## 2025-03-10 NOTE — ED NOTES
Per EVS, patient has primary [employer] Medicare and secondary [facility] (ID# [id number]). [employer] verified active via [employer].     Jeannette Bergeron, [facility]  03/10/25     [id number]

## 2025-03-10 NOTE — ASSESSMENT & PLAN NOTE
Patient presents with symptoms of psychosis including paranoia, internal preoccupation, responding to internal stimuli, delayed speech and poverty of thought. It is unclear at this time if patient is taking his psychotropic medications as prescribed. Last prescriptions filled on 2/27/25 include Abilify 30 mg daily and Depakote 250 mg daily plus 500 mg HS.    Plan:   Restart Abilify at 15 mg daily for psychosis  Restart Depakote at 500 mg BID for mood stabilization   - obtain depakote level prior to administration  302 petitioned and upheld for involuntary inpatient behavioral health admission

## 2025-03-10 NOTE — ED NOTES
No in network bed available. Bed search initiated to following facilities:    [facility] spoke to admissions, beds available, chart faxed for review.  [facility] spoke to admissions, beds available, chart faxed for review.  [facility] spoke to admissions, beds available, chart faxed for review.  [facility] spoke to admissions, beds available, chart faxed for review.    [facility] spoke to admissions, no beds available.  [facility] spoke to admissions, no beds available.    Simon Gibson  3/10/2025

## 2025-03-10 NOTE — ED NOTES
Patient is a 39 yr old male brought to the ED by Talon Cárdenas with a 302, petitioned by his brother, Jose D. He states that patient has a schizophric diagnosis. He has gone a week without showering if the family dosent tell him to. He is not eating regularly. He is not responsive to questions or responds with something that dosen't match the question. His responses are delayed. He is hearing voices. He was punching the air and arguing with someone who was not there. He paces a lot. He has an aggressive demeanor. He also has a hx of drug overdoses. Per Dr. Maldonado, he presents as malodorous, disheveled with intense eye contact. He appears anxious, paranoid and agitated. Speech is slow and scant. Thought process is delayed, with poverty of thoughts he appears internally preoccupied. Responding to internal stimuli. Inappropriately smiling at times. He is not answering basic questions. He appears posturing and agitated at times. His 302 was completed and emailed to Talon Cárdenas     Patient's last admission was at [facility] in 2021 on a 302 due to psychotic behavior.  He was referred to [facility] at that time.  More recently he has gone to [facility] for counseling.    No

## 2025-03-10 NOTE — ED PROVIDER NOTES
"Time reflects when diagnosis was documented in both MDM as applicable and the Disposition within this note       Time User Action Codes Description Comment    3/10/2025 11:59 AM Vic Suarez Add [F25.0] Schizoaffective disorder, bipolar type (HCC)           ED Disposition       None          Assessment & Plan       Medical Decision Making  Amount and/or Complexity of Data Reviewed  Labs: ordered.    39 year old male with need for psychiatric evaluation.   Patient was brought in by [facility] with 302 petition by his brother due to inability to care for self   Patient evaluated by psych and deemed appropriate for admission                Medications   ARIPiprazole (ABILIFY) tablet 15 mg (15 mg Oral Given 3/10/25 1515)   divalproex sodium (DEPAKOTE ER) 24 hr tablet 500 mg (500 mg Oral Given 3/10/25 1515)       ED Risk Strat Scores                                                History of Present Illness       Chief Complaint   Patient presents with    Psychiatric Evaluation     Pt arrives with [employer] escort after probation and family contacted [facility] and initiated 302 d/t pt \"acting out\" pt arrives calm and is cooperative at present       Past Medical History:   Diagnosis Date    Disease of thyroid gland     Drug abuse (HCC)     Schizoaffective disorder (HCC)     resolved 05/16/14    Schizoaffective disorder, bipolar type (HCC)     Schizophrenia (HCC)       Past Surgical History:   Procedure Laterality Date    NO PAST SURGERIES        Family History   Problem Relation Age of Onset    Anxiety disorder Mother     Depression Mother     Dysphagia Mother     Stroke Mother         cva    Diabetes type II Mother     Other Mother         Hyponatremia    Psychosis Mother       Social History     Tobacco Use    Smoking status: Every Day     Current packs/day: 1.00     Types: Cigarettes    Smokeless tobacco: Never   Vaping Use    Vaping status: Never Used   Substance Use Topics    Alcohol use: Yes     Comment: social    Drug use: Yes "     Types: Marijuana, Other     Comment: K2      E-Cigarette/Vaping    E-Cigarette Use Never User       E-Cigarette/Vaping Substances    Nicotine No     THC No     CBD No     Flavoring No     Other No     Unknown No       I have reviewed and agree with the history as documented.     HPI  39 year old male presenting for psych evaluation. Patient with history of schizophrenia. Patient lives with his brother and his mother and his brother contacted [facility] to get patient evaluated. Patient denies any SI/HI and also reports no hallucinations. Slight delay in answering but answering questions appropriately. Calm and cooperative. According to the brother patient is not taking good care of himself including poor hygiene, intermittent aggression, and also auditory hallucinations.       Review of Systems   Constitutional:  Negative for chills, diaphoresis, fever and unexpected weight change.   HENT:  Negative for ear pain and sore throat.    Eyes:  Negative for visual disturbance.   Respiratory:  Negative for cough, chest tightness and shortness of breath.    Cardiovascular:  Negative for chest pain and leg swelling.   Gastrointestinal:  Negative for abdominal distention, abdominal pain, constipation, diarrhea, nausea and vomiting.   Endocrine: Negative.    Genitourinary:  Negative for difficulty urinating and dysuria.   Musculoskeletal: Negative.    Skin: Negative.    Allergic/Immunologic: Negative.    Neurological: Negative.    Hematological: Negative.    Psychiatric/Behavioral: Negative.  Negative for hallucinations, self-injury and suicidal ideas.    All other systems reviewed and are negative.          Objective       ED Triage Vitals [03/10/25 1140]   Temp Pulse Blood Pressure Respirations SpO2 Patient Position - Orthostatic VS   -- 92 118/77 16 97 % Sitting      Temp src Heart Rate Source BP Location FiO2 (%) Pain Score    -- Monitor Left arm -- --      Vitals      Date and Time Temp Pulse SpO2 Resp BP Pain  Score FACES Pain Rating User   03/10/25 1140 -- 92 97 % 16 118/77 -- -- ES            Physical Exam  Vitals and nursing note reviewed.   Constitutional:       General: He is not in acute distress.     Appearance: Normal appearance. He is not ill-appearing.   HENT:      Head: Normocephalic and atraumatic.      Right Ear: External ear normal.      Left Ear: External ear normal.      Nose: Nose normal.      Mouth/Throat:      Mouth: Mucous membranes are moist.      Pharynx: Oropharynx is clear.   Eyes:      General: No scleral icterus.        Right eye: No discharge.         Left eye: No discharge.      Extraocular Movements: Extraocular movements intact.      Conjunctiva/sclera: Conjunctivae normal.      Pupils: Pupils are equal, round, and reactive to light.   Cardiovascular:      Rate and Rhythm: Normal rate and regular rhythm.      Pulses: Normal pulses.      Heart sounds: Normal heart sounds.   Pulmonary:      Effort: Pulmonary effort is normal.      Breath sounds: Normal breath sounds.   Abdominal:      General: Abdomen is flat. Bowel sounds are normal. There is no distension.      Palpations: Abdomen is soft.      Tenderness: There is no abdominal tenderness. There is no guarding or rebound.   Musculoskeletal:         General: Normal range of motion.      Cervical back: Normal range of motion and neck supple.   Skin:     General: Skin is warm and dry.      Capillary Refill: Capillary refill takes less than 2 seconds.   Neurological:      General: No focal deficit present.      Mental Status: He is alert and oriented to person, place, and time. Mental status is at baseline.   Psychiatric:         Mood and Affect: Mood normal. Affect is flat.         Speech: Speech is delayed.         Behavior: Behavior normal.         Thought Content: Thought content normal.         Judgment: Judgment normal.         Results Reviewed       Procedure Component Value Units Date/Time    Rapid drug screen, urine [[id number]]     Lab  Status: No result Specimen: Urine     POCT alcohol breath test [[id number]]     Lab Status: No result     Valproic acid level, total [[id number]]  (Normal) Collected: 03/10/25 1521    Lab Status: Final result Specimen: Blood from Arm, Right Updated: 03/10/25 1539     Valproic Acid, Total 97 ug/mL             No orders to display       Procedures    ED Medication and Procedure Management   Prior to Admission Medications   Prescriptions Last Dose Informant Patient Reported? Taking?   benztropine (COGENTIN) 0.5 mg tablet   No No   Sig: Take 1 tablet (0.5 mg total) by mouth 3 (three) times a day   benztropine (COGENTIN) 2 mg tablet   Yes No   Sig: Take 2 mg by mouth every morning   Patient not taking: Reported on 8/20/2021   buPROPion (WELLBUTRIN SR) 150 mg 12 hr tablet   Yes No   Sig: Take 150 mg by mouth every morning   Patient not taking: Reported on 8/20/2021   cloNIDine (CATAPRES) 0.2 mg tablet   Yes No   Sig: Take 0.2 mg by mouth daily at bedtime   Patient not taking: Reported on 8/20/2021   divalproex sodium (DEPAKOTE) 500 mg DR tablet   Yes No   Sig: Take 500 mg by mouth daily at bedtime   loxapine (LOXITANE) 10 mg capsule   No No   Sig: Take 5 capsules (50 mg total) by mouth 3 (three) times a day 1 in the morning, 2 at night      Facility-Administered Medications: None     Patient's Medications   Discharge Prescriptions    No medications on file     No discharge procedures on file.  ED SEPSIS DOCUMENTATION   Time reflects when diagnosis was documented in both MDM as applicable and the Disposition within this note       Time User Action Codes Description Comment    3/10/2025 11:59 AM Vic Suarez Add [F25.0] Schizoaffective disorder, bipolar type (HCC)                  Vic Suarez MD  03/10/25 1700

## 2025-03-10 NOTE — CONSULTS
Consultation - Behavioral Health   Name: Rhys Esparza 39 y.o. male I MRN: [id number]  Unit/Bed#: ED 05 I Date of Admission: 3/10/2025   Date of Service: 3/10/2025 I Hospital Day: 0   Consult to Psychiatry  Consult performed by: Thelma Maldonado DO  Consult ordered by: Vic Suarez MD        Physician Requesting Evaluation: Vic Suarez MD   Reason for Evaluation / Principal Problem: psychiatry evaluation pending 302 petition    Assessment & Plan  Unspecified Psychotic Disorder (HCC) r/o schizophrenia vs schizoaffective disorder vs substance induced psychotic disorder  Patient presents with symptoms of psychosis including paranoia, internal preoccupation, responding to internal stimuli, delayed speech and poverty of thought. It is unclear at this time if patient is taking his psychotropic medications as prescribed. Last prescriptions filled on 2/27/25 include Abilify 30 mg daily and Depakote 250 mg daily plus 500 mg HS.    Plan:   Restart Abilify at 15 mg daily for psychosis  Restart Depakote at 500 mg BID for mood stabilization   - obtain depakote level prior to administration  302 petitioned and upheld for involuntary inpatient behavioral health admission  I have discussed the above management plan in detail with the primary service.     Reason for Consult: psychotic symptoms, inability to care for ADLs    TREATMENT PLAN RECOMMENDATIONS:  Medications: Restart Abilify at 15 mg daily and Depakote 500 mg BID, pending Depakote level    Risks/Benefits of Treatment:   Risks, benefits, and possible side effects of medications explained to patient and patient verbalizes understanding and agreement for treatment.    Disposition: The patient meets criteria for inpatient psychiatric hospitalization when medically stable due to an acute psychiatric illness. 302 petition upheld.    Legal Status Recommendation: Follow hospital policy to place on involuntary hold.    Other/Medical Work Up and/or treatment modality recommendations: N/A  "to this case.    Patient Caregiver/Family Education: Provided education regarding relevant aspects of the treatment plan to identified patient support based on their needs and abilities in a manner that they could understand with the patient's express consent.    Follow-up: Re-consult PRN    History of Present Illness      Rhys Esparza is a 39 y.o. male with a history of self reported schizophrenia vs documented schizoaffective disorder, bipolar type and cannabis abuse, who presented to the ED on [facility]10/[facility] after family reported patient is not taking care of self, becoming aggressive, and exhibiting hallucinations. Psychiatric consultation was requested due to symptoms of psychosis, pending 302 petition.     Per 302, psychiatric symptoms prior to consultation included decrease in ADLs such as eating and showering, hallucinations, unresponsiveness to question, disorganized behavior.     On initial psychiatric consultation Rhys is an overtly appearing male, malodorous, disheveled, suspicious on approach. Patient states that he is in the hospital because \"they thought I was doing K2.\" Patient denies K2 or any other recent drug use. He states that he is on probation this would be a violation. He denies any suicidal ideation, homicidal ideation, auditory or visual hallucinations. Patient appears to be internally preoccupied and responding to internal stimuli throughout interview. He exhibits delayed speech and poverty of thought. He is unable vs unwilling to respond to most interview questions. Interview was terminated early as patient became abruptly agitated, posturing toward this writer, uncooperative with further interview.    Attempted to contact patient's brother, Jose D and patient's mother, Angy at [phone] to obtain collateral, however, was unable to contact.    Psychiatric Review Of Systems:  Denies feelings of depression, suicidal ideation, homicidal ideation, auditory or visual " hallucinations at time of interview. Unable to perform comprehensive ROS as patient was unable/uncooperative    Historical Information     Past Psychiatric History:     Past Inpatient Psychiatric Treatment:   Per chart: Multiple past inpatient psychiatric admissions . Most recent [facility] 3P in February 2021. Prior hospitalizations include [facility], [facility], and [facility]  Past Outpatient Psychiatric Treatment:    Per chart review: Patient has previously been seen by outpatient psychiatry and therapy [facility]  Past Suicide Attempts: Unable to assess as patient is uncooperative, per 302 petition, patient has recent drug overdoses  Past Violent Behavior: Unable to obtain  Past Psychiatric Medication Trials: Per chart review: Trazodone, Depakote, Risperdal, loxapine, Invega Sustenna, Atarax, Ambien, Cogentin, Abilify    Substance Abuse History:    Social History       Tobacco History       Smoking Status  Every Day Current Packs/Day  1 pack/day Smoking Tobacco Type  Cigarettes   Pack Year History     Packs/Day From To Years    1   0.0      Smokeless Tobacco Use  Never              Alcohol History       Alcohol Use Status  Yes Comment  social              Drug Use       Drug Use Status  Yes Types  Marijuana, Other Comment  K2              Sexual Activity       Sexually Active  Not Currently              Other Factors    Not Asked                 Additional Substance Use Detail       Questions Responses    Problems Due to Past Use of Alcohol? No    Problems Due to Past Use of Substances? No    Substance Use Assessment Substance use within the past 12 months    Alcohol Use Frequency 1 or 2 times/week    Cannabis frequency Daily    Comment: Daily on 2/17/2021     Heroin Frequency Denies use in past 12 months    Cocaine frequency Never used    Comment: Never used on 2/17/2021     Crack Cocaine Frequency Denies use in past 12 months    Methamphetamine Frequency  Denies use in past 12 months    Narcotic Frequency Denies use in past 12 months    Benzodiazepine Frequency Denies use in past 12 months    Amphetamine frequency Denies use in past 12 months    Barbituate Frequency Denies use use in past 12 months    Inhalant frequency Never used    Comment: Never used on 2/17/2021     Hallucinogen frequency Never used    Comment: Never used on 2/17/2021     Ecstasy frequency Never used    Comment: Never used on 2/17/2021     Other drug frequency Never used    Comment: Never used on 2/17/2021     Opiate frequency Denies use in past 12 months    Last reviewed by Car Muniz RN on 3/10/2025          I have assessed this patient for substance use within the past 12 months    Alcohol use: denies use  Recreational drug use:   Cannabis: denies current use.  Reports most recent use was about 1 year ago in March 2024.  Other drugs:   denies use  Longest clean time: unable to obtain  History of Inpatient/Outpatient rehabilitation program: unable to obtain    Family Psychiatric History:     Psychiatric Illness per chart review: Patient's mother has history of psychotic disorder, brother has history of mental illness  Substance Abuse no family history of substance abuse per chart review  Suicide Attempts no family history of suicide attempts Per chart review    Social History:  Per prior records:  Education:  11th grade  Learning Disabilities: None  Children: None  Living Arrangement: Lives in home with mother and brother  Occupational History: On permanent disability, previously worked at [employer] Relationships: Unable to obtain  Legal History: Patient is currently on probation unable to obtain further information   History: No    Traumatic History:   Abuse:no history of sexual abuse per chart review  Other Traumatic Events: none per chart review    Past Medical History:    History of Seizures: no  History of Head injury with loss of consciousness: no    Past  "Medical History:   Diagnosis Date    Disease of thyroid gland     Drug abuse (HCC)     Schizoaffective disorder (HCC)     resolved 05/16/14    Schizoaffective disorder, bipolar type (HCC)     Schizophrenia (HCC)      Past Surgical History:   Procedure Laterality Date    NO PAST SURGERIES       Meds/Allergies      Objective :  HR:  [92] 92  BP: (118)/(77) 118/77  Resp:  [16] 16  SpO2:  [97 %] 97 %  O2 Device: None (Room air)    HR:  [92] 92  BP: (118)/(77) 118/77  Resp:  [16] 16  SpO2:  [97 %] 97 %  O2 Device: None (Room air)    Mental Status Evaluation:    Appearance:  disheveled, looks stated age, marginal hygiene, bearded, shoulder length black hair, overtly appearing male   Behavior:  guarded, restless and fidgety, some agitation, evasive, gesturing a times, inappropriately smiling at times   Speech:  slow, scant, increased latency of response, paucity of speech   Mood:  \"Good\"   Affect:  constricted, mood-incongruent, anxious, agitated at times   Language: naming objects, repeating phrases   Thought Process:  decreased rate of thoughts, thought blocking   Thought Content:  Appears paranoid, suspicious. No overt delusions.   Perceptual Disturbances: Denies auditory or visual hallucinations. However, appears internally preoccupied and at times responding to internal stimuli.    Risk Potential: Suicidal Ideation - None at present  Homicidal Ideations - None at present  Potential for Aggression - Yes, due to agitation and psychosis   Sensorium:  oriented to person and place, unable to assess fully   Memory:  recent and remote memory: unable to assess due to lack of cooperation   Consciousness:  alert and awake   Attention/Concentration: attention span and concentration appear shorter than expected for age   Intellect: unable to assess due to lack of cooperation   Fund of Knowledge: Unable to assess due to lack of cooperation   Insight:  limited   Judgment: poor   Gait/Station: normal gait/station, normal balance "   Motor Activity: no abnormal movements     Suicide Screening  ED Crisis Suicide Risk Assessment:      C-SSRS Screening (Nursing Assessment - recent):    C-SSRS Screening (Nursing Assessment - since last contact):      Suicide Risk Assessment completed by the Consultant: The following ratings are based on assessment at the time of the interview. Demographic risk factors include: male. Historical Risk Factors include: history of mood disorder, history of psychosis, history of suicidal behaviors, substance use. Recent Specific Risk Factors include: diagnosis of mood disorder, current anxiety symptoms, unstable mood, presence of paranoid ideation, unemployed. Protective Factors: no current suicidal ideation. Weapons:  unable to obtain . . Based on today's assessment, Rhys presents the following risk of harm to self:  elevated .    The following interventions are recommended:  Admission to inpatient behavioral health unit. 302 petitioned and upheld      Lab Results: I have reviewed the following results:  Results from the past 24 hours: No results found for this or any previous visit (from the past 24 hours).    Imaging Results Review: No pertinent imaging studies reviewed.  Other Study Results Review: No additional pertinent studies reviewed.    Code Status: Prior  Advance Directive and Living Will:      Power of :    POLST:      Thelma Maldonado,  03/10/25

## 2025-03-10 NOTE — ED CARE HANDOFF
Emergency Department Sign Out Note        Sign out and transfer of care from Dr. Suarez. See Separate Emergency Department note.     The patient, Rhys Esparza, was evaluated by the previous provider for MH.    Workup Completed:  302    ED Course / Workup Pending (followup):  Pending transport                                     Procedures  Medical Decision Making  Amount and/or Complexity of Data Reviewed  Labs: ordered.            Disposition  Final diagnoses:   Schizoaffective disorder, bipolar type (HCC)     Time reflects when diagnosis was documented in both MDM as applicable and the Disposition within this note       Time User Action Codes Description Comment    3/10/2025 11:59 AM Vic Suarez Add [F25.0] Schizoaffective disorder, bipolar type (HCC)           ED Disposition       None          Follow-up Information    None       Patient's Medications   Discharge Prescriptions    No medications on file     No discharge procedures on file.       ED Provider  Electronically Signed by     Kadeem Yarbrough MD  03/10/25 [id number]

## 2025-03-10 NOTE — ED NOTES
Patient is accepted at [place]  Patient is accepted by Dr. Escobar per Arianna Keller [place] will complete authorization  Transportation is arranged with [employer].     Transportation is scheduled for 3/11/25 @8am with [employer]  Patient may go to the floor after 9am       Nurse report is not needed

## 2025-03-10 NOTE — ED NOTES
Updated ACT77 emailed to [facility].     Jeannette Bergeron, MORAIMA  03/10/25     1959

## 2025-03-10 NOTE — ED NOTES
302 completed by Dr. Maldonado and faxed to [facility].  Conformed with Niharika at Crisis that it was received

## 2025-03-11 VITALS
TEMPERATURE: 98.2 F | HEART RATE: 68 BPM | BODY MASS INDEX: 17.83 KG/M2 | RESPIRATION RATE: 18 BRPM | OXYGEN SATURATION: 99 % | DIASTOLIC BLOOD PRESSURE: 67 MMHG | WEIGHT: 107.14 LBS | SYSTOLIC BLOOD PRESSURE: 103 MMHG

## 2025-03-11 LAB
ATRIAL RATE: 110 BPM
P AXIS: 77 DEGREES
PR INTERVAL: 136 MS
QRS AXIS: 90 DEGREES
QRSD INTERVAL: 90 MS
QT INTERVAL: 344 MS
QTC INTERVAL: 465 MS
T WAVE AXIS: 58 DEGREES
VENTRICULAR RATE: 110 BPM

## 2025-03-11 NOTE — ED NOTES
Assumed care for ptr. Pt currently resting on stretcher with no s/s of distress observed. Continues on Q15 for safety. P/U for [place] at 8am.     Raven Andersen RN  03/11/25 0762

## 2025-03-11 NOTE — ED CARE HANDOFF
Emergency Department Sign Out Note        Sign out and transfer of care from Dr. Yarbrough. See Separate Emergency Department note.     The patient, Rhys Esparza, was evaluated by the previous provider for psych eval.    Workup Completed:  Med clearance    ED Course / Workup Pending (followup):  Transport pending                                  ED Course as of 03/11/25 0647   Tue Mar 11, 2025   0646 No issues overnight.  Awaiting transport.     Procedures  Medical Decision Making  Amount and/or Complexity of Data Reviewed  Labs: ordered.    Risk  Prescription drug management.  Decision regarding hospitalization.            Disposition  Final diagnoses:   Schizoaffective disorder, bipolar type (HCC)     Time reflects when diagnosis was documented in both MDM as applicable and the Disposition within this note       Time User Action Codes Description Comment    3/10/2025 11:59 AM Vic Suarez Add [F25.0] Schizoaffective disorder, bipolar type (HCC)           ED Disposition       ED Disposition   Transfer to Behavioral Health Condition   --    Date/Time   Mon Mar 10, 2025  8:25 PM    Comment   Rhys Esparza should be transferred out to [facility] and has been medically cleared.               MD Documentation      Flowsheet Row Most Recent Value   Patient Condition The patient has been stabilized such that within reasonable medical probability, no material deterioration of the patient condition or the condition of the unborn child(travis) is likely to result from the transfer   Reason for Transfer Level of Care needed not available at this facility   Benefits of Transfer Continuity of care, Other benefits (Include comment)_______________________  [inpatient mental health]   Risks of Transfer Potential for delay in receiving treatment   Accepting Physician Dr. Escobar   Accepting Facility Name, [facility]    (Name & Tel number) Simon Gibson   Transported by (Company and Unit #)  [employer]   Sending MD Dr. Yarbrough   Provider Certification General risk, such as traffic hazards, adverse weather conditions, rough terrain or turbulence, possible failure of equipment (including vehicle or aircraft), or consequences of actions of persons outside the control of the transport personnel          RN Documentation      Flowsheet Row Most Recent Value   Accepting Facility Name, [facility]    (Name & Tel number) Simon Gibson   Transport Mode Other (Comment)  [[facility]]   Transported by (Company and Unit #) [employer]   Patient Belongings Disposition Sent with patient   Transfer Date 03/11/25   Transfer Time 0800          Follow-up Information    None       Patient's Medications   Discharge Prescriptions    No medications on file     No discharge procedures on file.       ED Provider  Electronically Signed by     John Pitts DO  03/11/25 [id number]

## 2025-03-11 NOTE — EMTALA/ACUTE CARE TRANSFER
[facility] EMERGENCY DEPARTMENT  [address] [address]  [phone]  Dept: [phone]      EMTALA TRANSFER CONSENT    NAME Rhys DIAZ 1985                              MRN [id number]    I have been informed of my rights regarding examination, treatment, and transfer   by Dr. Kadeem Yarbrough MD    Benefits: Continuity of care, Other benefits (Include comment)_______________________ (inpatient mental health)    Risks: Potential for delay in receiving treatment      Consent for Transfer:  I acknowledge that my medical condition has been evaluated and explained to me by the emergency department physician or other qualified medical person and/or my attending physician, who has recommended that I be transferred to the service of  Accepting Physician: Dr. Escobar at Accepting Facility Name, City & State : [facility]. The above potential benefits of such transfer, the potential risks associated with such transfer, and the probable risks of not being transferred have been explained to me, and I fully understand them.  The doctor has explained that, in my case, the benefits of transfer outweigh the risks.  I agree to be transferred.    I authorize the performance of emergency medical procedures and treatments upon me in both transit and upon arrival at the receiving facility.  Additionally, I authorize the release of any and all medical records to the receiving facility and request they be transported with me, if possible.  I understand that the safest mode of transportation during a medical emergency is an ambulance and that the Hospital advocates the use of this mode of transport. Risks of traveling to the receiving facility by car, including absence of medical control, life sustaining equipment, such as oxygen, and medical personnel has been explained to me and I fully understand them.    (DENISE CORRECT BOX  BELOW)  [  ]  I consent to the stated transfer and to be transported by ambulance/helicopter.  [  ]  I consent to the stated transfer, but refuse transportation by ambulance and accept full responsibility for my transportation by car.  I understand the risks of non-ambulance transfers and I exonerate the Hospital and its staff from any deterioration in my condition that results from this refusal.    X___________________________________________    DATE  03/10/25  TIME________  Signature of patient or legally responsible individual signing on patient behalf           RELATIONSHIP TO PATIENT_________________________          Provider Certification    NAME Rhys Esparza                                         1985                              MRN [id number]    A medical screening exam was performed on the above named patient.  Based on the examination:    Condition Necessitating Transfer The encounter diagnosis was Schizoaffective disorder, bipolar type (HCC).    Patient Condition: The patient has been stabilized such that within reasonable medical probability, no material deterioration of the patient condition or the condition of the unborn child(travis) is likely to result from the transfer    Reason for Transfer: Level of Care needed not available at this facility    Transfer Requirements: Facility [facility]   Space available and qualified personnel available for treatment as acknowledged by Simon Gibson  Agreed to accept transfer and to provide appropriate medical treatment as acknowledged by       Dr. Escobar  Appropriate medical records of the examination and treatment of the patient are provided at the time of transfer   STAFF INITIAL WHEN COMPLETED _______  Transfer will be performed by qualified personnel from [facility]  and appropriate transfer equipment as required, including the use of necessary and appropriate life support measures.    Provider Certification: I have examined the patient and  explained the following risks and benefits of being transferred/refusing transfer to the patient/family:  General risk, such as traffic hazards, adverse weather conditions, rough terrain or turbulence, possible failure of equipment (including vehicle or aircraft), or consequences of actions of persons outside the control of the transport personnel      Based on these reasonable risks and benefits to the patient and/or the unborn child(travis), and based upon the information available at the time of the patient’s examination, I certify that the medical benefits reasonably to be expected from the provision of appropriate medical treatments at another medical facility outweigh the increasing risks, if any, to the individual’s medical condition, and in the case of labor to the unborn child, from effecting the transfer.    X____________________________________________ DATE 03/10/25        TIME_______      ORIGINAL - SEND TO MEDICAL RECORDS   COPY - SEND WITH PATIENT DURING TRANSFER

## 2025-03-18 ENCOUNTER — DOCUMENTATION (OUTPATIENT)
Dept: CASE MANAGEMENT | Facility: HOSPITAL | Age: 40
End: 2025-03-18

## 2025-03-18 NOTE — BEHAVIORAL HEALTH HIGH UTILIZER
Patient Name:Rhys Esparza MRN:  [id number]         : 1985     Age: 39 y.o.    Sex: male   Utilization History:  (# of ED visits & IP admits; reasons)  Pt had 14 [facility] visits from 10/2024-3/2025. ED presentations were related to chronic severe substance misuse of K2, pt found unresponsive or lethargic in the community, accidental overdose in the community, sleeping/laying down in public places and is brought in by the police & EMS, pt required Narcan in the community.  Treatment Recommendations & Presentation:  Presentation in the ED: Pt presents self or is accompanied by police/EMS. ED visits were related to chronic severe substance misuse of K2, pt found unresponsive or lethargic in the community, accidental overdose in the community, sleeping/laying down in public places and is brought in by the police & EMS, pt required Narcan in the community.        ED Recommendations: Following medical evaluation and treatment, establish acute risk versus chronic behavioral disturbances related to severe chronic K2 misuse. Pt should consider [facility] for inpt addictions treatment or a dual diagnosis facility such as [facility]. For community treatment, pt should return to [facility] program for addictions treatment and counseling [phone] at [address] [facility] or [facility] at [address] [facility] [phone] or [facility] at [address] [facility] [phone] or [facility] at [address] [facility] [phone].  For medical issues, pt should go to [facility] at [address] [facility] [phone] or [facility] at [address] & [facility] 1st floor [phone]. Pt can also contact [facility] Nurses at [phone].   If needed, contact mother or brother for collaboration and discharge planning.    Home Medication Regimen: see most recent documentation in Epic, mother or brother may be able to verify medications.     Recent Medical Work Ups:  (include Psych testing or ECT)  Labs -   Various Xrays & CT's -  2025 ECG - 2025    Inpatient Recommendations: collaborate with pt's community resources to develop a comprehensive discharge plan. Pt requires ICM services, pt should have addictions counseling.         Outpatient recommendations: pt should continue his medical, mental health and addictions treatment in the community and take his medications as prescribed.      Situation/Relevant Background Info: Pt is a 39 year old male with a diagnosis of Schizoaffective disorder, Synthetic Cannabinoid Abuse and an extensive history of chronic severe abuse of K2 and behavioral health admissions.  Pt lives with his mother and brother, sometimes they have 302'd patient for treatment as they report he was paranoid, aggressive and responding to internal stimuli. Pt has been consistently abusing illicit substances, mainly K2, for an extensive period of time.  Pt consistently comes to ED's or is brought in by the police and EMS due to his drug abuse and does not appear to have any significant clean time, sobriety from his drug use. Pt is often brought to ED's by police/EMS due to being lethargic, non-responsive, sleeping on sidewalks or various public places following drug use.   Pt did engage in the [facility] [employer] MAT program in 2024 but appeared to struggle in this program as per notes. Pt has been periodically incarcerated due to his drug use. At times, pt has utilized [facility] for his medical care.        Diagnoses/Significant Problems (Medical & Psychiatric):    Schizoaffective disorder, Synthetic Cannabinoid Abuse    Mild Tachycardia         Drivers of repeated utilization:   chronic, severe and consistent use of K2, possible substance induced psychosis, poor coping skills, poor insight.                                           Existing Community Resources & Supports:                 Mother and brother    Patient Medical & Psychiatric Care Team:   [employer] MAT program - [phone]  [facility] [phone]    Care plan date: 03/18/25                   Author:  Hope Olmstead RN                 Date reviewed 2025with patient:

## 2025-04-17 ENCOUNTER — TELEPHONE (OUTPATIENT)
Dept: FAMILY MEDICINE CLINIC | Facility: CLINIC | Age: 40
End: 2025-04-17

## 2025-04-17 NOTE — TELEPHONE ENCOUNTER
Hi, my name is Danielle Marmolejo,  at [facility] calling regarding Rhys LEVY date of birth 1985. I'm calling to schedule him a follow up appointment because I was informed that he needed to be scheduled with you guys so he can be referred to the [facility], [facility] Program. So if you could please give me a call back at Lovely's convenience, I would greatly appreciate it. I can be reached at [phone]. Once more [phone] Danielle Marmolejo calling regarding Rhys LEVY, date of birth [birth year]. Thank you. Bye.      Appointment scheduled.

## 2025-04-23 ENCOUNTER — APPOINTMENT (EMERGENCY)
Dept: RADIOLOGY | Facility: HOSPITAL | Age: 40
End: 2025-04-23
Payer: MEDICARE

## 2025-04-23 ENCOUNTER — HOSPITAL ENCOUNTER (EMERGENCY)
Facility: HOSPITAL | Age: 40
Discharge: HOME | End: 2025-04-24
Attending: EMERGENCY MEDICINE
Payer: MEDICARE

## 2025-04-23 DIAGNOSIS — F19.10 POLYSUBSTANCE ABUSE (HCC): ICD-10-CM

## 2025-04-23 DIAGNOSIS — T50.901A ACCIDENTAL OVERDOSE, INITIAL ENCOUNTER: Primary | ICD-10-CM

## 2025-04-23 LAB
ALBUMIN SERPL BCG-MCNC: 4.4 G/DL (ref 3.5–5)
ALP SERPL-CCNC: 72 U/L (ref 34–104)
ALT SERPL W P-5'-P-CCNC: 13 U/L (ref 7–52)
ANION GAP SERPL CALCULATED.3IONS-SCNC: 10 MMOL/L (ref 4–13)
APAP SERPL-MCNC: <2 UG/ML (ref 10–20)
AST SERPL W P-5'-P-CCNC: 21 U/L (ref 13–39)
BASE EX.OXY STD BLDV CALC-SCNC: 90.5 % (ref 60–80)
BASE EXCESS BLDV CALC-SCNC: -1.8 MMOL/L
BASOPHILS # BLD AUTO: 0.03 THOUSANDS/ÂΜL (ref 0–0.1)
BASOPHILS NFR BLD AUTO: 0 % (ref 0–1)
BILIRUB DIRECT SERPL-MCNC: 0.07 MG/DL (ref 0–0.2)
BILIRUB SERPL-MCNC: 0.45 MG/DL (ref 0.2–1)
BUN SERPL-MCNC: 10 MG/DL (ref 5–25)
CALCIUM SERPL-MCNC: 9 MG/DL (ref 8.4–10.2)
CHLORIDE SERPL-SCNC: 99 MMOL/L (ref 96–108)
CO2 SERPL-SCNC: 24 MMOL/L (ref 21–32)
CREAT SERPL-MCNC: 0.8 MG/DL (ref 0.6–1.3)
EOSINOPHIL # BLD AUTO: 0.11 THOUSAND/ÂΜL (ref 0–0.61)
EOSINOPHIL NFR BLD AUTO: 1 % (ref 0–6)
ERYTHROCYTE [DISTWIDTH] IN BLOOD BY AUTOMATED COUNT: 14.9 % (ref 11.6–15.1)
ETHANOL SERPL-MCNC: <10 MG/DL
GFR SERPL CREATININE-BSD FRML MDRD: 113 ML/MIN/1.73SQ M
GLUCOSE SERPL-MCNC: 149 MG/DL (ref 65–140)
HCO3 BLDV-SCNC: 24.1 MMOL/L (ref 24–30)
HCT VFR BLD AUTO: 36.9 % (ref 36.5–49.3)
HGB BLD-MCNC: 12.3 G/DL (ref 12–17)
IMM GRANULOCYTES # BLD AUTO: 0.04 THOUSAND/UL (ref 0–0.2)
IMM GRANULOCYTES NFR BLD AUTO: 1 % (ref 0–2)
LYMPHOCYTES # BLD AUTO: 2.69 THOUSANDS/ÂΜL (ref 0.6–4.47)
LYMPHOCYTES NFR BLD AUTO: 31 % (ref 14–44)
MCH RBC QN AUTO: 29.1 PG (ref 26.8–34.3)
MCHC RBC AUTO-ENTMCNC: 33.3 G/DL (ref 31.4–37.4)
MCV RBC AUTO: 87 FL (ref 82–98)
MONOCYTES # BLD AUTO: 1.06 THOUSAND/ÂΜL (ref 0.17–1.22)
MONOCYTES NFR BLD AUTO: 12 % (ref 4–12)
NEUTROPHILS # BLD AUTO: 4.86 THOUSANDS/ÂΜL (ref 1.85–7.62)
NEUTS SEG NFR BLD AUTO: 55 % (ref 43–75)
NRBC BLD AUTO-RTO: 0 /100 WBCS
O2 CT BLDV-SCNC: 16.9 ML/DL
PCO2 BLDV: 45.5 MM HG (ref 42–50)
PH BLDV: 7.34 [PH] (ref 7.3–7.4)
PLATELET # BLD AUTO: 231 THOUSANDS/UL (ref 149–390)
PMV BLD AUTO: 9.1 FL (ref 8.9–12.7)
PO2 BLDV: 165.2 MM HG (ref 35–45)
POTASSIUM SERPL-SCNC: 3.8 MMOL/L (ref 3.5–5.3)
PROT SERPL-MCNC: 6.4 G/DL (ref 6.4–8.4)
RBC # BLD AUTO: 4.22 MILLION/UL (ref 3.88–5.62)
SALICYLATES SERPL-MCNC: <5 MG/DL (ref 3–20)
SODIUM SERPL-SCNC: 133 MMOL/L (ref 135–147)
WBC # BLD AUTO: 8.79 THOUSAND/UL (ref 4.31–10.16)

## 2025-04-23 PROCEDURE — 80179 DRUG ASSAY SALICYLATE: CPT | Performed by: EMERGENCY MEDICINE

## 2025-04-23 PROCEDURE — 80143 DRUG ASSAY ACETAMINOPHEN: CPT | Performed by: EMERGENCY MEDICINE

## 2025-04-23 PROCEDURE — 80048 BASIC METABOLIC PNL TOTAL CA: CPT | Performed by: EMERGENCY MEDICINE

## 2025-04-23 PROCEDURE — 80076 HEPATIC FUNCTION PANEL: CPT | Performed by: EMERGENCY MEDICINE

## 2025-04-23 PROCEDURE — 85025 COMPLETE CBC W/AUTO DIFF WBC: CPT | Performed by: EMERGENCY MEDICINE

## 2025-04-23 PROCEDURE — 82077 ASSAY SPEC XCP UR&BREATH IA: CPT | Performed by: EMERGENCY MEDICINE

## 2025-04-23 PROCEDURE — 99284 EMERGENCY DEPT VISIT MOD MDM: CPT

## 2025-04-23 PROCEDURE — 36415 COLL VENOUS BLD VENIPUNCTURE: CPT | Performed by: EMERGENCY MEDICINE

## 2025-04-23 PROCEDURE — 71045 X-RAY EXAM CHEST 1 VIEW: CPT

## 2025-04-23 PROCEDURE — 93005 ELECTROCARDIOGRAM TRACING: CPT

## 2025-04-23 PROCEDURE — 82805 BLOOD GASES W/O2 SATURATION: CPT | Performed by: EMERGENCY MEDICINE

## 2025-04-23 PROCEDURE — 99284 EMERGENCY DEPT VISIT MOD MDM: CPT | Performed by: EMERGENCY MEDICINE

## 2025-04-23 RX ORDER — NALOXONE HYDROCHLORIDE 1 MG/ML
1 INJECTION PARENTERAL ONCE
Status: COMPLETED | OUTPATIENT
Start: 2025-04-23 | End: 2025-04-23

## 2025-04-23 RX ORDER — NALOXONE HYDROCHLORIDE 1 MG/ML
4 INJECTION INTRAMUSCULAR; INTRAVENOUS; SUBCUTANEOUS ONCE
Status: DISCONTINUED | OUTPATIENT
Start: 2025-04-23 | End: 2025-04-24 | Stop reason: HOSPADM

## 2025-04-23 RX ADMIN — SODIUM CHLORIDE 1000 ML: 0.9 INJECTION, SOLUTION INTRAVENOUS at 20:08

## 2025-04-24 VITALS
RESPIRATION RATE: 16 BRPM | DIASTOLIC BLOOD PRESSURE: 64 MMHG | HEART RATE: 74 BPM | OXYGEN SATURATION: 97 % | SYSTOLIC BLOOD PRESSURE: 91 MMHG

## 2025-04-24 LAB
ATRIAL RATE: 129 BPM
P AXIS: 59 DEGREES
PR INTERVAL: 132 MS
QRS AXIS: 79 DEGREES
QRSD INTERVAL: 86 MS
QT INTERVAL: 288 MS
QTC INTERVAL: 421 MS
T WAVE AXIS: 49 DEGREES
VENTRICULAR RATE: 129 BPM

## 2025-04-24 PROCEDURE — 93010 ELECTROCARDIOGRAM REPORT: CPT | Performed by: INTERNAL MEDICINE

## 2025-04-24 NOTE — ED PROVIDER NOTES
Time reflects when diagnosis was documented in both MDM as applicable and the Disposition within this note       Time User Action Codes Description Comment    4/24/2025 12:55 AM Mir Shields Add [T50.901A] Accidental overdose, initial encounter     4/24/2025 12:55 AM Mir Shields Add [F19.10] Polysubstance abuse (HCC)           ED Disposition       ED Disposition   Discharge    Condition   Stable    Date/Time   Thu Apr 24, 2025 12:55 AM    Comment   Rhys Esparza discharge to home/self care.                   Assessment & Plan       Medical Decision Making  1. Altered mental status - Patient with similar presentations in the past. Patient mostly unresponsive but with some spontaneous movements, appears to be maintaining airway. Will monitor, check VBG, ECG, metabolic panel for electrolyte abnormalities and dehydration.     Problems Addressed:  Accidental overdose, initial encounter: acute illness or injury  Polysubstance abuse (HCC): chronic illness or injury    Amount and/or Complexity of Data Reviewed  External Data Reviewed: notes.  Labs: ordered.  Radiology: ordered.    Risk  Prescription drug management.        ED Course as of 04/24/25 1657   Wed Apr 23, 2025 2037 Patient does open eyes to voice, no verbal responses.   2218 Patient nodding appropriately to simple questions.   Thu Apr 24, 2025   0000 Patient awake, able to sit up.        Medications   naloxone (FOR EMS ONLY) (NARCAN) 2 MG/2ML injection 2 mg (0 mg Does not apply Given to EMS 4/23/25 2022)   sodium chloride 0.9 % bolus 1,000 mL (0 mL Intravenous Stopped 4/23/25 2038)       ED Risk Strat Scores                    No data recorded                            History of Present Illness       Chief Complaint   Patient presents with    Overdose - Accidental     Pt arrives via EMS for an overdose       Past Medical History:   Diagnosis Date    Disease of thyroid gland     Drug abuse (HCC)     Schizoaffective disorder (HCC)     resolved 05/16/14     Schizoaffective disorder, bipolar type (HCC)     Schizophrenia (HCC)       Past Surgical History:   Procedure Laterality Date    NO PAST SURGERIES        Family History   Problem Relation Age of Onset    Anxiety disorder Mother     Depression Mother     Dysphagia Mother     Stroke Mother         cva    Diabetes type II Mother     Other Mother         Hyponatremia    Psychosis Mother       Social History     Tobacco Use    Smoking status: Every Day     Current packs/day: 1.00     Types: Cigarettes    Smokeless tobacco: Never   Vaping Use    Vaping status: Never Used   Substance Use Topics    Alcohol use: Yes     Comment: social    Drug use: Yes     Types: Marijuana, Other     Comment: K2      E-Cigarette/Vaping    E-Cigarette Use Never User       E-Cigarette/Vaping Substances    Nicotine No     THC No     CBD No     Flavoring No     Other No     Unknown No       I have reviewed and agree with the history as documented.     38 YO male presents after being found unresponsive outside. Patient was given naloxone by bystanders and EMS was called. EMS additionally gave naloxone with little improvement. Patient arrives to the ED minimally responsive but with occasional spontaneous movements. Patient is known to use opiates and K2. Per EMS this is a typical presentation and patient can become violent.       History provided by:  Medical records and EMS personnel  History limited by:  Patient unresponsive   used: No        Review of Systems   Unable to perform ROS: Patient unresponsive           Objective       ED Triage Vitals [04/23/25 2004]   Temp Pulse Blood Pressure Respirations SpO2 Patient Position - Orthostatic VS   -- (!) 116 97/53 18 97 % Lying      Temp src Heart Rate Source BP Location FiO2 (%) Pain Score    -- Monitor Right arm -- --      Vitals      Date and Time Temp Pulse SpO2 Resp BP Pain Score FACES Pain Rating User   04/24/25 0100 -- 74 97 % 16 91/64 -- -- TM   04/24/25 0000 -- 75 100 %  16 96/60 -- --    04/23/25 2245 -- 75 99 % 16 95/55 -- --    04/23/25 2200 -- 84 100 % 16 109/61 -- --    04/23/25 2100 -- 93 99 % 16 90/55 -- --    04/23/25 2004 -- 116 97 % 18 97/53 -- --             Physical Exam  Vitals and nursing note reviewed.   Constitutional:       Appearance: He is well-developed.   HENT:      Head: Normocephalic and atraumatic.   Eyes:      Extraocular Movements: Extraocular movements intact.   Cardiovascular:      Rate and Rhythm: Normal rate.   Pulmonary:      Effort: Pulmonary effort is normal.   Abdominal:      General: There is no distension.   Musculoskeletal:         General: Normal range of motion.      Cervical back: Normal range of motion.   Skin:     Findings: No rash.   Neurological:      Comments: Obtunded, wakes briefly to painful stimuli.   Psychiatric:         Behavior: Behavior normal.         Results Reviewed       Procedure Component Value Units Date/Time    Basic metabolic panel [[id number]]  (Abnormal) Collected: 04/23/25 2013    Lab Status: Final result Specimen: Blood from Arm, Right Updated: 04/23/25 2049     Sodium 133 mmol/L      Potassium 3.8 mmol/L      Chloride 99 mmol/L      CO2 24 mmol/L      ANION GAP 10 mmol/L      BUN 10 mg/dL      Creatinine 0.80 mg/dL      Glucose 149 mg/dL      Calcium 9.0 mg/dL      eGFR 113 ml/min/1.73sq m     Narrative:      National Kidney Disease Foundation guidelines for Chronic Kidney Disease (CKD):     Stage 1 with normal or high GFR (GFR > 90 mL/min/1.73 square meters)    Stage 2 Mild CKD (GFR = 60-89 mL/min/1.73 square meters)    Stage 3A Moderate CKD (GFR = 45-59 mL/min/1.73 square meters)    Stage 3B Moderate CKD (GFR = 30-44 mL/min/1.73 square meters)    Stage 4 Severe CKD (GFR = 15-29 mL/min/1.73 square meters)    Stage 5 End Stage CKD (GFR <15 mL/min/1.73 square meters)  Note: GFR calculation is accurate only with a steady state creatinine    Hepatic function panel [[id number]]  (Normal) Collected: 04/23/25 2013     Lab Status: Final result Specimen: Blood from Arm, Right Updated: 04/23/25 2049     Total Bilirubin 0.45 mg/dL      Bilirubin, Direct 0.07 mg/dL      Alkaline Phosphatase 72 U/L      AST 21 U/L      ALT 13 U/L      Total Protein 6.4 g/dL      Albumin 4.4 g/dL     Salicylate level [[id number]]  (Normal) Collected: 04/23/25 2013    Lab Status: Final result Specimen: Blood from Arm, Right Updated: 04/23/25 2049     Salicylate Lvl <5 mg/dL     Acetaminophen level-If concentration is detectable, please discuss with medical  on call. [[id number]]  (Abnormal) Collected: 04/23/25 2013    Lab Status: Final result Specimen: Blood from Arm, Right Updated: 04/23/25 2049     Acetaminophen Level <2 ug/mL     Ethanol [[id number]]  (Normal) Collected: 04/23/25 2013    Lab Status: Final result Specimen: Blood from Arm, Right Updated: 04/23/25 2040     Ethanol Lvl <10 mg/dL     Blood gas, venous [[id number]]  (Abnormal) Collected: 04/23/25 2013    Lab Status: Final result Specimen: Blood from Arm, Right Updated: 04/23/25 2036     pH, Tj 7.342     pCO2, Tj 45.5 mm Hg      pO2, Tj 165.2 mm Hg      HCO3, Tj 24.1 mmol/L      Base Excess, Tj -1.8 mmol/L      O2 Content, Tj 16.9 ml/dL      O2 HGB, VENOUS 90.5 %     CBC and differential [[id number]] Collected: 04/23/25 2013    Lab Status: Final result Specimen: Blood from Arm, Right Updated: 04/23/25 2020     WBC 8.79 Thousand/uL      RBC 4.22 Million/uL      Hemoglobin 12.3 g/dL      Hematocrit 36.9 %      MCV 87 fL      MCH 29.1 pg      MCHC 33.3 g/dL      RDW 14.9 %      MPV 9.1 fL      Platelets 231 Thousands/uL      nRBC 0 /100 WBCs      Segmented % 55 %      Immature Grans % 1 %      Lymphocytes % 31 %      Monocytes % 12 %      Eosinophils Relative 1 %      Basophils Relative 0 %      Absolute Neutrophils 4.86 Thousands/µL      Absolute Immature Grans 0.04 Thousand/uL      Absolute Lymphocytes 2.69 Thousands/µL      Absolute Monocytes 1.06 Thousand/µL      Eosinophils  Absolute 0.11 Thousand/µL      Basophils Absolute 0.03 Thousands/µL             XR chest 1 view portable   Final Interpretation by Jamal Guzman MD (04/24 0829)      No acute cardiopulmonary disease.            Workstation performed: [id number]             Procedures    ED Medication and Procedure Management   Prior to Admission Medications   Prescriptions Last Dose Informant Patient Reported? Taking?   benztropine (COGENTIN) 0.5 mg tablet   No No   Sig: Take 1 tablet (0.5 mg total) by mouth 3 (three) times a day   benztropine (COGENTIN) 2 mg tablet   Yes No   Sig: Take 2 mg by mouth every morning   Patient not taking: Reported on 8/20/2021   buPROPion (WELLBUTRIN SR) 150 mg 12 hr tablet   Yes No   Sig: Take 150 mg by mouth every morning   Patient not taking: Reported on 8/20/2021   cloNIDine (CATAPRES) 0.2 mg tablet   Yes No   Sig: Take 0.2 mg by mouth daily at bedtime   Patient not taking: Reported on 8/20/2021   divalproex sodium (DEPAKOTE) 500 mg DR tablet   Yes No   Sig: Take 500 mg by mouth daily at bedtime      Facility-Administered Medications: None     Discharge Medication List as of 4/24/2025 12:56 AM        CONTINUE these medications which have NOT CHANGED    Details   !! benztropine (COGENTIN) 0.5 mg tablet Take 1 tablet (0.5 mg total) by mouth 3 (three) times a day, Starting Thu 11/30/2023, Normal      !! benztropine (COGENTIN) 2 mg tablet Take 2 mg by mouth every morning, Starting Tue 6/22/2021, Historical Med      buPROPion (WELLBUTRIN SR) 150 mg 12 hr tablet Take 150 mg by mouth every morning, Starting Sun 6/20/2021, Historical Med      cloNIDine (CATAPRES) 0.2 mg tablet Take 0.2 mg by mouth daily at bedtime, Starting Tue 6/22/2021, Historical Med      divalproex sodium (DEPAKOTE) 500 mg DR tablet Take 500 mg by mouth daily at bedtime, Historical Med      loxapine (LOXITANE) 10 mg capsule Take 5 capsules (50 mg total) by mouth 3 (three) times a day 1 in the morning, 2 at night, Starting Thu  11/30/2023, Until Sat 12/30/2023, Normal       !! - Potential duplicate medications found. Please discuss with provider.        No discharge procedures on file.  ED SEPSIS DOCUMENTATION   Time reflects when diagnosis was documented in both MDM as applicable and the Disposition within this note       Time User Action Codes Description Comment    4/24/2025 12:55 AM Mir Shields Add [T50.901A] Accidental overdose, initial encounter     4/24/2025 12:55 AM Mir Shields Add [F19.10] Polysubstance abuse (HCC)                  Mir Shields MD  04/24/25 8652

## 2025-04-25 ENCOUNTER — HOSPITAL ENCOUNTER (EMERGENCY)
Facility: HOSPITAL | Age: 40
Discharge: HOME | End: 2025-04-25
Attending: EMERGENCY MEDICINE
Payer: MEDICARE

## 2025-04-25 VITALS
HEART RATE: 75 BPM | SYSTOLIC BLOOD PRESSURE: 104 MMHG | TEMPERATURE: 99 F | DIASTOLIC BLOOD PRESSURE: 66 MMHG | RESPIRATION RATE: 16 BRPM | OXYGEN SATURATION: 100 %

## 2025-04-25 DIAGNOSIS — F19.10 SUBSTANCE ABUSE (HCC): Primary | ICD-10-CM

## 2025-04-25 LAB
ALBUMIN SERPL BCG-MCNC: 4.1 G/DL (ref 3.5–5)
ALP SERPL-CCNC: 66 U/L (ref 34–104)
ALT SERPL W P-5'-P-CCNC: 12 U/L (ref 7–52)
ANION GAP SERPL CALCULATED.3IONS-SCNC: 8 MMOL/L (ref 4–13)
APTT PPP: 29 SECONDS (ref 23–34)
AST SERPL W P-5'-P-CCNC: 20 U/L (ref 13–39)
BASOPHILS # BLD AUTO: 0.03 THOUSANDS/ÂΜL (ref 0–0.1)
BASOPHILS NFR BLD AUTO: 0 % (ref 0–1)
BILIRUB SERPL-MCNC: 0.36 MG/DL (ref 0.2–1)
BUN SERPL-MCNC: 11 MG/DL (ref 5–25)
CALCIUM SERPL-MCNC: 8.3 MG/DL (ref 8.4–10.2)
CHLORIDE SERPL-SCNC: 104 MMOL/L (ref 96–108)
CK SERPL-CCNC: 352 U/L (ref 39–308)
CO2 SERPL-SCNC: 27 MMOL/L (ref 21–32)
CREAT SERPL-MCNC: 0.94 MG/DL (ref 0.6–1.3)
EOSINOPHIL # BLD AUTO: 0.03 THOUSAND/ÂΜL (ref 0–0.61)
EOSINOPHIL NFR BLD AUTO: 0 % (ref 0–6)
ERYTHROCYTE [DISTWIDTH] IN BLOOD BY AUTOMATED COUNT: 15.4 % (ref 11.6–15.1)
GFR SERPL CREATININE-BSD FRML MDRD: 102 ML/MIN/1.73SQ M
GLUCOSE SERPL-MCNC: 139 MG/DL (ref 65–140)
HCT VFR BLD AUTO: 33.7 % (ref 36.5–49.3)
HGB BLD-MCNC: 10.7 G/DL (ref 12–17)
IMM GRANULOCYTES # BLD AUTO: 0.06 THOUSAND/UL (ref 0–0.2)
IMM GRANULOCYTES NFR BLD AUTO: 1 % (ref 0–2)
INR PPP: 1.26 (ref 0.85–1.19)
LYMPHOCYTES # BLD AUTO: 2.41 THOUSANDS/ÂΜL (ref 0.6–4.47)
LYMPHOCYTES NFR BLD AUTO: 32 % (ref 14–44)
MCH RBC QN AUTO: 29.1 PG (ref 26.8–34.3)
MCHC RBC AUTO-ENTMCNC: 31.8 G/DL (ref 31.4–37.4)
MCV RBC AUTO: 92 FL (ref 82–98)
MONOCYTES # BLD AUTO: 0.95 THOUSAND/ÂΜL (ref 0.17–1.22)
MONOCYTES NFR BLD AUTO: 13 % (ref 4–12)
NEUTROPHILS # BLD AUTO: 4.06 THOUSANDS/ÂΜL (ref 1.85–7.62)
NEUTS SEG NFR BLD AUTO: 54 % (ref 43–75)
NRBC BLD AUTO-RTO: 0 /100 WBCS
PLATELET # BLD AUTO: 212 THOUSANDS/UL (ref 149–390)
PMV BLD AUTO: 9.1 FL (ref 8.9–12.7)
POTASSIUM SERPL-SCNC: 3.9 MMOL/L (ref 3.5–5.3)
PROT SERPL-MCNC: 6.2 G/DL (ref 6.4–8.4)
PROTHROMBIN TIME: 16.1 SECONDS (ref 12.3–15)
RBC # BLD AUTO: 3.68 MILLION/UL (ref 3.88–5.62)
SODIUM SERPL-SCNC: 139 MMOL/L (ref 135–147)
WBC # BLD AUTO: 7.54 THOUSAND/UL (ref 4.31–10.16)

## 2025-04-25 PROCEDURE — 99283 EMERGENCY DEPT VISIT LOW MDM: CPT

## 2025-04-25 PROCEDURE — 85610 PROTHROMBIN TIME: CPT | Performed by: EMERGENCY MEDICINE

## 2025-04-25 PROCEDURE — 96360 HYDRATION IV INFUSION INIT: CPT

## 2025-04-25 PROCEDURE — 85025 COMPLETE CBC W/AUTO DIFF WBC: CPT | Performed by: EMERGENCY MEDICINE

## 2025-04-25 PROCEDURE — 85730 THROMBOPLASTIN TIME PARTIAL: CPT | Performed by: EMERGENCY MEDICINE

## 2025-04-25 PROCEDURE — 82550 ASSAY OF CK (CPK): CPT | Performed by: EMERGENCY MEDICINE

## 2025-04-25 PROCEDURE — 80053 COMPREHEN METABOLIC PANEL: CPT | Performed by: EMERGENCY MEDICINE

## 2025-04-25 PROCEDURE — 93005 ELECTROCARDIOGRAM TRACING: CPT

## 2025-04-25 PROCEDURE — 99284 EMERGENCY DEPT VISIT MOD MDM: CPT | Performed by: EMERGENCY MEDICINE

## 2025-04-25 PROCEDURE — 36415 COLL VENOUS BLD VENIPUNCTURE: CPT | Performed by: EMERGENCY MEDICINE

## 2025-04-25 RX ORDER — HYDROXYZINE HYDROCHLORIDE 50 MG/1
TABLET, FILM COATED ORAL
COMMUNITY
Start: 2025-02-27

## 2025-04-25 RX ORDER — BENZTROPINE MESYLATE 1 MG/1
TABLET ORAL
COMMUNITY
Start: 2025-02-27

## 2025-04-25 RX ORDER — DIVALPROEX SODIUM 250 MG/1
TABLET, FILM COATED, EXTENDED RELEASE ORAL
COMMUNITY
Start: 2025-02-27

## 2025-04-25 RX ORDER — ARIPIPRAZOLE 30 MG/1
TABLET ORAL
COMMUNITY
Start: 2025-02-27

## 2025-04-25 RX ADMIN — SODIUM CHLORIDE 1000 ML: 0.9 INJECTION, SOLUTION INTRAVENOUS at 16:35

## 2025-04-25 NOTE — ED PROVIDER NOTES
Time reflects when diagnosis was documented in both MDM as applicable and the Disposition within this note       Time User Action Codes Description Comment    4/25/2025  7:46 PM Vic Suarez Add [F19.10] Substance abuse (HCC)           ED Disposition       ED Disposition   Discharge    Condition   Stable    Date/Time   Fri Apr 25, 2025  7:46 PM    Comment   Rhysrichard Esparza discharge to home/self care.                   Assessment & Plan       Medical Decision Making  39-year-old male presents with altered mental status with suspicion for drug overdose  Patient has a history of K2 use  No obvious signs of trauma  Not concern for intracranial bleed, fracture  Patient is alert oriented but sleepy  Patient does have a fever on initial examination and will obtain CK to rule out rhabdomyolysis due to unknown downtime  Patient is tachycardic will administer fluids  Also get basic labs to rule out any signs of infection  If labs are normal and patient's mental status improves back to baseline we will plan to discharge patient    Amount and/or Complexity of Data Reviewed  Labs: ordered.             Medications   sodium chloride 0.9 % bolus 1,000 mL (0 mL Intravenous Stopped 4/25/25 1735)       ED Risk Strat Scores                    No data recorded        SBIRT 20yo+      Flowsheet Row Most Recent Value   Initial Alcohol Screen: US AUDIT-C     1. How often do you have a drink containing alcohol? 0 Filed at: 04/25/2025 1909   2. How many drinks containing alcohol do you have on a typical day you are drinking?  0 Filed at: 04/25/2025 1909   3a. Male UNDER 65: How often do you have five or more drinks on one occasion? 0 Filed at: 04/25/2025 1909   Audit-C Score 0 Filed at: 04/25/2025 1909   JOURDAN: How many times in the past year have you...    Used an illegal drug or used a prescription medication for non-medical reasons? Never Filed at: 04/25/2025 1909                            History of Present Illness       Chief Complaint    Patient presents with    Recreational Drug Use     Found on the street, past out on side walk, brought in by EMS       Past Medical History:   Diagnosis Date    Disease of thyroid gland     Drug abuse (HCC)     Schizoaffective disorder (HCC)     resolved 05/16/14    Schizoaffective disorder, bipolar type (HCC)     Schizophrenia (HCC)       Past Surgical History:   Procedure Laterality Date    NO PAST SURGERIES        Family History   Problem Relation Age of Onset    Anxiety disorder Mother     Depression Mother     Dysphagia Mother     Stroke Mother         cva    Diabetes type II Mother     Other Mother         Hyponatremia    Psychosis Mother       Social History     Tobacco Use    Smoking status: Every Day     Current packs/day: 1.00     Types: Cigarettes    Smokeless tobacco: Never   Vaping Use    Vaping status: Never Used   Substance Use Topics    Alcohol use: Yes     Comment: social    Drug use: Yes     Types: Marijuana, Other     Comment: K2      E-Cigarette/Vaping    E-Cigarette Use Never User       E-Cigarette/Vaping Substances    Nicotine No     THC No     CBD No     Flavoring No     Other No     Unknown No       I have reviewed and agree with the history as documented.     HPI  39-year-old male presents with accidental overdose, hypothermia.  Patient was found on the streets unconscious by EMS.  Patient's fever was 100.9 on initial evaluation.  Patient's mental status slowly improved.  Patient denies any traumatic injuries.  Denies any drug use.  Patient does have a recurrent history of K2 use.  Also patient was found next to an individual who admits to using K2.    Review of Systems   Constitutional:  Negative for chills, diaphoresis, fever and unexpected weight change.   HENT:  Negative for ear pain and sore throat.    Eyes:  Negative for visual disturbance.   Respiratory:  Negative for cough, chest tightness and shortness of breath.    Cardiovascular:  Negative for chest pain and leg swelling.    Gastrointestinal:  Negative for abdominal distention, abdominal pain, constipation, diarrhea, nausea and vomiting.   Endocrine: Negative.    Genitourinary:  Negative for difficulty urinating and dysuria.   Musculoskeletal: Negative.    Skin: Negative.    Allergic/Immunologic: Negative.    Neurological: Negative.    Hematological: Negative.    Psychiatric/Behavioral: Negative.     All other systems reviewed and are negative.          Objective       ED Triage Vitals [04/25/25 1633]   Temperature Pulse Blood Pressure Respirations SpO2 Patient Position - Orthostatic VS   99 °F (37.2 °C) (!) 126 90/55 16 93 % Sitting      Temp Source Heart Rate Source BP Location FiO2 (%) Pain Score    Axillary Monitor Left arm -- No Pain      Vitals      Date and Time Temp Pulse SpO2 Resp BP Pain Score FACES Pain Rating User   04/25/25 1825 -- 75 100 % 16 104/66 -- -- EC   04/25/25 1813 -- 82 100 % 16 100/66 -- -- EC   04/25/25 1800 -- 85 100 % 16 100/70 -- -- EC   04/25/25 1755 -- 89 99 % 16 98/66 -- -- EC   04/25/25 1750 -- 98 99 % 16 96/62 -- -- EC   04/25/25 1745 -- 98 97 % 16 91/54 No Pain -- EC   04/25/25 1633 99 °F (37.2 °C) 126 93 % 16 90/55 No Pain -- EC            Physical Exam  Vitals and nursing note reviewed.   Constitutional:       General: He is not in acute distress.     Appearance: Normal appearance. He is not ill-appearing.   HENT:      Head: Normocephalic and atraumatic.      Right Ear: External ear normal.      Left Ear: External ear normal.      Nose: Nose normal.      Mouth/Throat:      Mouth: Mucous membranes are moist.      Pharynx: Oropharynx is clear.   Eyes:      General: No scleral icterus.        Right eye: No discharge.         Left eye: No discharge.      Extraocular Movements: Extraocular movements intact.      Conjunctiva/sclera: Conjunctivae normal.      Pupils: Pupils are equal, round, and reactive to light.   Cardiovascular:      Rate and Rhythm: Normal rate and regular rhythm.      Pulses: Normal  pulses.      Heart sounds: Normal heart sounds.   Pulmonary:      Effort: Pulmonary effort is normal.      Breath sounds: Normal breath sounds.   Abdominal:      General: Abdomen is flat. Bowel sounds are normal. There is no distension.      Palpations: Abdomen is soft.      Tenderness: There is no abdominal tenderness. There is no guarding or rebound.   Musculoskeletal:         General: Normal range of motion.      Cervical back: Normal range of motion and neck supple.   Skin:     General: Skin is warm and dry.      Capillary Refill: Capillary refill takes less than 2 seconds.   Neurological:      General: No focal deficit present.      Mental Status: He is alert and oriented to person, place, and time. Mental status is at baseline.   Psychiatric:         Mood and Affect: Mood normal.         Behavior: Behavior normal.         Thought Content: Thought content normal.         Judgment: Judgment normal.         Results Reviewed       Procedure Component Value Units Date/Time    Protime-INR [[id number]]  (Abnormal) Collected: 04/25/25 1637    Lab Status: Final result Specimen: Blood from Arm, Right Updated: 04/25/25 1720     Protime 16.1 seconds      INR 1.26    Narrative:      INR Therapeutic Range    Indication                                             INR Range      Atrial Fibrillation                                               2.0-3.0  Hypercoagulable State                                    2.0.2.3  Left Ventricular Asist Device                            2.0-3.0  Mechanical Heart Valve                                  -    Aortic(with afib, MI, embolism, HF, LA enlargement,    and/or coagulopathy)                                     2.0-3.0 (2.5-3.5)     Mitral                                                             2.5-3.5  Prosthetic/Bioprosthetic Heart Valve               2.0-3.0  Venous thromboembolism (VTE: VT, PE        2.0-3.0    APTT [[id number]]  (Normal) Collected: 04/25/25 1637    Lab Status:  Final result Specimen: Blood from Arm, Right Updated: 04/25/25 1720     PTT 29 seconds     Comprehensive metabolic panel [[id number]]  (Abnormal) Collected: 04/25/25 1637    Lab Status: Final result Specimen: Blood from Arm, Right Updated: 04/25/25 1717     Sodium 139 mmol/L      Potassium 3.9 mmol/L      Chloride 104 mmol/L      CO2 27 mmol/L      ANION GAP 8 mmol/L      BUN 11 mg/dL      Creatinine 0.94 mg/dL      Glucose 139 mg/dL      Calcium 8.3 mg/dL      AST 20 U/L      ALT 12 U/L      Alkaline Phosphatase 66 U/L      Total Protein 6.2 g/dL      Albumin 4.1 g/dL      Total Bilirubin 0.36 mg/dL      eGFR 102 ml/min/1.73sq m     Narrative:      [employer] Kid[employer] guidelines for Chronic Kidney Disease (CKD):     Stage 1 with normal or high GFR (GFR > 90 mL/min/1.73 square meters)    Stage 2 Mild CKD (GFR = 60-89 mL/min/1.73 square meters)    Stage 3A Moderate CKD (GFR = 45-59 mL/min/1.73 square meters)    Stage 3B Moderate CKD (GFR = 30-44 mL/min/1.73 square meters)    Stage 4 Severe CKD (GFR = 15-29 mL/min/1.73 square meters)    Stage 5 End Stage CKD (GFR <15 mL/min/1.73 square meters)  Note: GFR calculation is accurate only with a steady state creatinine    CK [[id number]]  (Abnormal) Collected: 04/25/25 1637    Lab Status: Final result Specimen: Blood from Arm, Right Updated: 04/25/25 1717     Total  U/L     CBC and differential [[id number]]  (Abnormal) Collected: 04/25/25 1637    Lab Status: Final result Specimen: Blood from Arm, Right Updated: 04/25/25 1700     WBC 7.54 Thousand/uL      RBC 3.68 Million/uL      Hemoglobin 10.7 g/dL      Hematocrit 33.7 %      MCV 92 fL      MCH 29.1 pg      MCHC 31.8 g/dL      RDW 15.4 %      MPV 9.1 fL      Platelets 212 Thousands/uL      nRBC 0 /100 WBCs      Segmented % 54 %      Immature Grans % 1 %      Lymphocytes % 32 %      Monocytes % 13 %      Eosinophils Relative 0 %      Basophils Relative 0 %      Absolute Neutrophils 4.06 Thousands/µL       Absolute Immature Grans 0.06 Thousand/uL      Absolute Lymphocytes 2.41 Thousands/µL      Absolute Monocytes 0.95 Thousand/µL      Eosinophils Absolute 0.03 Thousand/µL      Basophils Absolute 0.03 Thousands/µL             No orders to display       Procedures    ED Medication and Procedure Management   Prior to Admission Medications   Prescriptions Last Dose Informant Patient Reported? Taking?   ARIPiprazole (ABILIFY) 30 mg tablet   Yes Yes   Sig: TOME 1/2 TABLETA 2 VECES AL D A POR V A ORAL   benztropine (COGENTIN) 0.5 mg tablet   No No   Sig: Take 1 tablet (0.5 mg total) by mouth 3 (three) times a day   benztropine (COGENTIN) 1 mg tablet   Yes Yes   Sig: TOME 1 TABLETA POR V A ORAL TODOS LOS D AS   divalproex sodium (DEPAKOTE ER) 250 mg 24 hr tablet   Yes Yes   Sig: TAKE 1 TABLET BY MOUTH IN THE MORNING AND 2 TABLETS BY MOUTH AT BEDTIME   divalproex sodium (DEPAKOTE) 500 mg DR tablet   Yes No   Sig: Take 500 mg by mouth daily at bedtime   hydrOXYzine HCL (ATARAX) 50 mg tablet   Yes Yes   Sig: TOME 1 TABLETA POR V A ORAL DOS VECES AL D A      Facility-Administered Medications: None     Discharge Medication List as of 4/25/2025  8:01 PM        CONTINUE these medications which have NOT CHANGED    Details   ARIPiprazole (ABILIFY) 30 mg tablet TOME 1/2 TABLETA 2 VECES AL D A POR V A ORAL, Historical Med      !! benztropine (COGENTIN) 1 mg tablet TOME 1 TABLETA POR V A ORAL TODOS LOS D AS, Historical Med      divalproex sodium (DEPAKOTE ER) 250 mg 24 hr tablet TAKE 1 TABLET BY MOUTH IN THE MORNING AND 2 TABLETS BY MOUTH AT BEDTIME, Historical Med      hydrOXYzine HCL (ATARAX) 50 mg tablet TOME 1 TABLETA POR V A ORAL DOS VECES AL D A, Historical Med      !! benztropine (COGENTIN) 0.5 mg tablet Take 1 tablet (0.5 mg total) by mouth 3 (three) times a day, Starting Thu 11/30/2023, Normal      divalproex sodium (DEPAKOTE) 500 mg DR tablet Take 500 mg by mouth daily at bedtime, Historical Med       !! - Potential duplicate  medications found. Please discuss with provider.        No discharge procedures on file.  ED SEPSIS DOCUMENTATION   Time reflects when diagnosis was documented in both MDM as applicable and the Disposition within this note       Time User Action Codes Description Comment    4/25/2025  7:46 PM Vic Suarez Add [F19.10] Substance abuse (HCC)                  Vic Suarez MD  04/25/25 2012

## 2025-04-26 ENCOUNTER — HOSPITAL ENCOUNTER (EMERGENCY)
Facility: HOSPITAL | Age: 40
Discharge: HOME | End: 2025-04-26
Attending: EMERGENCY MEDICINE | Admitting: EMERGENCY MEDICINE
Payer: MEDICARE

## 2025-04-26 VITALS
OXYGEN SATURATION: 98 % | HEART RATE: 89 BPM | WEIGHT: 132.94 LBS | RESPIRATION RATE: 18 BRPM | SYSTOLIC BLOOD PRESSURE: 145 MMHG | DIASTOLIC BLOOD PRESSURE: 81 MMHG | BODY MASS INDEX: 22.12 KG/M2 | TEMPERATURE: 98 F

## 2025-04-26 DIAGNOSIS — F19.10 SUBSTANCE ABUSE (HCC): Primary | ICD-10-CM

## 2025-04-26 LAB
ATRIAL RATE: 107 BPM
P AXIS: 41 DEGREES
PR INTERVAL: 142 MS
QRS AXIS: 67 DEGREES
QRSD INTERVAL: 86 MS
QT INTERVAL: 354 MS
QTC INTERVAL: 473 MS
T WAVE AXIS: 31 DEGREES
VENTRICULAR RATE: 107 BPM

## 2025-04-26 PROCEDURE — 96360 HYDRATION IV INFUSION INIT: CPT

## 2025-04-26 PROCEDURE — 99284 EMERGENCY DEPT VISIT MOD MDM: CPT

## 2025-04-26 PROCEDURE — 93010 ELECTROCARDIOGRAM REPORT: CPT | Performed by: INTERNAL MEDICINE

## 2025-04-26 PROCEDURE — 96361 HYDRATE IV INFUSION ADD-ON: CPT

## 2025-04-26 RX ADMIN — SODIUM CHLORIDE 1000 ML: 0.9 INJECTION, SOLUTION INTRAVENOUS at 13:29

## 2025-04-26 RX ADMIN — SODIUM CHLORIDE 1000 ML: 0.9 INJECTION, SOLUTION INTRAVENOUS at 12:44

## 2025-04-26 NOTE — ED ATTENDING ATTESTATION
4/26/2025  I, Flo Reyna MD, saw and evaluated the patient. I have discussed the patient with the resident/non-physician practitioner and agree with the resident's/non-physician practitioner's findings, Plan of Care, and MDM as documented in the resident's/non-physician practitioner's note, except where noted. All available labs and Radiology studies were reviewed.  I was present for key portions of any procedure(s) performed by the resident/non-physician practitioner and I was immediately available to provide assistance.       At this point I agree with the current assessment done in the Emergency Department.  I have conducted an independent evaluation of this patient a history and physical is as follows:    39-year-old male well-known to the emergency department presents to the emergency department via EMS for evaluation of altered mental status after using K2.  He did receive 4 mg of intranasal Narcan en route to the hospital.  Upon arrival, patient is somnolent, but arousable.  He offers no physical complaints at this time.  Does admit to K2 use.  Denies any trauma or falls.    On exam, patient ill-appearing, but in no acute distress.  Head is normocephalic atraumatic, pupils equal round reactive, heart is regular rate and rhythm with pedis pulse, no increased work of breathing respiratory distress, or stridor.    Suspect symptoms likely secondary to recreational drug use, no outward signs of trauma, no evidence of specific toxidrome.  Do not believe any further workup is necessary at this time.  Patient was noted to be hypotensive, likely secondary to drug use, will treat with IV fluids.    Following period of observation, patient is more awake, alert, continues to deny any complaints.  Blood pressure improved.  He is stable for discharge.    ED Course         Critical Care Time  Procedures

## 2025-04-26 NOTE — ED PROVIDER NOTES
"Time reflects when diagnosis was documented in both MDM as applicable and the Disposition within this note       Time User Action Codes Description Comment    4/26/2025  5:03 PM Candi Ospina Add [F19.10] Substance abuse (HCC)           ED Disposition       ED Disposition   Discharge    Condition   Stable    Date/Time   Sat Apr 26, 2025  5:03 PM    Comment   Rhys Esparza discharge to home/self care.                   Assessment & Plan       Medical Decision Making  39 year old male presenting after K2 use. No acute symptoms. Patient initially hypotensive but after 2 L normalized. Negative workup for same yesterday. No signs for infection. Patient sleeping maintaining O2 saturations and airway. Eventually requesting discharge.       Pt stable at time of discharge, vital signs reviewed, questions answered. Strict ER return precautions provided/discussed and were well understood by patient. Patient's vitals, labs and/or imaging results, diagnosis, and treatment plan were discussed with the patient. All new and/or changed medications were discussed - specifically to include route of administration, how often to take, when to take, and the pharmacy they were sent to. Strict return precautions as well as close follow up with PCP was discussed with the patient and the patient was agreeable to my recommendations.  Patient verbally acknowledged understanding. All labs, imaging were reviewed and used in the medical decision making process (if ordered).     Portions of this chart may have been written with voice recognition software.  Occasional grammatical errors, wrong word or \"sound a like\" substitutions may have occurred due to software limitations.  Please read carefully and use context to recognize where substitutions have occurred.    Problems Addressed:  Substance abuse (HCC): chronic illness or injury             Medications   sodium chloride 0.9 % bolus 1,000 mL (0 mL Intravenous Stopped 4/26/25 9734)   sodium " chloride 0.9 % bolus 1,000 mL (0 mL Intravenous Stopped 4/26/25 1429)       ED Risk Strat Scores                    No data recorded                            History of Present Illness       Chief Complaint   Patient presents with    Overdose - Accidental     Known K2 user, received 4mg IN Narcan pre-hospital       Past Medical History:   Diagnosis Date    Disease of thyroid gland     Drug abuse (HCC)     Schizoaffective disorder (HCC)     resolved 05/16/14    Schizoaffective disorder, bipolar type (HCC)     Schizophrenia (HCC)       Past Surgical History:   Procedure Laterality Date    NO PAST SURGERIES        Family History   Problem Relation Age of Onset    Anxiety disorder Mother     Depression Mother     Dysphagia Mother     Stroke Mother         cva    Diabetes type II Mother     Other Mother         Hyponatremia    Psychosis Mother       Social History     Tobacco Use    Smoking status: Every Day     Current packs/day: 1.00     Types: Cigarettes    Smokeless tobacco: Never   Vaping Use    Vaping status: Never Used   Substance Use Topics    Alcohol use: Yes     Comment: social    Drug use: Yes     Types: Marijuana, Other     Comment: K2      E-Cigarette/Vaping    E-Cigarette Use Never User       E-Cigarette/Vaping Substances    Nicotine No     THC No     CBD No     Flavoring No     Other No     Unknown No       I have reviewed and agree with the history as documented.     Rhys is a 39 year old male presenting to the ED for K2 use. 4 mg IN Narcan given en route to the hospital. Patient tired, but no acute complaints.        Review of Systems   Constitutional:  Negative for chills and fever.   Respiratory:  Negative for cough and shortness of breath.    Cardiovascular:  Negative for chest pain, palpitations and leg swelling.   Gastrointestinal:  Negative for abdominal pain, diarrhea, nausea and vomiting.   Musculoskeletal:  Negative for myalgias, neck pain and neck stiffness.   Neurological:  Negative for  light-headedness and headaches.           Objective       ED Triage Vitals   Temperature Pulse Blood Pressure Respirations SpO2 Patient Position - Orthostatic VS   04/26/25 1240 04/26/25 1237 04/26/25 1237 04/26/25 1237 04/26/25 1237 04/26/25 1237   98 °F (36.7 °C) (!) 126 91/58 20 96 % Lying      Temp Source Heart Rate Source BP Location FiO2 (%) Pain Score    04/26/25 1240 04/26/25 1237 04/26/25 1237 -- 04/26/25 1237    Tympanic Monitor Left arm  No Pain      Vitals      Date and Time Temp Pulse SpO2 Resp BP Pain Score FACES Pain Rating User   04/26/25 1527 -- 89 98 % 18 145/81 No Pain -- AS   04/26/25 1420 -- 70 100 % 18 103/66 No Pain -- AS   04/26/25 1356 -- -- -- -- 95/62 -- -- AB   04/26/25 1302 -- 101 96 % 16 89/52 -- -- AM   04/26/25 1245 -- 108 94 % 20 -- No Pain -- AS   04/26/25 1240 98 °F (36.7 °C) -- -- -- -- -- -- AS   04/26/25 1237 -- 126 96 % 20 91/58 No Pain -- AS            Physical Exam  Vitals reviewed.   Constitutional:       General: He is not in acute distress.     Appearance: He is not ill-appearing or toxic-appearing.   HENT:      Head: Normocephalic and atraumatic.      Right Ear: External ear normal.      Left Ear: External ear normal.      Nose: Nose normal.      Mouth/Throat:      Comments: Patient maintaining airway  Cardiovascular:      Rate and Rhythm: Normal rate.      Pulses: Normal pulses.   Pulmonary:      Effort: Pulmonary effort is normal. No respiratory distress.   Musculoskeletal:         General: Normal range of motion.      Cervical back: Normal range of motion.   Skin:     General: Skin is warm and dry.      Capillary Refill: Capillary refill takes less than 2 seconds.   Neurological:      General: No focal deficit present.         Results Reviewed       None            No orders to display       Procedures    ED Medication and Procedure Management   Prior to Admission Medications   Prescriptions Last Dose Informant Patient Reported? Taking?   ARIPiprazole (ABILIFY) 30 mg  tablet   Yes No   Sig: TOME 1/2 TABLETA 2 VECES AL D A POR V A ORAL   benztropine (COGENTIN) 0.5 mg tablet   No No   Sig: Take 1 tablet (0.5 mg total) by mouth 3 (three) times a day   benztropine (COGENTIN) 1 mg tablet   Yes No   Sig: TOME 1 TABLETA POR V A ORAL TODOS LOS D AS   divalproex sodium (DEPAKOTE ER) 250 mg 24 hr tablet   Yes No   Sig: TAKE 1 TABLET BY MOUTH IN THE MORNING AND 2 TABLETS BY MOUTH AT BEDTIME   divalproex sodium (DEPAKOTE) 500 mg DR tablet   Yes No   Sig: Take 500 mg by mouth daily at bedtime   hydrOXYzine HCL (ATARAX) 50 mg tablet   Yes No   Sig: TOME 1 TABLETA POR V A ORAL DOS VECES AL D A      Facility-Administered Medications: None     Discharge Medication List as of 4/26/2025  5:04 PM        CONTINUE these medications which have NOT CHANGED    Details   ARIPiprazole (ABILIFY) 30 mg tablet TOME 1/2 TABLETA 2 VECES AL D A POR V A ORAL, Historical Med      !! benztropine (COGENTIN) 0.5 mg tablet Take 1 tablet (0.5 mg total) by mouth 3 (three) times a day, Starting u 11/30/2023, Normal      !! benztropine (COGENTIN) 1 mg tablet TOME 1 TABLETA POR V A ORAL TODOS LOS D AS, Historical Med      divalproex sodium (DEPAKOTE ER) 250 mg 24 hr tablet TAKE 1 TABLET BY MOUTH IN THE MORNING AND 2 TABLETS BY MOUTH AT BEDTIME, Historical Med      divalproex sodium (DEPAKOTE) 500 mg DR tablet Take 500 mg by mouth daily at bedtime, Historical Med      hydrOXYzine HCL (ATARAX) 50 mg tablet TOME 1 TABLETA POR V A ORAL DOS VECES AL D A, Historical Med       !! - Potential duplicate medications found. Please discuss with provider.        No discharge procedures on file.  ED SEPSIS DOCUMENTATION   Time reflects when diagnosis was documented in both MDM as applicable and the Disposition within this note       Time User Action Codes Description Comment    4/26/2025  5:03 PM Candi Ospina [F19.10] Substance abuse (HCC)                  Candi Ospina PA-C  04/26/25 6693

## 2025-04-27 ENCOUNTER — HOSPITAL ENCOUNTER (EMERGENCY)
Facility: HOSPITAL | Age: 40
Discharge: HOME | End: 2025-04-27
Attending: EMERGENCY MEDICINE | Admitting: EMERGENCY MEDICINE
Payer: MEDICARE

## 2025-04-27 VITALS
HEART RATE: 57 BPM | SYSTOLIC BLOOD PRESSURE: 107 MMHG | TEMPERATURE: 97.5 F | OXYGEN SATURATION: 99 % | RESPIRATION RATE: 18 BRPM | DIASTOLIC BLOOD PRESSURE: 56 MMHG

## 2025-04-27 DIAGNOSIS — F19.10 SUBSTANCE ABUSE (HCC): Primary | ICD-10-CM

## 2025-04-27 PROCEDURE — 99283 EMERGENCY DEPT VISIT LOW MDM: CPT

## 2025-04-27 NOTE — ED NOTES
This Tech removed this patients IV @11:54a per doctors orders.     Aurelia Stafford  04/27/25 1153

## 2025-04-27 NOTE — ED PROVIDER NOTES
Time reflects when diagnosis was documented in both MDM as applicable and the Disposition within this note       Time User Action Codes Description Comment    4/27/2025 12:23 PM Ryan Scott Add [F19.10] Substance abuse (HCC)           ED Disposition       ED Disposition   Left from Room after Provider Exam    Condition   --    Date/Time   Sun Apr 27, 2025 12:23 PM    Comment   --             Assessment & Plan       Medical Decision Making  Patient is a 39-year-old male coming in for evaluation after being found on the side of the road.  Patient does extensive past medical history of substance abuse.  Patient was being watched for approximate hour half, before he woke up, ambulate out of the emergency department in no acute distress, with no need of assistance.  Patient shows no sign of trauma at this time, no indication for advanced imaging or blood work.  Patient blood pressure and vitals are overall within normal limits during his stay here.  Diagnosis is substance abuse, likely K2             Medications - No data to display    ED Risk Strat Scores                    No data recorded        SBIRT 20yo+      Flowsheet Row Most Recent Value   Initial Alcohol Screen: US AUDIT-C     1. How often do you have a drink containing alcohol? 0 Filed at: 04/27/2025 1104   2. How many drinks containing alcohol do you have on a typical day you are drinking?  0 Filed at: 04/27/2025 1104   3a. Male UNDER 65: How often do you have five or more drinks on one occasion? 0 Filed at: 04/27/2025 1104   3b. FEMALE Any Age, or MALE 65+: How often do you have 4 or more drinks on one occassion? 0 Filed at: 04/27/2025 1104   Audit-C Score 0 Filed at: 04/27/2025 1104   JOURDAN: How many times in the past year have you...    Used an illegal drug or used a prescription medication for non-medical reasons? Daily or Almost Daily Filed at: 04/27/2025 1104   DAST-10: In the past 12 months...    1. Have you used drugs other than those required for  "medical reasons? 1 Filed at: 04/27/2025 1104   2. Do you use more than one drug at a time? 1 Filed at: 04/27/2025 1104   3. Have you had medical problems as a result of your drug use (e.g., memory loss, hepatitis, convulsions, bleeding, etc.)? 0 Filed at: 04/27/2025 1104   4. Have you had \"blackouts\" or \"flashbacks\" as a result of drug use?YesNo 0 Filed at: 04/27/2025 1104   5. Do you ever feel bad or guilty about your drug use? 0 Filed at: 04/27/2025 1104   6. Does your spouse (or parent) ever complain about your involvement with drugs? 0 Filed at: 04/27/2025 1104   7. Have you neglected your family because of your use of drugs? 0 Filed at: 04/27/2025 1104   8. Have you engaged in illegal activities in order to obtain drugs? 0 Filed at: 04/27/2025 1104   9. Have you ever experienced withdrawal symptoms (felt sick) when you stopped taking drugs? 0 Filed at: 04/27/2025 1104   10. Are you always able to stop using drugs when you want to? 0 Filed at: 04/27/2025 1104   DAST-10 Score 2 Filed at: 04/27/2025 1104                            History of Present Illness       Chief Complaint   Patient presents with    Drug Problem     Patient found sleeping on the sidewalk; known user of K2       Past Medical History:   Diagnosis Date    Disease of thyroid gland     Drug abuse (HCC)     Schizoaffective disorder (HCC)     resolved 05/16/14    Schizoaffective disorder, bipolar type (HCC)     Schizophrenia (HCC)       Past Surgical History:   Procedure Laterality Date    NO PAST SURGERIES        Family History   Problem Relation Age of Onset    Anxiety disorder Mother     Depression Mother     Dysphagia Mother     Stroke Mother         cva    Diabetes type II Mother     Other Mother         Hyponatremia    Psychosis Mother       Social History     Tobacco Use    Smoking status: Every Day     Current packs/day: 1.00     Types: Cigarettes    Smokeless tobacco: Never   Vaping Use    Vaping status: Never Used   Substance Use Topics "    Alcohol use: Yes     Comment: social    Drug use: Yes     Types: Marijuana, Other     Comment: K2      E-Cigarette/Vaping    E-Cigarette Use Never User       E-Cigarette/Vaping Substances    Nicotine No     THC No     CBD No     Flavoring No     Other No     Unknown No       I have reviewed and agree with the history as documented.     Patient is a 39-year-old male coming in by way of EMS, after being laying on the side of the road.  Patient was given approximately a liter of fluid prior to arrival, was hypotensive in the 70s at the time per EMS.  Blood sugar was 114.  Patient does have extensive past medical history as for substance use, and presenting with hypotension.  Patient this time, is somnolent, not answering questions, which is his usual presentation.  No signs of trauma on him at this time, no active bleeding.      Drug Problem      Review of Systems   Unable to perform ROS: Mental status change (Substance abuse)           Objective       ED Triage Vitals   Temperature Pulse Blood Pressure Respirations SpO2 Patient Position - Orthostatic VS   04/27/25 1053 04/27/25 1053 04/27/25 1053 04/27/25 1053 04/27/25 1053 04/27/25 1217   97.5 °F (36.4 °C) 95 98/52 16 93 % Lying      Temp Source Heart Rate Source BP Location FiO2 (%) Pain Score    04/27/25 1053 04/27/25 1217 04/27/25 1217 -- 04/27/25 1053    Tympanic Monitor Left arm  No Pain      Vitals      Date and Time Temp Pulse SpO2 Resp BP Pain Score FACES Pain Rating User   04/27/25 1217 -- 57 99 % 18 107/56 -- -- SA   04/27/25 1149 -- 56 99 % 16 98/66 -- -- AM   04/27/25 1053 -- 95 93 % 16 98/52 -- -- AM   04/27/25 1053 97.5 °F (36.4 °C) -- -- -- -- No Pain -- SA            Physical Exam  Vitals reviewed.   Constitutional:       Appearance: Normal appearance. He is normal weight.   HENT:      Head: Normocephalic and atraumatic. No raccoon eyes or Patterson's sign.      Right Ear: External ear normal.      Left Ear: External ear normal.      Nose: Nose  normal.   Eyes:      Conjunctiva/sclera: Conjunctivae normal.   Cardiovascular:      Rate and Rhythm: Normal rate.   Pulmonary:      Effort: Pulmonary effort is normal.   Musculoskeletal:         General: Normal range of motion.      Cervical back: Normal range of motion.   Skin:     General: Skin is warm and dry.   Neurological:      Mental Status: He is alert.         Results Reviewed       None            No orders to display       Procedures    ED Medication and Procedure Management   Prior to Admission Medications   Prescriptions Last Dose Informant Patient Reported? Taking?   ARIPiprazole (ABILIFY) 30 mg tablet   Yes No   Sig: TOME 1/2 TABLETA 2 VECES AL D A POR V A ORAL   benztropine (COGENTIN) 0.5 mg tablet   No No   Sig: Take 1 tablet (0.5 mg total) by mouth 3 (three) times a day   benztropine (COGENTIN) 1 mg tablet   Yes No   Sig: TOME 1 TABLETA POR V A ORAL TODOS LOS D AS   divalproex sodium (DEPAKOTE ER) 250 mg 24 hr tablet   Yes No   Sig: TAKE 1 TABLET BY MOUTH IN THE MORNING AND 2 TABLETS BY MOUTH AT BEDTIME   divalproex sodium (DEPAKOTE) 500 mg DR tablet   Yes No   Sig: Take 500 mg by mouth daily at bedtime   hydrOXYzine HCL (ATARAX) 50 mg tablet   Yes No   Sig: TOME 1 TABLETA POR V A ORAL DOS VECES AL D A      Facility-Administered Medications: None     Discharge Medication List as of 4/27/2025 12:24 PM        CONTINUE these medications which have NOT CHANGED    Details   ARIPiprazole (ABILIFY) 30 mg tablet TOME 1/2 TABLETA 2 VECES AL D A POR V A ORAL, Historical Med      !! benztropine (COGENTIN) 0.5 mg tablet Take 1 tablet (0.5 mg total) by mouth 3 (three) times a day, Starting Thu 11/30/2023, Normal      !! benztropine (COGENTIN) 1 mg tablet TOME 1 TABLETA POR V A ORAL TODOS LOS D AS, Historical Med      divalproex sodium (DEPAKOTE ER) 250 mg 24 hr tablet TAKE 1 TABLET BY MOUTH IN THE MORNING AND 2 TABLETS BY MOUTH AT BEDTIME, Historical Med      divalproex sodium (DEPAKOTE) 500 mg DR tablet Take  500 mg by mouth daily at bedtime, Historical Med      hydrOXYzine HCL (ATARAX) 50 mg tablet TOME 1 TABLETA POR V A ORAL DOS VECES AL D A, Historical Med       !! - Potential duplicate medications found. Please discuss with provider.        No discharge procedures on file.  ED SEPSIS DOCUMENTATION   Time reflects when diagnosis was documented in both MDM as applicable and the Disposition within this note       Time User Action Codes Description Comment    4/27/2025 12:23 PM Ryan Scott Add [F19.10] Substance abuse (HCC)                  Ryan Scott PA-C  04/27/25 9298

## 2025-04-28 ENCOUNTER — DOCUMENTATION (OUTPATIENT)
Dept: CASE MANAGEMENT | Facility: HOSPITAL | Age: 40
End: 2025-04-28

## 2025-04-28 ENCOUNTER — HOSPITAL ENCOUNTER (EMERGENCY)
Facility: HOSPITAL | Age: 40
Discharge: HOME | End: 2025-04-28
Attending: EMERGENCY MEDICINE
Payer: MEDICARE

## 2025-04-28 VITALS
RESPIRATION RATE: 18 BRPM | OXYGEN SATURATION: 98 % | HEART RATE: 80 BPM | SYSTOLIC BLOOD PRESSURE: 113 MMHG | DIASTOLIC BLOOD PRESSURE: 56 MMHG | TEMPERATURE: 98.6 F

## 2025-04-28 DIAGNOSIS — F19.10 SUBSTANCE ABUSE (HCC): Primary | ICD-10-CM

## 2025-04-28 PROCEDURE — 96361 HYDRATE IV INFUSION ADD-ON: CPT

## 2025-04-28 PROCEDURE — 96360 HYDRATION IV INFUSION INIT: CPT

## 2025-04-28 PROCEDURE — 99283 EMERGENCY DEPT VISIT LOW MDM: CPT

## 2025-04-28 PROCEDURE — 99283 EMERGENCY DEPT VISIT LOW MDM: CPT | Performed by: EMERGENCY MEDICINE

## 2025-04-28 RX ADMIN — SODIUM CHLORIDE 1000 ML: 0.9 INJECTION, SOLUTION INTRAVENOUS at 18:01

## 2025-04-28 NOTE — BEHAVIORAL HEALTH HIGH UTILIZER
Patient Name:Rhys Esparza MRN:  [id number]         : 1985     Age: 39 y.o.    Sex: male   Utilization History:  (# of ED visits & IP admits; reasons)  Pt had 18 [facility]-ED visits from 10/2024-2025. ED presentations were related to chronic severe substance misuse of K2, pt found unresponsive or lethargic in the community, accidental overdose in the community, sleeping/laying down in public places and is brought in by the police & EMS, pt required Narcan in the community.  Treatment Recommendations & Presentation:  Presentation in the ED: Pt presents self or is accompanied by police/EMS. ED visits were related to chronic severe substance misuse of K2, pt found unresponsive or lethargic in the community, accidental overdose in the community, sleeping/laying down in public places and is brought in by the police & EMS, pt required Narcan in the community.         ED Recommendations: Following medical evaluation and treatment, establish acute risk versus chronic behavioral disturbances related to severe chronic K2 misuse. Pt should consider [facility] for inpt addictions treatment or a dual diagnosis facility such as [facility]. For community treatment, pt should return to [facility] program for addictions treatment and counseling [phone] at [address] [facility] or [facility] at [address] [facility] [phone] or [facility] at [address] [facility] [phone] or [facility] at [address] [facility] [phone].  For medical issues, pt should go to [facility] at [address] [facility] [phone] or [facility] at [address] & [facility] 1st floor [phone]. Pt can also contact [facility] at [phone].   If needed, contact mother or brother for collaboration and discharge planning.     Home Medication Regimen: see most recent documentation in Epic, mother or brother may be able to verify medications.   Recent Medical Work Ups:  (include Psych testing or ECT)     Labs -   Various Xrays & CT's -  2025 ECG - 2025    Inpatient Recommendations: collaborate with pt's community resources to develop a comprehensive discharge plan. Pt requires ICM services, pt should have addictions counseling.         Outpatient recommendations: pt should continue his medical, mental health and addictions treatment in the community and take his medications as prescribed.      Situation/Relevant Background Info: Pt is a 39 year old male with a diagnosis of Schizoaffective disorder, Synthetic Cannabinoid Abuse and an extensive history of chronic severe abuse of K2 and behavioral health admissions.  Pt lives with his mother and brother, sometimes they have 302'd patient for treatment as they report he was paranoid, aggressive and responding to internal stimuli. Pt has been consistently abusing illicit substances, mainly K2, for an extensive period of time.  Pt consistently comes to ED's or is brought in by the police and EMS due to his drug abuse and does not appear to have any significant clean time, sobriety from his drug use. Pt is often brought to ED's by police/EMS due to being lethargic, non-responsive, sleeping on sidewalks or various public places following drug use.   Pt did engage in the [facility] program in 2024 but appeared to struggle in this program as per notes. Pt has been periodically incarcerated due to his drug use. At times, pt has utilized [facility] for his medical care.             Diagnoses/Significant Problems (Medical & Psychiatric): Schizoaffective disorder, Synthetic Cannabinoid Abuse     Mild Tachycardia             Drivers of repeated utilization:   chronic, severe and consistent use of K2, possible substance induced psychosis, poor coping skills, poor insight.                                            Existing Community Resources & Supports:              Mother and brother          Patient Medical & Psychiatric Care Team:   [facility] MAT program - [phone]  [facility]  [facility] - [phone]      Care plan date: 04/28/25                   Author:  Mindy Barrett RN                 Date reviewed with patient:

## 2025-04-28 NOTE — ED PROVIDER NOTES
ED Disposition       None          Assessment & Plan       Medical Decision Making      ED Course as of 04/28/25 2144   Mon Apr 28, 2025   1939 Systolic 113.  Awake and alert.  No clincial signs of intoxication.       Medications - No data to display    ED Risk Strat Scores                    No data recorded                            History of Present Illness       No chief complaint on file.      Past Medical History:   Diagnosis Date   • Disease of thyroid gland    • Drug abuse (HCC)    • Schizoaffective disorder (HCC)     resolved 05/16/14   • Schizoaffective disorder, bipolar type (HCC)    • Schizophrenia (HCC)       Past Surgical History:   Procedure Laterality Date   • NO PAST SURGERIES        Family History   Problem Relation Age of Onset   • Anxiety disorder Mother    • Depression Mother    • Dysphagia Mother    • Stroke Mother         cva   • Diabetes type II Mother    • Other Mother         Hyponatremia   • Psychosis Mother       Social History     Tobacco Use   • Smoking status: Every Day     Current packs/day: 1.00     Types: Cigarettes   • Smokeless tobacco: Never   Vaping Use   • Vaping status: Never Used   Substance Use Topics   • Alcohol use: Yes     Comment: social   • Drug use: Yes     Types: Marijuana, Other     Comment: K2      E-Cigarette/Vaping   • E-Cigarette Use Never User       E-Cigarette/Vaping Substances   • Nicotine No    • THC No    • CBD No    • Flavoring No    • Other No    • Unknown No       I have reviewed and agree with the history as documented.     Patient is a 39-year-old male marking his 4th visit in 4 days for K2 use.  Found on [facility] [address], slow to respond.  Bystander called.  Patient slow to respond with answers.  Similar presentation to this past weekend.  Hypotensive again as well.          Review of Systems   Unable to perform ROS: Patient nonverbal (intoxication)           Objective       ED Triage Vitals   Temp Pulse BP Resp SpO2 Patient Position - Orthostatic VS    -- -- -- -- -- --      Temp src Heart Rate Source BP Location FiO2 (%) Pain Score    -- -- -- -- --      Vitals    None         Physical Exam  Vitals and nursing note reviewed.   Constitutional:       Comments: disheveled   HENT:      Head: Normocephalic and atraumatic.      Right Ear: Tympanic membrane, ear canal and external ear normal.      Left Ear: Tympanic membrane, ear canal and external ear normal.      Nose: Nose normal.      Mouth/Throat:      Mouth: Mucous membranes are moist.      Pharynx: Oropharynx is clear.   Eyes:      Conjunctiva/sclera: Conjunctivae normal.      Pupils: Pupils are equal, round, and reactive to light.   Cardiovascular:      Rate and Rhythm: Normal rate and regular rhythm.      Pulses: Normal pulses.   Pulmonary:      Effort: Pulmonary effort is normal.      Breath sounds: Normal breath sounds.   Musculoskeletal:         General: Normal range of motion.      Cervical back: Normal range of motion and neck supple.   Skin:     General: Skin is warm and dry.      Capillary Refill: Capillary refill takes less than 2 seconds.   Neurological:      Comments: Slow but will follow command.  figueroa         Results Reviewed       None            No orders to display       Procedures    ED Medication and Procedure Management   Cannot display prior to admission medications because the patient has not been admitted in this contact.     Patient's Medications   Discharge Prescriptions    No medications on file     No discharge procedures on file.  ED SEPSIS DOCUMENTATION            Kadeem Yarbrough MD  04/28/25 7894

## 2025-04-29 ENCOUNTER — HOSPITAL ENCOUNTER (EMERGENCY)
Facility: HOSPITAL | Age: 40
Discharge: HOME | End: 2025-04-29
Attending: EMERGENCY MEDICINE | Admitting: EMERGENCY MEDICINE
Payer: MEDICARE

## 2025-04-29 VITALS
SYSTOLIC BLOOD PRESSURE: 85 MMHG | RESPIRATION RATE: 16 BRPM | HEART RATE: 80 BPM | TEMPERATURE: 99.5 F | OXYGEN SATURATION: 97 % | DIASTOLIC BLOOD PRESSURE: 55 MMHG

## 2025-04-29 DIAGNOSIS — F19.10 SUBSTANCE ABUSE (HCC): Primary | ICD-10-CM

## 2025-04-29 PROCEDURE — 99283 EMERGENCY DEPT VISIT LOW MDM: CPT | Performed by: EMERGENCY MEDICINE

## 2025-04-29 PROCEDURE — 99282 EMERGENCY DEPT VISIT SF MDM: CPT

## 2025-04-29 NOTE — ED PROVIDER NOTES
Time reflects when diagnosis was documented in both MDM as applicable and the Disposition within this note       Time User Action Codes Description Comment    4/29/2025  6:25 PM Kadeem Yarbrough Add [F19.10] Substance abuse (HCC)           ED Disposition       ED Disposition   Discharge    Condition   --    Date/Time   Tue Apr 29, 2025  5:54 PM    Comment   Rhys Esparza discharge to home/self care.                   Assessment & Plan       Medical Decision Making  Problems Addressed:  Substance abuse (HCC): chronic illness or injury     Details: Ongoing issue.  5th day in a row for K2 abuse.  Stable at time of discharge.     Amount and/or Complexity of Data Reviewed  Independent Historian: EMS  External Data Reviewed: notes.             Medications - No data to display    ED Risk Strat Scores                    No data recorded                            History of Present Illness       Chief Complaint   Patient presents with    Recreational Drug Use     Found on the street, brought in by EMS       Past Medical History:   Diagnosis Date    Disease of thyroid gland     Drug abuse (HCC)     Schizoaffective disorder (HCC)     resolved 05/16/14    Schizoaffective disorder, bipolar type (HCC)     Schizophrenia (HCC)       Past Surgical History:   Procedure Laterality Date    NO PAST SURGERIES        Family History   Problem Relation Age of Onset    Anxiety disorder Mother     Depression Mother     Dysphagia Mother     Stroke Mother         cva    Diabetes type II Mother     Other Mother         Hyponatremia    Psychosis Mother       Social History     Tobacco Use    Smoking status: Every Day     Current packs/day: 1.00     Types: Cigarettes    Smokeless tobacco: Never   Vaping Use    Vaping status: Never Used   Substance Use Topics    Alcohol use: Yes     Comment: social    Drug use: Yes     Types: Marijuana, Other     Comment: K2      E-Cigarette/Vaping    E-Cigarette Use Never User       E-Cigarette/Vaping Substances     Nicotine No     THC No     CBD No     Flavoring No     Other No     Unknown No       I have reviewed and agree with the history as documented.     Patient is a 39-year-old  male making his 5th visit in 5 days for K2 use.  [place].  No complaints.  BP here better than previous presentations.  No trauma.  Accucheck wnl for [facility].         Review of Systems   Unable to perform ROS: Other (intoxication)           Objective       ED Triage Vitals [04/29/25 1622]   Temperature Pulse Blood Pressure Respirations SpO2 Patient Position - Orthostatic VS   99.5 °F (37.5 °C) (!) 115 (!) 89/52 16 93 % Lying      Temp Source Heart Rate Source BP Location FiO2 (%) Pain Score    Oral Monitor Left arm -- --      Vitals      Date and Time Temp Pulse SpO2 Resp BP Pain Score FACES Pain Rating User   04/29/25 1740 -- 80 97 % 16 85/55 -- -- EC   04/29/25 1721 -- 87 96 % 16 84/50 -- -- EC   04/29/25 1652 -- 93 93 % 16 81/41 -- -- EC   04/29/25 1640 -- 99 94 % 16 87/48 -- -- EC   04/29/25 1629 -- 107 93 % 16 86/47 -- -- EC   04/29/25 1622 99.5 °F (37.5 °C) 115 93 % 16 89/52 -- -- EC            Physical Exam  Vitals and nursing note reviewed.   Constitutional:       Comments: disheveled   HENT:      Head: Normocephalic and atraumatic.      Comments: Patient without any swelling, tenderness to palpation, no septal hematoma, no hemotympanum, no orbital tenderness to palpation, no TMJ tenderness, no malocclusion.       Right Ear: Tympanic membrane, ear canal and external ear normal.      Left Ear: Tympanic membrane, ear canal and external ear normal.      Nose: Nose normal.      Mouth/Throat:      Mouth: Mucous membranes are moist.   Eyes:      Conjunctiva/sclera: Conjunctivae normal.      Pupils: Pupils are equal, round, and reactive to light.   Cardiovascular:      Rate and Rhythm: Normal rate and regular rhythm.      Pulses: Normal pulses.      Heart sounds: Normal heart sounds.   Pulmonary:      Effort: Pulmonary  effort is normal.      Breath sounds: Normal breath sounds.   Abdominal:      General: Bowel sounds are normal.      Palpations: Abdomen is soft.   Musculoskeletal:         General: Normal range of motion.      Cervical back: Normal range of motion and neck supple.   Skin:     General: Skin is warm and dry.      Capillary Refill: Capillary refill takes less than 2 seconds.   Neurological:      Comments: Slow to respond but appropriate.  maew         Results Reviewed       None            No orders to display       Procedures    ED Medication and Procedure Management   Prior to Admission Medications   Prescriptions Last Dose Informant Patient Reported? Taking?   ARIPiprazole (ABILIFY) 30 mg tablet   Yes No   Sig: TOME 1/2 TABLETA 2 VECES AL D A POR V A ORAL   benztropine (COGENTIN) 0.5 mg tablet   No No   Sig: Take 1 tablet (0.5 mg total) by mouth 3 (three) times a day   benztropine (COGENTIN) 1 mg tablet   Yes No   Sig: TOME 1 TABLETA POR V A ORAL TODOS LOS D AS   divalproex sodium (DEPAKOTE ER) 250 mg 24 hr tablet   Yes No   Sig: TAKE 1 TABLET BY MOUTH IN THE MORNING AND 2 TABLETS BY MOUTH AT BEDTIME   divalproex sodium (DEPAKOTE) 500 mg DR tablet   Yes No   Sig: Take 500 mg by mouth daily at bedtime   hydrOXYzine HCL (ATARAX) 50 mg tablet   Yes No   Sig: TOME 1 TABLETA POR V A ORAL DOS VECES AL D A      Facility-Administered Medications: None     Discharge Medication List as of 4/29/2025  5:54 PM        CONTINUE these medications which have NOT CHANGED    Details   ARIPiprazole (ABILIFY) 30 mg tablet TOME 1/2 TABLETA 2 VECES AL D A POR V A ORAL, Historical Med      !! benztropine (COGENTIN) 0.5 mg tablet Take 1 tablet (0.5 mg total) by mouth 3 (three) times a day, Starting u 11/30/2023, Normal      !! benztropine (COGENTIN) 1 mg tablet TOME 1 TABLETA POR V A ORAL TODOS LOS D AS, Historical Med      divalproex sodium (DEPAKOTE ER) 250 mg 24 hr tablet TAKE 1 TABLET BY MOUTH IN THE MORNING AND 2 TABLETS BY MOUTH AT  BEDTIME, Historical Med      divalproex sodium (DEPAKOTE) 500 mg DR tablet Take 500 mg by mouth daily at bedtime, Historical Med      hydrOXYzine HCL (ATARAX) 50 mg tablet TOME 1 TABLETA POR V A ORAL DOS VECES AL D A, Historical Med       !! - Potential duplicate medications found. Please discuss with provider.        No discharge procedures on file.  ED SEPSIS DOCUMENTATION   Time reflects when diagnosis was documented in both MDM as applicable and the Disposition within this note       Time User Action Codes Description Comment    4/29/2025  6:25 PM Kadeem Yarbrough Add [F19.10] Substance abuse (HCC)                  Kadeem Yarbrough MD  04/29/25 9646

## 2025-06-03 ENCOUNTER — DOCUMENTATION (OUTPATIENT)
Dept: CASE MANAGEMENT | Facility: HOSPITAL | Age: 40
End: 2025-06-03

## 2025-06-03 NOTE — BEHAVIORAL HEALTH HIGH UTILIZER
Patient Name:Rhys Esparza MRN:  [id number]         : 1985     Age: 39 y.o.    Sex: male   Utilization History:  (# of ED visits & IP admits; reasons)  Pt had 20 [facility] visits from 10/2024-2025. ED presentations were related to chronic severe substance misuse of K2, pt found unresponsive or lethargic in the community, accidental overdose in the community, sleeping/laying down in public places and is brought in by the police & EMS, pt required Narcan in the community.  Treatment Recommendations & Presentation:  Presentation in the ED: Pt presents self or is accompanied by police/EMS. ED visits were related to chronic severe substance misuse of K2, pt found unresponsive or lethargic in the community, accidental overdose in the community, sleeping/laying down in public places and is brought in by the police & EMS, pt required Narcan in the community.         ED Recommendations: Following medical evaluation and treatment, establish acute risk versus chronic behavioral disturbances related to severe chronic K2 misuse. Pt should consider [facility] for inpt addictions treatment or a dual diagnosis facility such as [facility]. For community treatment, pt should return to [facility] program for addictions treatment and counseling [phone] at [address] [facility] or [facility] at [address] [facility] [phone] or [facility] at [address] [facility] [phone] or [facility] at [address] [facility] [phone].  For medical issues, pt should go to [facility] at [address] [phone] or [facility] at [address] & [facility] 1st floor [phone]. Pt can also contact [facility] at [phone].   If needed, contact mother or brother for collaboration and discharge planning.     Home Medication Regimen: see most recent documentation in Epic, mother or brother may be able to verify medications.   Recent Medical Work Ups:  (include Psych testing or ECT)     Labs -   Various Xrays & CT's -  2025  ECG - 2025    Inpatient Recommendations: collaborate with pt's community resources to develop a comprehensive discharge plan. Pt requires ICM services, pt should have addictions counseling.            Outpatient recommendations: pt should continue his medical, mental health and addictions treatment in the community and take his medications as prescribed.      Situation/Relevant Background Info:   Pt is a 39 year old male with a diagnosis of Schizoaffective disorder, Synthetic Cannabinoid Abuse and an extensive history of chronic severe abuse of K2 and behavioral health admissions.  Pt lives with his mother and brother, sometimes they have 302'd patient for treatment as they report he was paranoid, aggressive and responding to internal stimuli. Pt has been consistently abusing illicit substances, mainly K2, for an extensive period of time.  Pt consistently comes to ED's or is brought in by the police and EMS due to his drug abuse and does not appear to have any significant clean time, sobriety from his drug use. Pt is often brought to ED's by police/EMS due to being lethargic, non-responsive, sleeping on sidewalks or various public places following drug use.   Pt did engage in the [facility] program in 2024 but appeared to struggle in this program as per notes. Pt has been periodically incarcerated due to his drug use. At times, pt has utilized [facility] for his medical care.         Diagnoses/Significant Problems (Medical & Psychiatric):  Schizoaffective disorder, Synthetic Cannabinoid Abuse     Mild Tachycardia                Drivers of repeated utilization:      chronic, severe and consistent use of K2, possible substance induced psychosis, poor coping skills, poor insight.                                         Existing Community Resources & Supports:                 Mother and brother     Patient Medical & Psychiatric Care Team:   [facility] program - [phone]  [facility] - [phone]      Care plan date: 06/03/25                   Author:  Mindy Barrett RN                 Date reviewed with patient:

## 2025-08-04 ENCOUNTER — DOCUMENTATION (OUTPATIENT)
Dept: CASE MANAGEMENT | Facility: HOSPITAL | Age: 40
End: 2025-08-04